# Patient Record
Sex: FEMALE | Race: WHITE | NOT HISPANIC OR LATINO | Employment: PART TIME | URBAN - METROPOLITAN AREA
[De-identification: names, ages, dates, MRNs, and addresses within clinical notes are randomized per-mention and may not be internally consistent; named-entity substitution may affect disease eponyms.]

---

## 2016-12-30 ASSESSMENT — ANXIETY QUESTIONNAIRES
7. FEELING AFRAID AS IF SOMETHING AWFUL MIGHT HAPPEN: 0 = NOT AT ALL
GAD7 TOTAL SCORE: 1
GAD7 TOTAL SCORE: 1

## 2016-12-30 ASSESSMENT — ENCOUNTER SYMPTOMS
BACK PAIN: 1
HEADACHES: 1
NAUSEA: 1
ABDOMINAL PAIN: 1
SORE THROAT: 1
DECREASED LIBIDO: 1
BLOATING: 1
SINUS CONGESTION: 1
ARTHRALGIAS: 1
VOMITING: 1
SINUS PAIN: 1
INCREASED ENERGY: 1
NIGHT SWEATS: 1
FATIGUE: 1
DIARRHEA: 1
STIFFNESS: 1
CONSTIPATION: 1

## 2016-12-31 ASSESSMENT — ANXIETY QUESTIONNAIRES: GAD7 TOTAL SCORE: 1

## 2017-01-04 DIAGNOSIS — D64.9 ANEMIA, UNSPECIFIED TYPE: Primary | ICD-10-CM

## 2017-01-06 DIAGNOSIS — D64.9 ANEMIA, UNSPECIFIED TYPE: ICD-10-CM

## 2017-01-06 LAB
HCT VFR BLD AUTO: 37.2 % (ref 35–47)
HGB BLD-MCNC: 11.4 G/DL (ref 11.7–15.7)

## 2017-01-06 PROCEDURE — 85018 HEMOGLOBIN: CPT | Performed by: INTERNAL MEDICINE

## 2017-01-06 PROCEDURE — 36415 COLL VENOUS BLD VENIPUNCTURE: CPT | Performed by: INTERNAL MEDICINE

## 2017-01-06 PROCEDURE — 85014 HEMATOCRIT: CPT | Performed by: INTERNAL MEDICINE

## 2017-01-11 NOTE — PROGRESS NOTES
"Quick Note:    M[facility] message sent:  \"Ms. Foy,    The repeat labs show your hemoglobin to be back at its baseline.    Hunter Monroy MD  1/10/2017 7:40 PM\"  ______  "

## 2017-01-13 ENCOUNTER — OFFICE VISIT (OUTPATIENT)
Dept: OBGYN | Facility: CLINIC | Age: 37
End: 2017-01-13
Attending: OBSTETRICS & GYNECOLOGY
Payer: COMMERCIAL

## 2017-01-13 VITALS
BODY MASS INDEX: 18.99 KG/M2 | HEIGHT: 65 IN | WEIGHT: 114 LBS | HEART RATE: 72 BPM | DIASTOLIC BLOOD PRESSURE: 79 MMHG | SYSTOLIC BLOOD PRESSURE: 118 MMHG

## 2017-01-13 DIAGNOSIS — N93.9 ABNORMAL UTERINE BLEEDING (AUB): ICD-10-CM

## 2017-01-13 DIAGNOSIS — N64.52 NIPPLE DISCHARGE: ICD-10-CM

## 2017-01-13 DIAGNOSIS — Z01.419 ENCOUNTER FOR GYNECOLOGICAL EXAMINATION WITHOUT ABNORMAL FINDING: Primary | ICD-10-CM

## 2017-01-13 DIAGNOSIS — Z12.4 SCREENING FOR MALIGNANT NEOPLASM OF CERVIX: ICD-10-CM

## 2017-01-13 LAB
PROLACTIN SERPL-MCNC: 4 UG/L (ref 3–27)
TSH SERPL DL<=0.005 MIU/L-ACNC: 3.1 MU/L (ref 0.4–4)

## 2017-01-13 PROCEDURE — 84443 ASSAY THYROID STIM HORMONE: CPT | Performed by: OBSTETRICS & GYNECOLOGY

## 2017-01-13 PROCEDURE — 84146 ASSAY OF PROLACTIN: CPT | Performed by: OBSTETRICS & GYNECOLOGY

## 2017-01-13 PROCEDURE — 82306 VITAMIN D 25 HYDROXY: CPT | Performed by: OBSTETRICS & GYNECOLOGY

## 2017-01-13 PROCEDURE — 87624 HPV HI-RISK TYP POOLED RSLT: CPT | Performed by: OBSTETRICS & GYNECOLOGY

## 2017-01-13 PROCEDURE — G0145 SCR C/V CYTO,THINLAYER,RESCR: HCPCS | Performed by: OBSTETRICS & GYNECOLOGY

## 2017-01-13 PROCEDURE — 99212 OFFICE O/P EST SF 10 MIN: CPT | Mod: ZF

## 2017-01-13 PROCEDURE — 36415 COLL VENOUS BLD VENIPUNCTURE: CPT | Performed by: OBSTETRICS & GYNECOLOGY

## 2017-01-13 NOTE — Clinical Note
2017       RE: Abby Foy  [address]  [place] [address]     Dear Colleague,    Thank you for referring your patient, Abby Foy, to the [facility] at [facility]. Please see a copy of my visit note below.      Progress Note    SUBJECTIVE:  Abby Foy is an 36 year old  , who requests a breast and pelvic exam.    Patient is followed by Stacie for primary care.    Concerns today include: Has been on OCPs since her last delivery, combination pills for most of this time.  Since October she has has irregular bleeding, generally during her withdrawal week , but sometimes heavier and sometimes lighter.  She has had increased breast tenderness and noticed some nipple discharge.  This improved when she stopped massaging her breasts.  She has taken multiple pregnancy tests which have all been negative.  She has been constipated but this is not new.    Menstrual History:  Menstrual History 2013 2013 2016 10/5/2016 2017   LAST MENSTRUAL PERIOD - 2013 2016 9/10/2016 2016   Menarche age - - - - -   Period Cycle (Days) - - - - -   Period Duration (Days) - - - - -   Method of Contraception Combined oral contraceptive - - - -   Period Pattern Regular - - - -   Menstrual Flow - - - - -   Menstrual Control - - - - -   Menstrual Control Change Freq (Hours) - - - - -   Dysmenorrhea - - - - -   Reviewed Today Yes - - - -       Last  PAP      NIL   2014  History of abnormal Pap smear: NO - age 30- 65 PAP every 3 years recommended    Last No results found for this basename: HPV16  Last No results found for this basename: hpv18  Last No results found for this basename: hrhpv    Mammogram current: not applicable    HISTORY:  Prescription Medications as of 2017             methotrexate sodium 2.5 MG TABS Take 10 mg by mouth once a week . Take all 4 tablets on the same day of each week.     sulfaSALAzine ER (AZULFIDINE EN) 500 MG EC tablet Take 2 tablets (1,000 mg) by mouth 2 times daily    folic acid (FOLVITE) 1 MG tablet Take 1 tablet (1 mg) by mouth daily    adalimumab (HUMIRA PEN) 40 MG/0.8ML pen kit Inject 0.8 mLs (40 mg) Subcutaneous every 14 days    levonorgestrel-ethinyl estradiol (AVIANE,ALESSE,LESSINA) 0.1-20 MG-MCG per tablet Take 1 tablet by mouth daily    Fexofenadine HCl (ALLEGRA PO) Take 180 mg by mouth daily    ibuprofen (ADVIL,MOTRIN) 400-800 mg tablet Take 1-2 tablets (400-800 mg) by mouth every 6 hours as needed for other (cramping)    multivitamin peds with iron (FLINTSTONES COMPLETE) 60 MG chewable tablet Take 1 chew tab by mouth daily.        Allergies   Allergen Reactions     No Known Drug Allergy      Shrimp Swelling     Lips and tongue     Walnuts [Nuts] Swelling     Lips and tongue       Immunization History   Administered Date(s) Administered     Influenza (IIV3) 10/12/2011, 10/03/2013     Influenza Vaccine, 3 YRS +, IM (QUADRIVALENT W/PRESERVATIVES) 10/01/2015     Pneumococcal (PCV 13) 2016     Pneumococcal 23 valent 2016     TD (ADULT, 7+) 08/15/2001     TDAP (ADACEL AGES 11-64) 2011     TDAP (BOOSTRIX AGES 10-64) 2014       Obstetric History       T1      TAB0   SAB0   E0   M0   L1      Past Medical History   Diagnosis Date     Contraception 2009     Diagnostic skin and sensitization tests 3/27/13 skin tests all NEGATIVE for environmental and food allergens including shellfish and nuts.      Nonallergic rhinitis      3/27/13 skin tests all NEGATIVE for environmental and food allergens including shellfish and nuts. 3/27/13 followup IgE tests NEG to Peanut, SNF, shrimp, macadamia nut, pistachio and walnut.     Angioedema of lips episode in 3/13--idiopathic     Rheumatoid arthritis of multiple sites with negative rheumatoid factor (H) 2015     No past surgical history on file.  Family History   Problem Relation Age of Onset      "Hypertension Maternal Grandmother      Autoimmune Disease Maternal Grandmother      Autoimmune hepatitis     CANCER Maternal Grandfather      Hypertension Paternal Grandmother      Cancer - colorectal Paternal Grandfather      Cardiovascular Paternal Grandfather      DIABETES No family hx of      CEREBROVASCULAR DISEASE No family hx of      Thyroid Disease No family hx of      Glaucoma No family hx of      Macular Degeneration No family hx of      Hypertension Mother      Autoimmune Disease Mother      Autoimmune hepatitis     Multiple Sclerosis Maternal Aunt      Other Cancer Paternal Grandfather      Hypertension Father      Hyperlipidemia Mother      DIABETES Father      Type 2     Asthma Mother      Social History     Social History     Marital Status:      Spouse Name: N/A     Number of Children: 1     Years of Education: N/A     Occupational History           Social History Main Topics     Smoking status: Never Smoker      Smokeless tobacco: Never Used     Alcohol Use: Yes      Comment: 1-3 drinks/ week     Drug Use: No     Sexual Activity:     Partners: Male     Birth Control/ Protection: Pill, Male Surgical     Other Topics Concern     Parent/Sibling W/ Cabg, Mi Or Angioplasty Before 65f 55m? No     Social History Narrative    How much exercise per week? 4 times    How much calcium per day? 2 servings and multivits       How much caffeine per day? 1 cup    How much vitamin D per day? In foods and sunlight    Do you/your family wear seatbelts?  Yes    Do you/your family use safety helmets? Yes    Do you/your family use sunscreen? Yes    Do you/your family keep firearms in the home? No    Do you/your family have a smoke detector(s)? Yes        Do you feel safe in your home? Yes    Has anyone ever touched you in an unwanted manner? No     Explain                ROS    EXAM:  Blood pressure 118/79, pulse 72, height 1.638 m (5' 4.5\"), weight 51.71 kg (114 lb), last menstrual period 12/24/2016, not " currently breastfeeding. Body mass index is 19.27 kg/(m^2).  General appearance: Pleasant female in no acute distress.     BREAST EXAM:  Breast: Without visible skin changes. No dimpling or lesions seen.   Breasts supple, non-tender with palpation, no dominant mass, nodularity, or nipple discharge noted bilaterally. Axillary nodes negative.      PELVIC EXAM:  EG/BUS: Normal genital architecture without lesions, erythema or abnormal secretions Bartholin's, Urethra, [place]'s normal   Urethral meatus: normal    Urethra: no masses, tenderness, or scarring    Bladder: no masses or tenderness    Vagina: moist, pink, rugae with creamy, white and odorless secretions  Cervix: Multiparous,, no lesions and pink, moist, closed, without lesion or CMT  Uterus: small, smooth, firm, mobile w/o pain  Adnexa: Within normal limits and No masses, nodularity, tenderness  Rectum:anus normal       ASSESSMENT:  Encounter Diagnoses   Name Primary?     Encounter for gynecological examination without abnormal finding [Z01.419] Yes     Screening for malignant neoplasm of cervix      Abnormal uterine bleeding (AUB)      Nipple discharge       36 year old Female Pelvic and Breast Exam    PLAN:   Orders Placed This Encounter   Procedures     Pelvic and Breast Exam Procedure []     Pap Smear Exam []     US GYN Complete Transvaginal - [address]50 (In Clinic)     Pap imaged thin layer screen with HPV - recommended age 30 - 65 years (select HPV order below)     HPV High Risk Types DNA Cervical     25- OH-Vitamin D     TSH with free T4 reflex     Prolactin     Will follow-up after ultrasound and labs via phone call or mychart.    Return in one year/PRN for preventive care or problems/concerns.     Verbalized understanding and agreement with visit plan.      Apryl West MD  Answers for HPI/ROS submitted by the patient on 12/30/2016   ANA 7 TOTAL SCORE: 1  PHQ-2 Depressed: Not at all, Not at all  PHQ-2 Score: 0  General Symptoms: Yes  Skin  Symptoms: Yes  HENT Symptoms: Yes  EYE SYMPTOMS: No  HEART SYMPTOMS: No  LUNG SYMPTOMS: No  INTESTINAL SYMPTOMS: Yes  URINARY SYMPTOMS: No  GYNECOLOGIC SYMPTOMS: Yes  BREAST SYMPTOMS: No  SKELETAL SYMPTOMS: Yes  BLOOD SYMPTOMS: No  NERVOUS SYSTEM SYMPTOMS: Yes  MENTAL HEALTH SYMPTOMS: No  Fatigue: Yes  Night sweats: Yes  Increased stress: Yes  Change in or Loss of Energy: Yes  Itching: Yes  Rashes: Yes  Tinnitus: Yes  Nosebleeds: Yes  Congestion: Yes  Sinus pain: Yes  Mouth sores: Yes  Sore throat: Yes  Nausea: Yes  Vomiting: Yes  Abdominal pain: Yes  Bloating: Yes  Constipation: Yes  Diarrhea: Yes  Back pain: Yes  Joint pain: Yes  Joint stiffness: Yes  Headache: Yes  Bleeding or spotting between periods: Yes  Heavy or painful periods: Yes  Irregular periods: Yes  Vaginal discharge: Yes  Reduced libido: Yes          Again, thank you for allowing me to participate in the care of your patient.      Sincerely,    Apryl West MD

## 2017-01-13 NOTE — PROGRESS NOTES
Progress Note    SUBJECTIVE:  Abby Foy is an 36 year old  , who requests a breast and pelvic exam.    Patient is followed by Stacie for primary care.    Concerns today include: Has been on OCPs since her last delivery, combination pills for most of this time.  Since October she has has irregular bleeding, generally during her withdrawal week , but sometimes heavier and sometimes lighter.  She has had increased breast tenderness and noticed some nipple discharge.  This improved when she stopped massaging her breasts.  She has taken multiple pregnancy tests which have all been negative.  She has been constipated but this is not new.    Menstrual History:  Menstrual History 2013 2013 2016 10/5/2016 2017   LAST MENSTRUAL PERIOD - 2013 2016 9/10/2016 2016   Menarche age - - - - -   Period Cycle (Days) - - - - -   Period Duration (Days) - - - - -   Method of Contraception Combined oral contraceptive - - - -   Period Pattern Regular - - - -   Menstrual Flow - - - - -   Menstrual Control - - - - -   Menstrual Control Change Freq (Hours) - - - - -   Dysmenorrhea - - - - -   Reviewed Today Yes - - - -       Last  PAP      NIL   2014  History of abnormal Pap smear: NO - age 30- 65 PAP every 3 years recommended    Last No results found for this basename: HPV16  Last No results found for this basename: hpv18  Last No results found for this basename: hrhpv    Mammogram current: not applicable    HISTORY:  Prescription Medications as of 2017             methotrexate sodium 2.5 MG TABS Take 10 mg by mouth once a week . Take all 4 tablets on the same day of each week.    sulfaSALAzine ER (AZULFIDINE EN) 500 MG EC tablet Take 2 tablets (1,000 mg) by mouth 2 times daily    folic acid (FOLVITE) 1 MG tablet Take 1 tablet (1 mg) by mouth daily    adalimumab (HUMIRA PEN) 40 MG/0.8ML pen kit Inject 0.8 mLs (40 mg) Subcutaneous every 14 days    levonorgestrel-ethinyl estradiol  (AVIANE,ALESSE,LESSINA) 0.1-20 MG-MCG per tablet Take 1 tablet by mouth daily    Fexofenadine HCl (ALLEGRA PO) Take 180 mg by mouth daily    ibuprofen (ADVIL,MOTRIN) 400-800 mg tablet Take 1-2 tablets (400-800 mg) by mouth every 6 hours as needed for other (cramping)    multivitamin peds with iron (FLINTSTONES COMPLETE) 60 MG chewable tablet Take 1 chew tab by mouth daily.        Allergies   Allergen Reactions     No Known Drug Allergy      Shrimp Swelling     Lips and tongue     Walnuts [Nuts] Swelling     Lips and tongue       Immunization History   Administered Date(s) Administered     Influenza (IIV3) 10/12/2011, 10/03/2013     Influenza Vaccine, 3 YRS +, IM (QUADRIVALENT W/PRESERVATIVES) 10/01/2015     Pneumococcal (PCV 13) 2016     Pneumococcal 23 valent 2016     TD (ADULT, 7+) 08/15/2001     TDAP (ADACEL AGES 11-64) 2011     TDAP (BOOSTRIX AGES 10-64) 2014       Obstetric History       T1      TAB0   SAB0   E0   M0   L1      Past Medical History   Diagnosis Date     Contraception 2009     Diagnostic skin and sensitization tests 3/27/13 skin tests all NEGATIVE for environmental and food allergens including shellfish and nuts.      Nonallergic rhinitis      3/27/13 skin tests all NEGATIVE for environmental and food allergens including shellfish and nuts. 3/27/13 followup IgE tests NEG to Peanut, SNF, shrimp, macadamia nut, pistachio and walnut.     Angioedema of lips episode in 3/13--idiopathic     Rheumatoid arthritis of multiple sites with negative rheumatoid factor (H) 2015     No past surgical history on file.  Family History   Problem Relation Age of Onset     Hypertension Maternal Grandmother      Autoimmune Disease Maternal Grandmother      Autoimmune hepatitis     CANCER Maternal Grandfather      Hypertension Paternal Grandmother      Cancer - colorectal Paternal Grandfather      Cardiovascular Paternal Grandfather      DIABETES No family hx of       "CEREBROVASCULAR DISEASE No family hx of      Thyroid Disease No family hx of      Glaucoma No family hx of      Macular Degeneration No family hx of      Hypertension Mother      Autoimmune Disease Mother      Autoimmune hepatitis     Multiple Sclerosis Maternal Aunt      Other Cancer Paternal Grandfather      Hypertension Father      Hyperlipidemia Mother      DIABETES Father      Type 2     Asthma Mother      Social History     Social History     Marital Status:      Spouse Name: N/A     Number of Children: 1     Years of Education: N/A     Occupational History           Social History Main Topics     Smoking status: Never Smoker      Smokeless tobacco: Never Used     Alcohol Use: Yes      Comment: 1-3 drinks/ week     Drug Use: No     Sexual Activity:     Partners: Male     Birth Control/ Protection: Pill, Male Surgical     Other Topics Concern     Parent/Sibling W/ Cabg, Mi Or Angioplasty Before 65f 55m? No     Social History Narrative    How much exercise per week? 4 times    How much calcium per day? 2 servings and multivits       How much caffeine per day? 1 cup    How much vitamin D per day? In foods and sunlight    Do you/your family wear seatbelts?  Yes    Do you/your family use safety helmets? Yes    Do you/your family use sunscreen? Yes    Do you/your family keep firearms in the home? No    Do you/your family have a smoke detector(s)? Yes        Do you feel safe in your home? Yes    Has anyone ever touched you in an unwanted manner? No     Explain                ROS    EXAM:  Blood pressure 118/79, pulse 72, height 1.638 m (5' 4.5\"), weight 51.71 kg (114 lb), last menstrual period 12/24/2016, not currently breastfeeding. Body mass index is 19.27 kg/(m^2).  General appearance: Pleasant female in no acute distress.     BREAST EXAM:  Breast: Without visible skin changes. No dimpling or lesions seen.   Breasts supple, non-tender with palpation, no dominant mass, nodularity, or nipple discharge noted " bilaterally. Axillary nodes negative.      PELVIC EXAM:  EG/BUS: Normal genital architecture without lesions, erythema or abnormal secretions Bartholin's, Urethra, [place]'s normal   Urethral meatus: normal    Urethra: no masses, tenderness, or scarring    Bladder: no masses or tenderness    Vagina: moist, pink, rugae with creamy, white and odorless secretions  Cervix: Multiparous,, no lesions and pink, moist, closed, without lesion or CMT  Uterus: small, smooth, firm, mobile w/o pain  Adnexa: Within normal limits and No masses, nodularity, tenderness  Rectum:anus normal       ASSESSMENT:  Encounter Diagnoses   Name Primary?     Encounter for gynecological examination without abnormal finding [Z01.419] Yes     Screening for malignant neoplasm of cervix      Abnormal uterine bleeding (AUB)      Nipple discharge       36 year old Female Pelvic and Breast Exam    PLAN:   Orders Placed This Encounter   Procedures     Pelvic and Breast Exam Procedure []     Pap Smear Exam []     US GYN Complete Transvaginal - 69717 (In Clinic)     Pap imaged thin layer screen with HPV - recommended age 30 - 65 years (select HPV order below)     HPV High Risk Types DNA Cervical     25- OH-Vitamin D     TSH with free T4 reflex     Prolactin     Will follow-up after ultrasound and labs via phone call or mychart.    Return in one year/PRN for preventive care or problems/concerns.     Verbalized understanding and agreement with visit plan.      Apryl West MD  Answers for HPI/ROS submitted by the patient on 12/30/2016   ANA 7 TOTAL SCORE: 1  PHQ-2 Depressed: Not at all, Not at all  PHQ-2 Score: 0  General Symptoms: Yes  Skin Symptoms: Yes  HENT Symptoms: Yes  EYE SYMPTOMS: No  HEART SYMPTOMS: No  LUNG SYMPTOMS: No  INTESTINAL SYMPTOMS: Yes  URINARY SYMPTOMS: No  GYNECOLOGIC SYMPTOMS: Yes  BREAST SYMPTOMS: No  SKELETAL SYMPTOMS: Yes  BLOOD SYMPTOMS: No  NERVOUS SYSTEM SYMPTOMS: Yes  MENTAL HEALTH SYMPTOMS: No  Fatigue: Yes  Night  sweats: Yes  Increased stress: Yes  Change in or Loss of Energy: Yes  Itching: Yes  Rashes: Yes  Tinnitus: Yes  Nosebleeds: Yes  Congestion: Yes  Sinus pain: Yes  Mouth sores: Yes  Sore throat: Yes  Nausea: Yes  Vomiting: Yes  Abdominal pain: Yes  Bloating: Yes  Constipation: Yes  Diarrhea: Yes  Back pain: Yes  Joint pain: Yes  Joint stiffness: Yes  Headache: Yes  Bleeding or spotting between periods: Yes  Heavy or painful periods: Yes  Irregular periods: Yes  Vaginal discharge: Yes  Reduced libido: Yes

## 2017-01-16 ENCOUNTER — OFFICE VISIT (OUTPATIENT)
Dept: OBGYN | Facility: CLINIC | Age: 37
End: 2017-01-16
Attending: OBSTETRICS & GYNECOLOGY
Payer: COMMERCIAL

## 2017-01-16 DIAGNOSIS — N93.9 ABNORMAL UTERINE BLEEDING (AUB): ICD-10-CM

## 2017-01-16 LAB — DEPRECATED CALCIDIOL+CALCIFEROL SERPL-MC: 45 UG/L (ref 20–75)

## 2017-01-16 PROCEDURE — 76830 TRANSVAGINAL US NON-OB: CPT | Mod: ZF

## 2017-01-16 NOTE — Clinical Note
1/16/2017       RE: Abby Foy  [address] [facility] [place] [address]     Dear Colleague,    Thank you for referring your patient, Abby Foy, to the [facility] at [facility]. Please see a copy of my visit note below.    36 year old female with LMP 12/24/2016 presents for gynecologic ultrasound indicated by abnormal uterine bleeding on OCP's.  This study was done transvaginally.    Uterine findings:   Presence: Visible Size: Normal 4.7x4.1x2.9 cm.  Endometrium = 3.8 mm.   Cx length = 31.8 mm.      Flexion:  Anteverted Position: Midline Margins: Smooth Shape: Normal   Contour: Regular Texture: Homogeneous Cavity: Normal Masses: Normal    Pelvic findings:    Right Adnexa: Normal   Left Adnexa : dilated left fallopian tube, possible left pyosalpinx   Bladder:  Normal         Cul - de - sac fluid: None    Ovarian follicles:   Right ovary:  2.3x2.4x1.6cm.     Small follicles     Size(s):largest -  [id number]     Left ovary:  2.0x1.76x1.5cm.     0 follicles    Comments:  Thin endometrial stripe, otherwise normal Pelvic US.    KIMMY Flores MD

## 2017-01-16 NOTE — PROGRESS NOTES
36 year old female with LMP 12/24/2016 presents for gynecologic ultrasound indicated by abnormal uterine bleeding on OCP's.  This study was done transvaginally.    Uterine findings:   Presence: Visible Size: Normal 4.7x4.1x2.9 cm.  Endometrium = 3.8 mm.   Cx length = 31.8 mm.      Flexion:  Anteverted Position: Midline Margins: Smooth Shape: Normal   Contour: Regular Texture: Homogeneous Cavity: Normal Masses: Normal    Pelvic findings:    Right Adnexa: Normal   Left Adnexa : dilated left fallopian tube, possible left pyosalpinx   Bladder:  Normal         Cul - de - sac fluid: None    Ovarian follicles:   Right ovary:  2.3x2.4x1.6cm.     Small follicles     Size(s):largest -  [id number]     Left ovary:  2.0x1.76x1.5cm.     0 follicles    Comments:  Thin endometrial stripe, otherwise normal Pelvic US.    KIMMY Flores MD

## 2017-01-16 NOTE — Clinical Note
1/16/2017      RE: Abby Foy  [address] [address] [facility]  [place] [address]       36 year old female with LMP 12/24/2016 presents for gynecologic ultrasound indicated by abnormal uterine bleeding on OCP's.  This study was done transvaginally.    Uterine findings:   Presence: Visible Size: Normal 4.7x4.1x2.9 cm.  Endometrium = 3.8 mm.   Cx length = 31.8 mm.      Flexion:  Anteverted Position: Midline Margins: Smooth Shape: Normal   Contour: Regular Texture: Homogeneous Cavity: Normal Masses: Normal    Pelvic findings:    Right Adnexa: Normal   Left Adnexa : dilated left fallopian tube, possible left pyosalpinx   Bladder:  Normal         Cul - de - sac fluid: None    Ovarian follicles:   Right ovary:  2.3x2.4x1.6cm.     Small follicles     Size(s):largest -  [id number]     Left ovary:  2.0x1.76x1.5cm.     0 follicles    Comments:  Thin endometrial stripe, otherwise normal Pelvic US.    KIMMY Flores MD    S Ultrasound

## 2017-01-17 LAB
COPATH REPORT: NORMAL
PAP: NORMAL

## 2017-01-18 DIAGNOSIS — N70.11 HYDROSALPINX: Primary | ICD-10-CM

## 2017-01-20 LAB
FINAL DIAGNOSIS: NORMAL
HPV HR 12 DNA CVX QL NAA+PROBE: NEGATIVE
HPV16 DNA SPEC QL NAA+PROBE: NEGATIVE
HPV18 DNA SPEC QL NAA+PROBE: NEGATIVE
SPECIMEN DESCRIPTION: NORMAL

## 2017-01-24 ENCOUNTER — TELEPHONE (OUTPATIENT)
Dept: OBGYN | Facility: CLINIC | Age: 37
End: 2017-01-24

## 2017-01-24 NOTE — TELEPHONE ENCOUNTER
Received message from call center. Patient needs an ULS in 2 months per Dr. West. Patient last seen on 1/16. Need to schedule ULS on 3/16.    Attempted to reach patient, N/A LVM.

## 2017-01-27 ENCOUNTER — TELEPHONE (OUTPATIENT)
Dept: OBGYN | Facility: CLINIC | Age: 37
End: 2017-01-27

## 2017-01-27 NOTE — TELEPHONE ENCOUNTER
Returning patient's call to schedule ultrasound in 2 months, begin 3/16. Attempted to reach patient, N/A LVM.

## 2017-01-30 ENCOUNTER — TELEPHONE (OUTPATIENT)
Dept: OBGYN | Facility: CLINIC | Age: 37
End: 2017-01-30

## 2017-02-27 RX ORDER — LEVONORGESTREL/ETHIN.ESTRADIOL 0.1-0.02MG
1 TABLET ORAL DAILY
Qty: 90 TABLET | Refills: 3 | Status: SHIPPED | OUTPATIENT
Start: 2017-02-27 | End: 2017-06-09

## 2017-02-27 NOTE — TELEPHONE ENCOUNTER
Refill request received for OCP . Her last clinic appointment was 1/13/17. Refill completed per [facility] RN protocol.

## 2017-03-16 DIAGNOSIS — Z79.899 HIGH RISK MEDICATION USE: ICD-10-CM

## 2017-03-16 DIAGNOSIS — M47.819 SERONEGATIVE SPONDYLOARTHROPATHY: ICD-10-CM

## 2017-03-16 LAB
ALBUMIN SERPL-MCNC: 3.8 G/DL (ref 3.4–5)
ALP SERPL-CCNC: 44 U/L (ref 40–150)
ALT SERPL W P-5'-P-CCNC: 27 U/L (ref 0–50)
AST SERPL W P-5'-P-CCNC: 20 U/L (ref 0–45)
BASOPHILS # BLD AUTO: 0 10E9/L (ref 0–0.2)
BASOPHILS NFR BLD AUTO: 0.5 %
BILIRUB DIRECT SERPL-MCNC: 0.1 MG/DL (ref 0–0.2)
BILIRUB SERPL-MCNC: 0.3 MG/DL (ref 0.2–1.3)
CREAT SERPL-MCNC: 0.86 MG/DL (ref 0.52–1.04)
CRP SERPL-MCNC: <2.9 MG/L (ref 0–8)
DIFFERENTIAL METHOD BLD: ABNORMAL
EOSINOPHIL # BLD AUTO: 0.2 10E9/L (ref 0–0.7)
EOSINOPHIL NFR BLD AUTO: 3.9 %
ERYTHROCYTE [DISTWIDTH] IN BLOOD BY AUTOMATED COUNT: 11.9 % (ref 10–15)
ERYTHROCYTE [SEDIMENTATION RATE] IN BLOOD BY WESTERGREN METHOD: 7 MM/H (ref 0–20)
GFR SERPL CREATININE-BSD FRML MDRD: 74 ML/MIN/1.7M2
HCT VFR BLD AUTO: 32.4 % (ref 35–47)
HGB BLD-MCNC: 10.1 G/DL (ref 11.7–15.7)
LYMPHOCYTES # BLD AUTO: 2.7 10E9/L (ref 0.8–5.3)
LYMPHOCYTES NFR BLD AUTO: 48 %
MCH RBC QN AUTO: 29.1 PG (ref 26.5–33)
MCHC RBC AUTO-ENTMCNC: 31.2 G/DL (ref 31.5–36.5)
MCV RBC AUTO: 93 FL (ref 78–100)
MONOCYTES # BLD AUTO: 0.4 10E9/L (ref 0–1.3)
MONOCYTES NFR BLD AUTO: 7.3 %
NEUTROPHILS # BLD AUTO: 2.3 10E9/L (ref 1.6–8.3)
NEUTROPHILS NFR BLD AUTO: 40.3 %
PLATELET # BLD AUTO: 173 10E9/L (ref 150–450)
PROT SERPL-MCNC: 7 G/DL (ref 6.8–8.8)
RBC # BLD AUTO: 3.47 10E12/L (ref 3.8–5.2)
WBC # BLD AUTO: 5.6 10E9/L (ref 4–11)

## 2017-03-16 PROCEDURE — 36415 COLL VENOUS BLD VENIPUNCTURE: CPT | Performed by: INTERNAL MEDICINE

## 2017-03-16 PROCEDURE — 82565 ASSAY OF CREATININE: CPT | Performed by: INTERNAL MEDICINE

## 2017-03-16 PROCEDURE — 86140 C-REACTIVE PROTEIN: CPT | Performed by: INTERNAL MEDICINE

## 2017-03-16 PROCEDURE — 85652 RBC SED RATE AUTOMATED: CPT | Performed by: INTERNAL MEDICINE

## 2017-03-16 PROCEDURE — 85025 COMPLETE CBC W/AUTO DIFF WBC: CPT | Performed by: INTERNAL MEDICINE

## 2017-03-16 PROCEDURE — 80076 HEPATIC FUNCTION PANEL: CPT | Performed by: INTERNAL MEDICINE

## 2017-03-20 ENCOUNTER — OFFICE VISIT (OUTPATIENT)
Dept: RHEUMATOLOGY | Facility: CLINIC | Age: 37
End: 2017-03-20
Payer: COMMERCIAL

## 2017-03-20 VITALS
SYSTOLIC BLOOD PRESSURE: 127 MMHG | BODY MASS INDEX: 19.13 KG/M2 | WEIGHT: 114.8 LBS | DIASTOLIC BLOOD PRESSURE: 75 MMHG | HEART RATE: 99 BPM | OXYGEN SATURATION: 99 % | HEIGHT: 65 IN

## 2017-03-20 DIAGNOSIS — D64.9 ANEMIA, UNSPECIFIED TYPE: ICD-10-CM

## 2017-03-20 DIAGNOSIS — R61 NIGHT SWEATS: ICD-10-CM

## 2017-03-20 DIAGNOSIS — M47.819 SERONEGATIVE SPONDYLOARTHROPATHY: Primary | ICD-10-CM

## 2017-03-20 DIAGNOSIS — R51.9 NONINTRACTABLE HEADACHE, UNSPECIFIED CHRONICITY PATTERN, UNSPECIFIED HEADACHE TYPE: ICD-10-CM

## 2017-03-20 DIAGNOSIS — Z79.899 HIGH RISK MEDICATION USE: ICD-10-CM

## 2017-03-20 LAB
DIFFERENTIAL METHOD BLD: ABNORMAL
EOSINOPHIL # BLD AUTO: 0.2 10E9/L (ref 0–0.7)
EOSINOPHIL NFR BLD AUTO: 3 %
ERYTHROCYTE [DISTWIDTH] IN BLOOD BY AUTOMATED COUNT: 12.2 % (ref 10–15)
FERRITIN SERPL-MCNC: 90 NG/ML (ref 12–150)
FOLATE SERPL-MCNC: 49.8 NG/ML
HCT VFR BLD AUTO: 32.8 % (ref 35–47)
HGB BLD-MCNC: 10.3 G/DL (ref 11.7–15.7)
IRON SATN MFR SERPL: 33 % (ref 15–46)
IRON SERPL-MCNC: 119 UG/DL (ref 35–180)
LYMPHOCYTES # BLD AUTO: 2.1 10E9/L (ref 0.8–5.3)
LYMPHOCYTES NFR BLD AUTO: 41 %
MCH RBC QN AUTO: 29.4 PG (ref 26.5–33)
MCHC RBC AUTO-ENTMCNC: 31.4 G/DL (ref 31.5–36.5)
MCV RBC AUTO: 94 FL (ref 78–100)
MONOCYTES # BLD AUTO: 0.3 10E9/L (ref 0–1.3)
MONOCYTES NFR BLD AUTO: 6 %
NEUTROPHILS # BLD AUTO: 2.6 10E9/L (ref 1.6–8.3)
NEUTROPHILS NFR BLD AUTO: 50 %
PLATELET # BLD AUTO: 187 10E9/L (ref 150–450)
RBC # BLD AUTO: 3.5 10E12/L (ref 3.8–5.2)
RETICS # AUTO: 109.6 10E9/L (ref 25–95)
RETICS/RBC NFR AUTO: 3.1 % (ref 0.5–2)
TIBC SERPL-MCNC: 357 UG/DL (ref 240–430)
TRANSFERRIN SERPL-MCNC: 282 MG/DL (ref 210–360)
VIT B12 SERPL-MCNC: 627 PG/ML (ref 193–986)
WBC # BLD AUTO: 5.2 10E9/L (ref 4–11)

## 2017-03-20 PROCEDURE — 82607 VITAMIN B-12: CPT | Performed by: INTERNAL MEDICINE

## 2017-03-20 PROCEDURE — 99214 OFFICE O/P EST MOD 30 MIN: CPT | Performed by: INTERNAL MEDICINE

## 2017-03-20 PROCEDURE — 83540 ASSAY OF IRON: CPT | Performed by: INTERNAL MEDICINE

## 2017-03-20 PROCEDURE — 86704 HEP B CORE ANTIBODY TOTAL: CPT | Performed by: INTERNAL MEDICINE

## 2017-03-20 PROCEDURE — 83550 IRON BINDING TEST: CPT | Performed by: INTERNAL MEDICINE

## 2017-03-20 PROCEDURE — 84165 PROTEIN E-PHORESIS SERUM: CPT | Performed by: INTERNAL MEDICINE

## 2017-03-20 PROCEDURE — 84466 ASSAY OF TRANSFERRIN: CPT | Performed by: INTERNAL MEDICINE

## 2017-03-20 PROCEDURE — 86334 IMMUNOFIX E-PHORESIS SERUM: CPT | Performed by: INTERNAL MEDICINE

## 2017-03-20 PROCEDURE — 85025 COMPLETE CBC W/AUTO DIFF WBC: CPT | Performed by: INTERNAL MEDICINE

## 2017-03-20 PROCEDURE — 36415 COLL VENOUS BLD VENIPUNCTURE: CPT | Performed by: INTERNAL MEDICINE

## 2017-03-20 PROCEDURE — 82784 ASSAY IGA/IGD/IGG/IGM EACH: CPT | Performed by: INTERNAL MEDICINE

## 2017-03-20 PROCEDURE — 82746 ASSAY OF FOLIC ACID SERUM: CPT | Performed by: INTERNAL MEDICINE

## 2017-03-20 PROCEDURE — 86480 TB TEST CELL IMMUN MEASURE: CPT | Performed by: INTERNAL MEDICINE

## 2017-03-20 PROCEDURE — 85045 AUTOMATED RETICULOCYTE COUNT: CPT | Performed by: INTERNAL MEDICINE

## 2017-03-20 PROCEDURE — 00000402 ZZHCL STATISTIC TOTAL PROTEIN: Performed by: INTERNAL MEDICINE

## 2017-03-20 PROCEDURE — 82728 ASSAY OF FERRITIN: CPT | Performed by: INTERNAL MEDICINE

## 2017-03-20 NOTE — PROGRESS NOTES
Rheumatology Clinic Visit      Abby Foy MRN# [id number]   YOB: 1980 Age: 36 year old      Date of visit: 3/20/17   PCP: Levar Slaughter  Dermatologist: Amy Patel  Ophthalmologist: Dr. Mathis     Chief Complaint   Patient presents with:  RECHECK: patient states she is still getting headaches, to the point she vomits and had to go to the ER for fluids. Getting them once a week. Also has spots on her fingers    Assessment and Plan   1. 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 However, given her continued back pain that improved with activity but no radiographic evidence for inflammatory arthritis or osteoarthritis, there was concern that she had inflammatory back pain that would be more consistent with a spondylarthritis. Humira was started and resulted in improvement of her back pain, arguing that she does have axial disease. Sulfasalazine was previously added and has been beneficial. Headaches more frequently though, and therefore we will discontinue methotrexate and sulfasalazine. See #3.   - Discontinue methotrexate 10 mg once weekly   - Continue Humira 40 mg every 14 days  - Discontinue sulfasalazine 1000mg BID    # Pregnancy Planning:  had a vasectomy    2. Iritis History, then diagnosed with Giant Papillary Conjunctivitis: Was evaluated by ophthalmology.    3. Headaches: Unclear etiology and has been worsening over time. Possibly related to methotrexate and sulfasalazine and therefore they are being discontinued. If she does not have improvement of her headaches within 2 weeks, then I have asked that she be evaluated by neurology.  - Discontinue methotrexate  - Discontinue sulfasalazine  - Neurology referral    4. Anemia: Unclear etiology. Not  menstruating for the past several months.    - Labs: Iron, iron binding capacity, reticulocyte count, transferrin, ferritin, CBC, B12, folate    5. No menstruation for the past several months: following with GYN    6. Night sweats: check labs  - Labs: SPEP, Immunofixation, quantiferon TB, HBV core ab    7. Bone Health: 1/2017 vitamin D normal.     8. Vaccinations:   - Influenza: encouraged yearly vaccination  - [id number]: up to date  - [id number]: up to date     Ms. Foy verbalized agreement with and understanding of the rational for the diagnosis and treatment plan.  All questions were answered to best of my ability and the patient's satisfaction. Ms. Foy was advised to contact the clinic with any questions that may arise after the clinic visit.      Thank you for involving me in the care of the patient    Return to clinic: 3 months      HPI   Abby Foy is a 36 year old female with medical history significant for urticaria, H. pylori infection, and iritis? who returns for f/u of seronegative spondyloarthropathy.    Today, Ms. Foy reports that her arthritis is doing well. Morning stiffness for approximately 10 minutes, but may be worse on random mornings. No joint swelling. Back stiffness may occur in the middle of the day it resolves quickly; + gelling phenomenon. She has noticed that her headaches have gotten worse and she has had to go to the [facility] twice for IV fluids and medications. She has also been to her gynecologist because she is no longer menstruating and for these issues.  No pregnant, and her  has now had a vasectomy.    Denies fevers, chills, nausea, vomiting, constipation, diarrhea. No abdominal pain. No chest pain/pressure, palpitations, or shortness of breath. No neck pain. Occasional cold sores; no sores since starting methotrexate.     Tobacco: None  EtOH: 1-2 drinks on the weekends  Drugs: None  Occupation: Dietitian at the [employer]    ROS    GEN: No fevers/chills  SKIN: See HPI  HEENT: Chronic headaches.  See HPI.  CV: No chest pain, pressure, palpitations, or dyspnea on exertion.  PULM: No SOB. No cough or wheezing  GI: No nausea, vomiting, constipation, diarrhea. No blood in stool. +Abdominal bloating.  : No blood in urine.  MSK: See HPI.  NEURO: negative  PSYCH: negative  EXT: No LE edema.     Active Problem List     Patient Active Problem List   Diagnosis     CARDIOVASCULAR SCREENING; LDL GOAL LESS THAN 160     Urticaria     Diagnostic skin and sensitization tests     Nonallergic rhinitis     Angioedema of lips     H. pylori infection     GPC (giant papillary conjunctivitis)     Seronegative spondyloarthropathy     Midline low back pain without sciatica     Abnormal uterine bleeding (AUB)- on OCPs     Past Medical History     Past Medical History   Diagnosis Date     Angioedema of lips episode in 3/13--idiopathic     Contraception 1/28/2009     Diagnostic skin and sensitization tests 3/27/13 skin tests all NEGATIVE for environmental and food allergens including shellfish and nuts.      Nonallergic rhinitis      3/27/13 skin tests all NEGATIVE for environmental and food allergens including shellfish and nuts. 3/27/13 followup IgE tests NEG to Peanut, SNF, shrimp, macadamia nut, pistachio and walnut.     Rheumatoid arthritis of multiple sites with negative rheumatoid factor (H) 12/31/2015     Past Surgical History   History reviewed. No pertinent past surgical history.     Allergy     Allergies   Allergen Reactions     No Known Drug Allergy      Shrimp Swelling     Lips and tongue     Walnuts [Nuts] Swelling     Lips and tongue       Current Medication List     Current Outpatient Prescriptions   Medication Sig     levonorgestrel-ethinyl estradiol (AVIANE,ALESSE,LESSINA) 0.1-20 MG-MCG per tablet Take 1 tablet by mouth daily     methotrexate sodium 2.5 MG TABS Take 10 mg by mouth once a week . Take all 4 tablets on the same day of each week.      sulfaSALAzine ER (AZULFIDINE EN) 500 MG EC tablet Take 2 tablets (1,000 mg) by mouth 2 times daily     folic acid (FOLVITE) 1 MG tablet Take 1 tablet (1 mg) by mouth daily     adalimumab (HUMIRA PEN) 40 MG/0.8ML pen kit Inject 0.8 mLs (40 mg) Subcutaneous every 14 days     Fexofenadine HCl (ALLEGRA PO) Take 180 mg by mouth daily     ibuprofen (ADVIL,MOTRIN) 400-800 mg tablet Take 1-2 tablets (400-800 mg) by mouth every 6 hours as needed for other (cramping)     multivitamin peds with iron (FLINTSTONES COMPLETE) 60 MG chewable tablet Take 1 chew tab by mouth daily.     No current facility-administered medications for this visit.      Social History   See HPI    Family History     Family History   Problem Relation Age of Onset     Hypertension Maternal Grandmother      Autoimmune Disease Maternal Grandmother      Autoimmune hepatitis     CANCER Maternal Grandfather      Hypertension Paternal Grandmother      Cancer - colorectal Paternal Grandfather      Cardiovascular Paternal Grandfather      Other Cancer Paternal Grandfather      Hypertension Mother      Autoimmune Disease Mother      Autoimmune hepatitis     Hyperlipidemia Mother      Asthma Mother      Hypertension Father      DIABETES Father      Type 2     Multiple Sclerosis Maternal Aunt      CEREBROVASCULAR DISEASE No family hx of      Thyroid Disease No family hx of      Glaucoma No family hx of      Macular Degeneration No family hx of      Breast Cancer No family hx of      Colon Cancer No family hx of      Physical Exam     Temp Readings from Last 3 Encounters:   10/05/16 98  F (36.7  C) (Oral)   09/26/16 98.5  F (36.9  C) (Oral)   08/02/16 98.4  F (36.9  C) (Oral)     BP Readings from Last 5 Encounters:   03/20/17 127/75   01/13/17 118/79   12/22/16 109/70   10/05/16 117/77   09/26/16 107/74     Pulse Readings from Last 1 Encounters:   03/20/17 99     Resp Readings from Last 1 Encounters:   03/28/16 16     Estimated body mass index is 19.4 kg/(m^2) as  "calculated from the following:    Height as of this encounter: 1.638 m (5' 4.5\").    Weight as of this encounter: 52.1 kg (114 lb 12.8 oz).    GEN: NAD  HEENT: MMM. Anicteric, noninjected sclera; eyes appear to have good moisture by gross examination  CV: S1, S2. RRR. No m/r/g.  PULM: CTA bilaterally. No w/c.  MSK: Tenderness palpation without synovial swelling of the right third and fourth PIPs. Wrists, elbows, and shoulders without swelling or tenderness palpation. Hips nontender to direct palpation. Knees, ankles, and feet without swelling or tenderness to palpation. Spine tender to palpation only in the lumbar area.  NEURO: normal gait  SKIN: pearly lesions on several fingers; nontender to palpation  EXT: No LE edema  PSYCH: Alert. Appropriate.    Labs / Imaging (select studies)   RF/CCP  Recent Labs   Lab Test  12/21/15   1447   CCPABY  <20  Interpretation:  Negative     RHF  <20     MELY/RNP/Sm/SSA/SSB/dsDNA  Recent Labs   Lab Test  08/05/15   1621  06/30/15   1636  08/06/13   1500   MARCIE   --   <1.0  Interpretation:  Negative     --    SSAIGG  <0.2  Negative   Antibody index (AI) values reflect qualitative changes in antibody   concentration that cannot be directly associated with clinical condition or   disease state.     --    --    SSBIGG  <0.2  Negative   Antibody index (AI) values reflect qualitative changes in antibody   concentration that cannot be directly associated with clinical condition or   disease state.     --    --    TREPAB   --    --   Negative   [id number]   --   116   --    [id number]   --   21   --      Recent Labs   Lab Test  06/30/15   1636   DNA  <15  Interpretation:  Negative       Send-out Labs  Recent Labs   Lab Test  03/31/10   1135   STSTNM  AMH ANTIMULLERIEN HORMONE   SSPTYP  Serum     Antiphospholipid Antibodies  Recent Labs   Lab Test  06/30/15   1636   CARG  <15.0  Interpretation:  Negative     FRED  <12.5  Interpretation:  Negative       HLA-B27  Recent Labs   Lab Test  12/21/15   " 1447   [id number]  SSOP   B1  B27 Neg     CBC  Recent Labs   Lab Test  03/16/17   1431  01/06/17   1007  12/22/16   0945  09/26/16   1444   WBC  5.6   --   5.5  7.7   RBC  3.47*   --   3.55*  3.71*   HGB  10.1*  11.4*  10.0*  10.9*   HCT  32.4*  37.2  32.9*  34.3*   MCV  93   --   93  93   RDW  11.9   --   12.3  12.5   PLT  173   --   172  222   MCH  29.1   --   28.2  29.4   MCHC  31.2*   --   30.4*  31.8   NEUTROPHIL  40.3   --   52.0  59.1   LYMPH  48.0   --   36.7  28.9   MONOCYTE  7.3   --   6.9  6.5   EOSINOPHIL  3.9   --   4.0  5.2   BASOPHIL  0.5   --   0.4  0.3   ANEU  2.3   --   2.9  4.5   ALYM  2.7   --   2.0  2.2   ANA MARÍA  0.4   --   0.4  0.5   AEOS  0.2   --   0.2  0.4   ABAS  0.0   --   0.0  0.0     CMP  Recent Labs   Lab Test  03/16/17   1431  12/22/16   0945  09/26/16   1444   06/30/15   1636  11/05/09   1058   02/02/09   0940   NA   --    --    --    --   140   --    --   141   POTASSIUM   --    --    --    --   4.1   --    --   4.1   CHLORIDE   --    --    --    --   105   --    --   103   CO2   --    --    --    --   30   --    --   25   ANIONGAP   --    --    --    --   5   --    --   13   GLC   --    --    --    --   73  79   --   79   BUN   --    --    --    --   9   --    --   8   CR  0.86  0.88  0.77   < >  0.80   --    --   0.88   GFRESTIMATED  74  73  85   < >  83   --    --   77   GFRESTBLACK  90  88  >90   GFR Calc     < >  >90   GFR Calc     --    --   >90   DAISY   --    --    --    --   9.4   --    --   9.0   BILITOTAL  0.3  0.3  0.2   < >  0.5   --    < >   --    ALBUMIN  3.8  4.0  3.7   < >  4.1   --    < >   --    PROTTOTAL  7.0  7.0  7.4   < >  8.1   --    < >   --    ALKPHOS  44  44  66   < >  65   --    < >   --    AST  20  21  23   < >  19   --    < >   --    ALT  27  27  29   < >  26   --    < >   --     < > = values in this interval not displayed.     Calcium/VitaminD  Recent Labs   Lab Test  01/13/17   1355  12/21/15   1447  06/30/15   7001   "01/09/14   0906   11/10/10   1335  11/05/09   1058  02/02/09   0940   DAISY   --    --   9.4   --    --    --    --   9.0   D3VIT   --    --    --    --    --   45  51   --    VITDT  45  74   --   35   < >   --    --    --     < > = values in this interval not displayed.     ESR/CRP  Recent Labs   Lab Test  03/16/17   1431  12/22/16   0945  09/26/16   1444   SED  7  6  20   CRP  <2.9  <2.9  23.3*     TSH/T4  Recent Labs   Lab Test  01/13/17   1355  08/05/15   1621  06/28/13   0853   TSH  3.10  1.11  1.47     Lipid Panel  Recent Labs   Lab Test  11/05/09   1058   CHOL  211*   TRIG  67   HDL  82   LDL  115   VLDL  13   CHOLHDLRATIO  3.0     Hepatitis B  Recent Labs   Lab Test  03/28/16   1442  08/06/13   1500  11/10/10   1335   AUSAB  264.69*   --    --    HEPBANG  Nonreactive  Negative  Negative     Hepatitis C  Recent Labs   Lab Test  12/21/15   1447   HCVAB  Nonreactive   Assay performance characteristics have not been established for newborns,   infants, and children       Lyme confirmation testing by Western Blot  Recent Labs   Lab Test  08/05/15   1621   LYWG  Negative  Reference range: Negative  (Note)  Band(s) present: NONE  (Insufficient number of bands for positive result)  INTERPRETIVE INFORMATION: Borrelia Burgdorferi Ab, IgG  Western Blot  For this assay, a positive result is reported when any 5 or  more of the following 10 bands are present: 18, 23, 28, 30,  39, 41, 45, 58, 66, or 93 kDa.  All other banding patterns  are reported as negative.  Performed by [employer],  [address] [place] [id number] [phone]  [email or web address], Pete Saha MD, Lab. Director       Tuberculosis Screening  Recent Labs   Lab Test  03/28/16   1443   TBRSLT  Negative   TBAGN  0.04     HIV Screening  Recent Labs   Lab Test  12/21/15   1447   HIAGAB  Nonreactive   HIV-1 p24 Ag & HIV-1/HIV-2 Ab Not Detected       \"XR SACROILIAC JOINT 1/2 VW 12/21/2015 3:23 PM  HISTORY: Pain.  COMPARISON: None.  FINDINGS: Sacroiliac " "joints are patent and symmetric. No acute osseous  abnormality.  IMPRESSION  IMPRESSION: Normal sacroiliac joints.  YUE JARRELL MD\"    \"XR LUMBAR SPINE 2-3 VIEWS 12/21/2015 3:23 PM  HISTORY: Pain.  COMPARISON: None.  FINDINGS: Lumbar alignment is anatomic. Vertebral body heights and  intervertebral disc spaces are preserved.  IMPRESSION  IMPRESSION: Normal lumbar spine.  YUE JARRELL MD\"    \"XR HAND BILATERAL G/E 3 VW 12/21/2015 3:50 PM  HISTORY: Pain in unspecified joint   IMPRESSION  IMPRESSION: Negative.  LINNEA ZELAYA MD\"    \"XR CHEST 2 VW 6/30/2015 4:54 PM  HISTORY: Pain.  COMPARISON: None.  IMPRESSION  IMPRESSION: The lungs are clear. No focal pulmonary opacities. Heart  and mediastinum are unremarkable. No acute cardiopulmonary  abnormalities.  SO PHAN MD\"      Immunization History     Immunization History   Administered Date(s) Administered     Influenza (IIV3) 10/12/2011, 10/03/2013     Influenza Vaccine, 3 YRS +, IM (QUADRIVALENT W/PRESERVATIVES) 10/01/2015     Pneumococcal (PCV 13) 09/26/2016     Pneumococcal 23 valent 12/22/2016     TD (ADULT, 7+) 08/15/2001     TDAP (ADACEL AGES 11-64) 06/20/2011     TDAP (BOOSTRIX AGES 10-64) 03/06/2014          Chart documentation done in part with Dragon Voice recognition Software. Although reviewed after completion, some word and grammatical error may remain.    Hunter Monroy MD  "

## 2017-03-20 NOTE — NURSING NOTE
"Chief Complaint   Patient presents with     RECHECK     patient states she is still getting headaches, to the point she vomits and had to go to the ER for fluids. Getting them once a week. Also has spots on her fingers       Initial /75 (BP Location: Left arm, Patient Position: Chair, Cuff Size: Adult Regular)  Pulse 99  Ht 1.638 m (5' 4.5\")  Wt 52.1 kg (114 lb 12.8 oz)  SpO2 99%  BMI 19.4 kg/m2 Estimated body mass index is 19.4 kg/(m^2) as calculated from the following:    Height as of this encounter: 1.638 m (5' 4.5\").    Weight as of this encounter: 52.1 kg (114 lb 12.8 oz).  BP completed using cuff size: regular         RAPID3 (0-30) Cumulative Score  1.5          RAPID3 Weighted Score (divide #4 by 3 and that is the weighted score)  0.5         "

## 2017-03-20 NOTE — PATIENT INSTRUCTIONS
Dr. Monroy s Rheumatology Clinics  Locations Clinic Hours Telephone Number   [place]  [address]  [place] [address]     Wednesday: 7:20AM - 4:00PM  Thursday:     7:20AM - 4:00PM  Friday:          7:20AM - 11:00AM       To schedule an appointment with  Dr. Monroy,  please contact  Specialty [phone]       [place]  [address]  [place] [address]       Monday:       7:20AM - 4:00PM      [place]  [address]  [place] [address]       Tuesday:      7:20AM - 4:00PM          Thank you!    Maddie Hennessy CMA

## 2017-03-20 NOTE — MR AVS SNAPSHOT
After Visit Summary   3/20/2017    Abby Foy    MRN: [id number]           Patient Information     Date Of Birth          1980        Visit Information        Provider Department      3/20/2017 2:20 PM Hunter Monroy MD [facility]        Today's Diagnoses     Seronegative spondyloarthropathy    -  1    Nonintractable headache, unspecified chronicity pattern, unspecified headache type        Night sweats        Anemia, unspecified type        High risk medication use          Care Instructions      Dr. Monroy s Rheumatology Clinics  Locations Clinic Hours Telephone Number   [place]  [address] [place] [address]     Wednesday: 7:20AM - 4:00PM  Thursday:     7:20AM - 4:00PM  Friday:          7:20AM - 11:00AM       To schedule an appointment with  Dr. Monroy,  please contact  Specialty [phone]       [facility]  [address]  [place] [address]       Monday:       7:20AM - 4:00PM      [facility]  [address]  [place] [address]       Tuesday:      7:20AM - 4:00PM          Thank you!    Maddie Hennessy CMA            Follow-ups after your visit        Additional Services     NEUROLOGY ADULT REFERRAL       Your provider has referred you to: FMG: [facility] [phone]   [email or web address]  UMP: Neurology [facility] [phone]   [email or web address]  General Neurology  N: [facility] [phone]   [email or web address]  [facility]: [facility] [phone]   [email or web address]    Reason for Referral: Consult    Please be aware that coverage of these services is subject to the terms and limitations of your health insurance plan.  Call member services at your health plan with any benefit or coverage questions.       Please bring the following with you to your appointment:    (1) Any X-Rays, CTs or MRIs which have been performed.  Contact the facility where they were done to arrange for  prior to your scheduled appointment.    (2) List of current medications  (3) This referral request   (4) Any documents/labs given to you for this referral                  Follow-up notes from your care team     Return in about 2 months (around 5/20/2017).      Your next 10 appointments already scheduled     Apr 14, 2017 10:00 AM CDT   ULTRASOUND with [facility] ULTRASOUND   [facility] ([facility])    [facility] [address]  [address]  [facility] [address]   [phone]            Jun 19, 2017  3:00 PM CDT   Return Visit with Hunter Monroy MD   [facility] [place] ([facility])    [address] [address] [phone]              Who to contact     If you have questions or need follow up information about today's clinic visit or your schedule please contact [facility] directly at [phone].  Normal or non-critical lab and imaging results will be communicated to you by MyChart, letter or phone within 4 business days after the clinic has received the results. If you do not hear from us within 7 days, please contact the clinic through [employer] or phone. If you have a critical or abnormal lab result, we will notify you by phone as soon as possible.  Submit refill requests through [employer] or call your pharmacy and they will forward the refill request to us. Please allow 3 business days for your refill to be completed.          Additional Information About Your Visit        [employer] Information     [employer] gives you secure access to your electronic health record. If you see a primary care provider, you can also send messages to your care team and make appointments. If you have questions, please call your primary care  "clinic.  If you do not have a primary care provider, please call [phone] and they will assist you.        Care EveryWhere ID     This is your Care EveryWhere ID. This could be used by other organizations to access your [facility] medical records  [id number]        Your Vitals Were     Pulse Height Pulse Oximetry BMI (Body Mass Index)          99 1.638 m (5' 4.5\") 99% 19.4 kg/m2         Blood Pressure from Last 3 Encounters:   03/20/17 127/75   01/13/17 118/79   12/22/16 109/70    Weight from Last 3 Encounters:   03/20/17 52.1 kg (114 lb 12.8 oz)   01/13/17 51.7 kg (114 lb)   12/22/16 51.2 kg (112 lb 12.8 oz)              We Performed the Following     CBC with platelets differential     Ferritin     Folate     Hepatitis B core antibody     Iron and iron binding capacity     M Tuberculosis by Quantiferon     NEUROLOGY ADULT REFERRAL     Protein electrophoresis     Protein Immunofixation Serum     Reticulocyte count     Transferrin     Vitamin B12          Today's Medication Changes          These changes are accurate as of: 3/20/17  3:04 PM.  If you have any questions, ask your nurse or doctor.               Stop taking these medicines if you haven't already. Please contact your care team if you have questions.     methotrexate sodium 2.5 MG Tabs   Stopped by:  Hunter Monroy MD           sulfaSALAzine  MG EC tablet   Commonly known as:  AZULFIDINE EN   Stopped by:  Hunter Monroy MD                Where to get your medicines      These medications were sent to [facility] - [place], [place] - [address] [place] [address]  [address] [place] [address], [facility] [address]    Hours:  Mon-Fri 8:30am-5:00pm Toll Free [phone] Phone:  [phone]     adalimumab 40 MG/0.8ML pen kit                Primary Care Provider Office Phone #    [facility] [phone]       No address on file        Thank you!     Thank you for choosing [facility]  for your care. Our goal is " always to provide you with excellent care. Hearing back from our patients is one way we can continue to improve our services. Please take a few minutes to complete the written survey that you may receive in the mail after your visit with us. Thank you!             Your Updated Medication List - Protect others around you: Learn how to safely use, store and throw away your medicines at www.disposemymeds.org.          This list is accurate as of: 3/20/17  3:04 PM.  Always use your most recent med list.                   Brand Name Dispense Instructions for use    adalimumab 40 MG/0.8ML pen kit    HUMIRA PEN    2 each    Inject 0.8 mLs (40 mg) Subcutaneous every 14 days       ALLEGRA PO      Take 180 mg by mouth daily       folic acid 1 MG tablet    FOLVITE    100 tablet    Take 1 tablet (1 mg) by mouth daily       ibuprofen 400-800 mg tablet    ADVIL,MOTRIN    90 tablet    Take 1-2 tablets (400-800 mg) by mouth every 6 hours as needed for other (cramping)       levonorgestrel-ethinyl estradiol 0.1-20 MG-MCG per tablet    THIEN JARAMILLO LESSINA    90 tablet    Take 1 tablet by mouth daily       multivitamin  peds with iron 60 MG chewable tablet      Take 1 chew tab by mouth daily.

## 2017-03-21 LAB
ALBUMIN SERPL ELPH-MCNC: 4.4 G/DL (ref 3.7–5.1)
ALPHA1 GLOB SERPL ELPH-MCNC: 0.3 G/DL (ref 0.2–0.4)
ALPHA2 GLOB SERPL ELPH-MCNC: 0.6 G/DL (ref 0.5–0.9)
B-GLOBULIN SERPL ELPH-MCNC: 0.6 G/DL (ref 0.6–1)
GAMMA GLOB SERPL ELPH-MCNC: 1 G/DL (ref 0.7–1.6)
HBV CORE AB SERPL QL IA: NONREACTIVE
IGA SERPL-MCNC: 99 MG/DL (ref 70–380)
IGG SERPL-MCNC: 986 MG/DL (ref 695–1620)
IGM SERPL-MCNC: 166 MG/DL (ref 60–265)
M PROTEIN SERPL ELPH-MCNC: 0 G/DL
PROT PATTERN SERPL ELPH-IMP: NORMAL
PROT PATTERN SERPL IFE-IMP: NORMAL

## 2017-03-22 DIAGNOSIS — D64.9 ANEMIA, UNSPECIFIED TYPE: Primary | ICD-10-CM

## 2017-03-22 LAB
M TB TUBERC IFN-G BLD QL: NEGATIVE
M TB TUBERC IFN-G/MITOGEN IGNF BLD: 0 IU/ML

## 2017-03-22 NOTE — PROGRESS NOTES
"MyChart message sent:  \"Ms. Foy,    Labs are normal except for the persistent anemia; you also have an elevated reticulocyte count that means your bone marrow is working harder to increase the hemoglobin.  This indicates that you are still losing blood somehow.  I put in additional blood test orders to look for hemolysis (breaking down of the red blood cells).  Please have a blood draw for these labs.      Tuberculosis test is still pending.      Sincerely,  Hunter Monroy MD  3/22/2017 6:18 AM\""

## 2017-03-24 DIAGNOSIS — D64.9 ANEMIA, UNSPECIFIED TYPE: ICD-10-CM

## 2017-03-24 LAB
BASOPHILS # BLD AUTO: 0 10E9/L (ref 0–0.2)
BASOPHILS NFR BLD AUTO: 0.3 %
DAT POLY-SP REAG RBC QL: NORMAL
DIFFERENTIAL METHOD BLD: ABNORMAL
EOSINOPHIL # BLD AUTO: 0.2 10E9/L (ref 0–0.7)
EOSINOPHIL NFR BLD AUTO: 3 %
ERYTHROCYTE [DISTWIDTH] IN BLOOD BY AUTOMATED COUNT: 12.1 % (ref 10–15)
HCT VFR BLD AUTO: 34.8 % (ref 35–47)
HGB BLD-MCNC: 10.9 G/DL (ref 11.7–15.7)
LDH SERPL L TO P-CCNC: 159 U/L (ref 81–234)
LYMPHOCYTES # BLD AUTO: 2 10E9/L (ref 0.8–5.3)
LYMPHOCYTES NFR BLD AUTO: 28.7 %
MCH RBC QN AUTO: 29.1 PG (ref 26.5–33)
MCHC RBC AUTO-ENTMCNC: 31.3 G/DL (ref 31.5–36.5)
MCV RBC AUTO: 93 FL (ref 78–100)
MONOCYTES # BLD AUTO: 0.4 10E9/L (ref 0–1.3)
MONOCYTES NFR BLD AUTO: 5.3 %
NEUTROPHILS # BLD AUTO: 4.3 10E9/L (ref 1.6–8.3)
NEUTROPHILS NFR BLD AUTO: 62.7 %
PLATELET # BLD AUTO: 206 10E9/L (ref 150–450)
RBC # BLD AUTO: 3.74 10E12/L (ref 3.8–5.2)
RETICS # AUTO: 73.7 10E9/L (ref 25–95)
RETICS/RBC NFR AUTO: 1.9 % (ref 0.5–2)
WBC # BLD AUTO: 6.9 10E9/L (ref 4–11)

## 2017-03-24 PROCEDURE — 85025 COMPLETE CBC W/AUTO DIFF WBC: CPT | Performed by: INTERNAL MEDICINE

## 2017-03-24 PROCEDURE — 85045 AUTOMATED RETICULOCYTE COUNT: CPT | Performed by: INTERNAL MEDICINE

## 2017-03-24 PROCEDURE — 85060 BLOOD SMEAR INTERPRETATION: CPT | Performed by: INTERNAL MEDICINE

## 2017-03-24 PROCEDURE — 83615 LACTATE (LD) (LDH) ENZYME: CPT | Performed by: INTERNAL MEDICINE

## 2017-03-24 PROCEDURE — 36415 COLL VENOUS BLD VENIPUNCTURE: CPT | Performed by: INTERNAL MEDICINE

## 2017-03-24 PROCEDURE — 83010 ASSAY OF HAPTOGLOBIN QUANT: CPT | Performed by: INTERNAL MEDICINE

## 2017-03-24 PROCEDURE — 86880 COOMBS TEST DIRECT: CPT | Performed by: INTERNAL MEDICINE

## 2017-03-24 NOTE — PROGRESS NOTES
"[employer] message sent:  \"Ms. Foy,    Tuberculosis test was negative.    Sincerely,  Hunter Monroy MD  3/24/2017 7:21 AM\""

## 2017-03-27 LAB
COPATH REPORT: NORMAL
HAPTOGLOB SERPL-MCNC: 25 MG/DL (ref 15–200)

## 2017-04-02 NOTE — PROGRESS NOTES
"[employer] message sent:  \"Ms. Foy,    No clear cause for the anemia.  The labs were normal except for the anemia; but there was improvement of the hemoglobin on the most recent labs.    Let's monitor for now and if it persists then can consider having hematology evaluate.    Sincerely,  Hunter Monroy MD  4/1/2017 11:09 PM\""

## 2017-04-11 ENCOUNTER — MYC MEDICAL ADVICE (OUTPATIENT)
Dept: OBGYN | Facility: CLINIC | Age: 37
End: 2017-04-11

## 2017-04-11 DIAGNOSIS — N70.11 HYDROSALPINX: Primary | ICD-10-CM

## 2017-04-14 ENCOUNTER — OFFICE VISIT (OUTPATIENT)
Dept: OBGYN | Facility: CLINIC | Age: 37
End: 2017-04-14
Attending: OBSTETRICS & GYNECOLOGY
Payer: COMMERCIAL

## 2017-04-14 DIAGNOSIS — N93.9 ABNORMAL UTERINE BLEEDING (AUB): Primary | ICD-10-CM

## 2017-04-14 DIAGNOSIS — N70.11 HYDROSALPINX: ICD-10-CM

## 2017-04-14 PROCEDURE — 76857 US EXAM PELVIC LIMITED: CPT | Mod: ZF

## 2017-04-14 RX ORDER — DOXYCYCLINE 100 MG/1
100 CAPSULE ORAL 2 TIMES DAILY
Qty: 20 CAPSULE | Refills: 0 | Status: SHIPPED | OUTPATIENT
Start: 2017-04-14 | End: 2017-04-24

## 2017-04-14 NOTE — MR AVS SNAPSHOT
After Visit Summary   4/14/2017    Abby Foy    MRN: [id number]           Patient Information     Date Of Birth          1980        Visit Information        Provider Department      4/14/2017 10:00 AM [facility] ULTRASOUND [facility]        Today's Diagnoses     Abnormal uterine bleeding (AUB)- on OCPs    -  1    Hydrosalpinx           Follow-ups after your visit        Your next 10 appointments already scheduled     May 22, 2017  3:00 PM CDT   Return Visit with Hunter Monroy MD   [facility] [place] ([facility])    [address]  [place] [address] [phone]              Who to contact     Please call your clinic at [phone] to:    Ask questions about your health    Make or cancel appointments    Discuss your medicines    Learn about your test results    Speak to your doctor   If you have compliments or concerns about an experience at your clinic, or if you wish to file a complaint, please contact [facility] Patient Relations at [phone] or email us at [email or web address]         Additional Information About Your Visit        MyChart Information     [employer] gives you secure access to your electronic health record. If you see a primary care provider, you can also send messages to your care team and make appointments. If you have questions, please call your primary care clinic.  If you do not have a primary care provider, please call [phone] and they will assist you.      [employer] is an electronic gateway that provides easy, online access to your medical records. With [employer], you can request a clinic appointment, read your test results, renew a prescription or communicate with your care team.     To access your existing account, please contact your [facility] or call [phone] for assistance.        Care EveryWhere ID     This is your Care  EveryWhere ID. This could be used by other organizations to access your [facility] medical records  [id number]         Blood Pressure from Last 3 Encounters:   03/20/17 127/75   01/13/17 118/79   12/22/16 109/70    Weight from Last 3 Encounters:   03/20/17 52.1 kg (114 lb 12.8 oz)   01/13/17 51.7 kg (114 lb)   12/22/16 51.2 kg (112 lb 12.8 oz)              We Performed the Following     Gyn,Limited (Follow up US) (In Clinic) [id number]        Primary Care Provider Office Phone #    Ritu Trevino [facility] [phone]       No address on file        Thank you!     Thank you for choosing [facility]  for your care. Our goal is always to provide you with excellent care. Hearing back from our patients is one way we can continue to improve our services. Please take a few minutes to complete the written survey that you may receive in the mail after your visit with us. Thank you!             Your Updated Medication List - Protect others around you: Learn how to safely use, store and throw away your medicines at www.disposemymeds.org.          This list is accurate as of: 4/14/17  1:35 PM.  Always use your most recent med list.                   Brand Name Dispense Instructions for use    adalimumab 40 MG/0.8ML pen kit    HUMIRA PEN    2 each    Inject 0.8 mLs (40 mg) Subcutaneous every 14 days       ALLEGRA PO      Take 180 mg by mouth daily       folic acid 1 MG tablet    FOLVITE    100 tablet    Take 1 tablet (1 mg) by mouth daily       ibuprofen 400-800 mg tablet    ADVIL,MOTRIN    90 tablet    Take 1-2 tablets (400-800 mg) by mouth every 6 hours as needed for other (cramping)       levonorgestrel-ethinyl estradiol 0.1-20 MG-MCG per tablet    THIEN JARAMILLOLESSINA    90 tablet    Take 1 tablet by mouth daily       multivitamin  peds with iron 60 MG chewable tablet      Take 1 chew tab by mouth daily.

## 2017-04-14 NOTE — LETTER
4/14/2017       RE: Abby Foy  [address] [address] [place] [address]     Dear Colleague,    Thank you for referring your patient, Abby Foy, to the [facility] at [facility]. Please see a copy of my visit note below.    36 year old female with LMP 03/29/2017 presents for gynecologic ultrasound indicated by left hydrosalpinx, AUB.  This study was done transvaginally.    Uterine findings:   Presence: Visible Size: Normal  Endometrium = 4.2 mm          Pelvic findings:    Right Adnexa: Normal   Left Adnexa: Abnormal - dilated left fallopian tube as seen on previous.   Bladder:  Normal         Cul - de - sac fluid: None    Ovarian follicles:   Right ovary:  1.5x2.0x1.7cm.     Small follicles     Size(s):  Less than 1cm     Left ovary:  1.9x1.8x1.2cm.     Small follicles     Size(s):  Less than 1cm      Comments:  Stable left adnexal mass, consistent with dilated fallopian tube.  Thin endometrium.        KMIMY Flores MD

## 2017-04-14 NOTE — TELEPHONE ENCOUNTER
I called Abby to discuss her concerns and her ultrasound results from today.  The endometrium is still very thin so this explains the scant bleeding during the withdrawal week of her pill.  The left swollen fallopian tube is unchanged and given she is having more symptoms with pain and dysmenorrhea this may be related.  We discussed possible empiric treatment with antibiotic to see if this improves the pain.  If it does we could consider repeat ultrasound to assess if this improves the appearance of the left fallopian tube.  If it does not improve would then consider laparoscopy and salpingectomy.  Patient no longer desires childbearing and her  has had a vasectomy. She may elect bilateral salpingectomy at that time for possible risk reduction of ovarian cancer.  Patient agreeable to plan and prescription for doxycycline sent to her pharmacy.  She will send a [employer] message after completion of antibiotics to assess response and make further plan from there.    Apryl West MD

## 2017-04-14 NOTE — PROGRESS NOTES
36 year old female with LMP 03/29/2017 presents for gynecologic ultrasound indicated by left hydrosalpinx, AUB.  This study was done transvaginally.    Uterine findings:   Presence: Visible Size: Normal  Endometrium = 4.2 mm          Pelvic findings:    Right Adnexa: Normal   Left Adnexa: Abnormal - dilated left fallopian tube as seen on previous.   Bladder:  Normal         Cul - de - sac fluid: None    Ovarian follicles:   Right ovary:  1.5x2.0x1.7cm.     Small follicles     Size(s):  Less than 1cm     Left ovary:  1.9x1.8x1.2cm.     Small follicles     Size(s):  Less than 1cm      Comments:  Stable left adnexal mass, consistent with dilated fallopian tube.  Thin endometrium.        KIMMY Flores MD

## 2017-04-26 ENCOUNTER — MYC MEDICAL ADVICE (OUTPATIENT)
Dept: OBGYN | Facility: CLINIC | Age: 37
End: 2017-04-26

## 2017-04-27 ENCOUNTER — MYC MEDICAL ADVICE (OUTPATIENT)
Dept: OBGYN | Facility: CLINIC | Age: 37
End: 2017-04-27

## 2017-04-28 ENCOUNTER — TELEPHONE (OUTPATIENT)
Dept: RHEUMATOLOGY | Facility: CLINIC | Age: 37
End: 2017-04-28

## 2017-04-28 NOTE — TELEPHONE ENCOUNTER
[facility] Prior Authorization Team   Phone: [phone]  Fax: [phone]      Prior Authorization Approval    Authorization Effective Date: 4/28/2017  Authorization Expiration Date: 4/28/2019  Medication: Humira  Approved Dose/Quantity: 2/28DS  Reference #: [id number]   Insurance Company: [employer] - Phone [phone] Fax [phone]  Expected CoPay:       CoPay Card Available:  N/A    Foundation Assistance Needed:    Which Pharmacy is filling the prescription (Not needed for infusion/clinic administered): [facility] - [place], [place] - [facility] [facility] [address]  Pharmacy Notified:  YES  Patient Notified:  YES

## 2017-04-28 NOTE — TELEPHONE ENCOUNTER
[facility] Prior Authorization Team   Phone: [phone]  Fax: [phone]      PA Initiation    Medication: Humira  Insurance Company: [employer] - Phone [phone] Fax [phone]  Pharmacy Filling the Rx: [facility] - [place], [place] - [address]  Filling Pharmacy Phone: [phone]  Filling Pharmacy Fax: [phone]  Start Date: 4/28/2017

## 2017-05-02 ENCOUNTER — MYC MEDICAL ADVICE (OUTPATIENT)
Dept: OBGYN | Facility: CLINIC | Age: 37
End: 2017-05-02

## 2017-05-04 DIAGNOSIS — N70.11 HYDROSALPINX: Primary | ICD-10-CM

## 2017-05-04 RX ORDER — ACETAMINOPHEN 325 MG/1
975 TABLET ORAL ONCE
Status: CANCELLED | OUTPATIENT
Start: 2017-05-04 | End: 2017-05-04

## 2017-05-08 ENCOUNTER — TELEPHONE (OUTPATIENT)
Dept: OBGYN | Facility: CLINIC | Age: 37
End: 2017-05-08

## 2017-05-10 ENCOUNTER — TELEPHONE (OUTPATIENT)
Dept: OBGYN | Facility: CLINIC | Age: 37
End: 2017-05-10

## 2017-05-10 NOTE — TELEPHONE ENCOUNTER
Confirmed surgery date 6/23/17 with arrival time at 10:00a.m with nothing to eat eight hours before scheduled surgery time and clear liquids up to two hours before scheduled surgery time, informed patient that she will need to sched a pre op physical within thirty days of surgery and that a map and letter will be mailed out.     to complete the following fields:            CHECKLIST     Google Calendar : Yes     Resident notified: Not Applicable     Clinic schedule blocked:  Not Applicable    Patient notified:Yes      Pre op information sent: Yes     Given to patient over the phone.Yes    Comments:

## 2017-05-22 ENCOUNTER — OFFICE VISIT (OUTPATIENT)
Dept: RHEUMATOLOGY | Facility: CLINIC | Age: 37
End: 2017-05-22
Payer: COMMERCIAL

## 2017-05-22 VITALS
TEMPERATURE: 98.5 F | WEIGHT: 110.8 LBS | SYSTOLIC BLOOD PRESSURE: 113 MMHG | BODY MASS INDEX: 18.46 KG/M2 | DIASTOLIC BLOOD PRESSURE: 65 MMHG | OXYGEN SATURATION: 99 % | HEIGHT: 65 IN | HEART RATE: 84 BPM

## 2017-05-22 DIAGNOSIS — M47.819 SERONEGATIVE SPONDYLOARTHROPATHY: Primary | ICD-10-CM

## 2017-05-22 DIAGNOSIS — Z79.899 HIGH RISK MEDICATION USE: ICD-10-CM

## 2017-05-22 PROCEDURE — 99214 OFFICE O/P EST MOD 30 MIN: CPT | Performed by: INTERNAL MEDICINE

## 2017-05-22 RX ORDER — LEFLUNOMIDE 20 MG/1
20 TABLET ORAL DAILY
Qty: 30 TABLET | Refills: 3 | Status: SHIPPED | OUTPATIENT
Start: 2017-05-22 | End: 2017-06-22

## 2017-05-22 NOTE — NURSING NOTE
"Chief Complaint   Patient presents with     RECHECK     patient states she has not had any headaches, hands and feet are more stiff and back more tight. Both wrists hurt and feel weak. Had a couple days that her knuckles felt like they were freezing.        Initial /65 (BP Location: Left arm, Patient Position: Chair, Cuff Size: Adult Regular)  Pulse 84  Temp 98.5  F (36.9  C) (Oral)  Ht 1.638 m (5' 4.5\")  Wt 50.3 kg (110 lb 12.8 oz)  SpO2 99%  BMI 18.73 kg/m2 Estimated body mass index is 18.73 kg/(m^2) as calculated from the following:    Height as of this encounter: 1.638 m (5' 4.5\").    Weight as of this encounter: 50.3 kg (110 lb 12.8 oz).  BP completed using cuff size: regular         RAPID3 (0-30) Cumulative Score  3.5          RAPID3 Weighted Score (divide #4 by 3 and that is the weighted score)  1.167         "

## 2017-05-22 NOTE — PROGRESS NOTES
Rheumatology Clinic Visit      Abby Foy MRN# [id number]   YOB: 1980 Age: 37 year old      Date of visit: 5/22/17   PCP: Levar Slaughter  Dermatologist: Amy Patel  Ophthalmologist: Dr. Mathis     Chief Complaint   Patient presents with:  RECHECK: patient states she has not had any headaches, hands and feet are more stiff and back more tight. Both wrists hurt and feel weak. Had a couple days that her knuckles felt like they were freezing.     Assessment and Plan   1. 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 However, given her continued back pain that improved with activity but no radiographic evidence for inflammatory arthritis or osteoarthritis, there was concern that she had inflammatory back pain that would be more consistent with a spondylarthritis. Humira was started and resulted in improvement of her back pain, arguing that she may have axial disease. Arthritis improved with methotrexate and sulfasalazine, but she was having headaches and therefore the most likely culprit methotrexate was reduced until her headaches worsened significantly and therefore sulfasalazine and methotrexate were both discontinued. She had resolution of her headaches after discontinuing both sulfasalazine and methotrexate. After reviewing the medication history and her symptoms, I believe that the sulfasalazine was the cause of her headaches. Therefore, cautious retrial of methotrexate in the future could be done. For her peripheral joint symptoms now, will use leflunomide.   - Continue Humira 40 mg every 14 days  - Start leflunomide 20 mg daily  - Labs monthly ×2 months: CBC, creatinine, hepatic panel  - Labs 2-3 days prior to the next rheumatology clinic visit: CBC, Creatinine, Hepatic Panel, ESR,  CRP    # Pregnancy Planning:  had a vasectomy    # Leflunomide (Arava) Risks and Benefits: The risks and benefits of leflunomide were discussed in detail and the patient verbalized understanding.  The risks discussed include, but are not limited to, the risk for hypersensitivity, anaphylaxis, anaphylactoid reactions, hepatotoxicity, infections, interstitial lung disease, alopecia, rash, nausea, and diarrhea.  The impact on malignancies is not fully defined.   Alcohol should be avoided while taking leflunomide.  Pregnancy prevention and planning was discussed; it is recommended that women of childbearing potential use reliable contraception before, during, and for a period of 2 years after treatment with leflunomide; if pregnancy is planned within 2 years of discontinuing medication then women should undergo drug elimination procedures (i.e. cholestyramine). Routine laboratory monitoring is required during leflunomide therapy. I encouraged reviewing the package insert and asking any questions about the medication.      2. Iritis History, then diagnosed with Giant Papillary Conjunctivitis: Was evaluated by ophthalmology previously    3. Headaches: baseline, but worsened with either MTX or SSZ and resolved with discontinuation of these.  I believe that the SSZ was the cause of the headaches because MTX dose reduction did not affect the headaches; and she did not have headaches until SSZ was added.      4. Anemia: Unclear etiology. Improving slowly on most recent labs.  If she does not continue to improve then could consider hematology evaluation in the future.     5. Upcoming salpingectomy: I advise holding Humira for 2 weeks prior to surgery and at least 2 weeks after the surgery, restarting only if there are no signs of infection and after her surgeon tells her that it is okay to restart.     6. Bone Health: 1/2017 vitamin D normal.     Ms. Foy verbalized agreement with and understanding of the rational  for the diagnosis and treatment plan.  All questions were answered to best of my ability and the patient's satisfaction. Ms. Foy was advised to contact the clinic with any questions that may arise after the clinic visit.      Thank you for involving me in the care of the patient    Return to clinic: 3 months      HPI   Abby Foy is a 37 year old female with medical history significant for urticaria, H. pylori infection, and iritis? who returns for f/u of seronegative spondyloarthropathy.    Today, Ms. Foy reports that the headaches stopped shortly after discontinuing methotrexate and sulfasalazine. Also since discontinuing methotrexate and sulfasalazine, her joint symptoms have worsened. Worsening warning stiffness. Joint pain primarily in her hands, wrists, and ankles. Feet hurt when she changes between Flats and high heels. She is planning on having a laparoscopic salpingectomy on June 23. At one point she felt like her MCPs had ice cold fluid injected into them; this resolved after about 3 days. She also reports having warm muscle tenseness bilaterally at the lumbar spine that is worse when she bends over; no radiation of pain from her lower back.    Denies fevers, chills, nausea, vomiting, constipation, diarrhea. No abdominal pain. No chest pain/pressure, palpitations, or shortness of breath. No neck pain. Occasional cold sores..     Tobacco: None  EtOH: 1-2 drinks on the weekends  Drugs: None  Occupation: Dietitian at the [employer]    ROS   GEN: No fevers/chills  SKIN: See HPI  HEENT: Chronic headaches; see HPI.  CV: No chest pain, pressure, palpitations, or dyspnea on exertion.  PULM: No SOB. No cough or wheezing  GI: No nausea, vomiting, constipation, diarrhea. No blood in stool. +Abdominal bloating.  : No blood in urine.  MSK: See HPI.  NEURO: negative  PSYCH: negative    Active Problem List     Patient Active Problem List   Diagnosis     CARDIOVASCULAR SCREENING; LDL  GOAL LESS THAN 160     Urticaria     Diagnostic skin and sensitization tests     Nonallergic rhinitis     Angioedema of lips     H. pylori infection     GPC (giant papillary conjunctivitis)     Seronegative spondyloarthropathy     Midline low back pain without sciatica     Abnormal uterine bleeding (AUB)- on OCPs     Past Medical History     Past Medical History:   Diagnosis Date     Angioedema of lips episode in 3/13--idiopathic     Contraception 1/28/2009     Diagnostic skin and sensitization tests 3/27/13 skin tests all NEGATIVE for environmental and food allergens including shellfish and nuts.      Nonallergic rhinitis     3/27/13 skin tests all NEGATIVE for environmental and food allergens including shellfish and nuts. 3/27/13 followup IgE tests NEG to Peanut, SN[facility], shrimp, macadamia nut, pistachio and walnut.     Rheumatoid arthritis of multiple sites with negative rheumatoid factor (H) 12/31/2015     Past Surgical History   No past surgical history on file.     Allergy     Allergies   Allergen Reactions     No Known Drug Allergy      Shrimp Swelling     Lips and tongue     Walnuts [Nuts] Swelling     Lips and tongue       Current Medication List     Current Outpatient Prescriptions   Medication Sig     adalimumab (HUMIRA PEN) 40 MG/0.8ML pen kit Inject 0.8 mLs (40 mg) Subcutaneous every 14 days     Fexofenadine HCl (ALLEGRA PO) Take 180 mg by mouth daily     ibuprofen (ADVIL,MOTRIN) 400-800 mg tablet Take 1-2 tablets (400-800 mg) by mouth every 6 hours as needed for other (cramping)     multivitamin peds with iron (FLINTSTONES COMPLETE) 60 MG chewable tablet Take 1 chew tab by mouth daily.     levonorgestrel-ethinyl estradiol (AVIANE,ALESSE,LESSINA) 0.1-20 MG-MCG per tablet Take 1 tablet by mouth daily (Patient not taking: Reported on 5/22/2017)     folic acid (FOLVITE) 1 MG tablet Take 1 tablet (1 mg) by mouth daily (Patient not taking: Reported on 5/22/2017)     No current facility-administered medications  "for this visit.      Social History   See HPI    Family History     Family History   Problem Relation Age of Onset     Hypertension Maternal Grandmother      Autoimmune Disease Maternal Grandmother      Autoimmune hepatitis     CANCER Maternal Grandfather      Hypertension Paternal Grandmother      Cancer - colorectal Paternal Grandfather      Cardiovascular Paternal Grandfather      Other Cancer Paternal Grandfather      Hypertension Mother      Autoimmune Disease Mother      Autoimmune hepatitis     Hyperlipidemia Mother      Asthma Mother      Hypertension Father      DIABETES Father      Type 2     Multiple Sclerosis Maternal Aunt      CEREBROVASCULAR DISEASE No family hx of      Thyroid Disease No family hx of      Glaucoma No family hx of      Macular Degeneration No family hx of      Breast Cancer No family hx of      Colon Cancer No family hx of      Physical Exam     Temp Readings from Last 3 Encounters:   10/05/16 98  F (36.7  C) (Oral)   09/26/16 98.5  F (36.9  C) (Oral)   08/02/16 98.4  F (36.9  C) (Oral)     BP Readings from Last 5 Encounters:   03/20/17 127/75   01/13/17 118/79   12/22/16 109/70   10/05/16 117/77   09/26/16 107/74     Pulse Readings from Last 1 Encounters:   03/20/17 99     Resp Readings from Last 1 Encounters:   03/28/16 16     Estimated body mass index is 19.4 kg/(m^2) as calculated from the following:    Height as of 3/20/17: 1.638 m (5' 4.5\").    Weight as of 3/20/17: 52.1 kg (114 lb 12.8 oz).    GEN: NAD  HEENT: MMM. Anicteric, noninjected sclera; eyes appear to have good moisture by gross examination  CV: S1, S2. RRR. No m/r/g.  PULM: CTA bilaterally. No w/c.  MSK: Tenderness to palpation without synovial swelling of the left second-fourth MCPs, the left second PIP, and the second-fourth PIPs. Wrists mildly tender to palpation but without swelling. Elbows and shoulders without swelling or tenderness to palpation. Hips nontender to direct palpation. Knees without swelling or " tenderness to palpation. Bilateral ankles tender to palpation but without swelling.   SKIN: pearly lesions on several fingers; nontender to palpation  EXT: No LE edema  PSYCH: Alert. Appropriate.    Labs / Imaging (select studies)   RF/CCP  Recent Labs   Lab Test  12/21/15   1447   CCPABY  <20  Interpretation:  Negative     RHF  <20     MELY/RNP/Sm/SSA/SSB  Recent Labs   Lab Test  08/05/15   1621  06/30/15   1636  08/06/13   1500   MARCIE   --   <1.0  Interpretation:  Negative     --    SSAIGG  <0.2  Negative   Antibody index (AI) values reflect qualitative changes in antibody   concentration that cannot be directly associated with clinical condition or   disease state.     --    --    SSBIGG  <0.2  Negative   Antibody index (AI) values reflect qualitative changes in antibody   concentration that cannot be directly associated with clinical condition or   disease state.     --    --    TREPAB   --    --   Negative     dsDNA  Recent Labs   Lab Test  06/30/15   1636   DNA  <15  Interpretation:  Negative       C3/C4  Recent Labs   Lab Test  06/30/15   1636   U3THJOS  116   [id number]  21     Send-out Labs  Recent Labs   Lab Test  03/31/10   1135   STSTNM  [facility] ANTIMULLERIEN HORMONE   SSPTYP  Serum     Antiphospholipid Antibodies  Recent Labs   Lab Test  06/30/15   1636   CARG  <15.0  Interpretation:  Negative     FRED  <12.5  Interpretation:  Negative       HLA-B27  Recent Labs   Lab Test  12/21/15   1447   [id number]  SSOP   B1  B27 Neg     CBC  Recent Labs   Lab Test  03/24/17   1027  03/20/17   1506  03/16/17   1431   12/22/16   0945   WBC  6.9  5.2  5.6   --   5.5   RBC  3.74*  3.50*  3.47*   --   3.55*   HGB  10.9*  10.3*  10.1*   < >  10.0*   HCT  34.8*  32.8*  32.4*   < >  32.9*   MCV  93  94  93   --   93   RDW  12.1  12.2  11.9   --   12.3   PLT  206  187  173   --   172   MCH  29.1  29.4  29.1   --   28.2   MCHC  31.3*  31.4*  31.2*   --   30.4*   NEUTROPHIL  62.7  50.0  40.3   --   52.0   LYMPH  28.7  41.0  48.0    --   36.7   MONOCYTE  5.3  6.0  7.3   --   6.9   EOSINOPHIL  3.0  3.0  3.9   --   4.0   BASOPHIL  0.3   --   0.5   --   0.4   ANEU  4.3  2.6  2.3   --   2.9   ALYM  2.0  2.1  2.7   --   2.0   ANA MARÍA  0.4  0.3  0.4   --   0.4   AEOS  0.2  0.2  0.2   --   0.2   ABAS  0.0   --   0.0   --   0.0    < > = values in this interval not displayed.     CMP  Recent Labs   Lab Test  03/16/17   1431  12/22/16   0945  09/26/16   1444   06/30/15   1636  11/05/09   1058   NA   --    --    --    --   140   --    POTASSIUM   --    --    --    --   4.1   --    CHLORIDE   --    --    --    --   105   --    CO2   --    --    --    --   30   --    ANIONGAP   --    --    --    --   5   --    GLC   --    --    --    --   73  79   BUN   --    --    --    --   9   --    CR  0.86  0.88  0.77   < >  0.80   --    GFRESTIMATED  74  73  85   < >  83   --    GFRESTBLACK  90  88  >90   GFR Calc     < >  >90   GFR Calc     --    DAISY   --    --    --    --   9.4   --    BILITOTAL  0.3  0.3  0.2   < >  0.5   --    ALBUMIN  3.8  4.0  3.7   < >  4.1   --    PROTTOTAL  7.0  7.0  7.4   < >  8.1   --    ALKPHOS  44  44  66   < >  65   --    AST  20  21  23   < >  19   --    ALT  27  27  29   < >  26   --     < > = values in this interval not displayed.     Iron Studies  Recent Labs   Lab Test  03/20/17   1506   SHANITA  90   IRON  119   FEB  357   IRONSAT  33     Calcium/VitaminD  Recent Labs   Lab Test  01/13/17   1355  12/21/15   1447  06/30/15   1636  01/09/14   0906   11/10/10   1335  11/05/09   1058   DAISY   --    --   9.4   --    --    --    --    D3VIT   --    --    --    --    --   45  51   VITDT  45  74   --   35   < >   --    --     < > = values in this interval not displayed.     ESR/CRP  Recent Labs   Lab Test  03/16/17   1431  12/22/16   0945  09/26/16   1444   SED  7  6  20   CRP  <2.9  <2.9  23.3*     TSH/T4  Recent Labs   Lab Test  01/13/17   1355  08/05/15   1621  06/28/13   0853   TSH  3.10  1.11  1.47     Lipid  "Panel  Recent Labs   Lab Test  11/05/09   1058   CHOL  211*   TRIG  67   HDL  82   LDL  115   VLDL  13   CHOLHDLRATIO  3.0     Hepatitis B  Recent Labs   Lab Test  03/20/17   1506  03/28/16   1442  08/06/13   1500  11/10/10   1335   AUSAB   --   264.69*   --    --    HBCAB  Nonreactive   --    --    --    HEPBANG   --   Nonreactive  Negative  Negative     Hepatitis C  Recent Labs   Lab Test  12/21/15   1447   HCVAB  Nonreactive   Assay performance characteristics have not been established for newborns,   infants, and children       Lyme confirmation testing by Western Blot  Recent Labs   Lab Test  08/05/15   1621   LYWG  Negative  Reference range: Negative  (Note)  Band(s) present: NONE  (Insufficient number of bands for positive result)  INTERPRETIVE INFORMATION: Borrelia Burgdorferi Ab, IgG  Western Blot  For this assay, a positive result is reported when any 5 or  more of the following 10 bands are present: 18, 23, 28, 30,  39, 41, 45, 58, 66, or 93 kDa.  All other banding patterns  are reported as negative.  Performed by [facility],  [address] [place] [id number] [phone]  [email or web address] Pete Saha MD, Lab. Director       Tuberculosis Screening  Recent Labs   Lab Test  03/20/17   1508  03/28/16   1443   TBRSLT  Negative  Negative   TBAGN  0.00  0.04     HIV Screening  Recent Labs   Lab Test  12/21/15   1447   HIAGAB  Nonreactive   HIV-1 p24 Ag & HIV-1/HIV-2 Ab Not Detected       \"XR SACROILIAC JOINT 1/2 VW 12/21/2015 3:23 PM  HISTORY: Pain.  COMPARISON: None.  FINDINGS: Sacroiliac joints are patent and symmetric. No acute osseous  abnormality.  IMPRESSION  IMPRESSION: Normal sacroiliac joints.  YUE JARRELL MD\"    \"XR LUMBAR SPINE 2-3 VIEWS 12/21/2015 3:23 PM  HISTORY: Pain.  COMPARISON: None.  FINDINGS: Lumbar alignment is anatomic. Vertebral body heights and  intervertebral disc spaces are preserved.  IMPRESSION  IMPRESSION: Normal lumbar spine.  YUE JARRELL MD\"    \"XR HAND BILATERAL G/E " "3 VW 12/21/2015 3:50 PM  HISTORY: Pain in unspecified joint   IMPRESSION  IMPRESSION: Negative.  LINNEA ZELAYA MD\"    \"XR CHEST 2 VW 6/30/2015 4:54 PM  HISTORY: Pain.  COMPARISON: None.  IMPRESSION  IMPRESSION: The lungs are clear. No focal pulmonary opacities. Heart  and mediastinum are unremarkable. No acute cardiopulmonary  abnormalities.  SO PHAN MD\"    Immunization History     Immunization History   Administered Date(s) Administered     Influenza (IIV3) 10/12/2011, 10/03/2013     Influenza Vaccine, 3 YRS +, IM (QUADRIVALENT W/PRESERVATIVES) 10/01/2015     Pneumococcal (PCV 13) 09/26/2016     Pneumococcal 23 valent 12/22/2016     TD (ADULT, 7+) 08/15/2001     TDAP Vaccine (Adacel) 06/20/2011     TDAP Vaccine (Boostrix) 03/06/2014          Chart documentation done in part with Dragon Voice recognition Software. Although reviewed after completion, some word and grammatical error may remain.    Hunter Monroy MD  "

## 2017-05-22 NOTE — MR AVS SNAPSHOT
After Visit Summary   5/22/2017    Abby Foy    MRN: [id number]           Patient Information     Date Of Birth          1980        Visit Information        Provider Department      5/22/2017 3:00 PM Hunter Monroy MD [facility] [place]        Today's Diagnoses     Seronegative spondyloarthropathy    -  1    High risk medication use           Follow-ups after your visit        Your next 10 appointments already scheduled     Jun 09, 2017  9:40 AM CDT   Pre-Op physical with Sandi Duffy PA-C   [facility] [place] ([facility] [place]    [address]  [place] [address]   [phone]            Jun 22, 2017 10:45 AM CDT   LAB with BE LAB   [facility] [place] ([facility] [place])    [address]  [place] [address] [phone]           Patient must bring picture ID.  Patient should be prepared to give a urine specimen  Please do not eat 10-12 hours before your appointment if you are coming in fasting for labs on lipids, cholesterol, or glucose (sugar).  Pregnant women should follow their Care Team instructions. Water with medications is okay. Do not drink coffee or other fluids.   If you have concerns about taking  your medications, please ask at office or if scheduling via [employer], send a message by clicking on Secure Messaging, Message Your Care Team.            Jun 23, 2017   Procedure with Apryl West MD   [facility], Same Day Surgery (--)    [address] [address]   [phone]            Jul 06, 2017  7:45 AM CDT   Return Visit with Apryl West MD   [facility] ([facility])    [facility] [address]  [address]  [facility] [address] [phone]            Jul 21, 2017 10:00 AM CDT   LAB with BE LAB   [facility] [place] ([facility] [place])    [address]  [place] [address]   [phone]            Patient must bring picture ID.  Patient should be prepared to give a urine specimen  Please do not eat 10-12 hours before your appointment if you are coming in fasting for labs on lipids, cholesterol, or glucose (sugar).  Pregnant women should follow their Care Team instructions. Water with medications is okay. Do not drink coffee or other fluids.   If you have concerns about taking  your medications, please ask at office or if scheduling via [employer], send a message by clicking on Secure Messaging, Message Your Care Team.            Aug 22, 2017  6:00 PM CDT   LAB with BE LAB   [facility] [place] ([facility] [place])    [address]  [place] [address] [phone]           Patient must bring picture ID.  Patient should be prepared to give a urine specimen  Please do not eat 10-12 hours before your appointment if you are coming in fasting for labs on lipids, cholesterol, or glucose (sugar).  Pregnant women should follow their Care Team instructions. Water with medications is okay. Do not drink coffee or other fluids.   If you have concerns about taking  your medications, please ask at office or if scheduling via [employer], send a message by clicking on Secure Messaging, Message Your Care Team.            Aug 25, 2017  9:40 AM CDT   Return Visit with Hunter Monroy MD   [facility] [place] ([facility] [place])    [address]  [place] [address]   [phone]              Future tests that were ordered for you today     Open Standing Orders        Priority Remaining Interval Expires Ordered    Hepatic panel Routine 2/2 Every 4 Weeks 11/18/2017 5/22/2017    CBC with platelets differential Routine 2/2 Every 4 Weeks 11/18/2017 5/22/2017    Creatinine Routine 2/2 Every 4 Weeks 11/18/2017 5/22/2017          Open Future Orders        Priority Expected Expires Ordered    CBC with platelets differential Routine 8/16/2017 9/4/2017 5/22/2017    Creatinine Routine 8/16/2017  "9/4/2017 5/22/2017    CRP inflammation Routine 8/16/2017 9/4/2017 5/22/2017    Erythrocyte sedimentation rate auto Routine 8/16/2017 9/4/2017 5/22/2017    Hepatic panel Routine 8/16/2017 9/4/2017 5/22/2017            Who to contact     If you have questions or need follow up information about today's clinic visit or your schedule please contact [facility] directly at [phone].  Normal or non-critical lab and imaging results will be communicated to you by [employer], letter or phone within 4 business days after the clinic has received the results. If you do not hear from us within 7 days, please contact the clinic through [employer] or phone. If you have a critical or abnormal lab result, we will notify you by phone as soon as possible.  Submit refill requests through [employer] or call your pharmacy and they will forward the refill request to us. Please allow 3 business days for your refill to be completed.          Additional Information About Your Visit        Seebright[employer]StartDate Labs Information     [employer] gives you secure access to your electronic health record. If you see a primary care provider, you can also send messages to your care team and make appointments. If you have questions, please call your primary care clinic.  If you do not have a primary care provider, please call [phone] and they will assist you.        Care EveryWhere ID     This is your Care EveryWhere ID. This could be used by other organizations to access your [facility] medical records  [id number]        Your Vitals Were     Pulse Temperature Height Pulse Oximetry BMI (Body Mass Index)       84 98.5  F (36.9  C) (Oral) 1.638 m (5' 4.5\") 99% 18.73 kg/m2        Blood Pressure from Last 3 Encounters:   05/22/17 113/65   03/20/17 127/75   01/13/17 118/79    Weight from Last 3 Encounters:   05/22/17 50.3 kg (110 lb 12.8 oz)   03/20/17 52.1 kg (114 lb 12.8 oz)   01/13/17 51.7 kg (114 lb)                 Today's Medication Changes          These " changes are accurate as of: 5/22/17  3:19 PM.  If you have any questions, ask your nurse or doctor.               Start taking these medicines.        Dose/Directions    leflunomide 20 MG tablet   Commonly known as:  ARAVA   Used for:  Seronegative spondyloarthropathy, High risk medication use   Started by:  Hunter Monroy MD        Dose:  20 mg   Take 1 tablet (20 mg) by mouth daily   Quantity:  30 tablet   Refills:  3            Where to get your medicines      These medications were sent to [facility] [place]  [facility] [place] [facility] [address]  [address] [place] [address]     Phone:  [phone]     leflunomide 20 MG tablet                Primary Care Provider Office Phone #    [facility] [phone]       No address on file        Thank you!     Thank you for choosing [facility]  for your care. Our goal is always to provide you with excellent care. Hearing back from our patients is one way we can continue to improve our services. Please take a few minutes to complete the written survey that you may receive in the mail after your visit with us. Thank you!             Your Updated Medication List - Protect others around you: Learn how to safely use, store and throw away your medicines at www.disposemymeds.org.          This list is accurate as of: 5/22/17  3:19 PM.  Always use your most recent med list.                   Brand Name Dispense Instructions for use    adalimumab 40 MG/0.8ML pen kit    HUMIRA PEN    2 each    Inject 0.8 mLs (40 mg) Subcutaneous every 14 days       ALLEGRA PO      Take 180 mg by mouth daily       folic acid 1 MG tablet    FOLVITE    100 tablet    Take 1 tablet (1 mg) by mouth daily       ibuprofen 400-800 mg tablet    ADVIL,MOTRIN    90 tablet    Take 1-2 tablets (400-800 mg) by mouth every 6 hours as needed for other (cramping)       leflunomide 20 MG tablet    ARAVA    30 tablet    Take 1 tablet (20 mg) by mouth daily        levonorgestrel-ethinyl estradiol 0.1-20 MG-MCG per tablet    THIEN JARAMILLO LESSINA    90 tablet    Take 1 tablet by mouth daily       multivitamin  peds with iron 60 MG chewable tablet      Take 1 chew tab by mouth daily.

## 2017-06-09 ENCOUNTER — OFFICE VISIT (OUTPATIENT)
Dept: FAMILY MEDICINE | Facility: CLINIC | Age: 37
End: 2017-06-09
Payer: COMMERCIAL

## 2017-06-09 VITALS
TEMPERATURE: 98.5 F | SYSTOLIC BLOOD PRESSURE: 122 MMHG | HEIGHT: 65 IN | WEIGHT: 110 LBS | BODY MASS INDEX: 18.33 KG/M2 | DIASTOLIC BLOOD PRESSURE: 71 MMHG | OXYGEN SATURATION: 99 % | HEART RATE: 79 BPM

## 2017-06-09 DIAGNOSIS — Z01.818 PREOP GENERAL PHYSICAL EXAM: Primary | ICD-10-CM

## 2017-06-09 DIAGNOSIS — N70.11 HYDROSALPINX: ICD-10-CM

## 2017-06-09 DIAGNOSIS — Z79.899 HIGH RISK MEDICATION USE: ICD-10-CM

## 2017-06-09 DIAGNOSIS — M47.819 SERONEGATIVE SPONDYLOARTHROPATHY: ICD-10-CM

## 2017-06-09 LAB
ALBUMIN SERPL-MCNC: 3.8 G/DL (ref 3.4–5)
ALP SERPL-CCNC: 61 U/L (ref 40–150)
ALT SERPL W P-5'-P-CCNC: 51 U/L (ref 0–50)
AST SERPL W P-5'-P-CCNC: 39 U/L (ref 0–45)
BASOPHILS # BLD AUTO: 0 10E9/L (ref 0–0.2)
BASOPHILS NFR BLD AUTO: 0.5 %
BILIRUB DIRECT SERPL-MCNC: <0.1 MG/DL (ref 0–0.2)
BILIRUB SERPL-MCNC: 0.5 MG/DL (ref 0.2–1.3)
CREAT SERPL-MCNC: 0.78 MG/DL (ref 0.52–1.04)
DIFFERENTIAL METHOD BLD: NORMAL
EOSINOPHIL # BLD AUTO: 0.4 10E9/L (ref 0–0.7)
EOSINOPHIL NFR BLD AUTO: 8 %
ERYTHROCYTE [DISTWIDTH] IN BLOOD BY AUTOMATED COUNT: 11.7 % (ref 10–15)
GFR SERPL CREATININE-BSD FRML MDRD: 84 ML/MIN/1.7M2
HCT VFR BLD AUTO: 38 % (ref 35–47)
HGB BLD-MCNC: 12 G/DL (ref 11.7–15.7)
LYMPHOCYTES # BLD AUTO: 2 10E9/L (ref 0.8–5.3)
LYMPHOCYTES NFR BLD AUTO: 46.6 %
MCH RBC QN AUTO: 27.5 PG (ref 26.5–33)
MCHC RBC AUTO-ENTMCNC: 31.6 G/DL (ref 31.5–36.5)
MCV RBC AUTO: 87 FL (ref 78–100)
MONOCYTES # BLD AUTO: 0.3 10E9/L (ref 0–1.3)
MONOCYTES NFR BLD AUTO: 7.3 %
NEUTROPHILS # BLD AUTO: 1.6 10E9/L (ref 1.6–8.3)
NEUTROPHILS NFR BLD AUTO: 37.6 %
PLATELET # BLD AUTO: 178 10E9/L (ref 150–450)
PROT SERPL-MCNC: 7.4 G/DL (ref 6.8–8.8)
RBC # BLD AUTO: 4.36 10E12/L (ref 3.8–5.2)
WBC # BLD AUTO: 4.4 10E9/L (ref 4–11)

## 2017-06-09 PROCEDURE — 99214 OFFICE O/P EST MOD 30 MIN: CPT | Performed by: PHYSICIAN ASSISTANT

## 2017-06-09 PROCEDURE — 80076 HEPATIC FUNCTION PANEL: CPT | Performed by: INTERNAL MEDICINE

## 2017-06-09 PROCEDURE — 82565 ASSAY OF CREATININE: CPT | Performed by: INTERNAL MEDICINE

## 2017-06-09 PROCEDURE — 85025 COMPLETE CBC W/AUTO DIFF WBC: CPT | Performed by: INTERNAL MEDICINE

## 2017-06-09 PROCEDURE — 36415 COLL VENOUS BLD VENIPUNCTURE: CPT | Performed by: INTERNAL MEDICINE

## 2017-06-09 NOTE — PROGRESS NOTES
[facility]  [address]  [place] [address]  [phone]  Dept: [phone]    PRE-OP EVALUATION:  Today's date: 2017    Abby Foy (: 1980) presents for pre-operative evaluation assessment as requested by Dr. West.  She requires evaluation and anesthesia risk assessment prior to undergoing surgery/procedure for treatment of fluid in fallopian tube .  Proposed procedure: LAPAROSCOPIC SALPINGECTOMY, BILATERAL     Date of Surgery/ Procedure: 17  Time of Surgery/ Procedure: 12:00pm  Hospital/Surgical Facility: [facility] day surgery   Fax number for surgical facility:   Primary Physician: [facility] [place]  Type of Anesthesia Anticipated: General    Patient has a Health Care Directive or Living Will:  YES     1. NO - Do you have a history of heart attack, stroke, stent, bypass or surgery on an artery in the head, neck, heart or legs?  2. NO - Do you ever have any pain or discomfort in your chest?  3. NO - Do you have a history of  Heart Failure?  4. NO - Are you troubled by shortness of breath when: walking on the level, up a slight hill or at night?  5. NO - Do you currently have a cold, bronchitis or other respiratory infection?  6. NO - Do you have a cough, shortness of breath or wheezing?  7. NO - Do you sometimes get pains in the calves of your legs when you walk?  8. NO - Do you or anyone in your family have previous history of blood clots?  9. NO - Do you or does anyone in your family have a serious bleeding problem such as prolonged bleeding following surgeries or cuts?  10. NO - Have you ever had problems with anemia or been told to take iron pills?  11. YES - HAVE YOU HAD ANY ABNORMAL BLOOD LOSS SUCH AS BLACK, TARRY OR BLOODY STOOLS, OR ABNORMAL VAGINAL BLEEDING? Irregular periods with heavy bleeding   12. NO - Have you ever had a blood transfusion?  13. NO - Have you or any of your relatives ever had problems with anesthesia?  14. NO - Do you have  sleep apnea, excessive snoring or daytime drowsiness?  15. NO - Do you have any prosthetic heart valves?  16. NO - Do you have prosthetic joints?  17. NO - Is there any chance that you may be pregnant?      HPI:                                                      Brief HPI related to upcoming procedure: Adnexal pain with inflammation of the fallopian tube      See problem list for active medical problems.  Problems all longstanding and stable, except as noted/documented.  See ROS for pertinent symptoms related to these conditions.                                                                                                  .    MEDICAL HISTORY:                                                      Patient Active Problem List    Diagnosis Date Noted     Abnormal uterine bleeding (AUB)- on OCPs 01/13/2017     Priority: Medium     GPC (giant papillary conjunctivitis) 12/31/2015     Priority: Medium     Seronegative spondyloarthropathy 12/31/2015     Priority: Medium     Midline low back pain without sciatica 12/31/2015     Priority: Medium     H. pylori infection 08/25/2015     Priority: Medium     Angioedema of lips      Priority: Medium     Diagnostic skin and sensitization tests      Priority: Medium     Nonallergic rhinitis      Priority: Medium     3/27/13 skin tests all NEGATIVE for environmental and food allergens including shellfish and nuts.        Urticaria 03/04/2013     Priority: Medium     CARDIOVASCULAR SCREENING; LDL GOAL LESS THAN 160 05/09/2010     Priority: Medium      Past Medical History:   Diagnosis Date     Angioedema of lips episode in 3/13--idiopathic     Contraception 1/28/2009     Diagnostic skin and sensitization tests 3/27/13 skin tests all NEGATIVE for environmental and food allergens including shellfish and nuts.      Nonallergic rhinitis     3/27/13 skin tests all NEGATIVE for environmental and food allergens including shellfish and nuts. 3/27/13 followup IgE tests NEG to [facility], [facility],  "shrimp, macadamia nut, pistachio and walnut.     Rheumatoid arthritis of multiple sites with negative rheumatoid factor (H) 12/31/2015     History reviewed. No pertinent surgical history.  Current Outpatient Prescriptions   Medication Sig Dispense Refill     leflunomide (ARAVA) 20 MG tablet Take 1 tablet (20 mg) by mouth daily 30 tablet 3     adalimumab (HUMIRA PEN) 40 MG/0.8ML pen kit Inject 0.8 mLs (40 mg) Subcutaneous every 14 days 2 each 12     Fexofenadine HCl (ALLEGRA PO) Take 180 mg by mouth daily       ibuprofen (ADVIL,MOTRIN) 400-800 mg tablet Take 1-2 tablets (400-800 mg) by mouth every 6 hours as needed for other (cramping) 90 tablet 0     multivitamin peds with iron (FLINTSTONES COMPLETE) 60 MG chewable tablet Take 1 chew tab by mouth daily.       folic acid (FOLVITE) 1 MG tablet Take 1 tablet (1 mg) by mouth daily (Patient not taking: Reported on 5/22/2017) 100 tablet 3     OTC products: NSAIDS - will stop 1 week prior    Allergies   Allergen Reactions     No Known Drug Allergy      Shrimp Swelling     Lips and tongue     Walnuts [Nuts] Swelling     Lips and tongue        Latex Allergy: NO    Social History   Substance Use Topics     Smoking status: Never Smoker     Smokeless tobacco: Never Used     Alcohol use Yes      Comment: 1-3 drinks/ week     History   Drug Use No       REVIEW OF SYSTEMS:                                                    Constitutional, neuro, ENT, endocrine, pulmonary, cardiac, gastrointestinal, genitourinary, musculoskeletal, integument and psychiatric systems are negative, except as otherwise noted.    EXAM:                                                    /71  Pulse 79  Temp 98.5  F (36.9  C) (Oral)  Ht 5' 4.5\" (1.638 m)  Wt 110 lb (49.9 kg)  LMP 05/24/2017  SpO2 99%  BMI 18.59 kg/m2    GENERAL APPEARANCE: healthy, alert and no distress     EYES: EOMI, PERRL     HENT: ear canals and TM's normal and nose and mouth without ulcers or lesions     NECK: no " adenopathy, no asymmetry, masses, or scars and thyroid normal to palpation     RESP: lungs clear to auscultation - no rales, rhonchi or wheezes     CV: regular rates and rhythm, normal S1 S2, no S3 or S4 and no murmur, click or rub     ABDOMEN:  soft, nontender, no HSM or masses and bowel sounds normal     MS: extremities normal- no gross deformities noted, no evidence of inflammation in joints, FROM in all extremities.     SKIN: no suspicious lesions or rashes     NEURO: Normal strength and tone, sensory exam grossly normal, mentation intact and speech normal     PSYCH: mentation appears normal. and affect normal/bright     LYMPHATICS: No axillary, cervical, or supraclavicular nodes    DIAGNOSTICS:                                                      Labs Resulted Today:   Results for orders placed or performed in visit on 06/09/17   CBC with platelets differential   Result Value Ref Range    WBC 4.4 4.0 - 11.0 10e9/L    RBC Count 4.36 3.8 - 5.2 10e12/L    Hemoglobin 12.0 11.7 - 15.7 g/dL    Hematocrit 38.0 35.0 - 47.0 %    MCV 87 78 - 100 fl    MCH 27.5 26.5 - 33.0 pg    MCHC 31.6 31.5 - 36.5 g/dL    RDW 11.7 10.0 - 15.0 %    Platelet Count 178 150 - 450 10e9/L    Diff Method Automated Method     % Neutrophils 37.6 %    % Lymphocytes 46.6 %    % Monocytes 7.3 %    % Eosinophils 8.0 %    % Basophils 0.5 %    Absolute Neutrophil 1.6 1.6 - 8.3 10e9/L    Absolute Lymphocytes 2.0 0.8 - 5.3 10e9/L    Absolute Monocytes 0.3 0.0 - 1.3 10e9/L    Absolute Eosinophils 0.4 0.0 - 0.7 10e9/L    Absolute Basophils 0.0 0.0 - 0.2 10e9/L   Creatinine   Result Value Ref Range    Creatinine 0.78 0.52 - 1.04 mg/dL    GFR Estimate 84 >60 mL/min/1.7m2    GFR Estimate If Black >90   GFR Calc   >60 mL/min/1.7m2   Hepatic panel   Result Value Ref Range    Bilirubin Direct <0.1 0.0 - 0.2 mg/dL    Bilirubin Total 0.5 0.2 - 1.3 mg/dL    Albumin 3.8 3.4 - 5.0 g/dL    Protein Total 7.4 6.8 - 8.8 g/dL    Alkaline Phosphatase 61  40 - 150 U/L    ALT 51 (H) 0 - 50 U/L    AST 39 0 - 45 U/L       Recent Labs   Lab Test  03/24/17   1027  03/20/17   1506  03/16/17   1431   12/22/16   0945   06/30/15   1636   HGB  10.9*  10.3*  10.1*   < >  10.0*   < >  12.0   PLT  206  187  173   --   172   < >  183   NA   --    --    --    --    --    --   140   POTASSIUM   --    --    --    --    --    --   4.1   CR   --    --   0.86   --   0.88   < >  0.80    < > = values in this interval not displayed.        IMPRESSION:                                                    Reason for surgery/procedure: Inflammation of the fallopian tube  Diagnosis/reason for consult: Pre op consult    The proposed surgical procedure is considered INTERMEDIATE risk.    REVISED CARDIAC RISK INDEX  The patient has the following serious cardiovascular risks for perioperative complications such as (MI, PE, VFib and 3  AV Block):  No serious cardiac risks  INTERPRETATION: 0 risks: Class I (very low risk - 0.4% complication rate)    The patient has the following additional risks for perioperative complications:  No identified additional risks      ICD-10-CM    1. Preop general physical exam Z01.818    2. Hydrosalpinx N70.11    3. Seronegative spondyloarthropathy M47.819 CBC with platelets differential     Creatinine     Hepatic panel   4. High risk medication use Z79.899 CBC with platelets differential     Creatinine     Hepatic panel       RECOMMENDATIONS:                                                      APPROVAL GIVEN to proceed with proposed procedure, without further diagnostic evaluation       Signed Electronically by: Sandi Duffy PA-C    Copy of this evaluation report is provided to requesting physician.    [facility] Preop Guidelines

## 2017-06-09 NOTE — NURSING NOTE
"Chief Complaint   Patient presents with     Pre-Op Exam       Initial /71  Pulse 79  Temp 98.5  F (36.9  C) (Oral)  Ht 5' 4.5\" (1.638 m)  Wt 110 lb (49.9 kg)  LMP 05/24/2017  SpO2 99%  BMI 18.59 kg/m2 Estimated body mass index is 18.59 kg/(m^2) as calculated from the following:    Height as of this encounter: 5' 4.5\" (1.638 m).    Weight as of this encounter: 110 lb (49.9 kg).  Medication Reconciliation: complete   Kari Feng MA      "

## 2017-06-09 NOTE — MR AVS SNAPSHOT
After Visit Summary   6/9/2017    Abby Foy    MRN: [id number]           Patient Information     Date Of Birth          1980        Visit Information        Provider Department      6/9/2017 9:40 AM Sandi Duffy PA-C [facility] [place]        Today's Diagnoses     Preop general physical exam    -  1    Hydrosalpinx        Seronegative spondyloarthropathy        High risk medication use          Care Instructions    FYI: SANDI WILL BE OUT OF THE CLINIC FROM GAURANG 15 THROUGH JULY 4.  Any calls/Mychart messages, refill requests, and urgent lab results will be forwarded to her colleagues in her absence.     Before Your Surgery      Call your surgeon if there is any change in your health. This includes signs of a cold or flu (such as a sore throat, runny nose, cough, rash or fever).    Do not smoke, drink alcohol or take over the counter medicine (unless your surgeon or primary care doctor tells you to) for the 24 hours before and after surgery.    If you take prescribed drugs: Follow your doctor s orders about which medicines to take and which to stop until after surgery.    Eating and drinking prior to surgery: follow the instructions from your surgeon    Take a shower or bath the night before surgery. Use the soap your surgeon gave you to gently clean your skin. If you do not have soap from your surgeon, use your regular soap. Do not shave or scrub the surgery site.  Wear clean pajamas and have clean sheets on your bed.           Follow-ups after your visit        Your next 10 appointments already scheduled     Jun 23, 2017   Procedure with Apryl West MD   [facility], Same Day Surgery (--)    [address] [address]   [phone]            Jul 06, 2017  7:45 AM CDT   Return Visit with Apryl West MD   [facility] ([facility])    [facility]  [address]  [address]   [phone]            Jul 21, 2017 10:00 AM CDT   LAB with BE LAB   [facility] ([facility])    [address]  [place] [id number] [phone]           Patient must bring picture ID.  Patient should be prepared to give a urine specimen  Please do not eat 10-12 hours before your appointment if you are coming in fasting for labs on lipids, cholesterol, or glucose (sugar).  Pregnant women should follow their Care Team instructions. Water with medications is okay. Do not drink coffee or other fluids.   If you have concerns about taking  your medications, please ask at office or if scheduling via [employer], send a message by clicking on Secure Messaging, Message Your Care Team.            Aug 22, 2017  6:00 PM CDT   LAB with BE LAB   [facility] ([facility])    [address]  [place] [id number] [phone]           Patient must bring picture ID.  Patient should be prepared to give a urine specimen  Please do not eat 10-12 hours before your appointment if you are coming in fasting for labs on lipids, cholesterol, or glucose (sugar).  Pregnant women should follow their Care Team instructions. Water with medications is okay. Do not drink coffee or other fluids.   If you have concerns about taking  your medications, please ask at office or if scheduling via [employer], send a message by clicking on Secure Messaging, Message Your Care Team.            Aug 25, 2017  9:40 AM CDT   Return Visit with Hunter Monroy MD   [facility] [place] ([facility] [place])    [address]  [place] [address]   [phone]              Who to contact     Normal or non-critical lab and imaging results will be communicated to you by MyChart, letter or phone within 4 business days after the clinic has received the results. If you do not hear from us within 7 days, please contact the clinic through MyChart or phone. If you have  "a critical or abnormal lab result, we will notify you by phone as soon as possible.  Submit refill requests through [employer] or call your pharmacy and they will forward the refill request to us. Please allow 3 business days for your refill to be completed.          If you need to speak with a  for additional information , please call: [phone]             Additional Information About Your Visit        [employer] Information     [employer] gives you secure access to your electronic health record. If you see a primary care provider, you can also send messages to your care team and make appointments. If you have questions, please call your primary care clinic.  If you do not have a primary care provider, please call [phone] and they will assist you.        Care EveryWhere ID     This is your Care EveryWhere ID. This could be used by other organizations to access your [facility] medical records  [id number]        Your Vitals Were     Pulse Temperature Height Last Period Pulse Oximetry BMI (Body Mass Index)    79 98.5  F (36.9  C) (Oral) 5' 4.5\" (1.638 m) 05/24/2017 99% 18.59 kg/m2       Blood Pressure from Last 3 Encounters:   06/09/17 122/71   05/22/17 113/65   03/20/17 127/75    Weight from Last 3 Encounters:   06/09/17 110 lb (49.9 kg)   05/22/17 110 lb 12.8 oz (50.3 kg)   03/20/17 114 lb 12.8 oz (52.1 kg)              We Performed the Following     CBC with platelets differential     Creatinine     Hepatic panel        Primary Care Provider Office Phone #    [facility] [place] [facility] [phone]       No address on file        Thank you!     Thank you for choosing [facility]  for your care. Our goal is always to provide you with excellent care. Hearing back from our patients is one way we can continue to improve our services. Please take a few minutes to complete the written survey that you may receive in the mail after your visit with us. Thank you!             Your Updated Medication " List - Protect others around you: Learn how to safely use, store and throw away your medicines at www.disposemymeds.org.          This list is accurate as of: 6/9/17 10:03 AM.  Always use your most recent med list.                   Brand Name Dispense Instructions for use    adalimumab 40 MG/0.8ML pen kit    HUMIRA PEN    2 each    Inject 0.8 mLs (40 mg) Subcutaneous every 14 days       ALLEGRA PO      Take 180 mg by mouth daily       folic acid 1 MG tablet    FOLVITE    100 tablet    Take 1 tablet (1 mg) by mouth daily       ibuprofen 400-800 mg tablet    ADVIL,MOTRIN    90 tablet    Take 1-2 tablets (400-800 mg) by mouth every 6 hours as needed for other (cramping)       leflunomide 20 MG tablet    ARAVA    30 tablet    Take 1 tablet (20 mg) by mouth daily       multivitamin  peds with iron 60 MG chewable tablet      Take 1 chew tab by mouth daily.

## 2017-06-09 NOTE — PATIENT INSTRUCTIONS
FYI: PAPI WILL BE OUT OF THE CLINIC FROM GAURANG 15 THROUGH JULY 4.  Any calls/Mychart messages, refill requests, and urgent lab results will be forwarded to her colleagues in her absence.     Before Your Surgery      Call your surgeon if there is any change in your health. This includes signs of a cold or flu (such as a sore throat, runny nose, cough, rash or fever).    Do not smoke, drink alcohol or take over the counter medicine (unless your surgeon or primary care doctor tells you to) for the 24 hours before and after surgery.    If you take prescribed drugs: Follow your doctor s orders about which medicines to take and which to stop until after surgery.    Eating and drinking prior to surgery: follow the instructions from your surgeon    Take a shower or bath the night before surgery. Use the soap your surgeon gave you to gently clean your skin. If you do not have soap from your surgeon, use your regular soap. Do not shave or scrub the surgery site.  Wear clean pajamas and have clean sheets on your bed.

## 2017-06-14 ENCOUNTER — TELEPHONE (OUTPATIENT)
Dept: OBGYN | Facility: CLINIC | Age: 37
End: 2017-06-14

## 2017-06-16 ENCOUNTER — MYC MEDICAL ADVICE (OUTPATIENT)
Dept: RHEUMATOLOGY | Facility: CLINIC | Age: 37
End: 2017-06-16

## 2017-06-16 DIAGNOSIS — R74.01 ELEVATED ALT MEASUREMENT: Primary | ICD-10-CM

## 2017-06-16 NOTE — PROGRESS NOTES
"Ludiahart message sent:  \"Ms. Foy,    ALT is slightly elevated and this could be due to leflunomide.  Please have repeat blood draw in 1 week to repeat the hepatic panel.    Sincerely,  Hunter Monroy MD  6/16/2017 12:16 AM\""

## 2017-06-20 DIAGNOSIS — R74.01 ELEVATED ALT MEASUREMENT: ICD-10-CM

## 2017-06-20 PROCEDURE — 80076 HEPATIC FUNCTION PANEL: CPT | Performed by: INTERNAL MEDICINE

## 2017-06-20 PROCEDURE — 36415 COLL VENOUS BLD VENIPUNCTURE: CPT | Performed by: INTERNAL MEDICINE

## 2017-06-21 LAB
ALBUMIN SERPL-MCNC: 3.9 G/DL (ref 3.4–5)
ALP SERPL-CCNC: 60 U/L (ref 40–150)
ALT SERPL W P-5'-P-CCNC: 52 U/L (ref 0–50)
AST SERPL W P-5'-P-CCNC: 36 U/L (ref 0–45)
BILIRUB DIRECT SERPL-MCNC: <0.1 MG/DL (ref 0–0.2)
BILIRUB SERPL-MCNC: 0.5 MG/DL (ref 0.2–1.3)
PROT SERPL-MCNC: 7.2 G/DL (ref 6.8–8.8)

## 2017-06-22 ENCOUNTER — ANESTHESIA EVENT (OUTPATIENT)
Dept: SURGERY | Facility: CLINIC | Age: 37
End: 2017-06-22
Payer: COMMERCIAL

## 2017-06-22 DIAGNOSIS — Z79.899 HIGH RISK MEDICATION USE: ICD-10-CM

## 2017-06-22 DIAGNOSIS — M47.819 SERONEGATIVE SPONDYLOARTHROPATHY: ICD-10-CM

## 2017-06-22 RX ORDER — LEFLUNOMIDE 10 MG/1
10 TABLET ORAL DAILY
Qty: 30 TABLET | Refills: 2 | Status: SHIPPED | OUTPATIENT
Start: 2017-06-22 | End: 2017-08-25

## 2017-06-22 NOTE — PROGRESS NOTES
"[employer] / hellofoodt message sent:  \"Ms. Foy,    Liver enzyme ALT remains elevated, just above the normal range, but about twice your baseline.  Therefore, please reduce leflunomide to 10mg daily (I sent a new prescription).    Sincerely,  Hunter Monroy MD  6/22/2017 11:55 AM\""

## 2017-06-23 ENCOUNTER — HOSPITAL ENCOUNTER (OUTPATIENT)
Facility: CLINIC | Age: 37
Discharge: HOME | End: 2017-06-23
Attending: OBSTETRICS & GYNECOLOGY | Admitting: OBSTETRICS & GYNECOLOGY
Payer: COMMERCIAL

## 2017-06-23 ENCOUNTER — ANESTHESIA (OUTPATIENT)
Dept: SURGERY | Facility: CLINIC | Age: 37
End: 2017-06-23
Payer: COMMERCIAL

## 2017-06-23 VITALS
OXYGEN SATURATION: 98 % | SYSTOLIC BLOOD PRESSURE: 112 MMHG | RESPIRATION RATE: 20 BRPM | DIASTOLIC BLOOD PRESSURE: 73 MMHG | TEMPERATURE: 98.7 F | HEART RATE: 82 BPM | WEIGHT: 107.36 LBS | HEIGHT: 64 IN | BODY MASS INDEX: 18.33 KG/M2

## 2017-06-23 DIAGNOSIS — Z90.79 STATUS POST BILATERAL SALPINGECTOMY: Primary | ICD-10-CM

## 2017-06-23 LAB
ABO + RH BLD: NORMAL
ABO + RH BLD: NORMAL
BLD GP AB SCN SERPL QL: NORMAL
BLOOD BANK CMNT PATIENT-IMP: NORMAL
HCG UR QL: NEGATIVE
SPECIMEN EXP DATE BLD: NORMAL

## 2017-06-23 PROCEDURE — 86900 BLOOD TYPING SEROLOGIC ABO: CPT | Performed by: OBSTETRICS & GYNECOLOGY

## 2017-06-23 PROCEDURE — 86850 RBC ANTIBODY SCREEN: CPT | Performed by: OBSTETRICS & GYNECOLOGY

## 2017-06-23 PROCEDURE — 71000014 ZZH RECOVERY PHASE 1 LEVEL 2 FIRST HR: Performed by: OBSTETRICS & GYNECOLOGY

## 2017-06-23 PROCEDURE — 25000128 H RX IP 250 OP 636: Performed by: ANESTHESIOLOGY

## 2017-06-23 PROCEDURE — 40000170 ZZH STATISTIC PRE-PROCEDURE ASSESSMENT II: Performed by: OBSTETRICS & GYNECOLOGY

## 2017-06-23 PROCEDURE — 36000059 ZZH SURGERY LEVEL 3 EA 15 ADDTL MIN UMMC: Performed by: OBSTETRICS & GYNECOLOGY

## 2017-06-23 PROCEDURE — 71000027 ZZH RECOVERY PHASE 2 EACH 15 MINS: Performed by: OBSTETRICS & GYNECOLOGY

## 2017-06-23 PROCEDURE — 25000128 H RX IP 250 OP 636: Performed by: NURSE ANESTHETIST, CERTIFIED REGISTERED

## 2017-06-23 PROCEDURE — 25000132 ZZH RX MED GY IP 250 OP 250 PS 637: Performed by: OBSTETRICS & GYNECOLOGY

## 2017-06-23 PROCEDURE — 37000008 ZZH ANESTHESIA TECHNICAL FEE, 1ST 30 MIN: Performed by: OBSTETRICS & GYNECOLOGY

## 2017-06-23 PROCEDURE — 88302 TISSUE EXAM BY PATHOLOGIST: CPT | Performed by: OBSTETRICS & GYNECOLOGY

## 2017-06-23 PROCEDURE — 37000009 ZZH ANESTHESIA TECHNICAL FEE, EACH ADDTL 15 MIN: Performed by: OBSTETRICS & GYNECOLOGY

## 2017-06-23 PROCEDURE — 88302 TISSUE EXAM BY PATHOLOGIST: CPT | Mod: 26 | Performed by: OBSTETRICS & GYNECOLOGY

## 2017-06-23 PROCEDURE — 25000125 ZZHC RX 250: Performed by: ANESTHESIOLOGY

## 2017-06-23 PROCEDURE — 25000132 ZZH RX MED GY IP 250 OP 250 PS 637: Performed by: ANESTHESIOLOGY

## 2017-06-23 PROCEDURE — C9290 INJ, BUPIVACAINE LIPOSOME: HCPCS | Performed by: ANESTHESIOLOGY

## 2017-06-23 PROCEDURE — 25000125 ZZHC RX 250: Performed by: NURSE ANESTHETIST, CERTIFIED REGISTERED

## 2017-06-23 PROCEDURE — 27210794 ZZH OR GENERAL SUPPLY STERILE: Performed by: OBSTETRICS & GYNECOLOGY

## 2017-06-23 PROCEDURE — 36000057 ZZH SURGERY LEVEL 3 1ST 30 MIN - UMMC: Performed by: OBSTETRICS & GYNECOLOGY

## 2017-06-23 PROCEDURE — 36415 COLL VENOUS BLD VENIPUNCTURE: CPT | Performed by: OBSTETRICS & GYNECOLOGY

## 2017-06-23 PROCEDURE — 81025 URINE PREGNANCY TEST: CPT | Performed by: OBSTETRICS & GYNECOLOGY

## 2017-06-23 PROCEDURE — 86901 BLOOD TYPING SEROLOGIC RH(D): CPT | Performed by: OBSTETRICS & GYNECOLOGY

## 2017-06-23 RX ORDER — ONDANSETRON 2 MG/ML
INJECTION INTRAMUSCULAR; INTRAVENOUS PRN
Status: DISCONTINUED | OUTPATIENT
Start: 2017-06-23 | End: 2017-06-23

## 2017-06-23 RX ORDER — BUPIVACAINE HYDROCHLORIDE AND EPINEPHRINE 2.5; 5 MG/ML; UG/ML
INJECTION, SOLUTION INFILTRATION; PERINEURAL PRN
Status: DISCONTINUED | OUTPATIENT
Start: 2017-06-23 | End: 2017-06-23

## 2017-06-23 RX ORDER — FENTANYL CITRATE 50 UG/ML
25-50 INJECTION, SOLUTION INTRAMUSCULAR; INTRAVENOUS
Status: DISCONTINUED | OUTPATIENT
Start: 2017-06-23 | End: 2017-06-23 | Stop reason: HOSPADM

## 2017-06-23 RX ORDER — DEXAMETHASONE SODIUM PHOSPHATE 4 MG/ML
INJECTION, SOLUTION INTRA-ARTICULAR; INTRALESIONAL; INTRAMUSCULAR; INTRAVENOUS; SOFT TISSUE PRN
Status: DISCONTINUED | OUTPATIENT
Start: 2017-06-23 | End: 2017-06-23

## 2017-06-23 RX ORDER — LIDOCAINE 40 MG/G
CREAM TOPICAL
Status: DISCONTINUED | OUTPATIENT
Start: 2017-06-23 | End: 2017-06-23 | Stop reason: HOSPADM

## 2017-06-23 RX ORDER — ONDANSETRON 4 MG/1
4 TABLET, ORALLY DISINTEGRATING ORAL
Status: DISCONTINUED | OUTPATIENT
Start: 2017-06-23 | End: 2017-06-23 | Stop reason: HOSPADM

## 2017-06-23 RX ORDER — HYDROXYZINE HYDROCHLORIDE 25 MG/1
25 TABLET, FILM COATED ORAL
Status: COMPLETED | OUTPATIENT
Start: 2017-06-23 | End: 2017-06-23

## 2017-06-23 RX ORDER — IBUPROFEN 600 MG/1
600 TABLET, FILM COATED ORAL EVERY 6 HOURS PRN
Qty: 30 TABLET | Refills: 0 | Status: SHIPPED | OUTPATIENT
Start: 2017-06-23 | End: 2019-08-05

## 2017-06-23 RX ORDER — SODIUM CHLORIDE, SODIUM LACTATE, POTASSIUM CHLORIDE, CALCIUM CHLORIDE 600; 310; 30; 20 MG/100ML; MG/100ML; MG/100ML; MG/100ML
INJECTION, SOLUTION INTRAVENOUS CONTINUOUS
Status: DISCONTINUED | OUTPATIENT
Start: 2017-06-23 | End: 2017-06-23 | Stop reason: HOSPADM

## 2017-06-23 RX ORDER — SCOLOPAMINE TRANSDERMAL SYSTEM 1 MG/1
1 PATCH, EXTENDED RELEASE TRANSDERMAL ONCE
Status: COMPLETED | OUTPATIENT
Start: 2017-06-23 | End: 2017-06-23

## 2017-06-23 RX ORDER — NALOXONE HYDROCHLORIDE 0.4 MG/ML
.1-.4 INJECTION, SOLUTION INTRAMUSCULAR; INTRAVENOUS; SUBCUTANEOUS
Status: DISCONTINUED | OUTPATIENT
Start: 2017-06-23 | End: 2017-06-23 | Stop reason: HOSPADM

## 2017-06-23 RX ORDER — FENTANYL CITRATE 50 UG/ML
INJECTION, SOLUTION INTRAMUSCULAR; INTRAVENOUS PRN
Status: DISCONTINUED | OUTPATIENT
Start: 2017-06-23 | End: 2017-06-23

## 2017-06-23 RX ORDER — GLYCOPYRROLATE 0.2 MG/ML
INJECTION, SOLUTION INTRAMUSCULAR; INTRAVENOUS PRN
Status: DISCONTINUED | OUTPATIENT
Start: 2017-06-23 | End: 2017-06-23

## 2017-06-23 RX ORDER — FLUMAZENIL 0.1 MG/ML
0.2 INJECTION, SOLUTION INTRAVENOUS
Status: DISCONTINUED | OUTPATIENT
Start: 2017-06-23 | End: 2017-06-23 | Stop reason: HOSPADM

## 2017-06-23 RX ORDER — OXYCODONE HYDROCHLORIDE 5 MG/1
5-10 TABLET ORAL EVERY 4 HOURS PRN
Qty: 5 TABLET | Refills: 0 | Status: SHIPPED | OUTPATIENT
Start: 2017-06-23 | End: 2017-07-06

## 2017-06-23 RX ORDER — ONDANSETRON 4 MG/1
4 TABLET, ORALLY DISINTEGRATING ORAL EVERY 30 MIN PRN
Status: DISCONTINUED | OUTPATIENT
Start: 2017-06-23 | End: 2017-06-23 | Stop reason: HOSPADM

## 2017-06-23 RX ORDER — KETOROLAC TROMETHAMINE 30 MG/ML
INJECTION, SOLUTION INTRAMUSCULAR; INTRAVENOUS PRN
Status: DISCONTINUED | OUTPATIENT
Start: 2017-06-23 | End: 2017-06-23

## 2017-06-23 RX ORDER — OXYCODONE HYDROCHLORIDE 5 MG/1
5-10 TABLET ORAL
Status: DISCONTINUED | OUTPATIENT
Start: 2017-06-23 | End: 2017-06-23 | Stop reason: HOSPADM

## 2017-06-23 RX ORDER — LIDOCAINE HYDROCHLORIDE 20 MG/ML
INJECTION, SOLUTION INFILTRATION; PERINEURAL PRN
Status: DISCONTINUED | OUTPATIENT
Start: 2017-06-23 | End: 2017-06-23

## 2017-06-23 RX ORDER — ACETAMINOPHEN 325 MG/1
975 TABLET ORAL ONCE
Status: COMPLETED | OUTPATIENT
Start: 2017-06-23 | End: 2017-06-23

## 2017-06-23 RX ORDER — ONDANSETRON 2 MG/ML
4 INJECTION INTRAMUSCULAR; INTRAVENOUS EVERY 30 MIN PRN
Status: DISCONTINUED | OUTPATIENT
Start: 2017-06-23 | End: 2017-06-23 | Stop reason: HOSPADM

## 2017-06-23 RX ORDER — PROPOFOL 10 MG/ML
INJECTION, EMULSION INTRAVENOUS CONTINUOUS PRN
Status: DISCONTINUED | OUTPATIENT
Start: 2017-06-23 | End: 2017-06-23

## 2017-06-23 RX ORDER — IBUPROFEN 600 MG/1
600 TABLET, FILM COATED ORAL
Status: DISCONTINUED | OUTPATIENT
Start: 2017-06-23 | End: 2017-06-23 | Stop reason: HOSPADM

## 2017-06-23 RX ORDER — MEPERIDINE HYDROCHLORIDE 25 MG/ML
12.5 INJECTION INTRAMUSCULAR; INTRAVENOUS; SUBCUTANEOUS
Status: DISCONTINUED | OUTPATIENT
Start: 2017-06-23 | End: 2017-06-23 | Stop reason: HOSPADM

## 2017-06-23 RX ORDER — PROPOFOL 10 MG/ML
INJECTION, EMULSION INTRAVENOUS PRN
Status: DISCONTINUED | OUTPATIENT
Start: 2017-06-23 | End: 2017-06-23

## 2017-06-23 RX ORDER — NEOSTIGMINE METHYLSULFATE 1 MG/ML
VIAL (ML) INJECTION PRN
Status: DISCONTINUED | OUTPATIENT
Start: 2017-06-23 | End: 2017-06-23

## 2017-06-23 RX ORDER — GABAPENTIN 100 MG/1
100 CAPSULE ORAL ONCE
Status: COMPLETED | OUTPATIENT
Start: 2017-06-23 | End: 2017-06-23

## 2017-06-23 RX ADMIN — Medication: at 17:37

## 2017-06-23 RX ADMIN — LIDOCAINE HYDROCHLORIDE 60 MG: 20 INJECTION, SOLUTION INFILTRATION; PERINEURAL at 13:45

## 2017-06-23 RX ADMIN — FENTANYL CITRATE 50 MCG: 50 INJECTION, SOLUTION INTRAMUSCULAR; INTRAVENOUS at 11:27

## 2017-06-23 RX ADMIN — DEXAMETHASONE SODIUM PHOSPHATE 10 MG: 4 INJECTION, SOLUTION INTRAMUSCULAR; INTRAVENOUS at 13:45

## 2017-06-23 RX ADMIN — FENTANYL CITRATE 50 MCG: 50 INJECTION, SOLUTION INTRAMUSCULAR; INTRAVENOUS at 14:13

## 2017-06-23 RX ADMIN — KETOROLAC TROMETHAMINE 30 MG: 30 INJECTION, SOLUTION INTRAMUSCULAR at 14:35

## 2017-06-23 RX ADMIN — FENTANYL CITRATE 50 MCG: 50 INJECTION, SOLUTION INTRAMUSCULAR; INTRAVENOUS at 15:19

## 2017-06-23 RX ADMIN — NEOSTIGMINE METHYLSULFATE 2.5 MG: 1 INJECTION INTRAMUSCULAR; INTRAVENOUS; SUBCUTANEOUS at 14:39

## 2017-06-23 RX ADMIN — GABAPENTIN 100 MG: 100 CAPSULE ORAL at 12:14

## 2017-06-23 RX ADMIN — PROPOFOL 30 MG: 10 INJECTION, EMULSION INTRAVENOUS at 14:46

## 2017-06-23 RX ADMIN — HYDROXYZINE HYDROCHLORIDE 25 MG: 25 TABLET ORAL at 17:36

## 2017-06-23 RX ADMIN — ACETAMINOPHEN 975 MG: 325 TABLET, FILM COATED ORAL at 10:52

## 2017-06-23 RX ADMIN — FENTANYL CITRATE 100 MCG: 50 INJECTION, SOLUTION INTRAMUSCULAR; INTRAVENOUS at 13:45

## 2017-06-23 RX ADMIN — FENTANYL CITRATE 50 MCG: 50 INJECTION, SOLUTION INTRAMUSCULAR; INTRAVENOUS at 14:18

## 2017-06-23 RX ADMIN — ONDANSETRON 4 MG: 2 INJECTION INTRAMUSCULAR; INTRAVENOUS at 13:45

## 2017-06-23 RX ADMIN — MIDAZOLAM HYDROCHLORIDE 2 MG: 1 INJECTION, SOLUTION INTRAMUSCULAR; INTRAVENOUS at 13:34

## 2017-06-23 RX ADMIN — BUPIVACAINE 20 ML: 13.3 INJECTION, SUSPENSION, LIPOSOMAL INFILTRATION at 11:11

## 2017-06-23 RX ADMIN — Medication 40 MG: at 13:45

## 2017-06-23 RX ADMIN — MIDAZOLAM HYDROCHLORIDE 1 MG: 1 INJECTION, SOLUTION INTRAMUSCULAR; INTRAVENOUS at 11:20

## 2017-06-23 RX ADMIN — SODIUM CHLORIDE, POTASSIUM CHLORIDE, SODIUM LACTATE AND CALCIUM CHLORIDE: 600; 310; 30; 20 INJECTION, SOLUTION INTRAVENOUS at 14:33

## 2017-06-23 RX ADMIN — SCOPALAMINE 1 PATCH: 1 PATCH, EXTENDED RELEASE TRANSDERMAL at 12:14

## 2017-06-23 RX ADMIN — PROPOFOL 100 MG: 10 INJECTION, EMULSION INTRAVENOUS at 13:45

## 2017-06-23 RX ADMIN — PROPOFOL 150 MCG/KG/MIN: 10 INJECTION, EMULSION INTRAVENOUS at 13:45

## 2017-06-23 RX ADMIN — BUPIVACAINE HYDROCHLORIDE AND EPINEPHRINE BITARTRATE 20 ML: 2.5; .005 INJECTION, SOLUTION INFILTRATION; PERINEURAL at 11:11

## 2017-06-23 RX ADMIN — SODIUM CHLORIDE, POTASSIUM CHLORIDE, SODIUM LACTATE AND CALCIUM CHLORIDE: 600; 310; 30; 20 INJECTION, SOLUTION INTRAVENOUS at 13:34

## 2017-06-23 RX ADMIN — PROCHLORPERAZINE EDISYLATE 5 MG: 5 INJECTION INTRAMUSCULAR; INTRAVENOUS at 16:42

## 2017-06-23 RX ADMIN — GLYCOPYRROLATE 0.5 MG: 0.2 INJECTION, SOLUTION INTRAMUSCULAR; INTRAVENOUS at 14:39

## 2017-06-23 RX ADMIN — ONDANSETRON 4 MG: 2 INJECTION INTRAMUSCULAR; INTRAVENOUS at 15:27

## 2017-06-23 NOTE — ANESTHESIA CARE TRANSFER NOTE
Patient: Abby Foy    Procedure(s):  Operative Laparoscopy, Bilateral Salpingectomy  - Wound Class: II-Clean Contaminated    Diagnosis: Dilated Fallopian Tube   Diagnosis Additional Information: No value filed.    Anesthesia Type:   General, ETT     Note:  Airway :Face Mask  Patient transferred to:PACU        Vitals: (Last set prior to Anesthesia Care Transfer)    CRNA VITALS  6/23/2017 1426 - 6/23/2017 1503      6/23/2017             Resp Rate (observed): 8                Electronically Signed By: Ree Hennessy CRNA, APRN CRNA  June 23, 2017  3:03 PM

## 2017-06-23 NOTE — DISCHARGE INSTRUCTIONS
Same-Day Surgery   Adult Discharge Orders & Instructions     For 24 hours after surgery:  1. Get plenty of rest.  A responsible adult must stay with you for at least 24 hours after you leave the hospital.   2. Pain medication can slow your reflexes. Do not drive or use heavy equipment.  If you have weakness or tingling, don't drive or use heavy equipment until this feeling goes away.  3. Mixing alcohol and pain medication can cause dizziness and slow your breathing. It can even be fatal. Do not drink alcohol while taking pain medication.  4. Avoid strenuous or risky activities.  Ask for help when climbing stairs.   5. You may feel lightheaded.  If so, sit for a few minutes before standing.  Have someone help you get up.   6. If you have nausea (feel sick to your stomach), drink only clear liquids such as apple juice, ginger ale, broth or 7-Up.  Rest may also help.  Be sure to drink enough fluids.  Move to a regular diet as you feel able. Take pain medications with a small amount of solid food, such as toast or crackers, to avoid nausea.   7. A slight fever is normal. Call the doctor if your fever is over 100 F (37.7 C) (taken under the tongue) or lasts longer than 24 hours.  8. You may have a dry mouth, muscle aches, trouble sleeping or a sore throat.  These symptoms should go away after 24 hours.  9. Do not make important or legal decisions.   Pain Management:      1. Take pain medication (if prescribed) for pain as directed by your physician.        2. WARNING: If the pain medication you have been prescribed contains Tylenol  (acetaminophen), DO NOT take additional doses of Tylenol (acetaminophen).     Call your doctor for any of the followin.  Signs of infection (fever, growing tenderness at the surgery site, severe pain, a large amount of drainage or bleeding, foul-smelling drainage, redness, swelling).    2.  It has been over 8 to 10 hours since surgery and you are still not able to urinate (pee).    3.   "Headache for over 24 hours.    4.  Numbness, tingling or weakness the day after surgery (if you had spinal anesthesia).  To contact a doctor, call _____________________________________ or:      [phone] and ask for the Resident On Call for:          _______ob/gyn___________________________________ (answered 24 hours a day)      Emergency Department:  [place] [facility]: [phone]  [facility]: [phone]    Discharge Instructions:   Following a Laparoscopy    Comfort:    The amount of discomfort you can expect is very unpredictable.     If you have pain that cannot be controlled with non aspirin medication or with the prescription medication you may have received, you should notify your physician.     You May Experience:    Abdominal tenderness; abdominal cramps (like menstrual cramps).    Low back ache or discomfort radiating to your shoulders, chest, back or neck. This is a result of the gas used to inflate your abdomen during surgery. This gas is absorbed in 24 to 36 hours. The \"knee chest\" position will help relieve this discomfort.    Sore throat for a day or two resulting from the anesthesia tube used during surgery. You may use throat lozenges to help relieve this discomfort.    Black and blue marks on your abdomen.    Drainage:    You may expect a small amount of drainage from the incision on your abdomen and you may change the bandage when necessary.    You may also have a small amount of vaginal drainage for 3 to 4 days; this is normal and no cause for concern. If excessive bleeding occurs, notify your physician.    Do not douche, and use a pad rather than tampons. Do not resume intercourse for at least one week or until bleeding has ceased.    Home Activity:    The day of surgery spend a quiet day at home.    Increase activity as tolerated.    You may bathe or shower, do not soak in bath tub or scrub incisions.    You have no restrictions on your diet. Following " surgery, drink plenty of fluids and eat a light meal.    The anesthesia may produce some nausea. If you feel nauseated, stay in bed, keep your head down and try drinking fluids such as Seven-Up, tea or soup.    Notify Physician at Once IF:    You have a fever over 100 degrees. A low grade fever (under 100 degrees) is usual after surgery.    You have severe pain.    You have a large amount of bleeding or drainage.    Rev. 4/2014    Scopolamine Patch  (Absorbed through the skin)    The Scopolamine Patch prevents nausea and vomiting caused by motion sickness or anesthesia and surgery in adults.    Brand Name(s): Transderm Scop, Transderm-Scope  There may be other brand names for this medicine.    When This Medicine Should Not Be Used:  You should not use this medicine if you have had an allergic reaction to scopolamine, or if you have narrow angle glaucoma.    How to Use This Medicine:    Your doctor will tell you how many patches to use, where to apply them, and how often to apply them.     Do not use more patches or apply them more often than your doctor tells you to.    This medicine comes with patient instructions. Read and follow these instructions carefully. Ask your doctor or pharmacist if you have any questions.    To prevent motion sickness, apply the patch at least 4 hours before you need it.    Wash and dry your hands thoroughly before applying the patch.    Leave the patch in its sealed wrapper until you are ready to put it on. Tear the wrapper open carefully. NEVER CUT the wrapper or the patch with scissors. Do not use any patch that has been cut by accident.    Take the liner off the sticky side before applying.    Apply the patch to dry, hairless skin behind the ear.    If the patch is loose or falls off, apply a new patch at a different place behind the ear.    After you take off the patch, wash the place where the patch was and your hands thoroughly.    Only one patch should be used at any time.    If  a dose is missed:  If you forget to wear or change a patch, put one on as soon as you can. If it is almost time to put on your next patch, wait until then to apply a new patch and skip the one you missed. Do not apply extra patches to make up for a missed dose.    How to Store and Dispose of This Medicine:    Store the patches at room temperature in a closed container, away from heat, moisture and direct light.    Fold the used patch in half with the sticky sides together. Throw any used patch away so that children or pets cannot get to it. You will also need to throw away old patches after the expiration date has passed.    Keep all medicine away from children and never share your medicine with anyone.    Ask your doctor or pharmacist before using any other medicine, including over-the-counter medicines, vitamins, and herbal products.  Tell your doctor if you are using any medicines that make you sleepy. These include sleeping pills, cold and allergy medicine, narcotic pain relievers and sedatives.     Tell your doctor if you are using any medicine that make you sleepy. These include sleeping pills, elvira and allergy medicine, narcotic pain relievers and sedatives.    Do not drink alcohol while you are using this medicine.     Warnings While Using This Medicine:    Make sure your doctor knows if you are pregnant or breastfeeding or if you have glaucoma, prostate problems, trouble urinating, blocked bowels, liver disease, kidney disease or a history of seizures or mental illness.    This medicine can cause blurring of vision and other vision problems if it comes in contact with the eyes. This medicine may also cause problems with urination. If any of these reactions occur, remove the patch and call your doctor right away.    This medicine may make you dizzy or drowsy. Avoid driving, using machines, or doing anything else that could be dangerous if you are not alert. If you plan to participate in underwater sports,  "this medicine may cause disorienting effects. If this is a concern for you, talk with your doctor.    This medicine may make you sweat less and cause your body to get too hot. Be careful in hot weather, when you are exercising or if using sauna or whirlpool.    Make sure any doctor or dentist who treats you knows that you are using this medicine. This medicine may affect the results of certain medical tests.    Skin burns have been reported at the patch site in several patients wearing an aluminized transdermal system during a magnetic resonance imaging scan (MRI). Because Transderm Scop contains aluminum, it is recommended to remove the system before undergoing an MRI.    Call your doctor right away if you notice any of these side effects:    Allergic reaction: Itching or hives, swelling in your face or hands, swelling or tingling in your mouth or throat, chest tightness, trouble breathing    Blurred vision    Confusion or memory loss    Fast, slow or uneven heartbeat    Lightheadedness, dizziness, drowsiness or fainting    Seeing, hearing or feeling things that are not there    Severe eye pain    Trouble urinating    If you notice these less serious side effects, talk with your doctor:    Dry mouth    Dry, itchy or red eyes    Restlessness    Skin rash or redness    If you notice other side effects that you think are caused by this medicine, tell your doctor immediately.    Rev. 4/2014    Information about Liposomal bupivacaine (Exparel)    What is Liposomal bupivacaine?    Exparel is a numbing medication that can help you manage your pain after surgery.  This medication is similar to \"novacaine,\" which is often used by the dentist.  Exparel is released slowly and can help control pain for up to 72 hours.    What is the purpose of Liposomal bupivacaine ?    To manage your pain after surgery    To help you sleep better, take deep breaths, walk more comfortable, and feel up to visiting with others    How is the " procedure done?    Liposomal bupivacaine medication by an injection.    It is usually given while you are under sedation right before your surgery.  If this is the case, you will be awake, but you should not experience any pain during the procedure.    For some people, the injection may be given at the very end of you surgery.  It all depends on the type of surgery and your situation.    The procedure usually takes about 15 minutes.  An ultrasound machine will help the anesthesiologist insert it in the right place.    A needle is used to place the numbing medication under your skin.  It provides pain relief by numbing the tissue in the area where your surgeon will make the incision.    What can I expect?    You may experience numbness, tingling, or a feeling of heaviness around the area that was injected.    The anesthesia team will check on you every day, for 3 days while you are in the hospital.    Let your nurse or anesthesia team know if you experience:       Numbness or tingling occurs in areas other than around the tubing     Blurry vision    Ringing in your ears    A metallic taste in your mouth    If you go home before the end of the 3 days, and experience any of the above symptoms, IMMEDIATELY CALL YOUR ANESTHESIOLOGIST:      Call: Dial [phone].or call [phone]  Let the  know why you are calling and ask them to contact the anesthesiologist right away for you.    You should not receive any other type of numbing medication within 4 days after receiving Liposomal bupivacaine unless your anesthesiologist approves.                Exp 9/2017

## 2017-06-23 NOTE — ANESTHESIA PREPROCEDURE EVALUATION
Anesthesia Evaluation     . Pt has had prior anesthetic. Type: General    History of anesthetic complications   - PONV        ROS/MED HX    ENT/Pulmonary:  - neg pulmonary ROS     Neurologic:  - neg neurologic ROS     Cardiovascular:  - neg cardiovascular ROS       METS/Exercise Tolerance:  >4 METS   Hematologic:     (+) Other Hematologic Disorder-Immunosupression      Musculoskeletal:   (+) arthritis, , , other musculoskeletal- RA affected TMJ, improved with wisdom teeth sx. b/l hand arthritis from RA      GI/Hepatic:  - neg GI/hepatic ROS       Renal/Genitourinary:     (+) Other Renal/ Genitourinary, Hydrosalpinx      Endo:  - neg endo ROS       Psychiatric:  - neg psychiatric ROS       Infectious Disease:  - neg infectious disease ROS       Malignancy:      - no malignancy   Other:    - neg other ROS                 Physical Exam  Normal systems: cardiovascular, pulmonary and dental    Airway   Mallampati: I  TM distance: >3 FB  Neck ROM: full    Dental     Cardiovascular   Rhythm and rate: regular and normal      Pulmonary    breath sounds clear to auscultation                    Anesthesia Plan      History & Physical Review  History and physical reviewed and following examination; no interval change.    ASA Status:  2 .    NPO Status:  > 8 hours    Plan for General and ETT with Intravenous and Propofol induction. Maintenance will be TIVA.    PONV prophylaxis:  Ondansetron (or other 5HT-3), Dexamethasone or Solumedrol and Scopolamine patch  Additional equipment: Videolaryngoscope (To minimize neck and jaw mobilization)      Postoperative Care  Postoperative pain management:  IV analgesics, Oral pain medications and Multi-modal analgesia.      Consents  Anesthetic plan, risks, benefits and alternatives discussed with:  Patient..                          .

## 2017-06-23 NOTE — ANESTHESIA PROCEDURE NOTES
Peripheral Nerve Block Procedure Note    Staff:     Anesthesiologist:  NEERU SMITH  Location: Pre-op  Procedure Start/Stop TImes:      6/23/2017 11:05 AM     6/23/2017 11:15 AM    patient identified, IV checked, site marked, risks and benefits discussed, informed consent, monitors and equipment checked, pre-op evaluation, at physician/surgeon's request and post-op pain management      Correct Patient: Yes      Correct Position: Yes      Correct Site: Yes      Correct Procedure: Yes      Correct Laterality:  Yes    Site Marked:  Yes  Procedure details:     Procedure:  TAP    ASA:  2    Laterality:  Bilateral    Position:  Supine    Sterile Prep: chloraprep, mask and sterile gloves      Local skin infiltration:  None    Needle:  Short bevel and insulated    Needle gauge:  21    Needle length (inches):  3.13    Ultrasound: Yes      Ultrasound used to identify targeted nerve, plexus, or vascular structure and placed a needle adjacent to it      Permanent Image entered into patiient's record      Abnormal pain on injection: No      Blood Aspirated: No      Paresthesias:  No    Bleeding at site: No      Bolus via:  Needle    Infusion Method:  Single Shot    Complications:  None  Assessment/Narrative:     Injection made incrementally with aspirations every (mL):  5

## 2017-06-23 NOTE — IP AVS SNAPSHOT
MRN:[id number]                      After Visit Summary   6/23/2017    Abby Foy    MRN: [id number]           Thank you!     Thank you for choosing [facility] for your care. Our goal is always to provide you with excellent care. Hearing back from our patients is one way we can continue to improve our services. Please take a few minutes to complete the written survey that you may receive in the mail after you visit with us. Thank you!        Patient Information     Date Of Birth          1980        About your hospital stay     You were admitted on:  June 23, 2017 You last received care in the:  [facility] PACU    You were discharged on:  June 23, 2017       Who to Call     For medical emergencies, please call 911.  For non-urgent questions about your medical care, please call your primary care provider or clinic, [phone]  For questions related to your surgery, please call your surgery clinic        Attending Provider     Provider Apryl Obando MD OB/Gyn       Primary Care Provider Office Phone #    Ritu Trevino [facility] [phone]      After Care Instructions     Discharge Instructions       Resume pre procedure diet            Discharge Instructions       Pelvic Rest. No tampons, douching or intercourse for  2-4  weeks.            Discharge Instructions       Patient to arrange follow up appointment in 1-2  weeks            Discharge Instructions       You may take 400-600mg ibuprofen every 6 hours for pain control. You may also take tylenol 650mg every 4 hours as needed. You may take narcotic pain medication (percocet, vicodin or norco) 1-2 tabs every 4 hours for severe pain. As a caution, narcotic pain may cause constipation and you should take either Miralax and/or Senna to help prevent constipation. If you have loose stools, discontinue Miralax and Senna. If your pain worsens or your nausea/vomiting is not resolved with pain medications return to ED for  evaluation or go to urgent care if able. You should make follow up appointment with your primary doctor or OB/GYN in 2 weeks if you do not already have an appointment scheduled. Call clinic sooner if you are not feeling well, have questions or other concerns.    Call the clinic or return to the ED if you have any of the following:   - Temperature greater than 100.4F  - Pain not controlled by pain medications  - Uncontrolled nausea/vomiting  - Foul-smelling vaginal discharge  - Vaginal bleeding soaking 1 pad per hour for 2 hours in a row    Call for temperature > 100.4, foul smelling vaginal discharge, bleeding > 1 pad per hour x 2 hrs, pain not controlled by oral pain meds, severe constipation or severe nausea or vomiting.            Dressing       Keep dressing clean and dry, change as instructed by Provider or RN            Ice to affected area       PRN as tolerated            No alcohol       NO ALCOHOL for 24 hours post procedure            No driving or operating machinery       No driving or operating machinery until day after procedure or while taking narcotic pain medications            No lifting       No lifting over 10-15 pounds and no strenuous physical activity.  For 6 weeks            Shower        Shower on Post-op day  1.   DO NOT take a bath                  Your next 10 appointments already scheduled     Jul 06, 2017  7:45 AM CDT   Return Visit with Apryl West MD   [facility] ([facility])    [facility] [address]  [address]  [address] [facility] [address]   [phone]            Jul 21, 2017 10:00 AM CDT   LAB with BE LAB   [facility] [place] ([facility] [place])    [address] [id number] [phone]           Patient must bring picture ID.  Patient should be prepared to give a urine specimen  Please do not eat 10-12 hours before your appointment if you are coming in fasting for labs on lipids,  cholesterol, or glucose (sugar).  Pregnant women should follow their Care Team instructions. Water with medications is okay. Do not drink coffee or other fluids.   If you have concerns about taking  your medications, please ask at office or if scheduling via reQall, send a message by clicking on Secure Messaging, Message Your Care Team.            Aug 22, 2017  6:00 PM CDT   LAB with BE LAB   [facility] [place] ([facility] [place])    [address]  [place] [id number] [phone]           Patient must bring picture ID.  Patient should be prepared to give a urine specimen  Please do not eat 10-12 hours before your appointment if you are coming in fasting for labs on lipids, cholesterol, or glucose (sugar).  Pregnant women should follow their Care Team instructions. Water with medications is okay. Do not drink coffee or other fluids.   If you have concerns about taking  your medications, please ask at office or if scheduling via reQall, send a message by clicking on Secure Messaging, Message Your Care Team.            Aug 25, 2017  9:40 AM CDT   Return Visit with Hunter Monroy MD   [facility] [place] ([facility] [place])    [address] [facility]  [place] [address] [phone]              Further instructions from your care team       Same-Day Surgery   Adult Discharge Orders & Instructions     For 24 hours after surgery:  1. Get plenty of rest.  A responsible adult must stay with you for at least 24 hours after you leave the hospital.   2. Pain medication can slow your reflexes. Do not drive or use heavy equipment.  If you have weakness or tingling, don't drive or use heavy equipment until this feeling goes away.  3. Mixing alcohol and pain medication can cause dizziness and slow your breathing. It can even be fatal. Do not drink alcohol while taking pain medication.  4. Avoid strenuous or risky activities.  Ask for help when climbing stairs.   5. You may feel  lightheaded.  If so, sit for a few minutes before standing.  Have someone help you get up.   6. If you have nausea (feel sick to your stomach), drink only clear liquids such as apple juice, ginger ale, broth or 7-Up.  Rest may also help.  Be sure to drink enough fluids.  Move to a regular diet as you feel able. Take pain medications with a small amount of solid food, such as toast or crackers, to avoid nausea.   7. A slight fever is normal. Call the doctor if your fever is over 100 F (37.7 C) (taken under the tongue) or lasts longer than 24 hours.  8. You may have a dry mouth, muscle aches, trouble sleeping or a sore throat.  These symptoms should go away after 24 hours.  9. Do not make important or legal decisions.   Pain Management:      1. Take pain medication (if prescribed) for pain as directed by your physician.        2. WARNING: If the pain medication you have been prescribed contains Tylenol  (acetaminophen), DO NOT take additional doses of Tylenol (acetaminophen).     Call your doctor for any of the followin.  Signs of infection (fever, growing tenderness at the surgery site, severe pain, a large amount of drainage or bleeding, foul-smelling drainage, redness, swelling).    2.  It has been over 8 to 10 hours since surgery and you are still not able to urinate (pee).    3.  Headache for over 24 hours.    4.  Numbness, tingling or weakness the day after surgery (if you had spinal anesthesia).  To contact a doctor, call _____________________________________ or:      [phone] and ask for the Resident On Call for:          _______ob/gyn___________________________________ (answered 24 hours a day)      Emergency Department:  [facility]: [phone]  [place] [facility]: [phone]    Discharge Instructions:   Following a Laparoscopy    Comfort:    The amount of discomfort you can expect is very unpredictable.     If you have pain that cannot be controlled with non  "aspirin medication or with the prescription medication you may have received, you should notify your physician.     You May Experience:    Abdominal tenderness; abdominal cramps (like menstrual cramps).    Low back ache or discomfort radiating to your shoulders, chest, back or neck. This is a result of the gas used to inflate your abdomen during surgery. This gas is absorbed in 24 to 36 hours. The \"knee chest\" position will help relieve this discomfort.    Sore throat for a day or two resulting from the anesthesia tube used during surgery. You may use throat lozenges to help relieve this discomfort.    Black and blue marks on your abdomen.    Drainage:    You may expect a small amount of drainage from the incision on your abdomen and you may change the bandage when necessary.    You may also have a small amount of vaginal drainage for 3 to 4 days; this is normal and no cause for concern. If excessive bleeding occurs, notify your physician.    Do not douche, and use a pad rather than tampons. Do not resume intercourse for at least one week or until bleeding has ceased.    Home Activity:    The day of surgery spend a quiet day at home.    Increase activity as tolerated.    You may bathe or shower, do not soak in bath tub or scrub incisions.    You have no restrictions on your diet. Following surgery, drink plenty of fluids and eat a light meal.    The anesthesia may produce some nausea. If you feel nauseated, stay in bed, keep your head down and try drinking fluids such as Seven-Up, tea or soup.    Notify Physician at Once IF:    You have a fever over 100 degrees. A low grade fever (under 100 degrees) is usual after surgery.    You have severe pain.    You have a large amount of bleeding or drainage.    Rev. 4/2014    Scopolamine Patch  (Absorbed through the skin)    The Scopolamine Patch prevents nausea and vomiting caused by motion sickness or anesthesia and surgery in adults.    Brand Name(s): Transderm Scop, " Transderm-Scope  There may be other brand names for this medicine.    When This Medicine Should Not Be Used:  You should not use this medicine if you have had an allergic reaction to scopolamine, or if you have narrow angle glaucoma.    How to Use This Medicine:    Your doctor will tell you how many patches to use, where to apply them, and how often to apply them.     Do not use more patches or apply them more often than your doctor tells you to.    This medicine comes with patient instructions. Read and follow these instructions carefully. Ask your doctor or pharmacist if you have any questions.    To prevent motion sickness, apply the patch at least 4 hours before you need it.    Wash and dry your hands thoroughly before applying the patch.    Leave the patch in its sealed wrapper until you are ready to put it on. Tear the wrapper open carefully. NEVER CUT the wrapper or the patch with scissors. Do not use any patch that has been cut by accident.    Take the liner off the sticky side before applying.    Apply the patch to dry, hairless skin behind the ear.    If the patch is loose or falls off, apply a new patch at a different place behind the ear.    After you take off the patch, wash the place where the patch was and your hands thoroughly.    Only one patch should be used at any time.    If a dose is missed:  If you forget to wear or change a patch, put one on as soon as you can. If it is almost time to put on your next patch, wait until then to apply a new patch and skip the one you missed. Do not apply extra patches to make up for a missed dose.    How to Store and Dispose of This Medicine:    Store the patches at room temperature in a closed container, away from heat, moisture and direct light.    Fold the used patch in half with the sticky sides together. Throw any used patch away so that children or pets cannot get to it. You will also need to throw away old patches after the expiration date has  passed.    Keep all medicine away from children and never share your medicine with anyone.    Ask your doctor or pharmacist before using any other medicine, including over-the-counter medicines, vitamins, and herbal products.  Tell your doctor if you are using any medicines that make you sleepy. These include sleeping pills, cold and allergy medicine, narcotic pain relievers and sedatives.     Tell your doctor if you are using any medicine that make you sleepy. These include sleeping pills, elvira and allergy medicine, narcotic pain relievers and sedatives.    Do not drink alcohol while you are using this medicine.     Warnings While Using This Medicine:    Make sure your doctor knows if you are pregnant or breastfeeding or if you have glaucoma, prostate problems, trouble urinating, blocked bowels, liver disease, kidney disease or a history of seizures or mental illness.    This medicine can cause blurring of vision and other vision problems if it comes in contact with the eyes. This medicine may also cause problems with urination. If any of these reactions occur, remove the patch and call your doctor right away.    This medicine may make you dizzy or drowsy. Avoid driving, using machines, or doing anything else that could be dangerous if you are not alert. If you plan to participate in underwater sports, this medicine may cause disorienting effects. If this is a concern for you, talk with your doctor.    This medicine may make you sweat less and cause your body to get too hot. Be careful in hot weather, when you are exercising or if using sauna or whirlpool.    Make sure any doctor or dentist who treats you knows that you are using this medicine. This medicine may affect the results of certain medical tests.    Skin burns have been reported at the patch site in several patients wearing an aluminized transdermal system during a magnetic resonance imaging scan (MRI). Because Transderm Scop contains aluminum, it is  "recommended to remove the system before undergoing an MRI.    Call your doctor right away if you notice any of these side effects:    Allergic reaction: Itching or hives, swelling in your face or hands, swelling or tingling in your mouth or throat, chest tightness, trouble breathing    Blurred vision    Confusion or memory loss    Fast, slow or uneven heartbeat    Lightheadedness, dizziness, drowsiness or fainting    Seeing, hearing or feeling things that are not there    Severe eye pain    Trouble urinating    If you notice these less serious side effects, talk with your doctor:    Dry mouth    Dry, itchy or red eyes    Restlessness    Skin rash or redness    If you notice other side effects that you think are caused by this medicine, tell your doctor immediately.    Rev. 4/2014    Information about Liposomal bupivacaine (Exparel)    What is Liposomal bupivacaine?    Exparel is a numbing medication that can help you manage your pain after surgery.  This medication is similar to \"novacaine,\" which is often used by the dentist.  Exparel is released slowly and can help control pain for up to 72 hours.    What is the purpose of Liposomal bupivacaine ?    To manage your pain after surgery    To help you sleep better, take deep breaths, walk more comfortable, and feel up to visiting with others    How is the procedure done?    Liposomal bupivacaine medication by an injection.    It is usually given while you are under sedation right before your surgery.  If this is the case, you will be awake, but you should not experience any pain during the procedure.    For some people, the injection may be given at the very end of you surgery.  It all depends on the type of surgery and your situation.    The procedure usually takes about 15 minutes.  An ultrasound machine will help the anesthesiologist insert it in the right place.    A needle is used to place the numbing medication under your skin.  It provides pain relief by " "numbing the tissue in the area where your surgeon will make the incision.    What can I expect?    You may experience numbness, tingling, or a feeling of heaviness around the area that was injected.    The anesthesia team will check on you every day, for 3 days while you are in the hospital.    Let your nurse or anesthesia team know if you experience:       Numbness or tingling occurs in areas other than around the tubing     Blurry vision    Ringing in your ears    A metallic taste in your mouth    If you go home before the end of the 3 days, and experience any of the above symptoms, IMMEDIATELY CALL YOUR ANESTHESIOLOGIST:      Call: Dial [phone].or call [phone]  Let the  know why you are calling and ask them to contact the anesthesiologist right away for you.    You should not receive any other type of numbing medication within 4 days after receiving Liposomal bupivacaine unless your anesthesiologist approves.                Exp 9/2017            Pending Results     Date and Time Order Name Status Description    6/23/2017 1421 Surgical pathology exam In process             Admission Information     Date & Time Provider Department Dept. Phone    6/23/2017 Apryl West MD [facility] PACU [phone]      Your Vitals Were     Blood Pressure Pulse Temperature Respirations Height Weight    114/78 82 98.1  F (36.7  C) (Axillary) 17 1.626 m (5' 4\") 48.7 kg (107 lb 5.8 oz)    Last Period Pulse Oximetry BMI (Body Mass Index)             05/24/2017 97% 18.43 kg/m2         [employer] Information     [employer] gives you secure access to your electronic health record. If you see a primary care provider, you can also send messages to your care team and make appointments. If you have questions, please call your primary care clinic.  If you do not have a primary care provider, please call [phone] and they will assist you.        Care EveryWhere ID     This is your Care EveryWhere ID. This could be used by other " organizations to access your [facility] medical records  [id number]        Equal Access to Services     ROMINA HORNER : Hadii jeff Salazar, tony nguyen, adam fonseca. So [facility] [phone].    ATENCIÓN: Si habla español, tiene a wheatley disposición servicios gratuitos de asistencia lingüística. Llame al [phone].    We comply with applicable federal civil rights laws and [place] laws. We do not discriminate on the basis of race, color, national origin, age, disability sex, sexual orientation or gender identity.               Review of your medicines      START taking        Dose / Directions    oxyCODONE 5 MG IR tablet   Commonly known as:  ROXICODONE   Used for:  Status post bilateral salpingectomy        Dose:  5-10 mg   Take 1-2 tablets (5-10 mg) by mouth every 4 hours as needed for pain or other (Moderate to Severe)   Quantity:  5 tablet   Refills:  0         CONTINUE these medicines which may have CHANGED, or have new prescriptions. If we are uncertain of the size of tablets/capsules you have at home, strength may be listed as something that might have changed.        Dose / Directions    ibuprofen 600 MG tablet   Commonly known as:  ADVIL/MOTRIN   This may have changed:    - medication strength  - how much to take  - reasons to take this   Used for:  Status post bilateral salpingectomy        Dose:  600 mg   Take 1 tablet (600 mg) by mouth every 6 hours as needed for pain (mild)   Quantity:  30 tablet   Refills:  0         CONTINUE these medicines which have NOT CHANGED        Dose / Directions    adalimumab 40 MG/0.8ML pen kit   Commonly known as:  HUMIRA PEN   Used for:  Seronegative spondyloarthropathy        Dose:  40 mg   Inject 0.8 mLs (40 mg) Subcutaneous every 14 days   Quantity:  2 each   Refills:  12       ALLEGRA PO        Dose:  180 mg   Take 180 mg by mouth daily   Refills:  0       folic acid 1 MG tablet   Commonly known as:   FOLVITE   Used for:  Seronegative spondyloarthropathy        Dose:  1 mg   Take 1 tablet (1 mg) by mouth daily   Quantity:  100 tablet   Refills:  3       leflunomide 10 MG tablet   Commonly known as:  ARAVA   Used for:  Seronegative spondyloarthropathy, High risk medication use        Dose:  10 mg   Take 1 tablet (10 mg) by mouth daily   Quantity:  30 tablet   Refills:  2       multivitamin  peds with iron 60 MG chewable tablet        Dose:  1 chew tab   Take 1 chew tab by mouth daily.   Refills:  0            Where to get your medicines      These medications were sent to [facility] [place], [place] - [address]  [address] [facility] [address], [facility] [address]     Phone:  [phone]     ibuprofen 600 MG tablet         Some of these will need a paper prescription and others can be bought over the counter. Ask your nurse if you have questions.     Bring a paper prescription for each of these medications     oxyCODONE 5 MG IR tablet                Protect others around you: Learn how to safely use, store and throw away your medicines at www.disposemymeds.org.             Medication List: This is a list of all your medications and when to take them. Check marks below indicate your daily home schedule. Keep this list as a reference.      Medications           Morning Afternoon Evening Bedtime As Needed    adalimumab 40 MG/0.8ML pen kit   Commonly known as:  HUMIRA PEN   Inject 0.8 mLs (40 mg) Subcutaneous every 14 days                                ALLEGRA PO   Take 180 mg by mouth daily                                folic acid 1 MG tablet   Commonly known as:  FOLVITE   Take 1 tablet (1 mg) by mouth daily                                ibuprofen 600 MG tablet   Commonly known as:  ADVIL/MOTRIN   Take 1 tablet (600 mg) by mouth every 6 hours as needed for pain (mild)   Last time this was given:  IV Toradol at 2:30 pm                                leflunomide 10 MG tablet   Commonly known as:   ARAVA   Take 1 tablet (10 mg) by mouth daily                                multivitamin  peds with iron 60 MG chewable tablet   Take 1 chew tab by mouth daily.                                oxyCODONE 5 MG IR tablet   Commonly known as:  ROXICODONE   Take 1-2 tablets (5-10 mg) by mouth every 4 hours as needed for pain or other (Moderate to Severe)

## 2017-06-23 NOTE — IP AVS SNAPSHOT
[facility]    [address] [address]    Phone:  [phone]                                       After Visit Summary   6/23/2017    Abby Foy    MRN: [id number]           After Visit Summary Signature Page     I have received my discharge instructions, and my questions have been answered. I have discussed any challenges I see with this plan with the nurse or doctor.    ..........................................................................................................................................  Patient/Patient Representative Signature      ..........................................................................................................................................  Patient Representative Print Name and Relationship to Patient    ..................................................               ................................................  Date                                            Time    ..........................................................................................................................................  Reviewed by Signature/Title    ...................................................              ..............................................  Date                                                            Time

## 2017-06-23 NOTE — OP NOTE
[facility] Operative Note    Name: Abby Foy   MRN: [id number]   : 1980     Date of Service: 2017     Preoperative Diagnosis:     AUB  Dysmenorrhea  Abdominal pain  H/o dilated left fallopian tube  Desires permanent sterilization  RA  Seronegative spondyloarthropathy    Postoperative Diagnosis:   Same s/p below stated procedure    Procedure:  Laparoscopic bilateral salpingectomy    Surgeon: Dr. West    Assistants: Francesca Szymanski MD PGY-4, Virginia Amezcua MS3    Anesthesia: General endotracheal    Complications: none apparent    EBL: 5 mL  IV Fluids: 1000 mL  crystalloid  UOP: 600 mL, clear    Findings: EUA revealed retroverted uterus, 5 cm size, mobile with no adnexal masses. Cervix appeared normal without masses or lesions. On laparoscopy, bilateral ureters visualized. Normal appearing retrocecal appendix, bowel, liver, gallbladder, omentum, stomach, uterus and bilateral ovaries. Tortuous left fallopian tube, mildly dilated. Right fallopian tube with 2 paratubal cysts, 1 cm and 2 cm in size. Hemostatic surgical excision sites. No intraabdominal adhesions noted.     Specimen: Bilateral fallopian tubes with right paratubal cyst    Indications: Abby Foy is a 37 year old  female who presented with AUB on OCPs with dysmenorrhea and abdominal pain found to have left dilated fallopian tube. Given she no longer desired fertility, bilateral salpingectomy was recommended.  Risks, benefits and alternatives to above stated procedure were discussed. Patient desired to proceed and written informed consent was obtained prior to proceeding.     Procedure:  The patient was taken to the operating room where she was placed in the dorsal lithotomy position with feet in yellow fin stirrups and arms tucked at her sides. General endotracheal anesthesia was administered after SCDs were in place. An exam under anesthesia was performed with findings as above. The patient was then prepped and  draped in the usual sterile fashion. A frances catheter was placed. Speculum was placed and cervix grasped with tenaculum at 12 o'clock. Uterine manipulator placed without difficulty. Speculum removed.    Attention was then turned to the abdomen where a 5 mm incision was made in the inferior aspect of the umbilicus was incised using an 11-blade scalpel. A rose used to expand the incision. A Veress needle with saline attached was used to access the peritoneum through the umbilical incision, and a saline drop test suggested intraabdominal placement. CO2 gas was attached and opening pressure was low, 0-2 mmHg. Pneumoperitoneum was achieved with good tympany of the abdomen. The Veress was removed and a 5 mm laparoscopic port was placed and trocar removed. Laparoscope inserted, intraabdominal entry confirmed and no evidence of injury from port placement noted. Abdomen explored with findings as noted above. The patient was placed in Trendelenburg position. Then two additional incisions were made in RLQ and LLQ approximately 2 cm superior and 2 cm medial to bilateral ASIS. Then a 5 mm port and a 10 mm port were placed under direct visualization in the RLQ and LLQ, respectively, with no evidence of injury. The two step laparoscope port was used to place the 10 mm port and fascia stretched, not requiring closure. An atraumatic grasper was used to sweep the bowel out of the pelvis. An atraumatic grasper was used to elevate the left fallopian tube and the Gyrus was then used to cauterize, cut and excise the fallopian tube by taking serial bites along mesosalpinx until the cornua was reached. The right fallopian tube was similarly identified, cauterized and ligated. Bilateral tubes were removed from abdomen without difficulty and sent to pathology. Hemostatic excision sites were noted.    The ports were tented up toward abdomen and the pneumoperitoneum was expelled with positive pressure breaths given. Ports were then removed.  The skin incisions were closed using 4-0 vicryl. Sterile bandage applied with Dermabond. Speculum replaced, uterine manipulator and cervix noted to be hemostatic. Morley catheter was removed.     Instrument, sponge, and needle counts were correct times 2. Dr. West was present and scrubbed for the entire procedure. The patient was extubated in the operating room and transferred to the PACU in stable condition. She tolerated the procedure without complications.     Francesca Szymanski MD, MPH  OB/GYN Resident G4  6/23/2017 2:34 PM     I was present and scrubbed throughout the procedure,  I agree with the note above  Apryl West MD

## 2017-06-23 NOTE — ANESTHESIA POSTPROCEDURE EVALUATION
Patient: Abby Foy    Procedure(s):  Operative Laparoscopy, Bilateral Salpingectomy  - Wound Class: II-Clean Contaminated    Diagnosis:Dilated Fallopian Tube   Diagnosis Additional Information: No value filed.    Anesthesia Type:  General, ETT    Note:  Anesthesia Post Evaluation    Patient location during evaluation: PACU  Patient participation: Able to fully participate in evaluation  Level of consciousness: awake and alert  Pain management: adequate  Airway patency: patent  Cardiovascular status: acceptable  Respiratory status: acceptable  Hydration status: acceptable  PONV: none     Anesthetic complications: None    Comments: Sleepy, but doing well.        Last vitals:  Vitals:    06/23/17 1245 06/23/17 1458 06/23/17 1500   BP:  130/86    Pulse:      Resp: 21 12 10   Temp:  36.5  C (97.7  F)    SpO2: 98% 100% 100%         Electronically Signed By: Nikhil Walsh MD  June 23, 2017  3:27 PM

## 2017-06-23 NOTE — OR NURSING
This RN transitioned pt from phase 1 to phase 2 of anesthesia recovery care by this RN at 1600. Report and transfer of care to Beatriz Loza RN

## 2017-06-27 LAB — COPATH REPORT: NORMAL

## 2017-06-29 ENCOUNTER — TELEPHONE (OUTPATIENT)
Dept: OBGYN | Facility: CLINIC | Age: 37
End: 2017-06-29

## 2017-06-29 NOTE — TELEPHONE ENCOUNTER
I spoke with Abby regarding the normal pathology report.  She is overall doing well and is back to work.  She still has some bloating but is down to using only ibuprofen for pain control.  Has follow-up scheduled next week.  Apryl West MD

## 2017-07-06 ENCOUNTER — OFFICE VISIT (OUTPATIENT)
Dept: OBGYN | Facility: CLINIC | Age: 37
End: 2017-07-06
Attending: OBSTETRICS & GYNECOLOGY
Payer: COMMERCIAL

## 2017-07-06 VITALS
DIASTOLIC BLOOD PRESSURE: 71 MMHG | WEIGHT: 108 LBS | BODY MASS INDEX: 18.54 KG/M2 | HEART RATE: 78 BPM | SYSTOLIC BLOOD PRESSURE: 107 MMHG

## 2017-07-06 DIAGNOSIS — Z90.79 STATUS POST BILATERAL SALPINGECTOMY: Primary | ICD-10-CM

## 2017-07-06 PROCEDURE — 99212 OFFICE O/P EST SF 10 MIN: CPT | Mod: ZF

## 2017-07-06 ASSESSMENT — PAIN SCALES - GENERAL: PAINLEVEL: NO PAIN (0)

## 2017-07-06 NOTE — NURSING NOTE
Chief Complaint   Patient presents with     Surgical Followup     Bilateral salpingectomy 6/23/2017   Cecilia Steward LPN

## 2017-07-06 NOTE — LETTER
7/6/2017       RE: Abby Foy  [address] [address]     Dear Colleague,    Thank you for referring your patient, Abby Foy, to the [facility] at [facility]. Please see a copy of my visit note below.    Abby Foy is 37 year old yo s/p laparoscopic bilateral salpingectomy on 6/23/17 for dilated fallopian tube on ultrasound.  She has been doing well.   She has had her menses since which was delayed one week.  She denies pain, she no longer requires any pain medication.  Bladder and bowel function have returned to normal.     ROS: 10 point ROS neg other than the symptoms noted above in the HPI.    Past Medical History:   Diagnosis Date     Angioedema of lips episode in 3/13--idiopathic     Contraception 1/28/2009     Diagnostic skin and sensitization tests 3/27/13 skin tests all NEGATIVE for environmental and food allergens including shellfish and nuts.      Nonallergic rhinitis     3/27/13 skin tests all NEGATIVE for environmental and food allergens including shellfish and nuts. 3/27/13 followup IgE tests NEG to Peanut, SNF, shrimp, macadamia nut, pistachio and walnut.     Rheumatoid arthritis of multiple sites with negative rheumatoid factor (H) 12/31/2015       Past Surgical History:   Procedure Laterality Date     LAPAROSCOPIC SALPINGECTOMY Bilateral 6/23/2017    Procedure: LAPAROSCOPIC SALPINGECTOMY;  Operative Laparoscopy, Bilateral Salpingectomy ;  Surgeon: Apryl West MD;  Location: [facility] OR       /71  Pulse 78  Wt 49 kg (108 lb)  LMP 05/24/2017  Breastfeeding? No  BMI 18.54 kg/m2   Gen'l: well appearing  Abdomen: soft, NT, ND well healed laparoscopic stab wounds, small area of echymosis below the umbilical incision.      Pathology:  A. FALLOPIAN TUBE, LEFT, PARTIAL EXCISION:   - Complete cross section of a fallopian tube with no significant   histologic abnormality     B. FALLOPIAN TUBE,  RIGHT, PARTIAL EXCISION:   - Complete cross section of a fallopian tube with no significant   histologic abnormality   - Simple, benign paratubal cyst    Assessment/Plan:  2 weeks s/p  laparoscopic bilateral salpingectomy doing well.    - Reviewed benign pathology, and variation from ultrasound appearance.  - Continue pelvic rest and lifting restrictions for 1 wk.  - Return to clinic for annual exam, sooner if any concerns.  Apryl West MD

## 2017-07-06 NOTE — PROGRESS NOTES
Abby Foy is 37 year old yo s/p laparoscopic bilateral salpingectomy on 6/23/17 for dilated fallopian tube on ultrasound.  She has been doing well.   She has had her menses since which was delayed one week.  She denies pain, she no longer requires any pain medication.  Bladder and bowel function have returned to normal.     ROS: 10 point ROS neg other than the symptoms noted above in the HPI.    Past Medical History:   Diagnosis Date     Angioedema of lips episode in 3/13--idiopathic     Contraception 1/28/2009     Diagnostic skin and sensitization tests 3/27/13 skin tests all NEGATIVE for environmental and food allergens including shellfish and nuts.      Nonallergic rhinitis     3/27/13 skin tests all NEGATIVE for environmental and food allergens including shellfish and nuts. 3/27/13 followup IgE tests NEG to Peanut, SNF, shrimp, macadamia nut, pistachio and walnut.     Rheumatoid arthritis of multiple sites with negative rheumatoid factor (H) 12/31/2015       Past Surgical History:   Procedure Laterality Date     LAPAROSCOPIC SALPINGECTOMY Bilateral 6/23/2017    Procedure: LAPAROSCOPIC SALPINGECTOMY;  Operative Laparoscopy, Bilateral Salpingectomy ;  Surgeon: Apryl West MD;  Location: [facility]       /71  Pulse 78  Wt 49 kg (108 lb)  LMP 05/24/2017  Breastfeeding? No  BMI 18.54 kg/m2   Gen'l: well appearing  Abdomen: soft, NT, ND well healed laparoscopic stab wounds, small area of echymosis below the umbilical incision.      Pathology:  A. FALLOPIAN TUBE, LEFT, PARTIAL EXCISION:   - Complete cross section of a fallopian tube with no significant   histologic abnormality     B. FALLOPIAN TUBE, RIGHT, PARTIAL EXCISION:   - Complete cross section of a fallopian tube with no significant   histologic abnormality   - Simple, benign paratubal cyst    Assessment/Plan:  2 weeks s/p  laparoscopic bilateral salpingectomy doing well.    - Reviewed benign pathology, and variation from ultrasound  appearance.  - Continue pelvic rest and lifting restrictions for 1 wk.  - Return to clinic for annual exam, sooner if any concerns.  Apryl West MD

## 2017-07-06 NOTE — MR AVS SNAPSHOT
After Visit Summary   7/6/2017    Abby Foy    MRN: [id number]           Patient Information     Date Of Birth          1980        Visit Information        Provider Department      7/6/2017 7:45 AM Apryl West MD [facility]        Today's Diagnoses     Status post bilateral salpingectomy    -  1       Follow-ups after your visit        Follow-up notes from your care team     Return if symptoms worsen or fail to improve.      Your next 10 appointments already scheduled     Jul 21, 2017 10:00 AM CDT   LAB with BE LAB   [facility]    [address] [address]   [phone]           Patient must bring picture ID.  Patient should be prepared to give a urine specimen  Please do not eat 10-12 hours before your appointment if you are coming in fasting for labs on lipids, cholesterol, or glucose (sugar).  Pregnant women should follow their Care Team instructions. Water with medications is okay. Do not drink coffee or other fluids.   If you have concerns about taking  your medications, please ask at office or if scheduling via TalkBin, send a message by clicking on Secure Messaging, Message Your Care Team.            Aug 22, 2017  6:00 PM CDT   LAB with BE LAB   [facility] ([facility])    [address] [address]   [phone]           Patient must bring picture ID.  Patient should be prepared to give a urine specimen  Please do not eat 10-12 hours before your appointment if you are coming in fasting for labs on lipids, cholesterol, or glucose (sugar).  Pregnant women should follow their Care Team instructions. Water with medications is okay. Do not drink coffee or other fluids.   If you have concerns about taking  your medications, please ask at office or if scheduling via TalkBin, send a message by clicking on Secure Messaging, Message Your Care Team.             Aug 25, 2017  9:40 AM CDT   Return Visit with Hunter Monroy MD   [facility] [place] ([facility])    [address]  [place] [address] [phone]              Who to contact     Please call your clinic at [phone] to:    Ask questions about your health    Make or cancel appointments    Discuss your medicines    Learn about your test results    Speak to your doctor   If you have compliments or concerns about an experience at your clinic, or if you wish to file a complaint, please contact [facility] Patient Relations at [phone] or email us at [email or web address]         Additional Information About Your Visit        [email or web address] Information     [employer] gives you secure access to your electronic health record. If you see a primary care provider, you can also send messages to your care team and make appointments. If you have questions, please call your primary care clinic.  If you do not have a primary care provider, please call [phone] and they will assist you.      [employer] is an electronic gateway that provides easy, online access to your medical records. With [employer], you can request a clinic appointment, read your test results, renew a prescription or communicate with your care team.     To access your existing account, please contact your [facility] Clinic or call [phone] for assistance.        Care EveryWhere ID     This is your Care EveryWhere ID. This could be used by other organizations to access your [place] medical records  [id number]        Your Vitals Were     Pulse Last Period Breastfeeding? BMI (Body Mass Index)          78 05/24/2017 No 18.54 kg/m2         Blood Pressure from Last 3 Encounters:   07/06/17 107/71   06/23/17 112/73   06/09/17 122/71    Weight from Last 3 Encounters:   07/06/17 49 kg (108 lb)   06/23/17 48.7 kg (107 lb 5.8 oz)   06/09/17 49.9 kg (110 lb)               Today, you had the following     No orders found for display         Today's Medication Changes          These changes are accurate as of: 7/6/17  8:00 AM.  If you have any questions, ask your nurse or doctor.               Stop taking these medicines if you haven't already. Please contact your care team if you have questions.     folic acid 1 MG tablet   Commonly known as:  FOLVITE   Stopped by:  Apryl West MD           oxyCODONE 5 MG IR tablet   Commonly known as:  ROXICODONE   Stopped by:  Apryl West MD                    Primary Care Provider Office Phone #    Ritu Trevino [facility] [phone]       No address on file        Equal Access to Services     [facility]: Hadii jeff wade Soly, wabryan nguyen, marla kaalmarichard moore, adam girard . So [facility] [phone].    ATENCIÓN: Si habla español, tiene a wheatley disposición servicios gratuitos de asistencia lingüística. [facility] [phone].    We comply with applicable federal civil rights laws and [place] laws. We do not discriminate on the basis of race, color, national origin, age, disability sex, sexual orientation or gender identity.            Thank you!     Thank you for choosing [facility]  for your care. Our goal is always to provide you with excellent care. Hearing back from our patients is one way we can continue to improve our services. Please take a few minutes to complete the written survey that you may receive in the mail after your visit with us. Thank you!             Your Updated Medication List - Protect others around you: Learn how to safely use, store and throw away your medicines at www.disposemymeds.org.          This list is accurate as of: 7/6/17  8:00 AM.  Always use your most recent med list.                   Brand Name Dispense Instructions for use Diagnosis    adalimumab 40 MG/0.8ML pen kit    HUMIRA PEN    2 each    Inject 0.8 mLs (40 mg) Subcutaneous  every 14 days    Seronegative spondyloarthropathy       ALLEGRA PO      Take 180 mg by mouth daily        ibuprofen 600 MG tablet    ADVIL/MOTRIN    30 tablet    Take 1 tablet (600 mg) by mouth every 6 hours as needed for pain (mild)    Status post bilateral salpingectomy       leflunomide 10 MG tablet    ARAVA    30 tablet    Take 1 tablet (10 mg) by mouth daily    Seronegative spondyloarthropathy, High risk medication use       multivitamin  peds with iron 60 MG chewable tablet      Take 1 chew tab by mouth daily.

## 2017-07-21 DIAGNOSIS — M47.819 SERONEGATIVE SPONDYLOARTHROPATHY: ICD-10-CM

## 2017-07-21 DIAGNOSIS — Z79.899 HIGH RISK MEDICATION USE: ICD-10-CM

## 2017-07-21 LAB
ALBUMIN SERPL-MCNC: 3.9 G/DL (ref 3.4–5)
ALP SERPL-CCNC: 67 U/L (ref 40–150)
ALT SERPL W P-5'-P-CCNC: 37 U/L (ref 0–50)
AST SERPL W P-5'-P-CCNC: 26 U/L (ref 0–45)
BASOPHILS # BLD AUTO: 0 10E9/L (ref 0–0.2)
BASOPHILS NFR BLD AUTO: 0.7 %
BILIRUB DIRECT SERPL-MCNC: <0.1 MG/DL (ref 0–0.2)
BILIRUB SERPL-MCNC: 0.5 MG/DL (ref 0.2–1.3)
CREAT SERPL-MCNC: 0.74 MG/DL (ref 0.52–1.04)
DIFFERENTIAL METHOD BLD: ABNORMAL
EOSINOPHIL # BLD AUTO: 0.3 10E9/L (ref 0–0.7)
EOSINOPHIL NFR BLD AUTO: 6.3 %
ERYTHROCYTE [DISTWIDTH] IN BLOOD BY AUTOMATED COUNT: 12.4 % (ref 10–15)
GFR SERPL CREATININE-BSD FRML MDRD: 89 ML/MIN/1.7M2
HCT VFR BLD AUTO: 36.1 % (ref 35–47)
HGB BLD-MCNC: 11.4 G/DL (ref 11.7–15.7)
LYMPHOCYTES # BLD AUTO: 1.5 10E9/L (ref 0.8–5.3)
LYMPHOCYTES NFR BLD AUTO: 35.8 %
MCH RBC QN AUTO: 26.8 PG (ref 26.5–33)
MCHC RBC AUTO-ENTMCNC: 31.6 G/DL (ref 31.5–36.5)
MCV RBC AUTO: 85 FL (ref 78–100)
MONOCYTES # BLD AUTO: 0.4 10E9/L (ref 0–1.3)
MONOCYTES NFR BLD AUTO: 9.8 %
NEUTROPHILS # BLD AUTO: 2 10E9/L (ref 1.6–8.3)
NEUTROPHILS NFR BLD AUTO: 47.4 %
PLATELET # BLD AUTO: 168 10E9/L (ref 150–450)
PROT SERPL-MCNC: 7.3 G/DL (ref 6.8–8.8)
RBC # BLD AUTO: 4.25 10E12/L (ref 3.8–5.2)
WBC # BLD AUTO: 4.3 10E9/L (ref 4–11)

## 2017-07-21 PROCEDURE — 85025 COMPLETE CBC W/AUTO DIFF WBC: CPT | Performed by: INTERNAL MEDICINE

## 2017-07-21 PROCEDURE — 82565 ASSAY OF CREATININE: CPT | Performed by: INTERNAL MEDICINE

## 2017-07-21 PROCEDURE — 36415 COLL VENOUS BLD VENIPUNCTURE: CPT | Performed by: INTERNAL MEDICINE

## 2017-07-21 PROCEDURE — 80076 HEPATIC FUNCTION PANEL: CPT | Performed by: INTERNAL MEDICINE

## 2017-07-25 NOTE — PROGRESS NOTES
"[employer] message sent:  \"Ms. Foy,    Liver enzyme, ALT, is normal again.  Mild anemia persists.    Sincerely,  Hunter Monroy MD  7/25/2017 4:32 PM\""

## 2017-08-22 DIAGNOSIS — Z79.899 HIGH RISK MEDICATION USE: ICD-10-CM

## 2017-08-22 DIAGNOSIS — M47.819 SERONEGATIVE SPONDYLOARTHROPATHY: ICD-10-CM

## 2017-08-22 LAB
BASOPHILS # BLD AUTO: 0 10E9/L (ref 0–0.2)
BASOPHILS NFR BLD AUTO: 0.3 %
DIFFERENTIAL METHOD BLD: NORMAL
EOSINOPHIL # BLD AUTO: 0.3 10E9/L (ref 0–0.7)
EOSINOPHIL NFR BLD AUTO: 4.1 %
ERYTHROCYTE [DISTWIDTH] IN BLOOD BY AUTOMATED COUNT: 12.4 % (ref 10–15)
ERYTHROCYTE [SEDIMENTATION RATE] IN BLOOD BY WESTERGREN METHOD: 7 MM/H (ref 0–20)
HCT VFR BLD AUTO: 36.5 % (ref 35–47)
HGB BLD-MCNC: 11.8 G/DL (ref 11.7–15.7)
LYMPHOCYTES # BLD AUTO: 2.2 10E9/L (ref 0.8–5.3)
LYMPHOCYTES NFR BLD AUTO: 36.1 %
MCH RBC QN AUTO: 27.1 PG (ref 26.5–33)
MCHC RBC AUTO-ENTMCNC: 32.3 G/DL (ref 31.5–36.5)
MCV RBC AUTO: 84 FL (ref 78–100)
MONOCYTES # BLD AUTO: 0.5 10E9/L (ref 0–1.3)
MONOCYTES NFR BLD AUTO: 8 %
NEUTROPHILS # BLD AUTO: 3.2 10E9/L (ref 1.6–8.3)
NEUTROPHILS NFR BLD AUTO: 51.5 %
PLATELET # BLD AUTO: 196 10E9/L (ref 150–450)
RBC # BLD AUTO: 4.36 10E12/L (ref 3.8–5.2)
WBC # BLD AUTO: 6.1 10E9/L (ref 4–11)

## 2017-08-22 PROCEDURE — 85025 COMPLETE CBC W/AUTO DIFF WBC: CPT | Performed by: INTERNAL MEDICINE

## 2017-08-22 PROCEDURE — 85652 RBC SED RATE AUTOMATED: CPT | Performed by: INTERNAL MEDICINE

## 2017-08-22 PROCEDURE — 80076 HEPATIC FUNCTION PANEL: CPT | Performed by: INTERNAL MEDICINE

## 2017-08-22 PROCEDURE — 36415 COLL VENOUS BLD VENIPUNCTURE: CPT | Performed by: INTERNAL MEDICINE

## 2017-08-22 PROCEDURE — 82565 ASSAY OF CREATININE: CPT | Performed by: INTERNAL MEDICINE

## 2017-08-22 PROCEDURE — 86140 C-REACTIVE PROTEIN: CPT | Performed by: INTERNAL MEDICINE

## 2017-08-23 LAB
ALBUMIN SERPL-MCNC: 4.1 G/DL (ref 3.4–5)
ALP SERPL-CCNC: 63 U/L (ref 40–150)
ALT SERPL W P-5'-P-CCNC: 32 U/L (ref 0–50)
AST SERPL W P-5'-P-CCNC: 21 U/L (ref 0–45)
BILIRUB DIRECT SERPL-MCNC: 0.1 MG/DL (ref 0–0.2)
BILIRUB SERPL-MCNC: 0.4 MG/DL (ref 0.2–1.3)
CREAT SERPL-MCNC: 0.75 MG/DL (ref 0.52–1.04)
CRP SERPL-MCNC: <2.9 MG/L (ref 0–8)
GFR SERPL CREATININE-BSD FRML MDRD: 87 ML/MIN/1.7M2
PROT SERPL-MCNC: 7.5 G/DL (ref 6.8–8.8)

## 2017-08-25 ENCOUNTER — OFFICE VISIT (OUTPATIENT)
Dept: RHEUMATOLOGY | Facility: CLINIC | Age: 37
End: 2017-08-25
Payer: COMMERCIAL

## 2017-08-25 VITALS
TEMPERATURE: 97.5 F | HEIGHT: 64 IN | HEART RATE: 79 BPM | WEIGHT: 110 LBS | DIASTOLIC BLOOD PRESSURE: 71 MMHG | BODY MASS INDEX: 18.78 KG/M2 | SYSTOLIC BLOOD PRESSURE: 103 MMHG | OXYGEN SATURATION: 99 %

## 2017-08-25 DIAGNOSIS — M79.642 PAIN OF LEFT HAND: ICD-10-CM

## 2017-08-25 DIAGNOSIS — M47.819 SERONEGATIVE SPONDYLOARTHROPATHY: Primary | ICD-10-CM

## 2017-08-25 DIAGNOSIS — Z79.899 HIGH RISK MEDICATION USE: ICD-10-CM

## 2017-08-25 PROCEDURE — 99213 OFFICE O/P EST LOW 20 MIN: CPT | Performed by: INTERNAL MEDICINE

## 2017-08-25 RX ORDER — LEFLUNOMIDE 10 MG/1
10 TABLET ORAL DAILY
Qty: 90 TABLET | Refills: 1 | Status: SHIPPED | OUTPATIENT
Start: 2017-08-25 | End: 2018-02-16

## 2017-08-25 NOTE — NURSING NOTE
"Chief Complaint   Patient presents with     Arthritis     patient states she is feeling really good, has a handful of time when her hands hurt. Has not had any headaches since switching medication       Initial /71 (BP Location: Left arm, Patient Position: Chair, Cuff Size: Adult Regular)  Pulse 79  Temp 97.5  F (36.4  C) (Oral)  Ht 1.626 m (5' 4\")  Wt 49.9 kg (110 lb)  SpO2 99%  BMI 18.88 kg/m2 Estimated body mass index is 18.88 kg/(m^2) as calculated from the following:    Height as of this encounter: 1.626 m (5' 4\").    Weight as of this encounter: 49.9 kg (110 lb).  BP completed using cuff size: regular         RAPID3 (0-30) Cumulative Score  0          RAPID3 Weighted Score (divide #4 by 3 and that is the weighted score)  0         "

## 2017-08-25 NOTE — PROGRESS NOTES
Rheumatology Clinic Visit      Abby Foy MRN# [id number]   YOB: 1980 Age: 37 year old      Date of visit: 8/25/17   PCP: Levar Slaughter  Dermatologist: Amy Patel  Ophthalmologist: Dr. Mathis     Chief Complaint   Patient presents with:  Arthritis: patient states she is feeling really good, has a handful of time when her hands hurt. Has not had any headaches since switching medication    Assessment and Plan   1. 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 However, given her continued back pain that improved with activity but no radiographic evidence for inflammatory arthritis or osteoarthritis, there was concern that she had inflammatory back pain that would be more consistent with a spondylarthritis. Humira was started and resulted in improvement of her back pain, suggesting axial disease.  Arthritis improved with methotrexate and sulfasalazine, but she was having headaches and therefore the most likely culprit methotrexate was reduced until her headaches worsened significantly and therefore sulfasalazine and methotrexate were both discontinued. She had resolution of her headaches after discontinuing both sulfasalazine and methotrexate. I believe that the sulfasalazine was the cause of her headaches. Therefore, cautious retrial of methotrexate in the future could be done. At this time, she is doing very well and tolerating her medications well.  Leflunomide dose is limited because of the transaminitis with higher doses.   - Continue Humira 40 mg every 14 days  - Continue leflunomide 20 mg daily  - Labs monthly ×2 months: CBC, creatinine, hepatic panel  - Labs 2-3 days prior to the next rheumatology clinic visit: CBC, Creatinine, Hepatic Panel, ESR, CRP    # Pregnancy Planning: s/p  salpingectomy;  had a vasectomy    2. Iritis History, then diagnosed with Giant Papillary Conjunctivitis: Was evaluated by ophthalmology previously    3. Headaches: Likely due to SSZ. Headaches stopped with discontinuation of SSZ.    4. Left thumb pain: sprained it 1 month ago per patient.  Mild tenderness to palpation of the left 1st MCP, flexion and extension with mild pain.  No swelling.  Refer to hand therapy.  - hand therapy referral    Ms. Foy verbalized agreement with and understanding of the rational for the diagnosis and treatment plan.  All questions were answered to best of my ability and the patient's satisfaction. Ms. Foy was advised to contact the clinic with any questions that may arise after the clinic visit.      Thank you for involving me in the care of the patient    Return to clinic: 3 months      HPI   Abby Foy is a 37 year old female with medical history significant for urticaria, H. pylori infection, and iritis? who returns for f/u of seronegative spondyloarthropathy.    Since last seen, ALT was elevated twice her baseline, so leflunomide was reduced on 6/22/2017 with resolution of the abnormality.     Today, Ms. Foy reports that she is doing great.  Occasional morning stiffness that resolves with activity.  Occasional pain in her bilateral 2nd-4th PIPs that resolves with activity.  No gelling phenomenon.  Headaches have resolved.  Doing well and tolerating her medications well.  She does not spraining her left thumb 1 month ago and her  (physical therapist) thought that possibly hand therapy could help.     Denies fevers, chills, nausea, vomiting, constipation, diarrhea. No abdominal pain. No chest pain/pressure, palpitations, or shortness of breath. No neck pain. Occasional cold sores..     Tobacco: None  EtOH: 1-2 drinks on the weekends  Drugs: None  Occupation: Dietitian at the [employer]    ROS   GEN: No fevers/chills  SKIN: See  HPI  HEENT: see HPI.  CV: No chest pain, pressure, palpitations, or dyspnea on exertion.  PULM: No SOB. No cough or wheezing  GI: No nausea, vomiting, constipation, diarrhea. No blood in stool. +Abdominal bloating.  : No blood in urine.  MSK: See HPI.  NEURO: negative  PSYCH: negative    Active Problem List     Patient Active Problem List   Diagnosis     CARDIOVASCULAR SCREENING; LDL GOAL LESS THAN 160     Urticaria     Diagnostic skin and sensitization tests     Nonallergic rhinitis     Angioedema of lips     H. pylori infection     GPC (giant papillary conjunctivitis)     Seronegative spondyloarthropathy     Midline low back pain without sciatica     Abnormal uterine bleeding (AUB)- on OCPs     Past Medical History     Past Medical History:   Diagnosis Date     Angioedema of lips episode in 3/13--idiopathic     Contraception 1/28/2009     Diagnostic skin and sensitization tests 3/27/13 skin tests all NEGATIVE for environmental and food allergens including shellfish and nuts.      Nonallergic rhinitis     3/27/13 skin tests all NEGATIVE for environmental and food allergens including shellfish and nuts. 3/27/13 followup IgE tests NEG to Peanut, SNF, shrimp, macadamia nut, pistachio and walnut.     Rheumatoid arthritis of multiple sites with negative rheumatoid factor (H) 12/31/2015     Past Surgical History     Past Surgical History:   Procedure Laterality Date     LAPAROSCOPIC SALPINGECTOMY Bilateral 6/23/2017    Procedure: LAPAROSCOPIC SALPINGECTOMY;  Operative Laparoscopy, Bilateral Salpingectomy ;  Surgeon: Apryl West MD;  Location: [facility] OR        Allergy     Allergies   Allergen Reactions     No Known Drug Allergy      Shrimp Swelling     Lips and tongue     Walnuts [Nuts] Swelling     Lips and tongue       Current Medication List     Current Outpatient Prescriptions   Medication Sig     ibuprofen (ADVIL/MOTRIN) 600 MG tablet Take 1 tablet (600 mg) by mouth every 6 hours as needed for pain (mild)      "leflunomide (ARAVA) 10 MG tablet Take 1 tablet (10 mg) by mouth daily     adalimumab (HUMIRA PEN) 40 MG/0.8ML pen kit Inject 0.8 mLs (40 mg) Subcutaneous every 14 days     Fexofenadine HCl (ALLEGRA PO) Take 180 mg by mouth daily     multivitamin peds with iron (FLINTSTONES COMPLETE) 60 MG chewable tablet Take 1 chew tab by mouth daily.     No current facility-administered medications for this visit.      Social History   See HPI    Family History     Family History   Problem Relation Age of Onset     Hypertension Maternal Grandmother      Autoimmune Disease Maternal Grandmother      Autoimmune hepatitis     CANCER Maternal Grandfather      Hypertension Paternal Grandmother      Cancer - colorectal Paternal Grandfather      Cardiovascular Paternal Grandfather      Other Cancer Paternal Grandfather      Hypertension Mother      Autoimmune Disease Mother      Autoimmune hepatitis     Hyperlipidemia Mother      Asthma Mother      Hypertension Father      DIABETES Father      Type 2     Multiple Sclerosis Maternal Aunt      CEREBROVASCULAR DISEASE No family hx of      Thyroid Disease No family hx of      Glaucoma No family hx of      Macular Degeneration No family hx of      Breast Cancer No family hx of      Colon Cancer No family hx of      Physical Exam     Temp Readings from Last 3 Encounters:   08/25/17 97.5  F (36.4  C) (Oral)   06/23/17 98.7  F (37.1  C) (Oral)   06/09/17 98.5  F (36.9  C) (Oral)     BP Readings from Last 5 Encounters:   08/25/17 103/71   07/06/17 107/71   06/23/17 112/73   06/09/17 122/71   05/22/17 113/65     Pulse Readings from Last 1 Encounters:   08/25/17 79     Resp Readings from Last 1 Encounters:   06/23/17 20     Estimated body mass index is 18.88 kg/(m^2) as calculated from the following:    Height as of this encounter: 1.626 m (5' 4\").    Weight as of this encounter: 49.9 kg (110 lb).    GEN: NAD  HEENT: MMM. Anicteric, noninjected sclera; eyes appear to have good moisture by gross " examination  CV: S1, S2. RRR. No m/r/g.  PULM: CTA bilaterally. No w/c.  MSK: Left 1st MCP with mild tenderness to palpation but no swelling or increased warmth; flexion and extension with some pain; strength preserved.  Other MCPs, PIPs, DIPs, wrists, elbows, shoulders, knees, ankles, MTPs, and back without swelling or tenderness to palpation.    SKIN: No rash  EXT: No LE edema  PSYCH: Alert. Appropriate.    Labs / Imaging (select studies)   RF/CCP  Recent Labs   Lab Test  12/21/15   1447   CCPABY  <20  Interpretation:  Negative     RHF  <20     MELY/RNP/Sm/SSA/SSB  Recent Labs   Lab Test  08/05/15   1621  06/30/15   1636  08/06/13   1500   MARCIE   --   <1.0  Interpretation:  Negative     --    SSAIGG  <0.2  Negative   Antibody index (AI) values reflect qualitative changes in antibody   concentration that cannot be directly associated with clinical condition or   disease state.     --    --    SSBIGG  <0.2  Negative   Antibody index (AI) values reflect qualitative changes in antibody   concentration that cannot be directly associated with clinical condition or   disease state.     --    --    TREPAB   --    --   Negative     dsDNA  Recent Labs   Lab Test  06/30/15   1636   DNA  <15  Interpretation:  Negative       C3/C4  Recent Labs   Lab Test  06/30/15   1636   [id number]  116   R4ZOKML  21     Antiphospholipid Antibodies  Recent Labs   Lab Test  06/30/15   1636   CARG  <15.0  Interpretation:  Negative     FRED  <12.5  Interpretation:  Negative       HLA-B27  Recent Labs   Lab Test  12/21/15   1447   [id number]  SSOP   B1  B27 Neg     CBC  Recent Labs   Lab Test  08/22/17   1819  07/21/17   0926  06/09/17   0944   WBC  6.1  4.3  4.4   RBC  4.36  4.25  4.36   HGB  11.8  11.4*  12.0   HCT  36.5  36.1  38.0   MCV  84  85  87   RDW  12.4  12.4  11.7   PLT  196  168  178   MCH  27.1  26.8  27.5   MCHC  32.3  31.6  31.6   NEUTROPHIL  51.5  47.4  37.6   LYMPH  36.1  35.8  46.6   MONOCYTE  8.0  9.8  7.3   EOSINOPHIL  4.1  6.3   8.0   BASOPHIL  0.3  0.7  0.5   ANEU  3.2  2.0  1.6   ALYM  2.2  1.5  2.0   ANA MARÍA  0.5  0.4  0.3   AEOS  0.3  0.3  0.4   ABAS  0.0  0.0  0.0     CMP  Recent Labs   Lab Test  08/22/17   1819  07/21/17   0926  06/20/17   1746  06/09/17   0944  03/16/17   1431   06/30/15   1636  11/05/09   1058   NA   --    --    --    --    --    --   140   --    POTASSIUM   --    --    --    --    --    --   4.1   --    CHLORIDE   --    --    --    --    --    --   105   --    CO2   --    --    --    --    --    --   30   --    ANIONGAP   --    --    --    --    --    --   5   --    GLC   --    --    --    --    --    --   73  79   BUN   --    --    --    --    --    --   9   --    CR  0.75  0.74   --   0.78  0.86   < >  0.80   --    GFRESTIMATED  87  89   --   84  74   < >  83   --    GFRESTBLACK  >90  >90  African American GFR Calc     --   >90   GFR Calc    90   < >  >90   GFR Calc     --    DAISY   --    --    --    --    --    --   9.4   --    BILITOTAL  0.4  0.5  0.5  0.5  0.3   < >  0.5   --    ALBUMIN  4.1  3.9  3.9  3.8  3.8   < >  4.1   --    PROTTOTAL  7.5  7.3  7.2  7.4  7.0   < >  8.1   --    ALKPHOS  63  67  60  61  44   < >  65   --    AST  21  26  36  39  20   < >  19   --    ALT  32  37  52*  51*  27   < >  26   --     < > = values in this interval not displayed.     Iron Studies  Recent Labs   Lab Test  03/20/17   1506   SHANITA  90   IRON  119   FEB  357   IRONSAT  33     Calcium/VitaminD  Recent Labs   Lab Test  01/13/17   1355  12/21/15   1447  06/30/15   1636  01/09/14   0906   11/10/10   1335  11/05/09   1058   DAISY   --    --   9.4   --    --    --    --    D3VIT   --    --    --    --    --   45  51   VITDT  45  74   --   35   < >   --    --     < > = values in this interval not displayed.     ESR/CRP  Recent Labs   Lab Test  08/22/17   1819  03/16/17   1431  12/22/16   0945   SED  7  7  6   CRP  <2.9  <2.9  <2.9     TSH/T4  Recent Labs   Lab Test  01/13/17   1355  08/05/15   1621   "06/28/13   0853   TSH  3.10  1.11  1.47     Lipid Panel  Recent Labs   Lab Test  11/05/09   1058   CHOL  211*   TRIG  67   HDL  82   LDL  115   VLDL  13   CHOLHDLRATIO  3.0     Hepatitis B  Recent Labs   Lab Test  03/20/17   1506  03/28/16   1442  08/06/13   1500  11/10/10   1335   AUSAB   --   264.69*   --    --    HBCAB  Nonreactive   --    --    --    HEPBANG   --   Nonreactive  Negative  Negative     Hepatitis C  Recent Labs   Lab Test  12/21/15   1447   HCVAB  Nonreactive   Assay performance characteristics have not been established for newborns,   infants, and children       Lyme confirmation testing by Western Blot  Recent Labs   Lab Test  08/05/15   1621   LYWG  Negative  Reference range: Negative  (Note)  Band(s) present: NONE  (Insufficient number of bands for positive result)  INTERPRETIVE INFORMATION: Borrelia Burgdorferi Ab, IgG  Western Blot  For this assay, a positive result is reported when any 5 or  more of the following 10 bands are present: 18, 23, 28, 30,  39, 41, 45, 58, 66, or 93 kDa.  All other banding patterns  are reported as negative.  Performed by [facility],  [address] [place] [address] [phone]  [email or web address], Pete Saha MD, Lab. Director       Tuberculosis Screening  Recent Labs   Lab Test  03/20/17   1508  03/28/16   1443   TBRSLT  Negative  Negative   TBAGN  0.00  0.04     HIV Screening  Recent Labs   Lab Test  12/21/15   1447   HIAGAB  Nonreactive   HIV-1 p24 Ag & HIV-1/HIV-2 Ab Not Detected       \"XR SACROILIAC JOINT 1/2 VW 12/21/2015 3:23 PM  HISTORY: Pain.  COMPARISON: None.  FINDINGS: Sacroiliac joints are patent and symmetric. No acute osseous  abnormality.  IMPRESSION  IMPRESSION: Normal sacroiliac joints.  YUE JARRELL MD\"    \"XR LUMBAR SPINE 2-3 VIEWS 12/21/2015 3:23 PM  HISTORY: Pain.  COMPARISON: None.  FINDINGS: Lumbar alignment is anatomic. Vertebral body heights and  intervertebral disc spaces are preserved.  IMPRESSION  IMPRESSION: Normal lumbar " "spine.  YUE JARRELL MD\"    \"XR HAND BILATERAL G/E 3 VW 12/21/2015 3:50 PM  HISTORY: Pain in unspecified joint   IMPRESSION  IMPRESSION: Negative.  LINNEA ZELAYA MD\"    \"XR CHEST 2 VW 6/30/2015 4:54 PM  HISTORY: Pain.  COMPARISON: None.  IMPRESSION  IMPRESSION: The lungs are clear. No focal pulmonary opacities. Heart  and mediastinum are unremarkable. No acute cardiopulmonary  abnormalities.  SO PHAN MD\"    Immunization History     Immunization History   Administered Date(s) Administered     Influenza (IIV3) 10/12/2011, 10/03/2013     Influenza Vaccine, 3 YRS +, IM (QUADRIVALENT W/PRESERVATIVES) 10/01/2015     Pneumococcal (PCV 13) 09/26/2016     Pneumococcal 23 valent 12/22/2016     TD (ADULT, 7+) 08/15/2001     TDAP Vaccine (Adacel) 06/20/2011     TDAP Vaccine (Boostrix) 03/06/2014          Chart documentation done in part with Dragon Voice recognition Software. Although reviewed after completion, some word and grammatical error may remain.    Hunter Monroy MD  "

## 2017-08-25 NOTE — MR AVS SNAPSHOT
After Visit Summary   8/25/2017    Abby Foy    MRN: [id number]           Patient Information     Date Of Birth          1980        Visit Information        Provider Department      8/25/2017 9:40 AM Hunter Monroy MD [facility] [place]        Today's Diagnoses     Seronegative spondyloarthropathy    -  1    High risk medication use        Pain of left hand           Follow-ups after your visit        Additional Services     ERMA PT, HAND, AND CHIROPRACTIC REFERRAL       **This order will print in the ERMA Scheduling Office**    Hand Therapy is available through:    *[facility]  *[facility]  *[facility]    Call one number to schedule at any of the above locations: [phone].    Your provider has referred you to: Hand Therapy    Indication/Reason for Referral: left thumb/hand pain  Onset of Illness: 1 month ago  Therapy Orders: Evaluate and Treat  Special Programs: None  Special Request: None    Gricelda Madison      Additional Comments for the Therapist or Chiropractor: none    Please be aware that coverage of these services is subject to the terms and limitations of your health insurance plan.  Call member services at your health plan with any benefit or coverage questions.      Please bring the following to your appointment:    *Your personal calendar for scheduling future appointments  *Comfortable clothing                  Your next 10 appointments already scheduled     Sep 22, 2017  9:30 AM CDT   LAB with BE LAB   [facility] [place] ([facility] [place])    [address]  [place] [id number] [phone]           Patient must bring picture ID. Patient should be prepared to give a urine specimen  Please do not eat 10-12 hours before your appointment if you are coming in fasting for labs on lipids, cholesterol, or glucose (sugar). Pregnant women should follow their Care Team instructions. Water  with medications is okay. Do not drink coffee or other fluids. If you have concerns about taking  your medications, please ask at office or if scheduling via [employer], send a message by clicking on Secure Messaging, Message Your Care Team.            Oct 27, 2017  9:30 AM CDT   LAB with BE LAB   [facility] ([facility]    [address]  [place] [address]   [phone]           Patient must bring picture ID. Patient should be prepared to give a urine specimen  Please do not eat 10-12 hours before your appointment if you are coming in fasting for labs on lipids, cholesterol, or glucose (sugar). Pregnant women should follow their Care Team instructions. Water with medications is okay. Do not drink coffee or other fluids. If you have concerns about taking  your medications, please ask at office or if scheduling via [employer], send a message by clicking on Secure Messaging, Message Your Care Team.            Nov 14, 2017  6:00 PM CST   LAB with BE LAB   [facility]    [address]  [place] [address]   [phone]           Patient must bring picture ID. Patient should be prepared to give a urine specimen  Please do not eat 10-12 hours before your appointment if you are coming in fasting for labs on lipids, cholesterol, or glucose (sugar). Pregnant women should follow their Care Team instructions. Water with medications is okay. Do not drink coffee or other fluids. If you have concerns about taking  your medications, please ask at office or if scheduling via [employer], send a message by clicking on Secure Messaging, Message Your Care Team.            Nov 17, 2017 10:20 AM CST   Return Visit with Hutner Monroy MD   [facility]    [address]  [place] [address] [phone]              Future tests that were ordered for you today     Open Standing Orders        Priority  Remaining Interval Expires Ordered    CBC with platelets differential Routine 2/2 Every 4 Weeks 2/21/2018 8/25/2017    Creatinine Routine 2/2 Every 4 Weeks 2/21/2018 8/25/2017    Hepatic panel Routine 2/2 Every 4 Weeks 2/21/2018 8/25/2017          Open Future Orders        Priority Expected Expires Ordered    CBC with platelets differential Routine 11/19/2017 12/8/2017 8/25/2017    Creatinine Routine 11/19/2017 12/8/2017 8/25/2017    CRP inflammation Routine 11/19/2017 12/8/2017 8/25/2017    Erythrocyte sedimentation rate auto Routine 11/19/2017 12/8/2017 8/25/2017    Hepatic panel Routine 11/19/2017 12/8/2017 8/25/2017            Who to contact     If you have questions or need follow up information about today's clinic visit or your schedule please contact [facility] directly at [phone].  Normal or non-critical lab and imaging results will be communicated to you by [email or web address], letter or phone within 4 business days after the clinic has received the results. If you do not hear from us within 7 days, please contact the clinic through [employer] or phone. If you have a critical or abnormal lab result, we will notify you by phone as soon as possible.  Submit refill requests through [employer] or call your pharmacy and they will forward the refill request to us. Please allow 3 business days for your refill to be completed.          Additional Information About Your Visit        [email or web address] Information     [employer] gives you secure access to your electronic health record. If you see a primary care provider, you can also send messages to your care team and make appointments. If you have questions, please call your primary care clinic.  If you do not have a primary care provider, please call [phone] and they will assist you.        Care EveryWhere ID     This is your Care EveryWhere ID. This could be used by other organizations to access your [place] medical records  [id number]        Your Vitals Were      "Pulse Temperature Height Pulse Oximetry BMI (Body Mass Index)       79 97.5  F (36.4  C) (Oral) 1.626 m (5' 4\") 99% 18.88 kg/m2        Blood Pressure from Last 3 Encounters:   08/25/17 103/71   07/06/17 107/71   06/23/17 112/73    Weight from Last 3 Encounters:   08/25/17 49.9 kg (110 lb)   07/06/17 49 kg (108 lb)   06/23/17 48.7 kg (107 lb 5.8 oz)              We Performed the Following     ERMA PT, HAND, AND CHIROPRACTIC REFERRAL          Where to get your medicines      These medications were sent to [facility] - [place], [place] - [address]  [address] [facility] [address]    Hours:  Mon-Fri 8:30am-5:00pm Toll Free [phone] Phone:  [phone]     leflunomide 10 MG tablet          Primary Care Provider Office Phone #    [facility] [phone]       No address on file        Equal Access to Services     DANIKA [facility] HospitalSTEFANY : Hadii jeff wilkso Soly, waaxda luqadaha, qaybta kaalmada ademelinda, adam girard . So [facility] [phone].    ATENCIÓN: Si habla español, tiene a wheatley disposición servicios gratuitos de asistencia lingüística. Marilynn al [phone].    We comply with applicable federal civil rights laws and [place] laws. We do not discriminate on the basis of race, color, national origin, age, disability sex, sexual orientation or gender identity.            Thank you!     Thank you for choosing [facility]  for your care. Our goal is always to provide you with excellent care. Hearing back from our patients is one way we can continue to improve our services. Please take a few minutes to complete the written survey that you may receive in the mail after your visit with us. Thank you!             Your Updated Medication List - Protect others around you: Learn how to safely use, store and throw away your medicines at www.disposemymeds.org.          This list is accurate as of: 8/25/17 10:13 AM.  Always use " your most recent med list.                   Brand Name Dispense Instructions for use Diagnosis    adalimumab 40 MG/0.8ML pen kit    HUMIRA PEN    2 each    Inject 0.8 mLs (40 mg) Subcutaneous every 14 days    Seronegative spondyloarthropathy       ALLEGRA PO      Take 180 mg by mouth daily        ibuprofen 600 MG tablet    ADVIL/MOTRIN    30 tablet    Take 1 tablet (600 mg) by mouth every 6 hours as needed for pain (mild)    Status post bilateral salpingectomy       leflunomide 10 MG tablet    ARAVA    90 tablet    Take 1 tablet (10 mg) by mouth daily    Seronegative spondyloarthropathy, High risk medication use       multivitamin  peds with iron 60 MG chewable tablet      Take 1 chew tab by mouth daily.

## 2017-09-22 DIAGNOSIS — Z79.899 HIGH RISK MEDICATION USE: ICD-10-CM

## 2017-09-22 DIAGNOSIS — M47.819 SERONEGATIVE SPONDYLOARTHROPATHY: ICD-10-CM

## 2017-09-22 LAB
ALBUMIN SERPL-MCNC: 3.9 G/DL (ref 3.4–5)
ALP SERPL-CCNC: 62 U/L (ref 40–150)
ALT SERPL W P-5'-P-CCNC: 34 U/L (ref 0–50)
AST SERPL W P-5'-P-CCNC: 25 U/L (ref 0–45)
BASOPHILS # BLD AUTO: 0 10E9/L (ref 0–0.2)
BASOPHILS NFR BLD AUTO: 0.3 %
BILIRUB DIRECT SERPL-MCNC: 0.1 MG/DL (ref 0–0.2)
BILIRUB SERPL-MCNC: 0.5 MG/DL (ref 0.2–1.3)
CREAT SERPL-MCNC: 0.85 MG/DL (ref 0.52–1.04)
DIFFERENTIAL METHOD BLD: ABNORMAL
EOSINOPHIL # BLD AUTO: 0.2 10E9/L (ref 0–0.7)
EOSINOPHIL NFR BLD AUTO: 3.3 %
ERYTHROCYTE [DISTWIDTH] IN BLOOD BY AUTOMATED COUNT: 12.3 % (ref 10–15)
GFR SERPL CREATININE-BSD FRML MDRD: 76 ML/MIN/1.7M2
HCT VFR BLD AUTO: 34.7 % (ref 35–47)
HGB BLD-MCNC: 10.9 G/DL (ref 11.7–15.7)
LYMPHOCYTES # BLD AUTO: 1.4 10E9/L (ref 0.8–5.3)
LYMPHOCYTES NFR BLD AUTO: 22.3 %
MCH RBC QN AUTO: 27 PG (ref 26.5–33)
MCHC RBC AUTO-ENTMCNC: 31.4 G/DL (ref 31.5–36.5)
MCV RBC AUTO: 86 FL (ref 78–100)
MONOCYTES # BLD AUTO: 0.5 10E9/L (ref 0–1.3)
MONOCYTES NFR BLD AUTO: 8.3 %
NEUTROPHILS # BLD AUTO: 4.2 10E9/L (ref 1.6–8.3)
NEUTROPHILS NFR BLD AUTO: 65.8 %
PLATELET # BLD AUTO: 160 10E9/L (ref 150–450)
PROT SERPL-MCNC: 7.1 G/DL (ref 6.8–8.8)
RBC # BLD AUTO: 4.03 10E12/L (ref 3.8–5.2)
WBC # BLD AUTO: 6.4 10E9/L (ref 4–11)

## 2017-09-22 PROCEDURE — 85025 COMPLETE CBC W/AUTO DIFF WBC: CPT | Performed by: INTERNAL MEDICINE

## 2017-09-22 PROCEDURE — 36415 COLL VENOUS BLD VENIPUNCTURE: CPT | Performed by: INTERNAL MEDICINE

## 2017-09-22 PROCEDURE — 80076 HEPATIC FUNCTION PANEL: CPT | Performed by: INTERNAL MEDICINE

## 2017-09-22 PROCEDURE — 82565 ASSAY OF CREATININE: CPT | Performed by: INTERNAL MEDICINE

## 2017-09-25 NOTE — PROGRESS NOTES
"[employer] message sent:  \"Ms. Foy,    Hemoglobin is reduced at 10.9; this is lower than your previous lab but similar to labs 6 months ago.  No change to the treatment plan at this time based on this finding.    Sincerely,  Hunter Monroy MD  9/25/2017 6:15 AM\""

## 2017-10-12 ENCOUNTER — THERAPY VISIT (OUTPATIENT)
Dept: OCCUPATIONAL THERAPY | Facility: CLINIC | Age: 37
End: 2017-10-12
Payer: COMMERCIAL

## 2017-10-12 DIAGNOSIS — M79.645 THUMB PAIN, LEFT: Primary | ICD-10-CM

## 2017-10-12 DIAGNOSIS — S63.642D SPRAIN OF METACARPOPHALANGEAL (MCP) JOINT OF LEFT THUMB, SUBSEQUENT ENCOUNTER: ICD-10-CM

## 2017-10-12 PROCEDURE — 29130 APPL FINGER SPLINT STATIC: CPT | Mod: GO | Performed by: OCCUPATIONAL THERAPIST

## 2017-10-12 PROCEDURE — 97165 OT EVAL LOW COMPLEX 30 MIN: CPT | Mod: GO | Performed by: OCCUPATIONAL THERAPIST

## 2017-10-12 NOTE — MR AVS SNAPSHOT
After Visit Summary   10/12/2017    Abby Foy    MRN: [id number]           Patient Information     Date Of Birth          1980        Visit Information        Provider Department      10/12/2017 2:00 PM [facility] [place]        Today's Diagnoses     Thumb pain, left    -  1    Sprain of metacarpophalangeal (MCP) joint of left thumb, subsequent encounter           Follow-ups after your visit        Your next 10 appointments already scheduled     Oct 27, 2017  9:30 AM CDT   LAB with BE LAB   [facility]    [address] [address]   [phone]           Patient must bring picture ID. Patient should be prepared to give a urine specimen  Please do not eat 10-12 hours before your appointment if you are coming in fasting for labs on lipids, cholesterol, or glucose (sugar). Pregnant women should follow their Care Team instructions. Water with medications is okay. Do not drink coffee or other fluids. If you have concerns about taking  your medications, please ask at office or if scheduling via happn, send a message by clicking on Secure Messaging, Message Your Care Team.            Nov 14, 2017  6:00 PM CST   LAB with BE LAB   [facility] [place] ([facility])    [address] [address]   [phone]           Patient must bring picture ID. Patient should be prepared to give a urine specimen  Please do not eat 10-12 hours before your appointment if you are coming in fasting for labs on lipids, cholesterol, or glucose (sugar). Pregnant women should follow their Care Team instructions. Water with medications is okay. Do not drink coffee or other fluids. If you have concerns about taking  your medications, please ask at office or if scheduling via happn, send a message by clicking on Secure Messaging, Message Your Care Team.            Nov  17, 2017 10:20 AM CST   Return Visit with Hunter Monroy MD   [facility] ([facility])    [address]  [place] [address] [phone]              Who to contact     If you have questions or need follow up information about today's clinic visit or your schedule please contact [facility] [place] directly at [phone].  Normal or non-critical lab and imaging results will be communicated to you by [employer], letter or phone within 4 business days after the clinic has received the results. If you do not hear from us within 7 days, please contact the clinic through [employer] or phone. If you have a critical or abnormal lab result, we will notify you by phone as soon as possible.  Submit refill requests through SHAPE or call your pharmacy and they will forward the refill request to us. Please allow 3 business days for your refill to be completed.          Additional Information About Your Visit        SHAPE Information     SHAPE gives you secure access to your electronic health record. If you see a primary care provider, you can also send messages to your care team and make appointments. If you have questions, please call your primary care clinic.  If you do not have a primary care provider, please call [phone] and they will assist you.        Care EveryWhere ID     This is your Care EveryWhere ID. This could be used by other organizations to access your [facility] medical records  [id number]         Blood Pressure from Last 3 Encounters:   08/25/17 103/71   07/06/17 107/71   06/23/17 112/73    Weight from Last 3 Encounters:   08/25/17 49.9 kg (110 lb)   07/06/17 49 kg (108 lb)   06/23/17 48.7 kg (107 lb 5.8 oz)              We Performed the Following     APPLY FINGER SPLINT STATIC     HC OT EVAL, LOW COMPLEXITY     ERMA INITIAL EVAL REPORT        Primary Care Provider Office Phone # Fax #    Sandi Duffy PA-C [phone]  [phone]       [address] [place] [address]        Equal Access to Services     ROMINA HORNER : Hadii aad ku hadbolivaro Soly, waaxda luqadaha, qaybta kaalmada teresa, adam bestin hayaakaren azarvikki belle lasrikaren lo. So [facility] [phone].    ATENCIÓN: Si habla español, tiene a wheatley disposición servicios gratuitos de asistencia lingüística. Llame al [phone].    We comply with applicable federal civil rights laws and [place] laws. We do not discriminate on the basis of race, color, national origin, age, disability, sex, sexual orientation, or gender identity.            Thank you!     Thank you for choosing [facility]  for your care. Our goal is always to provide you with excellent care. Hearing back from our patients is one way we can continue to improve our services. Please take a few minutes to complete the written survey that you may receive in the mail after your visit with us. Thank you!             Your Updated Medication List - Protect others around you: Learn how to safely use, store and throw away your medicines at www.disposemymeds.org.          This list is accurate as of: 10/12/17  2:48 PM.  Always use your most recent med list.                   Brand Name Dispense Instructions for use Diagnosis    adalimumab 40 MG/0.8ML pen kit    HUMIRA PEN    2 each    Inject 0.8 mLs (40 mg) Subcutaneous every 14 days    Seronegative spondyloarthropathy       ALLEGRA PO      Take 180 mg by mouth daily        ibuprofen 600 MG tablet    ADVIL/MOTRIN    30 tablet    Take 1 tablet (600 mg) by mouth every 6 hours as needed for pain (mild)    Status post bilateral salpingectomy       leflunomide 10 MG tablet    ARAVA    90 tablet    Take 1 tablet (10 mg) by mouth daily    Seronegative spondyloarthropathy, High risk medication use       multivitamin  peds with iron 60 MG chewable tablet      Take 1 chew tab by mouth daily.

## 2017-10-12 NOTE — PROGRESS NOTES
Hand Therapy Initial Evaluation    Current Date:  10/12/2017    Subjective:  Abby Foy is a 37 year old right hand dominant female.    Diagnosis:   Left thumb pain/sprain  DOI:  July 2017    Patient reports symptoms of pain, stiffness/loss of motion, weakness/loss of strength and edema of the left thumb which occurred due to falling on thumb while diving for ball. Since onset symptoms are unchanged.  Special tests:  None.  Previous treatment: None.  General health as reported by patient is excellent.  Pertinent medical history includes:rheumatoid arthritis, migraines/headaches  Medical allergies:none.  Surgical history: other: tube removal.  Medication history: anti-inflammatory.    Occupational Profile Information:  Current occupation is Dietician   Currently working in normal job without restrictions  Job Tasks: prolonged sitting, computer work, walking  Prior functional level:  no limitations  Barriers include:none  Mobility: No difficulty  Transportation: drives  Leisure activities/hobbies: Cares for 2 young children    Functional Outcome Measure:  Upper Extremity Functional Index  SCORE:   Column Totals: 69/80  (A lower score indicates greater disability.)    ROM:  Thumb  10/12/17   AROM(PROM) Right Left   MP 0/50 0/50   IP +/55 +/55   PAbd 45 45   RAbd 45 40     Strength:   (Measured in pounds)  1 trial due to pain    10/12/17   Trials Right Left   1  2  3 55  45  44 12++   Average: 48      Lat Pinch  10/12/17   Trials Right Left   1  2  3 13  12  10 5++   Average: 12      3 Pt Pinch  10/12/17   Trials Right Left   1  2  3 13  12  13 8+   Average: 13      Edema: reports swelling for a few days after injury 2 months ago but resolve    Sensation:  WNL throughout all nerve distributions; per patient report    Palpation:   10/12/17   Thenars ++   Thumb MP volar plate ++   Thumb UCL  +   Thumb RCL +     Special Tests:  Thumb MP 10/12/17   RCL ++ with laxity   UCL -       Pain Report:  VAS(0-10)  10/12/17   At Rest: 1-2/10   With Use: 7/10   Location:  thumb  Pain Quality:  Sharp  Frequency: intermittent    Pain is worst:  daytime  Exacerbated by:  Gripping, lifting  Relieved by:  rest  Progression:  Unchanged    Assessment:  Patient presents with symptoms consistent with diagnosis of thumb RCL sprain, with conservative intervention.     Patient's limitations or Problem List includes:  Pain, Weakness, Decreased stability, Hypermobility, Decreased  and Decreased pinch of the left thumb which interferes with the patient's ability to perform Work Tasks, Recreational Activities and Household Chores as compared to previous level of function.    Rehab Potential:  Good - Return to full activity, some limitations    Patient will benefit from skilled Occupational Therapy to increase  strength, pinch strength and stability of thumb and decrease pain to return to previous activity level and resume normal daily tasks and to reach their rehab potential.    Barriers to Learning:  No barrier    Communication Issues:  Patient appears to be able to clearly communicate and understand verbal and written communication and follow directions correctly.    Chart Review: Chart Review and Simple history review with patient    Identified Performance Deficits: child rearing, home establishment and management, meal preparation and cleanup and work    Assessment of Occupational Performance:  5 or more Performance Deficits    Clinical Decision Making (Complexity): Low complexity    Treatment Explanation:  The following has been discussed with the patient:  RX ordered/plan of care  Anticipated outcomes  Possible risks and side effects    Plan:  Frequency:  1 X week, once daily  Duration:  for 1 week (NA, one time visit)    Treatment Plan:    Orthotic Fabrication:  Hand based orthosis  Self Care:  Ergonomic Considerations to avoid stress to RCL of thumb    Discharge Plan:  Achieve all LTG.  Independent in home treatment  program.  Reach maximal therapeutic benefit.    Home Exercise Program:  Avoid stress to RCL of thumb  Wear hand based thumb spica splint full time x 3-6 weeks    Next Visit:  May return for strengthening when thumb is less painful  Hold chart open for two months, if no contact is received from patient, discharge will occur at that time with this note serving as the discharge summary.

## 2017-10-27 DIAGNOSIS — Z79.899 HIGH RISK MEDICATION USE: ICD-10-CM

## 2017-10-27 DIAGNOSIS — M47.819 SERONEGATIVE SPONDYLOARTHROPATHY: ICD-10-CM

## 2017-10-27 LAB
ALBUMIN SERPL-MCNC: 4.2 G/DL (ref 3.4–5)
ALP SERPL-CCNC: 66 U/L (ref 40–150)
ALT SERPL W P-5'-P-CCNC: 31 U/L (ref 0–50)
AST SERPL W P-5'-P-CCNC: 25 U/L (ref 0–45)
BASOPHILS # BLD AUTO: 0 10E9/L (ref 0–0.2)
BASOPHILS NFR BLD AUTO: 0.7 %
BILIRUB DIRECT SERPL-MCNC: 0.1 MG/DL (ref 0–0.2)
BILIRUB SERPL-MCNC: 0.4 MG/DL (ref 0.2–1.3)
CREAT SERPL-MCNC: 0.85 MG/DL (ref 0.52–1.04)
DIFFERENTIAL METHOD BLD: ABNORMAL
EOSINOPHIL # BLD AUTO: 0.2 10E9/L (ref 0–0.7)
EOSINOPHIL NFR BLD AUTO: 4.5 %
ERYTHROCYTE [DISTWIDTH] IN BLOOD BY AUTOMATED COUNT: 12.3 % (ref 10–15)
GFR SERPL CREATININE-BSD FRML MDRD: 75 ML/MIN/1.7M2
HCT VFR BLD AUTO: 37.8 % (ref 35–47)
HGB BLD-MCNC: 11.8 G/DL (ref 11.7–15.7)
LYMPHOCYTES # BLD AUTO: 1.7 10E9/L (ref 0.8–5.3)
LYMPHOCYTES NFR BLD AUTO: 40.4 %
MCH RBC QN AUTO: 26.5 PG (ref 26.5–33)
MCHC RBC AUTO-ENTMCNC: 31.2 G/DL (ref 31.5–36.5)
MCV RBC AUTO: 85 FL (ref 78–100)
MONOCYTES # BLD AUTO: 0.4 10E9/L (ref 0–1.3)
MONOCYTES NFR BLD AUTO: 9.9 %
NEUTROPHILS # BLD AUTO: 1.9 10E9/L (ref 1.6–8.3)
NEUTROPHILS NFR BLD AUTO: 44.5 %
PLATELET # BLD AUTO: 200 10E9/L (ref 150–450)
PROT SERPL-MCNC: 7.8 G/DL (ref 6.8–8.8)
RBC # BLD AUTO: 4.45 10E12/L (ref 3.8–5.2)
WBC # BLD AUTO: 4.2 10E9/L (ref 4–11)

## 2017-10-27 PROCEDURE — 82565 ASSAY OF CREATININE: CPT | Performed by: INTERNAL MEDICINE

## 2017-10-27 PROCEDURE — 36415 COLL VENOUS BLD VENIPUNCTURE: CPT | Performed by: INTERNAL MEDICINE

## 2017-10-27 PROCEDURE — 85025 COMPLETE CBC W/AUTO DIFF WBC: CPT | Performed by: INTERNAL MEDICINE

## 2017-10-27 PROCEDURE — 80076 HEPATIC FUNCTION PANEL: CPT | Performed by: INTERNAL MEDICINE

## 2017-11-17 ENCOUNTER — OFFICE VISIT (OUTPATIENT)
Dept: RHEUMATOLOGY | Facility: CLINIC | Age: 37
End: 2017-11-17
Payer: COMMERCIAL

## 2017-11-17 VITALS
HEART RATE: 76 BPM | HEIGHT: 64 IN | WEIGHT: 109.2 LBS | BODY MASS INDEX: 18.64 KG/M2 | DIASTOLIC BLOOD PRESSURE: 73 MMHG | OXYGEN SATURATION: 100 % | SYSTOLIC BLOOD PRESSURE: 108 MMHG

## 2017-11-17 DIAGNOSIS — Z79.899 HIGH RISK MEDICATION USE: ICD-10-CM

## 2017-11-17 DIAGNOSIS — M47.819 SERONEGATIVE SPONDYLOARTHROPATHY: Primary | ICD-10-CM

## 2017-11-17 PROCEDURE — 99213 OFFICE O/P EST LOW 20 MIN: CPT | Performed by: INTERNAL MEDICINE

## 2017-11-17 RX ORDER — LEFLUNOMIDE 10 MG/1
10 TABLET ORAL DAILY
Qty: 90 TABLET | Refills: 1 | Status: CANCELLED | OUTPATIENT
Start: 2017-11-17

## 2017-11-17 NOTE — NURSING NOTE
"Chief Complaint   Patient presents with     Arthritis     patient states in the mornings she has some hand pain but goes away within a half hour. Best she has felt in a long time       Initial /73 (BP Location: Left arm, Patient Position: Chair, Cuff Size: Adult Regular)  Pulse 76  Ht 1.626 m (5' 4\")  Wt 49.5 kg (109 lb 3.2 oz)  SpO2 100%  BMI 18.74 kg/m2 Estimated body mass index is 18.74 kg/(m^2) as calculated from the following:    Height as of this encounter: 1.626 m (5' 4\").    Weight as of this encounter: 49.5 kg (109 lb 3.2 oz).  BP completed using cuff size: regular         RAPID3 (0-30) Cumulative Score  0.5          RAPID3 Weighted Score (divide #4 by 3 and that is the weighted score)  0.17         "

## 2017-11-17 NOTE — MR AVS SNAPSHOT
After Visit Summary   11/17/2017    Abby Foy    MRN: [id number]           Patient Information     Date Of Birth          1980        Visit Information        Provider Department      11/17/2017 10:20 AM Hunter Monroy MD [facility] [place]        Today's Diagnoses     Seronegative spondyloarthropathy    -  1    High risk medication use           Follow-ups after your visit        Your next 10 appointments already scheduled     Feb 13, 2018  6:00 PM CST   LAB with BE LAB   [facility] [place] [facility] [place]    [address]  [place] [address]   [phone]           Please do not eat 10-12 hours before your appointment if you are coming in fasting for labs on lipids, cholesterol, or glucose (sugar). This does not apply to pregnant women. Water, hot tea and black coffee (with nothing added) are okay. Do not drink other fluids, diet soda or chew gum.            Feb 16, 2018 10:00 AM CST   Return Visit with Hunter Monroy MD   [facility] ([facility] [place])    [address]  [place] [address]   [phone]              Future tests that were ordered for you today     Open Future Orders        Priority Expected Expires Ordered    Creatinine Routine 2/11/2018 3/2/2018 11/17/2017    Hepatic panel Routine 2/11/2018 3/2/2018 11/17/2017    CRP inflammation Routine 2/11/2018 3/2/2018 11/17/2017    Erythrocyte sedimentation rate auto Routine 2/11/2018 3/2/2018 11/17/2017    CBC with platelets differential Routine 2/11/2018 3/2/2018 11/17/2017            Who to contact     If you have questions or need follow up information about today's clinic visit or your schedule please contact [facility] [place] directly at [phone].  Normal or non-critical lab and imaging results will be communicated to you by MyChart, letter or phone within 4 business days after the clinic has received the results. If you do not hear from us  "within 7 days, please contact the clinic through [employer] or phone. If you have a critical or abnormal lab result, we will notify you by phone as soon as possible.  Submit refill requests through [employer] or call your pharmacy and they will forward the refill request to us. Please allow 3 business days for your refill to be completed.          Additional Information About Your Visit        [facility] Information     [employer] gives you secure access to your electronic health record. If you see a primary care provider, you can also send messages to your care team and make appointments. If you have questions, please call your primary care clinic.  If you do not have a primary care provider, please call [phone] and they will assist you.        Care EveryWhere ID     This is your Care EveryWhere ID. This could be used by other organizations to access your [facility] medical records  [id number]        Your Vitals Were     Pulse Height Pulse Oximetry BMI (Body Mass Index)          76 1.626 m (5' 4\") 100% 18.74 kg/m2         Blood Pressure from Last 3 Encounters:   11/17/17 108/73   08/25/17 103/71   07/06/17 107/71    Weight from Last 3 Encounters:   11/17/17 49.5 kg (109 lb 3.2 oz)   08/25/17 49.9 kg (110 lb)   07/06/17 49 kg (108 lb)               Primary Care Provider Office Phone # Fax #    Sandi Duffy PA-C [phone]       [address] [facility] [address]        Equal Access to Services     [facility] AH: Hadii aad ku hadasho Soomaali, waaxda luqadaha, qaybta kaalmada adeegyada, waxay idiin hayaan ashanti berriosararosanna la'zuleyman . So [facility] [phone].    ATENCIÓN: Si habla español, tiene a wheatley disposición servicios gratuitos de asistencia lingüística. Llame al [phone].    We comply with applicable federal civil rights laws and [place] laws. We do not discriminate on the basis of race, color, national origin, age, disability, sex, sexual orientation, or gender identity.            Thank you!  "    Thank you for choosing [facility] [place]  for your care. Our goal is always to provide you with excellent care. Hearing back from our patients is one way we can continue to improve our services. Please take a few minutes to complete the written survey that you may receive in the mail after your visit with us. Thank you!             Your Updated Medication List - Protect others around you: Learn how to safely use, store and throw away your medicines at www.disposemymeds.org.          This list is accurate as of: 11/17/17 10:33 AM.  Always use your most recent med list.                   Brand Name Dispense Instructions for use Diagnosis    adalimumab 40 MG/0.8ML pen kit    HUMIRA PEN    2 each    Inject 0.8 mLs (40 mg) Subcutaneous every 14 days    Seronegative spondyloarthropathy       ALLEGRA PO      Take 180 mg by mouth daily        ibuprofen 600 MG tablet    ADVIL/MOTRIN    30 tablet    Take 1 tablet (600 mg) by mouth every 6 hours as needed for pain (mild)    Status post bilateral salpingectomy       leflunomide 10 MG tablet    ARAVA    90 tablet    Take 1 tablet (10 mg) by mouth daily    Seronegative spondyloarthropathy, High risk medication use       multivitamin  peds with iron 60 MG chewable tablet      Take 1 chew tab by mouth daily.

## 2017-11-17 NOTE — PROGRESS NOTES
Rheumatology Clinic Visit      Abby Foy MRN# [id number]   YOB: 1980 Age: 37 year old      Date of visit: 11/17/17   PCP: Levar Slaughter  Dermatologist: Amy Patel  Ophthalmologist: Dr. Mathis     Chief Complaint   Patient presents with:  Arthritis: patient states in the mornings she has some hand pain but goes away within a half hour. Best she has felt in a long time    Assessment and Plan   1. 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 However, given her continued back pain that improved with activity but no radiographic evidence for inflammatory arthritis or osteoarthritis, there was concern that she had inflammatory back pain that would be more consistent with a spondylarthritis. Humira was started and resulted in improvement of her back pain, suggesting axial disease.  Arthritis improved with methotrexate and sulfasalazine, but she was having headaches and therefore the most likely culprit methotrexate was reduced until her headaches worsened significantly and therefore sulfasalazine and methotrexate were both discontinued. She had resolution of her headaches after discontinuing both sulfasalazine and methotrexate. I believe that the sulfasalazine was the cause of her headaches. Therefore, cautious retrial of methotrexate in the future could be done. At this time, she is doing very well and tolerating her medications well.  Leflunomide dose is limited because of the transaminitis with higher doses.   - Continue Humira 40 mg every 14 days  - Continue leflunomide 10 mg daily  - Labs 2-3 days prior to the next rheumatology clinic visit: CBC, Creatinine, Hepatic Panel, ESR, CRP    # Pregnancy Planning: s/p salpingectomy;  had a vasectomy    2. Iritis History, then diagnosed with  Giant Papillary Conjunctivitis: Was evaluated by ophthalmology previously    3. Headaches: Likely due to SSZ. Headaches stopped with discontinuation of SSZ. One headache since last seen.     4. Left thumb pain: improved with a splint given by hand therapy, but cannot wear it during the day because of needing to write notes.  I recommended that she consider using a dictation program such as Dragon, during this time to help her hand heal.     5. Vaccinations: Vaccinations reviewed with Ms. Foy.  Risks and benefits of vaccinations were discussed.  - Influenza: reportedly receieved at her place of employment this season  - [id number]: up to date  - [id number]: up to date    Ms. Foy verbalized agreement with and understanding of the rational for the diagnosis and treatment plan.  All questions were answered to best of my ability and the patient's satisfaction. Ms. Foy was advised to contact the clinic with any questions that may arise after the clinic visit.      Thank you for involving me in the care of the patient    Return to clinic: 3 months      HPI   Abby Foy is a 37 year old female with medical history significant for urticaria, H. pylori infection, and iritis? who returns for f/u of seronegative spondyloarthropathy.    Today, Ms. Foy reports that she is doing great.  She says that this is the best she has felt.  Went to hand therapy and was given a splint; wearing it is hindered by the need to type her notes at work.  One migraine since last seen.  Morning stiffness for no more than 30 minutes, if it is present at all.  No gelling phenomenon.       Denies fevers, chills, nausea, vomiting, constipation, diarrhea. No abdominal pain. No chest pain/pressure, palpitations, or shortness of breath. No neck pain. Occasional cold sores..     Tobacco: None  EtOH: 1-2 drinks on the weekends  Drugs: None  Occupation: Dietitian at the [employer]    ROS   GEN: No  fevers/chills  SKIN: See HPI  HEENT: see HPI.  CV: No chest pain, pressure, palpitations, or dyspnea on exertion.  PULM: No SOB. No cough or wheezing  GI: No nausea, vomiting, constipation, diarrhea. No blood in stool. +Abdominal bloating.  : No blood in urine.  MSK: See HPI.  NEURO: negative  PSYCH: negative    Active Problem List     Patient Active Problem List   Diagnosis     CARDIOVASCULAR SCREENING; LDL GOAL LESS THAN 160     Urticaria     Diagnostic skin and sensitization tests     Nonallergic rhinitis     Angioedema of lips     H. pylori infection     GPC (giant papillary conjunctivitis)     Seronegative spondyloarthropathy     Midline low back pain without sciatica     Abnormal uterine bleeding (AUB)- on OCPs     Thumb pain, left     Sprain of metacarpophalangeal (MCP) joint of left thumb, subsequent encounter     Past Medical History     Past Medical History:   Diagnosis Date     Angioedema of lips episode in 3/13--idiopathic     Contraception 1/28/2009     Diagnostic skin and sensitization tests 3/27/13 skin tests all NEGATIVE for environmental and food allergens including shellfish and nuts.      Nonallergic rhinitis     3/27/13 skin tests all NEGATIVE for environmental and food allergens including shellfish and nuts. 3/27/13 followup IgE tests NEG to Peanut, SNF, shrimp, macadamia nut, pistachio and walnut.     Rheumatoid arthritis of multiple sites with negative rheumatoid factor (H) 12/31/2015     Past Surgical History     Past Surgical History:   Procedure Laterality Date     LAPAROSCOPIC SALPINGECTOMY Bilateral 6/23/2017    Procedure: LAPAROSCOPIC SALPINGECTOMY;  Operative Laparoscopy, Bilateral Salpingectomy ;  Surgeon: Apryl West MD;  Location: [facility] OR        Allergy     Allergies   Allergen Reactions     No Known Drug Allergy      Shrimp Swelling     Lips and tongue     Walnuts [Nuts] Swelling     Lips and tongue       Current Medication List     Current Outpatient Prescriptions  "  Medication Sig     leflunomide (ARAVA) 10 MG tablet Take 1 tablet (10 mg) by mouth daily     ibuprofen (ADVIL/MOTRIN) 600 MG tablet Take 1 tablet (600 mg) by mouth every 6 hours as needed for pain (mild)     adalimumab (HUMIRA PEN) 40 MG/0.8ML pen kit Inject 0.8 mLs (40 mg) Subcutaneous every 14 days     Fexofenadine HCl (ALLEGRA PO) Take 180 mg by mouth daily     multivitamin peds with iron (FLINTSTONES COMPLETE) 60 MG chewable tablet Take 1 chew tab by mouth daily.     No current facility-administered medications for this visit.      Social History   See HPI    Family History     Family History   Problem Relation Age of Onset     Hypertension Maternal Grandmother      Autoimmune Disease Maternal Grandmother      Autoimmune hepatitis     CANCER Maternal Grandfather      Hypertension Paternal Grandmother      Cancer - colorectal Paternal Grandfather      Cardiovascular Paternal Grandfather      Other Cancer Paternal Grandfather      Hypertension Mother      Autoimmune Disease Mother      Autoimmune hepatitis     Hyperlipidemia Mother      Asthma Mother      Hypertension Father      DIABETES Father      Type 2     Multiple Sclerosis Maternal Aunt      CEREBROVASCULAR DISEASE No family hx of      Thyroid Disease No family hx of      Glaucoma No family hx of      Macular Degeneration No family hx of      Breast Cancer No family hx of      Colon Cancer No family hx of      Physical Exam     Temp Readings from Last 3 Encounters:   08/25/17 97.5  F (36.4  C) (Oral)   06/23/17 98.7  F (37.1  C) (Oral)   06/09/17 98.5  F (36.9  C) (Oral)     BP Readings from Last 5 Encounters:   08/25/17 103/71   07/06/17 107/71   06/23/17 112/73   06/09/17 122/71   05/22/17 113/65     Pulse Readings from Last 1 Encounters:   08/25/17 79     Resp Readings from Last 1 Encounters:   06/23/17 20     Estimated body mass index is 18.88 kg/(m^2) as calculated from the following:    Height as of 8/25/17: 1.626 m (5' 4\").    Weight as of 8/25/17: " 49.9 kg (110 lb).    GEN: NAD  HEENT: MMM. Anicteric, noninjected sclera; eyes appear to have good moisture by gross examination  CV: S1, S2. RRR. No m/r/g.  PULM: CTA bilaterally. No w/c.  MSK: MCPs, PIPs, DIPs, wrists, elbows, shoulders, knees, ankles, and MTPs without swelling or tenderness to palpation.  Hips nontender to direct palpation. Achilles tendon nontender to palpation.   SKIN: No rash  EXT: No LE edema  PSYCH: Alert. Appropriate.    Labs / Imaging (select studies)   RF/CCP  Recent Labs   Lab Test  12/21/15   1447   CCPABY  <20  Interpretation:  Negative     RHF  <20     MELY  Recent Labs   Lab Test  06/30/15   1636   MARCIE  <1.0  Interpretation:  Negative       RNP/Sm/SSA/SSB  Recent Labs   Lab Test  08/05/15   1621  08/06/13   1500   SSAIGG  <0.2  Negative   Antibody index (AI) values reflect qualitative changes in antibody   concentration that cannot be directly associated with clinical condition or   disease state.     --    SSBIGG  <0.2  Negative   Antibody index (AI) values reflect qualitative changes in antibody   concentration that cannot be directly associated with clinical condition or   disease state.     --    TREPAB   --   Negative     dsDNA  Recent Labs   Lab Test  06/30/15   1636   DNA  <15  Interpretation:  Negative       C3/C4  Recent Labs   Lab Test  06/30/15   1636   H1ARXGN  116   M8EHUNK  21     Antiphospholipid Antibodies  Recent Labs   Lab Test  06/30/15   1636   CARG  <15.0  Interpretation:  Negative     FRED  <12.5  Interpretation:  Negative       HLA-B27  Recent Labs   Lab Test  12/21/15   1447   [id number]  SSOP   B1  B27 Neg     IgG  Recent Labs   Lab Test  03/20/17   1506   IGG  986   IGA  99   IGM  166     CBC  Recent Labs   Lab Test  10/27/17   0912  09/22/17   0925  08/22/17   1819   WBC  4.2  6.4  6.1   RBC  4.45  4.03  4.36   HGB  11.8  10.9*  11.8   HCT  37.8  34.7*  36.5   MCV  85  86  84   RDW  12.3  12.3  12.4   PLT  200  160  196   MCH  26.5  27.0  27.1   MCHC  31.2*   31.4*  32.3   NEUTROPHIL  44.5  65.8  51.5   LYMPH  40.4  22.3  36.1   MONOCYTE  9.9  8.3  8.0   EOSINOPHIL  4.5  3.3  4.1   BASOPHIL  0.7  0.3  0.3   ANEU  1.9  4.2  3.2   ALYM  1.7  1.4  2.2   ANA MARÍA  0.4  0.5  0.5   AEOS  0.2  0.2  0.3   ABAS  0.0  0.0  0.0     CMP  Recent Labs   Lab Test  10/27/17   0912  09/22/17   0925  08/22/17   1819  07/21/17   0926   06/30/15   1636  11/05/09   1058   NA   --    --    --    --    --   140   --    POTASSIUM   --    --    --    --    --   4.1   --    CHLORIDE   --    --    --    --    --   105   --    CO2   --    --    --    --    --   30   --    ANIONGAP   --    --    --    --    --   5   --    GLC   --    --    --    --    --   73  79   BUN   --    --    --    --    --   9   --    CR  0.85  0.85  0.75  0.74   < >  0.80   --    GFRESTIMATED  75  76  87  89   < >  83   --    GFRESTBLACK  >90  >90  >90  >90  African American GFR Calc     < >  >90   GFR Calc     --    DAISY   --    --    --    --    --   9.4   --    BILITOTAL  0.4  0.5  0.4  0.5   < >  0.5   --    ALBUMIN  4.2  3.9  4.1  3.9   < >  4.1   --    PROTTOTAL  7.8  7.1  7.5  7.3   < >  8.1   --    ALKPHOS  66  62  63  67   < >  65   --    AST  25  25  21  26   < >  19   --    ALT  31  34  32  37   < >  26   --     < > = values in this interval not displayed.     Iron Studies  Recent Labs   Lab Test  03/20/17   1506   SHANITA  90   IRON  119   FEB  357   IRONSAT  33     Calcium/VitaminD  Recent Labs   Lab Test  01/13/17   1355  12/21/15   1447  06/30/15   1636  01/09/14   0906   11/10/10   1335  11/05/09   1058   DAISY   --    --   9.4   --    --    --    --    D3VIT   --    --    --    --    --   45  51   VITDT  45  74   --   35   < >   --    --     < > = values in this interval not displayed.     ESR/CRP  Recent Labs   Lab Test  08/22/17   1819  03/16/17   1431  12/22/16   0945   SED  7  7  6   CRP  <2.9  <2.9  <2.9     Lipid Panel  Recent Labs   Lab Test  11/05/09   1058   CHOL  211*   TRIG  67   HDL  82  "  LDL  115   VLDL  13   CHOLHDLRATIO  3.0     Hepatitis B  Recent Labs   Lab Test  03/20/17   1506  03/28/16   1442  08/06/13   1500  11/10/10   1335   AUSAB   --   264.69*   --    --    HBCAB  Nonreactive   --    --    --    HEPBANG   --   Nonreactive  Negative  Negative     Hepatitis C  Recent Labs   Lab Test  12/21/15   1447   HCVAB  Nonreactive   Assay performance characteristics have not been established for newborns,   infants, and children       Lyme confirmation testing by Western Blot  Recent Labs   Lab Test  08/05/15   1621   LYWG  Negative  Reference range: Negative  (Note)  Band(s) present: NONE  (Insufficient number of bands for positive result)  INTERPRETIVE INFORMATION: Borrelia Burgdorferi Ab, IgG  Western Blot  For this assay, a positive result is reported when any 5 or  more of the following 10 bands are present: 18, 23, 28, 30,  39, 41, 45, 58, 66, or 93 kDa.  All other banding patterns  are reported as negative.  Performed by [email or web address],  [address] [place] [id number] [phone]  [email or web address] Pete Saha MD, Lab. Director       Tuberculosis Screening  Recent Labs   Lab Test  03/20/17   1508  03/28/16   1443   TBRSLT  Negative  Negative   TBAGN  0.00  0.04     HIV Screening  Recent Labs   Lab Test  12/21/15   1447   HIAGAB  Nonreactive   HIV-1 p24 Ag & HIV-1/HIV-2 Ab Not Detected       \"XR SACROILIAC JOINT 1/2 VW 12/21/2015 3:23 PM  HISTORY: Pain.  COMPARISON: None.  FINDINGS: Sacroiliac joints are patent and symmetric. No acute osseous  abnormality.  IMPRESSION  IMPRESSION: Normal sacroiliac joints.  YUE JARRELL MD\"    \"XR LUMBAR SPINE 2-3 VIEWS 12/21/2015 3:23 PM  HISTORY: Pain.  COMPARISON: None.  FINDINGS: Lumbar alignment is anatomic. Vertebral body heights and  intervertebral disc spaces are preserved.  IMPRESSION  IMPRESSION: Normal lumbar spine.  YUE JARRELL MD\"    \"XR HAND BILATERAL G/E 3 VW 12/21/2015 3:50 PM  HISTORY: Pain in unspecified joint " "  IMPRESSION  IMPRESSION: Negative.  LINNEA ZELAYA MD\"    \"XR CHEST 2 VW 6/30/2015 4:54 PM  HISTORY: Pain.  COMPARISON: None.  IMPRESSION  IMPRESSION: The lungs are clear. No focal pulmonary opacities. Heart  and mediastinum are unremarkable. No acute cardiopulmonary  abnormalities.  SO PHAN MD\"    Immunization History     Immunization History   Administered Date(s) Administered     Influenza (IIV3) 10/12/2011, 10/03/2013     Influenza Vaccine, 3 YRS +, IM (QUADRIVALENT W/PRESERVATIVES) 10/01/2015     Pneumococcal (PCV 13) 09/26/2016     Pneumococcal 23 valent 12/22/2016     TD (ADULT, 7+) 08/15/2001     TDAP Vaccine (Adacel) 06/20/2011     TDAP Vaccine (Boostrix) 03/06/2014          Chart documentation done in part with Dragon Voice recognition Software. Although reviewed after completion, some word and grammatical error may remain.    Hunter Monroy MD  "

## 2017-11-30 ENCOUNTER — OFFICE VISIT (OUTPATIENT)
Dept: FAMILY MEDICINE | Facility: CLINIC | Age: 37
End: 2017-11-30
Payer: COMMERCIAL

## 2017-11-30 VITALS
DIASTOLIC BLOOD PRESSURE: 73 MMHG | OXYGEN SATURATION: 98 % | TEMPERATURE: 98.4 F | HEART RATE: 84 BPM | WEIGHT: 106 LBS | BODY MASS INDEX: 18.19 KG/M2 | SYSTOLIC BLOOD PRESSURE: 113 MMHG

## 2017-11-30 DIAGNOSIS — H10.9 BACTERIAL CONJUNCTIVITIS OF RIGHT EYE: ICD-10-CM

## 2017-11-30 DIAGNOSIS — Z23 NEED FOR PROPHYLACTIC VACCINATION AND INOCULATION AGAINST INFLUENZA: ICD-10-CM

## 2017-11-30 DIAGNOSIS — J06.9 VIRAL UPPER RESPIRATORY TRACT INFECTION: Primary | ICD-10-CM

## 2017-11-30 PROCEDURE — 99213 OFFICE O/P EST LOW 20 MIN: CPT | Performed by: PHYSICIAN ASSISTANT

## 2017-11-30 RX ORDER — OFLOXACIN 3 MG/ML
1 SOLUTION/ DROPS OPHTHALMIC 4 TIMES DAILY
Qty: 2 ML | Refills: 0 | Status: SHIPPED | OUTPATIENT
Start: 2017-11-30 | End: 2017-12-07

## 2017-11-30 NOTE — MR AVS SNAPSHOT
After Visit Summary   11/30/2017    Abby Foy    MRN: [id number]           Patient Information     Date Of Birth          1980        Visit Information        Provider Department      11/30/2017 9:40 AM Sandi Duffy PA-C [facility]        Today's Diagnoses     Need for prophylactic vaccination and inoculation against influenza    -  1    Bacterial conjunctivitis of right eye          Care Instructions    Sucrets lozenges and Cepacol throat spray.  Ibuprofen, Aleve, and Tylenol - pain relievers.    Increase your water intake in order to keep the secretions/mucous in your upper respiratory tract thin. Get plenty of rest and wash your hands well. Follow up if symptoms fail to improve or worsen.            Follow-ups after your visit        Your next 10 appointments already scheduled     Feb 13, 2018  6:00 PM CST   LAB with BE LAB   [facility] ([facility])    [address] [facility] [address]   [phone]           Please do not eat 10-12 hours before your appointment if you are coming in fasting for labs on lipids, cholesterol, or glucose (sugar). This does not apply to pregnant women. Water, hot tea and black coffee (with nothing added) are okay. Do not drink other fluids, diet soda or chew gum.            Feb 16, 2018 10:00 AM CST   Return Visit with Hunter Monroy MD   [facility] ([facility])    [address] [place] [address]  [place] [address]   [phone]              Who to contact     Normal or non-critical lab and imaging results will be communicated to you by MyChart, letter or phone within 4 business days after the clinic has received the results. If you do not hear from us within 7 days, please contact the clinic through MyChart or phone. If you have a critical or abnormal lab result, we will notify you by phone as soon as possible.  Submit refill requests through [employer] or call your  pharmacy and they will forward the refill request to us. Please allow 3 business days for your refill to be completed.          If you need to speak with a  for additional information , please call: [phone]             Additional Information About Your Visit        "[employer]hart Information     [employer] gives you secure access to your electronic health record. If you see a primary care provider, you can also send messages to your care team and make appointments. If you have questions, please call your primary care clinic.  If you do not have a primary care provider, please call [phone] and they will assist you.        Care EveryWhere ID     This is your Care EveryWhere ID. This could be used by other organizations to access your [place] medical records  [id number]        Your Vitals Were     Pulse Temperature Pulse Oximetry Breastfeeding? BMI (Body Mass Index)       84 98.4  F (36.9  C) (Oral) 98% No 18.19 kg/m2        Blood Pressure from Last 3 Encounters:   11/30/17 113/73   11/17/17 108/73   08/25/17 103/71    Weight from Last 3 Encounters:   11/30/17 106 lb (48.1 kg)   11/17/17 109 lb 3.2 oz (49.5 kg)   08/25/17 110 lb (49.9 kg)              Today, you had the following     No orders found for display         Today's Medication Changes          These changes are accurate as of: 11/30/17 10:10 AM.  If you have any questions, ask your nurse or doctor.               Start taking these medicines.        Dose/Directions    ofloxacin 0.3 % ophthalmic solution   Commonly known as:  OCUFLOX   Used for:  Bacterial conjunctivitis of right eye   Started by:  Sandi Duffy PA-C        Dose:  1 drop   Place 1 drop into the right eye 4 times daily for 7 days   Quantity:  2 mL   Refills:  0            Where to get your medicines      Some of these will need a paper prescription and others can be bought over the counter.  Ask your nurse if you have questions.     Bring a paper prescription for  each of these medications     ofloxacin 0.3 % ophthalmic solution                Primary Care Provider Office Phone # Fax #    Sandi Duffy PA-C [phone]       [address] [place] [address]        Equal Access to Services     [facility]: Hadii aad ku hadasho Soomaali, waaxda luqadaha, qaybta kaalmada adeegyada, waxay idiin hayaan adeeg yoshi lasrikaren . So [facility] [phone].    ATENCIÓN: Si habla español, tiene a wheatley disposición servicios gratuitos de asistencia lingüística. [facility] [phone].    We comply with applicable federal civil rights laws and [place] laws. We do not discriminate on the basis of race, color, national origin, age, disability, sex, sexual orientation, or gender identity.            Thank you!     Thank you for choosing [facility]  for your care. Our goal is always to provide you with excellent care. Hearing back from our patients is one way we can continue to improve our services. Please take a few minutes to complete the written survey that you may receive in the mail after your visit with us. Thank you!             Your Updated Medication List - Protect others around you: Learn how to safely use, store and throw away your medicines at www.disposemymeds.org.          This list is accurate as of: 11/30/17 10:10 AM.  Always use your most recent med list.                   Brand Name Dispense Instructions for use Diagnosis    adalimumab 40 MG/0.8ML pen kit    HUMIRA PEN    2 each    Inject 0.8 mLs (40 mg) Subcutaneous every 14 days    Seronegative spondyloarthropathy       ALLEGRA PO      Take 180 mg by mouth daily        ibuprofen 600 MG tablet    ADVIL/MOTRIN    30 tablet    Take 1 tablet (600 mg) by mouth every 6 hours as needed for pain (mild)    Status post bilateral salpingectomy       leflunomide 10 MG tablet    ARAVA    90 tablet    Take 1 tablet (10 mg) by mouth daily    Seronegative spondyloarthropathy, High risk medication use        multivitamin  peds with iron 60 MG chewable tablet      Take 1 chew tab by mouth daily.        ofloxacin 0.3 % ophthalmic solution    OCUFLOX    2 mL    Place 1 drop into the right eye 4 times daily for 7 days    Bacterial conjunctivitis of right eye

## 2017-11-30 NOTE — PROGRESS NOTES
SUBJECTIVE:   Abby Foy is a 37 year old female who presents to clinic today for the following health issues:      Acute Illness   Acute illness concerns: Cold symptoms, follow by sore throat  Onset: Last Tuesday    Fever: YES- 100.9F oral    Chills/Sweats: YES    Headache (location?): no    Sinus Pressure:YES    Conjunctivitis:  YES: both    Ear Pain: YES: both    Rhinorrhea: YES    Congestion: YES    Sore Throat: YES     Cough: no    Wheeze: no    Decreased Appetite: no    Nausea: no    Vomiting: no    Diarrhea:  YES    Dysuria/Freq.: no    Fatigue/Achiness: YES    Sick/Strep Exposure: no     Therapies Tried and outcome: na        Problem list and histories reviewed & adjusted, as indicated.  Additional history: as documented    Patient Active Problem List   Diagnosis     CARDIOVASCULAR SCREENING; LDL GOAL LESS THAN 160     Urticaria     Diagnostic skin and sensitization tests     Nonallergic rhinitis     Angioedema of lips     H. pylori infection     GPC (giant papillary conjunctivitis)     Seronegative spondyloarthropathy     Midline low back pain without sciatica     Abnormal uterine bleeding (AUB)- on OCPs     Thumb pain, left     Sprain of metacarpophalangeal (MCP) joint of left thumb, subsequent encounter     Past Surgical History:   Procedure Laterality Date     LAPAROSCOPIC SALPINGECTOMY Bilateral 6/23/2017    Procedure: LAPAROSCOPIC SALPINGECTOMY;  Operative Laparoscopy, Bilateral Salpingectomy ;  Surgeon: Apryl West MD;  Location:  OR       Social History   Substance Use Topics     Smoking status: Never Smoker     Smokeless tobacco: Never Used     Alcohol use Yes      Comment: 1-3 drinks/ week     Family History   Problem Relation Age of Onset     Hypertension Maternal Grandmother      Autoimmune Disease Maternal Grandmother      Autoimmune hepatitis     CANCER Maternal Grandfather      Hypertension Paternal Grandmother      Cancer - colorectal Paternal Grandfather      Cardiovascular  Paternal Grandfather      Other Cancer Paternal Grandfather      Hypertension Mother      Autoimmune Disease Mother      Autoimmune hepatitis     Hyperlipidemia Mother      Asthma Mother      Hypertension Father      DIABETES Father      Type 2     Multiple Sclerosis Maternal Aunt      CEREBROVASCULAR DISEASE No family hx of      Thyroid Disease No family hx of      Glaucoma No family hx of      Macular Degeneration No family hx of      Breast Cancer No family hx of      Colon Cancer No family hx of              Reviewed and updated as needed this visit by clinical staffTobacco  Allergies  Meds  Med Hx  Surg Hx  Fam Hx  Soc Hx      Reviewed and updated as needed this visit by Provider         ROS:  Constitutional, HEENT, cardiovascular, pulmonary, gi and gu systems are negative, except as otherwise noted.      OBJECTIVE:                                                    /73 (BP Location: Right arm, Patient Position: Chair, Cuff Size: Adult Regular)  Pulse 84  Temp 98.4  F (36.9  C) (Oral)  Wt 106 lb (48.1 kg)  SpO2 98%  Breastfeeding? No  BMI 18.19 kg/m2  Body mass index is 18.19 kg/(m^2).  GENERAL APPEARANCE: healthy, alert and no distress  EYES: Eyes grossly normal to inspection and conjunctiva/corneas- conjunctival injection right  HENT: ear canals and TM's normal and pharyngeal erythema  RESP: lungs clear to auscultation - no rales, rhonchi or wheezes  CV: regular rates and rhythm, normal S1 S2, no S3 or S4 and no murmur, click or rub  LYMPHATICS: normal ant/post cervical and supraclavicular nodes       ASSESSMENT:                                                      1. Viral upper respiratory tract infection    2. Need for prophylactic vaccination and inoculation against influenza    3. Bacterial conjunctivitis of right eye         PLAN:                                                    Likely a viral URI. Patient leaving the state tomorrow, prescription for Ofloxacin given incase eye  symptoms worsen.    Patient Instructions   Sucrets lozenges and Cepacol throat spray.  Ibuprofen, Aleve, and Tylenol - pain relievers.    Increase your water intake in order to keep the secretions/mucous in your upper respiratory tract thin. Get plenty of rest and wash your hands well. Follow up if symptoms fail to improve or worsen.         The patient was in agreement with the plan today and had no questions or concerns prior to leaving the clinic.     Sandi Duffy PA-C  [facility]

## 2017-11-30 NOTE — NURSING NOTE
"Chief Complaint   Patient presents with     URI       Initial /73 (BP Location: Right arm, Patient Position: Chair, Cuff Size: Adult Regular)  Pulse 84  Temp 98.4  F (36.9  C) (Oral)  Wt 106 lb (48.1 kg)  SpO2 98%  Breastfeeding? No  BMI 18.19 kg/m2 Estimated body mass index is 18.19 kg/(m^2) as calculated from the following:    Height as of 11/17/17: 5' 4\" (1.626 m).    Weight as of this encounter: 106 lb (48.1 kg).  Medication Reconciliation: complete   An,CMA (AMAA)      "

## 2017-12-26 PROBLEM — S63.642D SPRAIN OF METACARPOPHALANGEAL (MCP) JOINT OF LEFT THUMB, SUBSEQUENT ENCOUNTER: Status: RESOLVED | Noted: 2017-10-12 | Resolved: 2017-12-26

## 2017-12-26 PROBLEM — M79.645 THUMB PAIN, LEFT: Status: RESOLVED | Noted: 2017-10-12 | Resolved: 2017-12-26

## 2018-02-13 DIAGNOSIS — Z79.899 HIGH RISK MEDICATION USE: ICD-10-CM

## 2018-02-13 DIAGNOSIS — M47.819 SERONEGATIVE SPONDYLOARTHROPATHY: ICD-10-CM

## 2018-02-13 LAB
BASOPHILS # BLD AUTO: 0 10E9/L (ref 0–0.2)
BASOPHILS NFR BLD AUTO: 0.2 %
DIFFERENTIAL METHOD BLD: ABNORMAL
EOSINOPHIL # BLD AUTO: 0.2 10E9/L (ref 0–0.7)
EOSINOPHIL NFR BLD AUTO: 2.5 %
ERYTHROCYTE [DISTWIDTH] IN BLOOD BY AUTOMATED COUNT: 12.9 % (ref 10–15)
ERYTHROCYTE [SEDIMENTATION RATE] IN BLOOD BY WESTERGREN METHOD: 7 MM/H (ref 0–20)
HCT VFR BLD AUTO: 36.7 % (ref 35–47)
HGB BLD-MCNC: 11.6 G/DL (ref 11.7–15.7)
LYMPHOCYTES # BLD AUTO: 2.3 10E9/L (ref 0.8–5.3)
LYMPHOCYTES NFR BLD AUTO: 39.5 %
MCH RBC QN AUTO: 26.5 PG (ref 26.5–33)
MCHC RBC AUTO-ENTMCNC: 31.6 G/DL (ref 31.5–36.5)
MCV RBC AUTO: 84 FL (ref 78–100)
MONOCYTES # BLD AUTO: 0.5 10E9/L (ref 0–1.3)
MONOCYTES NFR BLD AUTO: 7.6 %
NEUTROPHILS # BLD AUTO: 3 10E9/L (ref 1.6–8.3)
NEUTROPHILS NFR BLD AUTO: 50.2 %
PLATELET # BLD AUTO: 218 10E9/L (ref 150–450)
RBC # BLD AUTO: 4.37 10E12/L (ref 3.8–5.2)
WBC # BLD AUTO: 5.9 10E9/L (ref 4–11)

## 2018-02-13 PROCEDURE — 80076 HEPATIC FUNCTION PANEL: CPT | Performed by: INTERNAL MEDICINE

## 2018-02-13 PROCEDURE — 85652 RBC SED RATE AUTOMATED: CPT | Performed by: INTERNAL MEDICINE

## 2018-02-13 PROCEDURE — 36415 COLL VENOUS BLD VENIPUNCTURE: CPT | Performed by: INTERNAL MEDICINE

## 2018-02-13 PROCEDURE — 85025 COMPLETE CBC W/AUTO DIFF WBC: CPT | Performed by: INTERNAL MEDICINE

## 2018-02-13 PROCEDURE — 82565 ASSAY OF CREATININE: CPT | Performed by: INTERNAL MEDICINE

## 2018-02-13 PROCEDURE — 86140 C-REACTIVE PROTEIN: CPT | Performed by: INTERNAL MEDICINE

## 2018-02-14 LAB
ALBUMIN SERPL-MCNC: 4 G/DL (ref 3.4–5)
ALP SERPL-CCNC: 59 U/L (ref 40–150)
ALT SERPL W P-5'-P-CCNC: 26 U/L (ref 0–50)
AST SERPL W P-5'-P-CCNC: 22 U/L (ref 0–45)
BILIRUB DIRECT SERPL-MCNC: 0.1 MG/DL (ref 0–0.2)
BILIRUB SERPL-MCNC: 0.5 MG/DL (ref 0.2–1.3)
CREAT SERPL-MCNC: 0.74 MG/DL (ref 0.52–1.04)
CRP SERPL-MCNC: <2.9 MG/L (ref 0–8)
GFR SERPL CREATININE-BSD FRML MDRD: 88 ML/MIN/1.7M2
PROT SERPL-MCNC: 7.3 G/DL (ref 6.8–8.8)

## 2018-02-16 ENCOUNTER — OFFICE VISIT (OUTPATIENT)
Dept: RHEUMATOLOGY | Facility: CLINIC | Age: 38
End: 2018-02-16
Payer: COMMERCIAL

## 2018-02-16 VITALS
OXYGEN SATURATION: 100 % | HEIGHT: 64 IN | DIASTOLIC BLOOD PRESSURE: 68 MMHG | WEIGHT: 111 LBS | SYSTOLIC BLOOD PRESSURE: 106 MMHG | BODY MASS INDEX: 18.95 KG/M2 | HEART RATE: 76 BPM

## 2018-02-16 DIAGNOSIS — M47.819 SERONEGATIVE SPONDYLOARTHROPATHY: Primary | ICD-10-CM

## 2018-02-16 DIAGNOSIS — Z79.899 HIGH RISK MEDICATION USE: ICD-10-CM

## 2018-02-16 PROCEDURE — 99213 OFFICE O/P EST LOW 20 MIN: CPT | Performed by: INTERNAL MEDICINE

## 2018-02-16 RX ORDER — LEFLUNOMIDE 10 MG/1
10 TABLET ORAL DAILY
Qty: 90 TABLET | Refills: 0 | Status: SHIPPED | OUTPATIENT
Start: 2018-02-16 | End: 2018-06-15

## 2018-02-16 NOTE — NURSING NOTE
"Chief Complaint   Patient presents with     Arthritis     Patient states when the weather is coold her hands hurt more       Initial /68 (BP Location: Left arm, Patient Position: Chair, Cuff Size: Adult Regular)  Pulse 76  Ht 1.626 m (5' 4\")  Wt 50.3 kg (111 lb)  SpO2 100%  BMI 19.05 kg/m2 Estimated body mass index is 19.05 kg/(m^2) as calculated from the following:    Height as of this encounter: 1.626 m (5' 4\").    Weight as of this encounter: 50.3 kg (111 lb).  BP completed using cuff size: regular         RAPID3 (0-30) Cumulative Score  1.0          RAPID3 Weighted Score (divide #4 by 3 and that is the weighted score)  0.33         "

## 2018-02-16 NOTE — Clinical Note
Arthritis doing well.  Seeing if she'll do equally well with just Humira.  So she's holding the leflunomide for now.

## 2018-02-16 NOTE — PROGRESS NOTES
Rheumatology Clinic Visit      Abby Foy MRN# [id number]   YOB: 1980 Age: 37 year old      Date of visit: 2/16/18   PCP: Levar Slaughter  Dermatologist: Amy Patel  Ophthalmologist: Dr. Mathis     Chief Complaint   Patient presents with:  Arthritis: Patient states when the weather is coold her hands hurt more    Assessment and Plan   1. 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 However, given her continued back pain that improved with activity but no radiographic evidence for inflammatory arthritis or osteoarthritis, there was concern that she had inflammatory back pain that would be more consistent with a spondylarthritis. Humira was started and resulted in improvement of her back pain, suggesting axial disease.  Arthritis improved with methotrexate and sulfasalazine, but she was having headaches and therefore the most likely culprit methotrexate was reduced until her headaches worsened significantly and therefore sulfasalazine and methotrexate were both discontinued. She had resolution of her headaches after discontinuing both sulfasalazine and methotrexate. I believe that the sulfasalazine was the cause of her headaches. Therefore, cautious retrial of methotrexate in the future could be done. At this time, she is doing very well and tolerating her medications well.  Currently on leflunomide 10 mg daily (transaminitis with higher doses) and Humira 40 mg SQ every 14 days.  Trial stopping leflunomide, to see if she will do as well with Humira monotherapy.  Leflunomide will be left on her medication list in case she finds that she needs to restart it.  - Continue Humira 40 mg every 14 days  - Hold leflunomide 10 mg daily; restart leflunomide if symptoms return    # Pregnancy Planning:  s/p salpingectomy;  had a vasectomy    2. Iritis History, then diagnosed with Giant Papillary Conjunctivitis: Was evaluated by ophthalmology previously    3. Headaches: Likely due to SSZ. Headaches stopped with discontinuation of SSZ. One headache since last seen.     Ms. Foy verbalized agreement with and understanding of the rational for the diagnosis and treatment plan.  All questions were answered to best of my ability and the patient's satisfaction. Ms. Foy was advised to contact the clinic with any questions that may arise after the clinic visit.      Thank you for involving me in the care of the patient    Return to clinic: 4 months      HPI   Abby Foy is a 37 year old female with medical history significant for urticaria, H. pylori infection, and iritis? who returns for f/u of seronegative spondyloarthropathy.    Today, Ms. Foy reports that she is doing well.  No joint pain.  Morning stiffness for no more than 20-30 minutes.  No gelling phenomenon.  Questions if she is able to get off of her medications or if she will need to be on them forever.      Denies fevers, chills, nausea, vomiting, constipation, diarrhea. No abdominal pain. No chest pain/pressure, palpitations, or shortness of breath. No neck pain. Occasional cold sores..     Tobacco: None  EtOH: 1-2 drinks on the weekends  Drugs: None  Occupation: Dietitian at the [employer]    ROS   GEN: No fevers/chills  SKIN: See HPI  HEENT: see HPI.  CV: No chest pain, pressure, palpitations, or dyspnea on exertion.  PULM: No SOB. No cough or wheezing  GI: No nausea, vomiting, constipation, diarrhea. No blood in stool. +Abdominal bloating.  : No blood in urine.  MSK: See HPI.  NEURO: negative  PSYCH: negative    Active Problem List     Patient Active Problem List   Diagnosis     CARDIOVASCULAR SCREENING; LDL GOAL LESS THAN 160     Urticaria     Diagnostic skin and sensitization tests     Nonallergic rhinitis      Angioedema of lips     H. pylori infection     GPC (giant papillary conjunctivitis)     Seronegative spondyloarthropathy     Midline low back pain without sciatica     Abnormal uterine bleeding (AUB)- on OCPs     Past Medical History     Past Medical History:   Diagnosis Date     Angioedema of lips episode in 3/13--idiopathic     Contraception 1/28/2009     Diagnostic skin and sensitization tests 3/27/13 skin tests all NEGATIVE for environmental and food allergens including shellfish and nuts.      Nonallergic rhinitis     3/27/13 skin tests all NEGATIVE for environmental and food allergens including shellfish and nuts. 3/27/13 followup IgE tests NEG to Peanut, SN[facility], shrimp, macadamia nut, pistachio and walnut.     Rheumatoid arthritis of multiple sites with negative rheumatoid factor (H) 12/31/2015     Past Surgical History     Past Surgical History:   Procedure Laterality Date     LAPAROSCOPIC SALPINGECTOMY Bilateral 6/23/2017    Procedure: LAPAROSCOPIC SALPINGECTOMY;  Operative Laparoscopy, Bilateral Salpingectomy ;  Surgeon: Apryl West MD;  Location: [facility] OR        Allergy     Allergies   Allergen Reactions     No Known Drug Allergy      Shrimp Swelling     Lips and tongue     Walnuts [Nuts] Swelling     Lips and tongue       Current Medication List     Current Outpatient Prescriptions   Medication Sig     leflunomide (ARAVA) 10 MG tablet Take 1 tablet (10 mg) by mouth daily     ibuprofen (ADVIL/MOTRIN) 600 MG tablet Take 1 tablet (600 mg) by mouth every 6 hours as needed for pain (mild)     adalimumab (HUMIRA PEN) 40 MG/0.8ML pen kit Inject 0.8 mLs (40 mg) Subcutaneous every 14 days     Fexofenadine HCl (ALLEGRA PO) Take 180 mg by mouth daily     multivitamin peds with iron (FLINTSTONES COMPLETE) 60 MG chewable tablet Take 1 chew tab by mouth daily.     No current facility-administered medications for this visit.      Social History   See HPI    Family History     Family History   Problem Relation Age of  "Onset     Hypertension Maternal Grandmother      Autoimmune Disease Maternal Grandmother      Autoimmune hepatitis     CANCER Maternal Grandfather      Hypertension Paternal Grandmother      Cancer - colorectal Paternal Grandfather      Cardiovascular Paternal Grandfather      Other Cancer Paternal Grandfather      Hypertension Mother      Autoimmune Disease Mother      Autoimmune hepatitis     Hyperlipidemia Mother      Asthma Mother      Hypertension Father      DIABETES Father      Type 2     Multiple Sclerosis Maternal Aunt      CEREBROVASCULAR DISEASE No family hx of      Thyroid Disease No family hx of      Glaucoma No family hx of      Macular Degeneration No family hx of      Breast Cancer No family hx of      Colon Cancer No family hx of      Physical Exam     Temp Readings from Last 3 Encounters:   11/30/17 98.4  F (36.9  C) (Oral)   08/25/17 97.5  F (36.4  C) (Oral)   06/23/17 98.7  F (37.1  C) (Oral)     BP Readings from Last 5 Encounters:   02/16/18 106/68   11/30/17 113/73   11/17/17 108/73   08/25/17 103/71   07/06/17 107/71     Pulse Readings from Last 1 Encounters:   02/16/18 76     Resp Readings from Last 1 Encounters:   06/23/17 20     Estimated body mass index is 19.05 kg/(m^2) as calculated from the following:    Height as of this encounter: 1.626 m (5' 4\").    Weight as of this encounter: 50.3 kg (111 lb).    GEN: NAD  HEENT: MMM. Anicteric, noninjected sclera; eyes appear to have good moisture by gross examination  CV: S1, S2. RRR. No m/r/g.  PULM: CTA bilaterally. No w/c.  MSK: MCPs, PIPs, DIPs, wrists, elbows, shoulders, knees, ankles, and MTPs without swelling or tenderness to palpation.  Hips nontender to direct palpation. Achilles tendon nontender to palpation.   SKIN: No rash  EXT: No LE edema  PSYCH: Alert. Appropriate.    Labs / Imaging (select studies)   RF/CCP  Recent Labs   Lab Test  12/21/15   1447   CCPABY  <20  Interpretation:  Negative     RHF  <20     CBC  Recent Labs   Lab " Test  02/13/18   1754  10/27/17   0912  09/22/17   0925   WBC  5.9  4.2  6.4   RBC  4.37  4.45  4.03   HGB  11.6*  11.8  10.9*   HCT  36.7  37.8  34.7*   MCV  84  85  86   RDW  12.9  12.3  12.3   PLT  218  200  160   MCH  26.5  26.5  27.0   MCHC  31.6  31.2*  31.4*   NEUTROPHIL  50.2  44.5  65.8   LYMPH  39.5  40.4  22.3   MONOCYTE  7.6  9.9  8.3   EOSINOPHIL  2.5  4.5  3.3   BASOPHIL  0.2  0.7  0.3   ANEU  3.0  1.9  4.2   ALYM  2.3  1.7  1.4   ANA MARÍA  0.5  0.4  0.5   AEOS  0.2  0.2  0.2   ABAS  0.0  0.0  0.0     CMP  Recent Labs   Lab Test  02/13/18   1754  10/27/17   0912  09/22/17   0925   06/30/15   1636   NA   --    --    --    --   140   POTASSIUM   --    --    --    --   4.1   CHLORIDE   --    --    --    --   105   CO2   --    --    --    --   30   ANIONGAP   --    --    --    --   5   GLC   --    --    --    --   73   BUN   --    --    --    --   9   CR  0.74  0.85  0.85   < >  0.80   GFRESTIMATED  88  75  76   < >  83   GFRESTBLACK  >90  >90  >90   < >  >90   GFR Calc     DAISY   --    --    --    --   9.4   BILITOTAL  0.5  0.4  0.5   < >  0.5   ALBUMIN  4.0  4.2  3.9   < >  4.1   PROTTOTAL  7.3  7.8  7.1   < >  8.1   ALKPHOS  59  66  62   < >  65   AST  22  25  25   < >  19   ALT  26  31  34   < >  26    < > = values in this interval not displayed.     Calcium/VitaminD  Recent Labs   Lab Test  01/13/17   1355  12/21/15   1447  06/30/15   1636  01/09/14   0906   11/10/10   1335   DAISY   --    --   9.4   --    --    --    D3VIT   --    --    --    --    --   45   VITDT  45  74   --   35   < >   --     < > = values in this interval not displayed.     ESR/CRP  Recent Labs   Lab Test  02/13/18   1754  08/22/17   1819  03/16/17   1431   SED  7  7  7   CRP  <2.9  <2.9  <2.9     Hepatitis B  Recent Labs   Lab Test  03/20/17   1506  03/28/16   1442  08/06/13   1500  11/10/10   1335   AUSAB   --   264.69*   --    --    HBCAB  Nonreactive   --    --    --    HEPBANG   --   Nonreactive  Negative  Negative  "    Hepatitis C  Recent Labs   Lab Test  12/21/15   1447   HCVAB  Nonreactive   Assay performance characteristics have not been established for newborns,   infants, and children       Lyme confirmation testing by Western Blot  Recent Labs   Lab Test  08/05/15   1621   LYWG  Negative  Reference range: Negative  (Note)  Band(s) present: NONE  (Insufficient number of bands for positive result)  INTERPRETIVE INFORMATION: Borrelia Burgdorferi Ab, IgG  Western Blot  For this assay, a positive result is reported when any 5 or  more of the following 10 bands are present: 18, 23, 28, 30,  39, 41, 45, 58, 66, or 93 kDa.  All other banding patterns  are reported as negative.  Performed by [facility],  [address] [place] [id number] [phone]  [email or web address], Pete Saha MD, Lab. Director       Tuberculosis Screening  Recent Labs   Lab Test  03/20/17   1508  03/28/16   1443   TBRSLT  Negative  Negative   TBAGN  0.00  0.04     HIV Screening  Recent Labs   Lab Test  12/21/15   1447   HIAGAB  Nonreactive   HIV-1 p24 Ag & HIV-1/HIV-2 Ab Not Detected         \"XR SACROILIAC JOINT 1/2 VW 12/21/2015 3:23 PM  HISTORY: Pain.  COMPARISON: None.  FINDINGS: Sacroiliac joints are patent and symmetric. No acute osseous  abnormality.  IMPRESSION  IMPRESSION: Normal sacroiliac joints.  YUE JARRELL MD\"    \"XR LUMBAR SPINE 2-3 VIEWS 12/21/2015 3:23 PM  HISTORY: Pain.  COMPARISON: None.  FINDINGS: Lumbar alignment is anatomic. Vertebral body heights and  intervertebral disc spaces are preserved.  IMPRESSION  IMPRESSION: Normal lumbar spine.  YUE JARRELL MD\"    \"XR HAND BILATERAL G/E 3 VW 12/21/2015 3:50 PM  HISTORY: Pain in unspecified joint   IMPRESSION  IMPRESSION: Negative.  LINNEA ZELAYA MD\"    \"XR CHEST 2 VW 6/30/2015 4:54 PM  HISTORY: Pain.  COMPARISON: None.  IMPRESSION  IMPRESSION: The lungs are clear. No focal pulmonary opacities. Heart  and mediastinum are unremarkable. No acute " "cardiopulmonary  abnormalities.  SO PHAN MD\"    Immunization History     Immunization History   Administered Date(s) Administered     Influenza (IIV3) PF 10/12/2011, 10/03/2013     Influenza Vaccine, 3 YRS +, IM (QUADRIVALENT W/PRESERVATIVES) 10/01/2015     Pneumo Conj 13-V (2010&after) 09/26/2016     Pneumococcal 23 valent 12/22/2016     TD (ADULT, 7+) 08/15/2001     TDAP Vaccine (Adacel) 06/20/2011     TDAP Vaccine (Boostrix) 03/06/2014          Chart documentation done in part with Dragon Voice recognition Software. Although reviewed after completion, some word and grammatical error may remain.    Hunter Monroy MD     "

## 2018-02-16 NOTE — MR AVS SNAPSHOT
After Visit Summary   2/16/2018    Abby Foy    MRN: [id number]           Patient Information     Date Of Birth          1980        Visit Information        Provider Department      2/16/2018 10:00 AM Hunter Monroy MD [facility]        Today's Diagnoses     Seronegative spondyloarthropathy        High risk medication use           Follow-ups after your visit        Your next 10 appointments already scheduled     Juvenal 15, 2018 10:00 AM CDT   Return Visit with Hunter Monroy MD   [facility] ([facility])    [address]  [place] [id number] [phone]              Who to contact     If you have questions or need follow up information about today's clinic visit or your schedule please contact [facility] directly at [phone].  Normal or non-critical lab and imaging results will be communicated to you by MyChart, letter or phone within 4 business days after the clinic has received the results. If you do not hear from us within 7 days, please contact the clinic through MyChart or phone. If you have a critical or abnormal lab result, we will notify you by phone as soon as possible.  Submit refill requests through Meetmeals or call your pharmacy and they will forward the refill request to us. Please allow 3 business days for your refill to be completed.          Additional Information About Your Visit        MyChart Information     [employer] gives you secure access to your electronic health record. If you see a primary care provider, you can also send messages to your care team and make appointments. If you have questions, please call your primary care clinic.  If you do not have a primary care provider, please call [phone] and they will assist you.        Care EveryWhere ID     This is your Care EveryWhere ID. This could be used by other organizations to access your [facility] medical records  [id number]        Your  "Vitals Were     Pulse Height Pulse Oximetry BMI (Body Mass Index)          76 1.626 m (5' 4\") 100% 19.05 kg/m2         Blood Pressure from Last 3 Encounters:   02/16/18 106/68   11/30/17 113/73   11/17/17 108/73    Weight from Last 3 Encounters:   02/16/18 50.3 kg (111 lb)   11/30/17 48.1 kg (106 lb)   11/17/17 49.5 kg (109 lb 3.2 oz)              Today, you had the following     No orders found for display         Where to get your medicines      These medications were sent to [facility] - [place] [address]   [address] , [facility] [address]    Hours:  Mon-Fri 8:30am-5:00pm Toll Free [phone] Phone:  [phone]     adalimumab 40 MG/0.8ML pen kit         These medications were sent to [facility] [place]  [address]  [address] [facility] [address]     Phone:  [phone]     leflunomide 10 MG tablet          Primary Care Provider Office Phone # Fax #    Sandi Duffy PA-C [phone] [id number] [facility] [address] [facility]  [place] [address]        Equal Access to Services     DANIKA [facility] AH: Hadii aad ku hadasho Soomaali, waaxda luqadaha, qaybta kaalmada adeegyada, adam bran hayadebayo girard . So [facility] [phone].    ATENCIÓN: Si habla español, tiene a wheatley disposición servicios gratuitos de asistencia lingüística. Marilynn al [phone].    We comply with applicable federal civil rights laws and [place] laws. We do not discriminate on the basis of race, color, national origin, age, disability, sex, sexual orientation, or gender identity.            Thank you!     Thank you for choosing [facility]  for your care. Our goal is always to provide you with excellent care. Hearing back from our patients is one way we can continue to improve our services. Please take a few minutes to complete the written survey that you may receive in the mail after your visit with us. Thank you!      "        Your Updated Medication List - Protect others around you: Learn how to safely use, store and throw away your medicines at www.disposemymeds.org.          This list is accurate as of 2/16/18 10:29 AM.  Always use your most recent med list.                   Brand Name Dispense Instructions for use Diagnosis    adalimumab 40 MG/0.8ML pen kit    HUMIRA PEN    2 each    Inject 0.8 mLs (40 mg) Subcutaneous every 14 days    Seronegative spondyloarthropathy       ALLEGRA PO      Take 180 mg by mouth daily        ibuprofen 600 MG tablet    ADVIL/MOTRIN    30 tablet    Take 1 tablet (600 mg) by mouth every 6 hours as needed for pain (mild)    Status post bilateral salpingectomy       leflunomide 10 MG tablet    ARAVA    90 tablet    Take 1 tablet (10 mg) by mouth daily    Seronegative spondyloarthropathy, High risk medication use       multivitamin  peds with iron 60 MG chewable tablet      Take 1 chew tab by mouth daily.

## 2018-06-15 ENCOUNTER — OFFICE VISIT (OUTPATIENT)
Dept: RHEUMATOLOGY | Facility: CLINIC | Age: 38
End: 2018-06-15
Payer: COMMERCIAL

## 2018-06-15 VITALS
RESPIRATION RATE: 14 BRPM | WEIGHT: 109.6 LBS | DIASTOLIC BLOOD PRESSURE: 70 MMHG | HEART RATE: 82 BPM | SYSTOLIC BLOOD PRESSURE: 112 MMHG | BODY MASS INDEX: 18.71 KG/M2 | OXYGEN SATURATION: 100 % | HEIGHT: 64 IN

## 2018-06-15 DIAGNOSIS — Z79.899 HIGH RISK MEDICATION USE: ICD-10-CM

## 2018-06-15 DIAGNOSIS — M47.819 SERONEGATIVE SPONDYLOARTHROPATHY: Primary | ICD-10-CM

## 2018-06-15 PROCEDURE — 99213 OFFICE O/P EST LOW 20 MIN: CPT | Performed by: INTERNAL MEDICINE

## 2018-06-15 RX ORDER — LEFLUNOMIDE 10 MG/1
10 TABLET ORAL DAILY
Qty: 90 TABLET | Refills: 1 | Status: SHIPPED | OUTPATIENT
Start: 2018-06-15 | End: 2018-10-19

## 2018-06-15 NOTE — PROGRESS NOTES
Rheumatology Clinic Visit      Abby Foy MRN# [id number]   YOB: 1980 Age: 38 year old      Date of visit: 6/15/18   PCP: Sandi Duffy  Dermatologist: Amy Patel  Ophthalmologist: Dr. Mathis     Chief Complaint   Patient presents with:  Arthritis: patient states she is doing ok, having more issues with eyes, they get more red. More stiff in the morning, trouble getting rings on fingers in the morning as her fingers feel more stiff and swollen    Assessment and Plan   1. 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 However, given her continued back pain that improved with activity but no radiographic evidence for inflammatory arthritis or osteoarthritis, there was concern that she had inflammatory back pain that would be more consistent with a spondylarthritis. Humira was started and resulted in improvement of her back pain, suggesting axial disease.  Arthritis improved with methotrexate and sulfasalazine, but she was having headaches and therefore the most likely culprit methotrexate was reduced until her headaches worsened significantly and therefore sulfasalazine and methotrexate were both discontinued. She had resolution of her headaches after discontinuing both sulfasalazine and methotrexate. I believe that the sulfasalazine was the cause of her headaches. Therefore, cautious retrial of methotrexate in the future could be done. At this time, she is doing very well and tolerating her medications well.  Currently on Humira 40 mg SQ every 14 days.  Leflunomide was stopped to see if Humira monotherapy was sufficient but symptoms returned (more stiffness and could not put rings on in the AM) so will restart leflunomide 10 mg daily (transaminitis with higher doses).   - Continue Humira  40 mg every 14 days  - Restar leflunomide 10 mg daily  - Labs in 3 months: CBC, Creatinine, Hepatic Panel, ESR, CRP    # Pregnancy Planning: s/p salpingectomy;  had a vasectomy    2. 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 Interestingly when leflunomide was stopped between 3/2018 and today, 6/15/2018, she experienced increased L>R eye irritation that when bothering her only affected one eye at a time.  Leflunomide is being restarted for arthritis symptoms so will see if it improves the eye issue.     3. Headaches: Likely due to SSZ. Headaches stopped with discontinuation of SSZ. One headache since last seen.     Ms. Foy verbalized agreement with and understanding of the rational for the diagnosis and treatment plan.  All questions were answered to best of my ability and the patient's satisfaction. Ms. Foy was advised to contact the clinic with any questions that may arise after the clinic visit.      Thank you for involving me in the care of the patient    Return to clinic: 3-4 months    HPI   Abby Foy is a 38 year old female with medical history significant for urticaria, H. pylori infection, and iritis? who returns for f/u of seronegative spondyloarthropathy.    Today, Ms. Foy reports that since stopping the leflunomide she has had more joint stiffness that lasts all day, worsens with inactivity and improves with activity.  Can't put rings on a couple fingers that she normally wears rings on; but able to put them on in the late morning/afternoon.  Also with some irritation to her L>R eyes, unilateral each times she has more eye irritation and the irritation resolves with topical treatments she has.      Denies fevers, chills, nausea, vomiting, constipation, diarrhea. No abdominal pain. No chest pain/pressure, palpitations, or shortness of breath. No neck pain. Occasional cold sores..     Tobacco: None  EtOH:  1-2 drinks on the weekends  Drugs: None  Occupation: Dietitian at the [employer]    ROS   GEN: No fevers/chills  SKIN: See HPI  HEENT: see HPI.  CV: No chest pain, pressure, palpitations, or dyspnea on exertion.  PULM: No SOB. No cough or wheezing  GI: No nausea, vomiting, constipation, diarrhea. No blood in stool. +Abdominal bloating.  : No blood in urine.  MSK: See HPI.  NEURO: negative  PSYCH: negative    Active Problem List     Patient Active Problem List   Diagnosis     CARDIOVASCULAR SCREENING; LDL GOAL LESS THAN 160     Urticaria     Diagnostic skin and sensitization tests     Nonallergic rhinitis     Angioedema of lips     H. pylori infection     GPC (giant papillary conjunctivitis)     Seronegative spondyloarthropathy     Midline low back pain without sciatica     Abnormal uterine bleeding (AUB)- on OCPs     Past Medical History     Past Medical History:   Diagnosis Date     Angioedema of lips episode in 3/13--idiopathic     Contraception 1/28/2009     Diagnostic skin and sensitization tests 3/27/13 skin tests all NEGATIVE for environmental and food allergens including shellfish and nuts.      Nonallergic rhinitis     3/27/13 skin tests all NEGATIVE for environmental and food allergens including shellfish and nuts. 3/27/13 followup IgE tests NEG to Peanut, SNF, shrimp, macadamia nut, pistachio and walnut.     Rheumatoid arthritis of multiple sites with negative rheumatoid factor (H) 12/31/2015     Past Surgical History     Past Surgical History:   Procedure Laterality Date     LAPAROSCOPIC SALPINGECTOMY Bilateral 6/23/2017    Procedure: LAPAROSCOPIC SALPINGECTOMY;  Operative Laparoscopy, Bilateral Salpingectomy ;  Surgeon: Apryl West MD;  Location: [facility] OR        Allergy     Allergies   Allergen Reactions     No Known Drug Allergy      Shrimp Swelling     Lips and tongue     Walnuts [Nuts] Swelling     Lips and tongue       Current Medication List     Current Outpatient Prescriptions    Medication Sig     adalimumab (HUMIRA PEN) 40 MG/0.8ML pen kit Inject 0.8 mLs (40 mg) Subcutaneous every 14 days     Fexofenadine HCl (ALLEGRA PO) Take 180 mg by mouth daily     ibuprofen (ADVIL/MOTRIN) 600 MG tablet Take 1 tablet (600 mg) by mouth every 6 hours as needed for pain (mild)     multivitamin peds with iron (FLINTSTONES COMPLETE) 60 MG chewable tablet Take 1 chew tab by mouth daily.     leflunomide (ARAVA) 10 MG tablet Take 1 tablet (10 mg) by mouth daily (Patient not taking: Reported on 6/15/2018)     No current facility-administered medications for this visit.      Social History   See HPI    Family History     Family History   Problem Relation Age of Onset     Hypertension Maternal Grandmother      Autoimmune Disease Maternal Grandmother      Autoimmune hepatitis     CANCER Maternal Grandfather      Hypertension Paternal Grandmother      Cancer - colorectal Paternal Grandfather      Cardiovascular Paternal Grandfather      Other Cancer Paternal Grandfather      Hypertension Mother      Autoimmune Disease Mother      Autoimmune hepatitis     Hyperlipidemia Mother      Asthma Mother      Hypertension Father      DIABETES Father      Type 2     Multiple Sclerosis Maternal Aunt      CEREBROVASCULAR DISEASE No family hx of      Thyroid Disease No family hx of      Glaucoma No family hx of      Macular Degeneration No family hx of      Breast Cancer No family hx of      Colon Cancer No family hx of      Physical Exam     Temp Readings from Last 3 Encounters:   11/30/17 98.4  F (36.9  C) (Oral)   08/25/17 97.5  F (36.4  C) (Oral)   06/23/17 98.7  F (37.1  C) (Oral)     BP Readings from Last 5 Encounters:   06/15/18 112/70   02/16/18 106/68   11/30/17 113/73   11/17/17 108/73   08/25/17 103/71     Pulse Readings from Last 1 Encounters:   06/15/18 82     Resp Readings from Last 1 Encounters:   06/15/18 14     Estimated body mass index is 18.81 kg/(m^2) as calculated from the following:    Height as of this  "encounter: 1.626 m (5' 4\").    Weight as of this encounter: 49.7 kg (109 lb 9.6 oz).    GEN: NAD  HEENT: MMM. Anicteric, noninjected sclera; eyes appear to have good moisture by gross examination  CV: S1, S2. RRR. No m/r/g.  PULM: CTA bilaterally. No w/c.  MSK: MCPs, PIPs, DIPs, wrists, elbows, shoulders, knees, ankles, and left MTPs without swelling or tenderness to palpation. She reports dropping an object on her right forefoot with some pain and swelling that has nearly resolved; so I did not palpate the right MTPs to assess for pain. Hips nontender to direct palpation. Achilles tendon nontender to palpation.   SKIN: No rash  EXT: No LE edema  PSYCH: Alert. Appropriate.    Labs / Imaging (select studies)   RF/CCP  Recent Labs   Lab Test  12/21/15   1447   CCPABY  <20  Interpretation:  Negative     RHF  <20     CBC  Recent Labs   Lab Test  02/13/18   1754  10/27/17   0912  09/22/17   0925   WBC  5.9  4.2  6.4   RBC  4.37  4.45  4.03   HGB  11.6*  11.8  10.9*   HCT  36.7  37.8  34.7*   MCV  84  85  86   RDW  12.9  12.3  12.3   PLT  218  200  160   MCH  26.5  26.5  27.0   MCHC  31.6  31.2*  31.4*   NEUTROPHIL  50.2  44.5  65.8   LYMPH  39.5  40.4  22.3   MONOCYTE  7.6  9.9  8.3   EOSINOPHIL  2.5  4.5  3.3   BASOPHIL  0.2  0.7  0.3   ANEU  3.0  1.9  4.2   ALYM  2.3  1.7  1.4   ANA MARÍA  0.5  0.4  0.5   AEOS  0.2  0.2  0.2   ABAS  0.0  0.0  0.0     CMP  Recent Labs   Lab Test  02/13/18   1754  10/27/17   0912  09/22/17   0925   06/30/15   1636   NA   --    --    --    --   140   POTASSIUM   --    --    --    --   4.1   CHLORIDE   --    --    --    --   105   CO2   --    --    --    --   30   ANIONGAP   --    --    --    --   5   GLC   --    --    --    --   73   BUN   --    --    --    --   9   CR  0.74  0.85  0.85   < >  0.80   GFRESTIMATED  88  75  76   < >  83   GFRESTBLACK  >90  >90  >90   < >  >90   GFR Calc     DAISY   --    --    --    --   9.4   BILITOTAL  0.5  0.4  0.5   < >  0.5   ALBUMIN  4.0  4.2 " " 3.9   < >  4.1   PROTTOTAL  7.3  7.8  7.1   < >  8.1   ALKPHOS  59  66  62   < >  65   AST  22  25  25   < >  19   ALT  26  31  34   < >  26    < > = values in this interval not displayed.     Calcium/VitaminD  Recent Labs   Lab Test  01/13/17   1355  12/21/15   1447  06/30/15   1636  01/09/14   0906   11/10/10   1335   DAISY   --    --   9.4   --    --    --    D3VIT   --    --    --    --    --   45   VITDT  45  74   --   35   < >   --     < > = values in this interval not displayed.     ESR/CRP  Recent Labs   Lab Test  02/13/18   1754  08/22/17   1819  03/16/17   1431   SED  7  7  7   CRP  <2.9  <2.9  <2.9     Hepatitis B  Recent Labs   Lab Test  03/20/17   1506  03/28/16   1442  08/06/13   1500  11/10/10   1335   AUSAB   --   264.69*   --    --    HBCAB  Nonreactive   --    --    --    HEPBANG   --   Nonreactive  Negative  Negative     Hepatitis C  Recent Labs   Lab Test  12/21/15   1447   HCVAB  Nonreactive   Assay performance characteristics have not been established for newborns,   infants, and children       Lyme confirmation testing by Western Blot  Recent Labs   Lab Test  08/05/15   1621   LYWG  Negative  Reference range: Negative  (Note)  Band(s) present: NONE  (Insufficient number of bands for positive result)  INTERPRETIVE INFORMATION: Borrelia Burgdorferi Ab, IgG  Western Blot  For this assay, a positive result is reported when any 5 or  more of the following 10 bands are present: 18, 23, 28, 30,  39, 41, 45, 58, 66, or 93 kDa.  All other banding patterns  are reported as negative.  Performed by [facility],  [address] [place] [id number] [phone]  [email or web address], Pete Saha MD, Lab. Director       Tuberculosis Screening  Recent Labs   Lab Test  03/20/17   1508  03/28/16   1443   TBRSLT  Negative  Negative   TBAGN  0.00  0.04     HIV Screening  Recent Labs   Lab Test  12/21/15   1447   HIAGAB  Nonreactive   HIV-1 p24 Ag & HIV-1/HIV-2 Ab Not Detected       \"XR SACROILIAC JOINT 1/2 VW " "12/21/2015 3:23 PM  HISTORY: Pain.  COMPARISON: None.  FINDINGS: Sacroiliac joints are patent and symmetric. No acute osseous  abnormality.  IMPRESSION  IMPRESSION: Normal sacroiliac joints.  YUE JARRELL MD\"    \"XR LUMBAR SPINE 2-3 VIEWS 12/21/2015 3:23 PM  HISTORY: Pain.  COMPARISON: None.  FINDINGS: Lumbar alignment is anatomic. Vertebral body heights and  intervertebral disc spaces are preserved.  IMPRESSION  IMPRESSION: Normal lumbar spine.  YUE JARRELL MD\"    \"XR HAND BILATERAL G/E 3 VW 12/21/2015 3:50 PM  HISTORY: Pain in unspecified joint   IMPRESSION  IMPRESSION: Negative.  LINNEA ZELAYA MD\"    \"XR CHEST 2 VW 6/30/2015 4:54 PM  HISTORY: Pain.  COMPARISON: None.  IMPRESSION  IMPRESSION: The lungs are clear. No focal pulmonary opacities. Heart  and mediastinum are unremarkable. No acute cardiopulmonary  abnormalities.  SO PHAN MD\"    Immunization History     Immunization History   Administered Date(s) Administered     Influenza (IIV3) PF 10/12/2011, 10/03/2013     Influenza Vaccine, 3 YRS +, IM (QUADRIVALENT W/PRESERVATIVES) 10/01/2015     Pneumo Conj 13-V (2010&after) 09/26/2016     Pneumococcal 23 valent 12/22/2016     TD (ADULT, 7+) 08/15/2001     TDAP Vaccine (Adacel) 06/20/2011     TDAP Vaccine (Boostrix) 03/06/2014          Chart documentation done in part with Dragon Voice recognition Software. Although reviewed after completion, some word and grammatical error may remain.    Hunter Monroy MD     "

## 2018-06-15 NOTE — PATIENT INSTRUCTIONS
Rheumatology    Dr. Hunter Monroy         [facility]   (Monday)  [address]  [place] [address]       [facility]   (Tuesday)  [address]  [place] [address]    [facility]   (Wed., Thurs., and Friday)  [address] [place] [address]    Phone number: [phone]  Thank you for choosing [facility].  Maddie Hennessy CMA

## 2018-06-15 NOTE — MR AVS SNAPSHOT
After Visit Summary   6/15/2018    Abby Foy    MRN: [id number]           Patient Information     Date Of Birth          1980        Visit Information        Provider Department      6/15/2018 10:00 AM Hunter Monroy MD [facility]        Today's Diagnoses     Seronegative spondyloarthropathy    -  1    High risk medication use          Care Instructions    Rheumatology    Dr. Hunter [place] [facility]   (Monday)  [address]  [place] [address]       [facility]   (Tuesday)  [address] [place] [address]    [facility]   (Wed., Thurs., and Friday)  [address]  [place] [address]    Phone number: [phone]  Thank you for choosing [facility].  Maddie Hennessy CMA            Follow-ups after your visit        Your next 10 appointments already scheduled     Sep 14, 2018 10:00 AM CDT   LAB with BE LAB   [facility] ([facility]    [address] [facility] [id number] [phone]           Please do not eat 10-12 hours before your appointment if you are coming in fasting for labs on lipids, cholesterol, or glucose (sugar). This does not apply to pregnant women. Water, hot tea and black coffee (with nothing added) are okay. Do not drink other fluids, diet soda or chew gum.            Oct 19, 2018  8:20 AM CDT   Return Visit with Hunter Monroy MD   [facility] ([facility])    [address] [facility] [id number] [phone]              Future tests that were ordered for you today     Open Future Orders        Priority Expected Expires Ordered    Creatinine Routine 9/9/2018 10/13/2018 6/15/2018    CRP inflammation Routine 9/9/2018 10/13/2018 6/15/2018    Erythrocyte sedimentation rate auto Routine 9/9/2018 10/13/2018 6/15/2018    Hepatic panel Routine 9/9/2018 10/13/2018 6/15/2018    CBC with platelets differential Routine 9/9/2018 10/13/2018  "6/15/2018            Who to contact     If you have questions or need follow up information about today's clinic visit or your schedule please contact [facility] directly at [phone].  Normal or non-critical lab and imaging results will be communicated to you by MyChart, letter or phone within 4 business days after the clinic has received the results. If you do not hear from us within 7 days, please contact the clinic through MyChart or phone. If you have a critical or abnormal lab result, we will notify you by phone as soon as possible.  Submit refill requests through [employer] or call your pharmacy and they will forward the refill request to us. Please allow 3 business days for your refill to be completed.          Additional Information About Your Visit        [facility] Information     [employer] gives you secure access to your electronic health record. If you see a primary care provider, you can also send messages to your care team and make appointments. If you have questions, please call your primary care clinic.  If you do not have a primary care provider, please call [phone] and they will assist you.        Care EveryWhere ID     This is your Care EveryWhere ID. This could be used by other organizations to access your [facility] medical records  [id number]        Your Vitals Were     Pulse Respirations Height Pulse Oximetry BMI (Body Mass Index)       82 14 1.626 m (5' 4\") 100% 18.81 kg/m2        Blood Pressure from Last 3 Encounters:   06/15/18 112/70   02/16/18 106/68   11/30/17 113/73    Weight from Last 3 Encounters:   06/15/18 49.7 kg (109 lb 9.6 oz)   02/16/18 50.3 kg (111 lb)   11/30/17 48.1 kg (106 lb)                 Where to get your medicines      These medications were sent to [facility] [place] - [address] [address] [facility] [address] [place]  [address] [place] [address]     Phone:  [phone]     leflunomide 10 MG tablet          Primary Care Provider Office Phone # " Fax #    Sandi Duffy PA-C [phone]       [address] [place] [address]        Equal Access to Services     JONATHANDANIKA SIRI : Hadii aad ku hadbolivaro Soalejandroali, waaxda luqadaha, qaybta kaalmada adeegyada, adam narayankaren wally. So [facility] [phone].    ATENCIÓN: Si habla español, tiene a wheatley disposición servicios gratuitos de asistencia lingüística. Llame al [phone].    We comply with applicable federal civil rights laws and [place] laws. We do not discriminate on the basis of race, color, national origin, age, disability, sex, sexual orientation, or gender identity.            Thank you!     Thank you for choosing [facility]  [facility]. Our goal is always to provide you with excellent care. Hearing back from our patients is one way we can continue to improve our services. Please take a few minutes to complete the written survey that you may receive in the mail after your visit with us. Thank you!             Your Updated Medication List - Protect others around you: Learn how to safely use, store and throw away your medicines at www.disposemymeds.org.          This list is accurate as of 6/15/18 10:12 AM.  Always use your most recent med list.                   Brand Name Dispense Instructions for use Diagnosis    adalimumab 40 MG/0.8ML pen kit    HUMIRA PEN    2 each    Inject 0.8 mLs (40 mg) Subcutaneous every 14 days    Seronegative spondyloarthropathy       ALLEGRA PO      Take 180 mg by mouth daily        ibuprofen 600 MG tablet    ADVIL/MOTRIN    30 tablet    Take 1 tablet (600 mg) by mouth every 6 hours as needed for pain (mild)    Status post bilateral salpingectomy       leflunomide 10 MG tablet    ARAVA    90 tablet    Take 1 tablet (10 mg) by mouth daily    Seronegative spondyloarthropathy       multivitamin  peds with iron 60 MG chewable tablet      Take 1 chew tab by mouth daily.

## 2018-09-14 DIAGNOSIS — M47.819 SERONEGATIVE SPONDYLOARTHROPATHY: ICD-10-CM

## 2018-09-14 LAB
ALBUMIN SERPL-MCNC: 4 G/DL (ref 3.4–5)
ALP SERPL-CCNC: 55 U/L (ref 40–150)
ALT SERPL W P-5'-P-CCNC: 24 U/L (ref 0–50)
AST SERPL W P-5'-P-CCNC: 21 U/L (ref 0–45)
BASOPHILS # BLD AUTO: 0 10E9/L (ref 0–0.2)
BASOPHILS NFR BLD AUTO: 0.5 %
BILIRUB DIRECT SERPL-MCNC: <0.1 MG/DL (ref 0–0.2)
BILIRUB SERPL-MCNC: 0.3 MG/DL (ref 0.2–1.3)
CREAT SERPL-MCNC: 0.87 MG/DL (ref 0.52–1.04)
CRP SERPL-MCNC: <2.9 MG/L (ref 0–8)
DIFFERENTIAL METHOD BLD: NORMAL
EOSINOPHIL # BLD AUTO: 0.1 10E9/L (ref 0–0.7)
EOSINOPHIL NFR BLD AUTO: 0.8 %
ERYTHROCYTE [DISTWIDTH] IN BLOOD BY AUTOMATED COUNT: 12.4 % (ref 10–15)
ERYTHROCYTE [SEDIMENTATION RATE] IN BLOOD BY WESTERGREN METHOD: 7 MM/H (ref 0–20)
GFR SERPL CREATININE-BSD FRML MDRD: 73 ML/MIN/1.7M2
HCT VFR BLD AUTO: 37.3 % (ref 35–47)
HGB BLD-MCNC: 11.8 G/DL (ref 11.7–15.7)
LYMPHOCYTES # BLD AUTO: 1.7 10E9/L (ref 0.8–5.3)
LYMPHOCYTES NFR BLD AUTO: 28.1 %
MCH RBC QN AUTO: 26.9 PG (ref 26.5–33)
MCHC RBC AUTO-ENTMCNC: 31.6 G/DL (ref 31.5–36.5)
MCV RBC AUTO: 85 FL (ref 78–100)
MONOCYTES # BLD AUTO: 0.5 10E9/L (ref 0–1.3)
MONOCYTES NFR BLD AUTO: 7.4 %
NEUTROPHILS # BLD AUTO: 3.9 10E9/L (ref 1.6–8.3)
NEUTROPHILS NFR BLD AUTO: 63.2 %
PLATELET # BLD AUTO: 186 10E9/L (ref 150–450)
PROT SERPL-MCNC: 7.3 G/DL (ref 6.8–8.8)
RBC # BLD AUTO: 4.38 10E12/L (ref 3.8–5.2)
WBC # BLD AUTO: 6.1 10E9/L (ref 4–11)

## 2018-09-14 PROCEDURE — 86140 C-REACTIVE PROTEIN: CPT | Performed by: INTERNAL MEDICINE

## 2018-09-14 PROCEDURE — 80076 HEPATIC FUNCTION PANEL: CPT | Performed by: INTERNAL MEDICINE

## 2018-09-14 PROCEDURE — 85652 RBC SED RATE AUTOMATED: CPT | Performed by: INTERNAL MEDICINE

## 2018-09-14 PROCEDURE — 82565 ASSAY OF CREATININE: CPT | Performed by: INTERNAL MEDICINE

## 2018-09-14 PROCEDURE — 36415 COLL VENOUS BLD VENIPUNCTURE: CPT | Performed by: INTERNAL MEDICINE

## 2018-09-14 PROCEDURE — 85025 COMPLETE CBC W/AUTO DIFF WBC: CPT | Performed by: INTERNAL MEDICINE

## 2018-10-19 ENCOUNTER — TELEPHONE (OUTPATIENT)
Dept: RHEUMATOLOGY | Facility: CLINIC | Age: 38
End: 2018-10-19

## 2018-10-19 ENCOUNTER — OFFICE VISIT (OUTPATIENT)
Dept: RHEUMATOLOGY | Facility: CLINIC | Age: 38
End: 2018-10-19
Payer: COMMERCIAL

## 2018-10-19 VITALS
RESPIRATION RATE: 16 BRPM | OXYGEN SATURATION: 100 % | DIASTOLIC BLOOD PRESSURE: 79 MMHG | HEIGHT: 64 IN | BODY MASS INDEX: 19.29 KG/M2 | WEIGHT: 113 LBS | SYSTOLIC BLOOD PRESSURE: 114 MMHG | HEART RATE: 83 BPM

## 2018-10-19 DIAGNOSIS — M47.819 SERONEGATIVE SPONDYLOARTHROPATHY: Primary | ICD-10-CM

## 2018-10-19 PROCEDURE — 99214 OFFICE O/P EST MOD 30 MIN: CPT | Performed by: INTERNAL MEDICINE

## 2018-10-19 RX ORDER — LEFLUNOMIDE 10 MG/1
10 TABLET ORAL DAILY
Qty: 90 TABLET | Refills: 2 | Status: SHIPPED | OUTPATIENT
Start: 2018-10-19 | End: 2019-02-21

## 2018-10-19 NOTE — MR AVS SNAPSHOT
After Visit Summary   10/19/2018    Abby Foy    MRN: [id number]           Patient Information     Date Of Birth          1980        Visit Information        Provider Department      10/19/2018 8:20 AM Hunter Monroy MD [facility]        Today's Diagnoses     Seronegative spondyloarthropathy           Follow-ups after your visit        Your next 10 appointments already scheduled     Nov 09, 2018  9:15 AM CST   Return Visit with Apryl West MD   [facility]    [facility]  [address]  [address] [facility] [address]   [phone]            Dec 07, 2018  9:30 AM CST   LAB with [facility]   [facility]    [address] [facility] [id number] [phone]           Please do not eat 10-12 hours before your appointment if you are coming in fasting for labs on lipids, cholesterol, or glucose (sugar). This does not apply to pregnant women. Water, hot tea and black coffee (with nothing added) are okay. Do not drink other fluids, diet soda or chew gum.            Feb 21, 2019  2:00 PM CST   Return Visit with Hunter Monroy MD   [facility]    [address] [facility] [id number] [phone]              Future tests that were ordered for you today     Open Standing Orders        Priority Remaining Interval Expires Ordered    CBC with platelets differential Routine 4/4 Every 3 Months 10/14/2019 10/19/2018    Creatinine Routine 4/4 Every 3 Months 10/14/2019 10/19/2018    Hepatic panel Routine 4/4 Every 3 Months 10/14/2019 10/19/2018            Who to contact     If you have questions or need follow up information about today's clinic visit or your schedule please contact [facility] directly at [phone].  Normal or non-critical lab and imaging results will be communicated to you by  "MyChart, letter or phone within 4 business days after the clinic has received the results. If you do not hear from us within 7 days, please contact the clinic through [employer] or phone. If you have a critical or abnormal lab result, we will notify you by phone as soon as possible.  Submit refill requests through [employer] or call your pharmacy and they will forward the refill request to us. Please allow 3 business days for your refill to be completed.          Additional Information About Your Visit        [employer] Information     [employer] gives you secure access to your electronic health record. If you see a primary care provider, you can also send messages to your care team and make appointments. If you have questions, please call your primary care clinic.  If you do not have a primary care provider, please call [phone] and they will assist you.        Care EveryWhere ID     This is your Care EveryWhere ID. This could be used by other organizations to access your [place] medical records  [id number]        Your Vitals Were     Pulse Respirations Height Pulse Oximetry BMI (Body Mass Index)       83 16 1.626 m (5' 4\") 100% 19.4 kg/m2        Blood Pressure from Last 3 Encounters:   10/19/18 114/79   06/15/18 112/70   02/16/18 106/68    Weight from Last 3 Encounters:   10/19/18 51.3 kg (113 lb)   06/15/18 49.7 kg (109 lb 9.6 oz)   02/16/18 50.3 kg (111 lb)                 Today's Medication Changes          These changes are accurate as of 10/19/18  8:56 AM.  If you have any questions, ask your nurse or doctor.               Start taking these medicines.        Dose/Directions    golimumab 50 MG/0.5ML auto-injector pen   Commonly known as:  SIMPONI   Used for:  Seronegative spondyloarthropathy   Started by:  Hunter Monroy MD        Dose:  50 mg   Inject 0.5 mLs (50 mg) Subcutaneous every 28 days   Quantity:  0.5 mL   Refills:  6         Stop taking these medicines if you haven't already. Please contact your care " team if you have questions.     adalimumab 40 MG/0.8ML pen kit   Commonly known as:  HUMIRA PEN   Stopped by:  Hunetr Monroy MD                Where to get your medicines      These medications were sent to [facility] - [address] [facility]  [address] [facility] [place] [address]     Phone:  [phone]     leflunomide 10 MG tablet         Call your pharmacy to confirm that your medication is ready for pickup. It may take up to 24 hours for them to receive the prescription. If the prescription is not ready within 3 business days, please contact your clinic or your provider.     We will let you know when these medications are ready. If you don't hear back within 3 business days, please contact us.     golimumab 50 MG/0.5ML auto-injector pen                Primary Care Provider Office Phone # Fax #    Sandi Duffy PA-C [phone] [address]  [place] [address]        Equal Access to Services     [facility]: Hadii aad ku hadasho Soomaali, waaxda luqadaha, qaybta kaalmada adeegyada, waxay idiin hayaan adeeg khararosanna la'adebayo . So [facility] [phone].    ATENCIÓN: Si habla español, tiene a wheatley disposición servicios gratuitos de asistencia lingüística. [facility] [phone].    We comply with applicable federal civil rights laws and [place] laws. We do not discriminate on the basis of race, color, national origin, age, disability, sex, sexual orientation, or gender identity.            Thank you!     Thank you for choosing [facility]  for your care. Our goal is always to provide you with excellent care. Hearing back from our patients is one way we can continue to improve our services. Please take a few minutes to complete the written survey that you may receive in the mail after your visit with us. Thank you!             Your Updated Medication List - Protect others around you: Learn how to safely use, store and throw away your medicines  at www.disposemymeds.org.          This list is accurate as of 10/19/18  8:56 AM.  Always use your most recent med list.                   Brand Name Dispense Instructions for use Diagnosis    ALLEGRA PO      Take 180 mg by mouth daily        golimumab 50 MG/0.5ML auto-injector pen    SIMPONI    0.5 mL    Inject 0.5 mLs (50 mg) Subcutaneous every 28 days    Seronegative spondyloarthropathy       ibuprofen 600 MG tablet    ADVIL/MOTRIN    30 tablet    Take 1 tablet (600 mg) by mouth every 6 hours as needed for pain (mild)    Status post bilateral salpingectomy       leflunomide 10 MG tablet    ARAVA    90 tablet    Take 1 tablet (10 mg) by mouth daily    Seronegative spondyloarthropathy       multivitamin  peds with iron 60 MG chewable tablet      Take 1 chew tab by mouth daily.

## 2018-10-19 NOTE — PROGRESS NOTES
Rheumatology Clinic Visit      Abby Foy MRN# [id number]   YOB: 1980 Age: 38 year old      Date of visit: 10/19/18   PCP: Sandi Duffy  Dermatologist: Amy Patel  Ophthalmologist: Dr. Mathis     Chief Complaint   Patient presents with:  RECHECK: 4 months patient states she is doing ok    Assessment and Plan   1. 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 However, given her continued back pain that improved with activity but no radiographic evidence for inflammatory arthritis or osteoarthritis, there was concern that she had inflammatory back pain that would be more consistent with a spondylarthritis. Humira was started and resulted in improvement of her back pain, suggesting axial disease.  Arthritis improved with methotrexate and sulfasalazine, but she was having headaches and therefore the most likely culprit methotrexate was reduced until her headaches worsened significantly and therefore sulfasalazine and methotrexate were both discontinued. She had resolution of her headaches after discontinuing both sulfasalazine and methotrexate. I believe that the sulfasalazine was the cause of her headaches. Therefore, cautious retrial of methotrexate in the future could be done.  She was doing well on a combination of leflunomide 10 mg daily and Humira 40 mg SQ every 14 days but Humira has not been taken for approximately 1 months because of financial assistance issues with it that makes Humira not financially feasible.  We discussed other options for treatment.  One option is to wait and see how she does without a biologic DMARD but my concern about her inflammatory back symptoms is that they will return with more time, and her arthritis was doing well on combination therapy.  Another  option is to start a different biologic DMARD that comes with a different financial assistance program.  Enbrel is not preferred because of her iritis history.  Therefore, will change to Simponi.     - Discontinue Humira 40 mg every 14 days  - Start Simponi 50mg SQ every 28 days  - Continue leflunomide 10 mg daily  - Labs every 3 months, next in December: CBC, Creatinine, Hepatic Panel, ESR, CRP              Rapid 3, cumulative scores                      10/19/2018: 2 (leflunomide 10mg daily; Humira last taken 1 month ago [financially not reasonable now])    # Golimumab (Simponi) Risks and Benefits: The risks and benefits of golimumab were discussed in detail and the patient verbalized understanding.  The risks discussed include, but are not limited to, the risk for hypersensitivity, anaphylaxis, anaphylactoid reactions, an increased risk for serious infections leading to hospitalization or death, a possible increased risk for lymphoma and other malignancies, a possible worsening of demyelinating diseases, a possible worsening of heart failure, risk for cytopenias, risk for drug induced lupus, possible reactivation of hepatitis B, and possible reactivation of latent tuberculosis.  Subcutaneous injections may result in injection site reactions and/or pain at the site of injection.  The most common adverse reactions are infections and injection site reactions.  It was discussed that the medication would need to be discontinued if a serious infection develops.  It was discussed that live vaccinations should not be received while using golimumab or within 30 days prior to starting golimumab.  I encouraged reviewing the package insert and asking any questions about the medication.      # Pregnancy Planning: s/p salpingectomy;  had a vasectomy    2. 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 Interestingly when leflunomide was stopped between  3/2018 and today, 6/15/2018, she experienced increased L>R eye irritation that when bothering her only affected one eye at a time.  Leflunomide was restarted with improvement of the eye inflammation.     3. Headaches: Likely due to SSZ. Headaches stopped with discontinuation of SSZ. One headache since last seen.    4. Vaccinations: Vaccinations reviewed with Ms. Foy.  Risks and benefits of vaccinations were discussed. Data and lack of data for shingrix reviewed.  CDC stance on shingrix when on moderate to high immunosuppression reviewed.   - Influenza: up to date  - [id number]: up to date  - [id number]: up to date  - Shingrix: She will check on insurance coverage; may receive at a pharmacy or next clinic visit      Ms. Foy verbalized agreement with and understanding of the rational for the diagnosis and treatment plan.  All questions were answered to best of my ability and the patient's satisfaction. Ms. Foy was advised to contact the clinic with any questions that may arise after the clinic visit.      Thank you for involving me in the care of the patient    Return to clinic: 3-4 months    HPI   Abby Foy is a 38 year old female with medical history significant for urticaria, H. pylori infection, and iritis? who returns for f/u of seronegative spondyloarthropathy.    Today, Ms. Foy reports that she has not taken Humira since September because her financial assistance associated with it ran out and it is not financially feasible to continue.  She had one episode where she wore high heels and found that her ankles and MTPs hurt more so she used ibuprofen 600mg in the morning and 400mg in the evening on that day, and then 400 mg once the next day with some improvement.  She then more regular tennis shoes for 5 days and it improved but she still has some pain across her MTPs.  Overall feels like the leflunomide is doing the most for her and is not sure if she wants to restart a biologic  medication at this time versus waiting to see how she does without a biologic and starting at that time if needed.  A different biologic will be needed because of her financial assistance running out.  No recurrence of inflammatory eye issues; she says that she does not mind wearing glasses but really appreciates being able to wear contacts.  Morning stiffness for approximately 30-60 minutes.    Denies fevers, chills, nausea, vomiting, constipation, diarrhea. No abdominal pain. No chest pain/pressure, palpitations, or shortness of breath. No neck pain. Occasional cold sores..     Tobacco: None  EtOH: 1-2 drinks on the weekends  Drugs: None  Occupation: Dietitian at the [employer]    ROS   GEN: No fevers/chills  SKIN: See HPI  HEENT: see HPI.  CV: No chest pain, pressure, palpitations, or dyspnea on exertion.  PULM: No SOB. No cough or wheezing  GI: No nausea, vomiting, constipation, diarrhea. No blood in stool. +Abdominal bloating.  : No blood in urine.  MSK: See HPI.  NEURO: negative  PSYCH: negative    Active Problem List     Patient Active Problem List   Diagnosis     CARDIOVASCULAR SCREENING; LDL GOAL LESS THAN 160     Urticaria     Diagnostic skin and sensitization tests     Nonallergic rhinitis     Angioedema of lips     H. pylori infection     GPC (giant papillary conjunctivitis)     Seronegative spondyloarthropathy     Midline low back pain without sciatica     Abnormal uterine bleeding (AUB)- on OCPs     Past Medical History     Past Medical History:   Diagnosis Date     Angioedema of lips episode in 3/13--idiopathic     Contraception 1/28/2009     Diagnostic skin and sensitization tests 3/27/13 skin tests all NEGATIVE for environmental and food allergens including shellfish and nuts.      Nonallergic rhinitis     3/27/13 skin tests all NEGATIVE for environmental and food allergens including shellfish and nuts. 3/27/13 followup IgE tests NEG to Peanut, SNF, shrimp, macadamia nut, pistachio  and walnut.     Rheumatoid arthritis of multiple sites with negative rheumatoid factor (H) 12/31/2015     Past Surgical History     Past Surgical History:   Procedure Laterality Date     LAPAROSCOPIC SALPINGECTOMY Bilateral 6/23/2017    Procedure: LAPAROSCOPIC SALPINGECTOMY;  Operative Laparoscopy, Bilateral Salpingectomy ;  Surgeon: Apryl West MD;  Location: [facility] OR        Allergy     Allergies   Allergen Reactions     No Known Drug Allergy      Shrimp Swelling     Lips and tongue     Walnuts [Nuts] Swelling     Lips and tongue       Current Medication List     Current Outpatient Prescriptions   Medication Sig     adalimumab (HUMIRA PEN) 40 MG/0.8ML pen kit Inject 0.8 mLs (40 mg) Subcutaneous every 14 days     Fexofenadine HCl (ALLEGRA PO) Take 180 mg by mouth daily     ibuprofen (ADVIL/MOTRIN) 600 MG tablet Take 1 tablet (600 mg) by mouth every 6 hours as needed for pain (mild)     leflunomide (ARAVA) 10 MG tablet Take 1 tablet (10 mg) by mouth daily     multivitamin peds with iron (FLINTSTONES COMPLETE) 60 MG chewable tablet Take 1 chew tab by mouth daily.     No current facility-administered medications for this visit.      Social History   See HPI    Family History     Family History   Problem Relation Age of Onset     Hypertension Maternal Grandmother      Autoimmune Disease Maternal Grandmother      Autoimmune hepatitis     Cancer Maternal Grandfather      Hypertension Paternal Grandmother      Cancer - colorectal Paternal Grandfather      Cardiovascular Paternal Grandfather      Other Cancer Paternal Grandfather      Hypertension Mother      Autoimmune Disease Mother      Autoimmune hepatitis     Hyperlipidemia Mother      Asthma Mother      Hypertension Father      Diabetes Father      Type 2     Multiple Sclerosis Maternal Aunt      Cerebrovascular Disease No family hx of      Thyroid Disease No family hx of      Glaucoma No family hx of      Macular Degeneration No family hx of      Breast Cancer No  "family hx of      Colon Cancer No family hx of      Physical Exam     Temp Readings from Last 3 Encounters:   11/30/17 98.4  F (36.9  C) (Oral)   08/25/17 97.5  F (36.4  C) (Oral)   06/23/17 98.7  F (37.1  C) (Oral)     BP Readings from Last 5 Encounters:   10/19/18 114/79   06/15/18 112/70   02/16/18 106/68   11/30/17 113/73   11/17/17 108/73     Pulse Readings from Last 1 Encounters:   10/19/18 83     Resp Readings from Last 1 Encounters:   10/19/18 16     Estimated body mass index is 19.4 kg/(m^2) as calculated from the following:    Height as of this encounter: 1.626 m (5' 4\").    Weight as of this encounter: 51.3 kg (113 lb).    GEN: NAD  HEENT: MMM. Anicteric, noninjected sclera; eyes appear to have good moisture by gross examination  CV: S1, S2. RRR. No m/r/g.  PULM: CTA bilaterally. No w/c.  MSK: MCPs, PIPs, DIPs, wrists, elbows, shoulders, knees, and ankles without swelling or tenderness to palpation.  Diffuse MTP tenderness to palpation that was without swelling.  Achilles tendons nontender to palpation.    SKIN: No rash  EXT: No LE edema  PSYCH: Alert. Appropriate.    Labs / Imaging (select studies)   RF/CCP  Recent Labs   Lab Test  12/21/15   1447   CCPABY  <20  Interpretation:  Negative     RHF  <20     CBC  Recent Labs   Lab Test  09/14/18   0952  02/13/18   1754  10/27/17   0912   WBC  6.1  5.9  4.2   RBC  4.38  4.37  4.45   HGB  11.8  11.6*  11.8   HCT  37.3  36.7  37.8   MCV  85  84  85   RDW  12.4  12.9  12.3   PLT  186  218  200   MCH  26.9  26.5  26.5   MCHC  31.6  31.6  31.2*   NEUTROPHIL  63.2  50.2  44.5   LYMPH  28.1  39.5  40.4   MONOCYTE  7.4  7.6  9.9   EOSINOPHIL  0.8  2.5  4.5   BASOPHIL  0.5  0.2  0.7   ANEU  3.9  3.0  1.9   ALYM  1.7  2.3  1.7   ANA MARÍA  0.5  0.5  0.4   AEOS  0.1  0.2  0.2   ABAS  0.0  0.0  0.0     CMP  Recent Labs   Lab Test  09/14/18   0952  02/13/18   1754  10/27/17   0912   06/30/15   1636   NA   --    --    --    --   140   POTASSIUM   --    --    --    --   4.1 "   CHLORIDE   --    --    --    --   105   CO2   --    --    --    --   30   ANIONGAP   --    --    --    --   5   GLC   --    --    --    --   73   BUN   --    --    --    --   9   CR  0.87  0.74  0.85   < >  0.80   GFRESTIMATED  73  88  75   < >  83   GFRESTBLACK  88  >90  >90   < >  >90   GFR Calc     DAISY   --    --    --    --   9.4   BILITOTAL  0.3  0.5  0.4   < >  0.5   ALBUMIN  4.0  4.0  4.2   < >  4.1   PROTTOTAL  7.3  7.3  7.8   < >  8.1   ALKPHOS  55  59  66   < >  65   AST  21  22  25   < >  19   ALT  24  26  31   < >  26    < > = values in this interval not displayed.     Calcium/VitaminD  Recent Labs   Lab Test  01/13/17   1355  12/21/15   1447  06/30/15   1636  01/09/14   0906   11/10/10   1335   DAISY   --    --   9.4   --    --    --    D3VIT   --    --    --    --    --   45   VITDT  45  74   --   35   < >   --     < > = values in this interval not displayed.     ESR/CRP  Recent Labs   Lab Test  09/14/18   0952  02/13/18   1754  08/22/17   1819   SED  7  7  7   CRP  <2.9  <2.9  <2.9     Hepatitis B  Recent Labs   Lab Test  03/20/17   1506  03/28/16   1442  08/06/13   1500  11/10/10   1335   AUSAB   --   264.69*   --    --    HBCAB  Nonreactive   --    --    --    HEPBANG   --   Nonreactive  Negative  Negative     Hepatitis C  Recent Labs   Lab Test  12/21/15   1447   HCVAB  Nonreactive   Assay performance characteristics have not been established for newborns,   infants, and children       Lyme confirmation testing by Western Blot  Recent Labs   Lab Test  08/05/15   1621   LYWG  Negative  Reference range: Negative  (Note)  Band(s) present: NONE  (Insufficient number of bands for positive result)  INTERPRETIVE INFORMATION: Borrelia Burgdorferi Ab, IgG  Western Blot  For this assay, a positive result is reported when any 5 or  more of the following 10 bands are present: 18, 23, 28, 30,  39, 41, 45, 58, 66, or 93 kDa.  All other banding patterns  are reported as negative.  Performed by  "[facility],  [address] [place] [address] [phone]  [email or web address], Pete Saha MD, Lab. Director       Tuberculosis Screening  Recent Labs   Lab Test  03/20/17   1508  03/28/16   1443   TBRSLT  Negative  Negative   TBAGN  0.00  0.04     HIV Screening  Recent Labs   Lab Test  12/21/15   1447   HIAGAB  Nonreactive   HIV-1 p24 Ag & HIV-1/HIV-2 Ab Not Detected       \"XR SACROILIAC JOINT 1/2 VW 12/21/2015 3:23 PM  HISTORY: Pain.  COMPARISON: None.  FINDINGS: Sacroiliac joints are patent and symmetric. No acute osseous  abnormality.  IMPRESSION  IMPRESSION: Normal sacroiliac joints.  YUE JARRELL MD\"    \"XR LUMBAR SPINE 2-3 VIEWS 12/21/2015 3:23 PM  HISTORY: Pain.  COMPARISON: None.  FINDINGS: Lumbar alignment is anatomic. Vertebral body heights and  intervertebral disc spaces are preserved.  IMPRESSION  IMPRESSION: Normal lumbar spine.  YUE JARRELL MD\"    \"XR HAND BILATERAL G/E 3 VW 12/21/2015 3:50 PM  HISTORY: Pain in unspecified joint   IMPRESSION  IMPRESSION: Negative.  LINNEA ZELAYA MD\"    \"XR CHEST 2 VW 6/30/2015 4:54 PM  HISTORY: Pain.  COMPARISON: None.  IMPRESSION  IMPRESSION: The lungs are clear. No focal pulmonary opacities. Heart  and mediastinum are unremarkable. No acute cardiopulmonary  abnormalities.  SO PHAN MD\"    Immunization History     Immunization History   Administered Date(s) Administered     Influenza (IIV3) PF 10/12/2011, 10/03/2013     Influenza Vaccine, 3 YRS +, IM (QUADRIVALENT W/PRESERVATIVES) 10/01/2015     Pneumo Conj 13-V (2010&after) 09/26/2016     Pneumococcal 23 valent 12/22/2016     TD (ADULT, 7+) 08/15/2001     TDAP Vaccine (Adacel) 06/20/2011     TDAP Vaccine (Boostrix) 03/06/2014          Chart documentation done in part with Dragon Voice recognition Software. Although reviewed after completion, some word and grammatical error may remain.    Hunter Monroy MD     "

## 2018-10-19 NOTE — TELEPHONE ENCOUNTER
PA Initiation    Medication: Simponi - PA Initiated  Insurance Company: [employer] - Phone [phone] Fax [phone]  Pharmacy Filling the Rx: [facility] - [place], [place] - [address]  Filling Pharmacy Phone:    Filling Pharmacy Fax:    Start Date: 10/19/2018

## 2018-10-23 NOTE — TELEPHONE ENCOUNTER
PRIOR AUTHORIZATION DENIED    Medication: Simponi - PA Denied, Appeal Initiated    Denial Date: 10/22/2018    Denial Rational:     Appeal Information:

## 2018-10-23 NOTE — TELEPHONE ENCOUNTER
Medication Appeal Initiation    We have initiated an appeal for the requested medication:  Medication: Simponi - PA Denied, Appeal Initiated  Appeal Start Date:  10/23/2018  Insurance Company:    Comments:

## 2018-10-25 NOTE — TELEPHONE ENCOUNTER
MEDICATION APPEAL APPROVED    Medication: Simponi - PA Denied, Appeal Approved  Authorization Effective Date: 10/24/2018  Authorization Expiration Date: 10/24/2019  Approved Dose/Quantity:   Reference #: [id number]   Insurance Company: [employer] - Phone [phone] Fax [phone]  Expected CoPay: 4619.65 ($5)     CoPay Card Available: Yes   Foundation Assistance Needed:    Which Pharmacy is filling the prescription (Not needed for infusion/clinic administered): [facility] - [place], [place] - [address]

## 2018-11-09 ENCOUNTER — OFFICE VISIT (OUTPATIENT)
Dept: OBGYN | Facility: CLINIC | Age: 38
End: 2018-11-09
Attending: OBSTETRICS & GYNECOLOGY
Payer: COMMERCIAL

## 2018-11-09 VITALS
DIASTOLIC BLOOD PRESSURE: 79 MMHG | SYSTOLIC BLOOD PRESSURE: 116 MMHG | HEART RATE: 73 BPM | HEIGHT: 64 IN | BODY MASS INDEX: 18.92 KG/M2 | WEIGHT: 110.8 LBS

## 2018-11-09 DIAGNOSIS — Z12.4 SCREENING FOR CERVICAL CANCER: ICD-10-CM

## 2018-11-09 DIAGNOSIS — N93.9 ABNORMAL UTERINE BLEEDING (AUB): Primary | ICD-10-CM

## 2018-11-09 LAB
ESTRADIOL SERPL-MCNC: 42 PG/ML
FSH SERPL-ACNC: 6.2 IU/L
PROLACTIN SERPL-MCNC: 3 UG/L (ref 3–27)
TSH SERPL DL<=0.005 MIU/L-ACNC: 0.84 MU/L (ref 0.4–4)

## 2018-11-09 PROCEDURE — 84443 ASSAY THYROID STIM HORMONE: CPT | Performed by: OBSTETRICS & GYNECOLOGY

## 2018-11-09 PROCEDURE — 87624 HPV HI-RISK TYP POOLED RSLT: CPT | Performed by: OBSTETRICS & GYNECOLOGY

## 2018-11-09 PROCEDURE — G0145 SCR C/V CYTO,THINLAYER,RESCR: HCPCS | Performed by: OBSTETRICS & GYNECOLOGY

## 2018-11-09 PROCEDURE — 82670 ASSAY OF TOTAL ESTRADIOL: CPT | Performed by: OBSTETRICS & GYNECOLOGY

## 2018-11-09 PROCEDURE — G0463 HOSPITAL OUTPT CLINIC VISIT: HCPCS | Mod: 25

## 2018-11-09 PROCEDURE — 84146 ASSAY OF PROLACTIN: CPT | Performed by: OBSTETRICS & GYNECOLOGY

## 2018-11-09 PROCEDURE — 83001 ASSAY OF GONADOTROPIN (FSH): CPT | Performed by: OBSTETRICS & GYNECOLOGY

## 2018-11-09 PROCEDURE — 36415 COLL VENOUS BLD VENIPUNCTURE: CPT | Performed by: OBSTETRICS & GYNECOLOGY

## 2018-11-09 RX ORDER — MEDROXYPROGESTERONE ACETATE 10 MG
10 TABLET ORAL DAILY
Qty: 14 TABLET | Refills: 0 | Status: SHIPPED | OUTPATIENT
Start: 2018-11-09 | End: 2018-11-23

## 2018-11-09 NOTE — MR AVS SNAPSHOT
After Visit Summary   11/9/2018    Abby Foy    MRN: [id number]           Patient Information     Date Of Birth          1980        Visit Information        Provider Department      11/9/2018 9:15 AM Apryl West MD [facility]        Today's Diagnoses     Abnormal uterine bleeding (AUB)    -  1    Screening for cervical cancer           Follow-ups after your visit        Your next 10 appointments already scheduled     Nov 16, 2018 10:30 AM CST   US PELVIC COMPLETE W TRANSVAGINAL with [email or web address]   [facility] ([email or web address] Clinics)    [facility]  3rd Floor, [address]  [address] [place] [address] [phone]           How do I prepare for my exam? (Food and drink instructions) Adults: Drink four 8-ounce glasses of fluid an hour before your exam. If you need to empty your bladder before your exam, try to release only a little urine. Then, drink another glass of fluid.  Children: * Children who are potty trained up to 6 years old should drink at least 2 cups (16 oz) of water/non-carbonated beverage 30 minutes prior to the exam. * Children who are 6-10 years should drink at least 3 cups (24 oz) of water/non-carbonated beverage 45 minutes prior to the exam. * Children who are 10 years or older should drink at least 4 cups (32 oz) of water/non-carbonated beverage 45 minutes prior to the exam.  If your child is very uncomfortable or has an urgent need to pee, please notify a technologist; they will try to find out how much longer the wait may be and provide instructions to help relieve the pressure.  What should I wear: Wear comfortable clothes.  How long does the exam take: Most ultrasounds take 30 to 60 minutes.  What should I bring: Bring a list of your medicines, including vitamins, minerals and over-the-counter drugs. It is safest to leave personal items at home.  Do I need a :  No  is  needed.  What do I need to tell my doctor: Tell your doctor about any allergies you may have.  What should I do after the exam: No restrictions, You may resume normal activities.  What is this test: An ultrasound uses sound waves to make pictures of the body. Sound waves do not cause pain. The only discomfort may be the pressure of the wand against your skin or full bladder.  Who should I call with questions: If you have any questions, please call the Imaging Department where you will have your exam. Directions, parking instructions, and other information is available on our website, [email or web address].            Dec 07, 2018  9:30 AM CST   LAB with BE LAB   [facility] ([facility]    [address] [address] [phone]           Please do not eat 10-12 hours before your appointment if you are coming in fasting for labs on lipids, cholesterol, or glucose (sugar). This does not apply to pregnant women. Water, hot tea and black coffee (with nothing added) are okay. Do not drink other fluids, diet soda or chew gum.            Feb 21, 2019  2:00 PM CST   Return Visit with Hunter Monroy MD   [facility] ([facility]    [address]  [place] [address] [phone]              Who to contact     Please call your clinic at [phone] to:    Ask questions about your health    Make or cancel appointments    Discuss your medicines    Learn about your test results    Speak to your doctor            Additional Information About Your Visit        [employer] Information     [employer] gives you secure access to your electronic health record. If you see a primary care provider, you can also send messages to your care team and make appointments. If you have questions, please call your primary care clinic.  If you do not have a primary care provider, please call [phone] and they will assist you.      [employer] is an electronic gateway  "that provides easy, online access to your medical records. With [employer], you can request a clinic appointment, read your test results, renew a prescription or communicate with your care team.     To access your existing account, please contact your [facility] or call [phone] for assistance.        Care EveryWhere ID     This is your Care EveryWhere ID. This could be used by other organizations to access your [place] medical records  [id number]        Your Vitals Were     Pulse Height Last Period Breastfeeding? BMI (Body Mass Index)       73 1.626 m (5' 4\") 10/22/2018 No 19.02 kg/m2        Blood Pressure from Last 3 Encounters:   11/09/18 116/79   10/19/18 114/79   06/15/18 112/70    Weight from Last 3 Encounters:   11/09/18 50.3 kg (110 lb 12.8 oz)   10/19/18 51.3 kg (113 lb)   06/15/18 49.7 kg (109 lb 9.6 oz)              We Performed the Following     Estradiol     Follicle Stimulating Hormone     HPV High Risk Types DNA Cervical     Obtaining, preparing and conveyance of cervical or vaginal smear to laboratory.     Pap imaged thin layer screen with HPV - recommended age 30 - 65 years (select HPV order below)     Prolactin     TSH with free T4 reflex          Today's Medication Changes          These changes are accurate as of 11/9/18 11:59 PM.  If you have any questions, ask your nurse or doctor.               Start taking these medicines.        Dose/Directions    medroxyPROGESTERone 10 MG tablet   Commonly known as:  PROVERA   Used for:  Abnormal uterine bleeding (AUB)   Started by:  Apryl West MD        Dose:  10 mg   Take 1 tablet (10 mg) by mouth daily for 14 days   Quantity:  14 tablet   Refills:  0            Where to get your medicines      These medications were sent to [facility] [address]  [address] [address]     Phone:  [phone]     medroxyPROGESTERone 10 MG tablet                Primary " Care Provider Office Phone # Fax #    Sandi Duffy PA-C [phone]       [address] [place] [address]        Equal Access to Services     ROMINA HORNER : Hadii aad ku hadbolivaro Soalejandroali, waaxda luqadaha, qaybta kaalmada adeegyada, waxhector griffinn ashanti belle laKenrickadebayo lo. So [facility] [phone].    ATENCIÓN: Si habla español, tiene a wheatley disposición servicios gratuitos de asistencia lingüística. Llame al [phone].    We comply with applicable federal civil rights laws and [place] laws. We do not discriminate on the basis of race, color, national origin, age, disability, sex, sexual orientation, or gender identity.            Thank you!     Thank you for choosing [facility]  for your care. Our goal is always to provide you with excellent care. Hearing back from our patients is one way we can continue to improve our services. Please take a few minutes to complete the written survey that you may receive in the mail after your visit with us. Thank you!             Your Updated Medication List - Protect others around you: Learn how to safely use, store and throw away your medicines at www.disposemymeds.org.          This list is accurate as of 11/9/18 11:59 PM.  Always use your most recent med list.                   Brand Name Dispense Instructions for use Diagnosis    ALLEGRA PO      Take 180 mg by mouth daily        golimumab 50 MG/0.5ML auto-injector pen    SIMPONI    0.5 mL    Inject 0.5 mLs (50 mg) Subcutaneous every 28 days    Seronegative spondyloarthropathy       ibuprofen 600 MG tablet    ADVIL/MOTRIN    30 tablet    Take 1 tablet (600 mg) by mouth every 6 hours as needed for pain (mild)    Status post bilateral salpingectomy       leflunomide 10 MG tablet    ARAVA    90 tablet    Take 1 tablet (10 mg) by mouth daily    Seronegative spondyloarthropathy       medroxyPROGESTERone 10 MG tablet    PROVERA    14 tablet    Take 1 tablet (10 mg) by mouth daily for  14 days    Abnormal uterine bleeding (AUB)       multivitamin  peds with iron 60 MG chewable tablet      Take 1 chew tab by mouth daily.

## 2018-11-09 NOTE — PROGRESS NOTES
"37 yo  s/p bilateral salpingectomy in  present with abnormal bleeding beginning in August.  She has had spotting for a long time before and after her menses.  This is associated with significant pelvic pain.  She even had her  exam her, \"to make sure I didn't have a fractured pelvis\".  She feels \"swollen\" in the perineum.  No changes in voiding.  No trouble with BM and buldge or swelling is not associated with constipation.  No splinting.  She may have noted more dryness with intercourse but no pain.      Had been on COCs until bilateral salpingectomy in .    Otherwise health seems stable.  Did discontinue humera and may start symphony but currently is not on any biologic agents for her RA.  So far her RA seems stable.    Recent bleeding history is as follows:  10/15 spotted for a week  10/22 period like bleeding for 3-4 days   started spotting again     ROS: 10 point ROS neg other than the symptoms noted above in the HPI.    Past Medical History:   Diagnosis Date     Angioedema of lips episode in 3/13--idiopathic     Contraception 2009     Diagnostic skin and sensitization tests 3/27/13 skin tests all NEGATIVE for environmental and food allergens including shellfish and nuts.      Nonallergic rhinitis     3/27/13 skin tests all NEGATIVE for environmental and food allergens including shellfish and nuts. 3/27/13 followup IgE tests NEG to Peanut, SNF, shrimp, macadamia nut, pistachio and walnut.     Rheumatoid arthritis of multiple sites with negative rheumatoid factor (H) 2015     Past Surgical History:   Procedure Laterality Date     LAPAROSCOPIC SALPINGECTOMY Bilateral 2017    Procedure: LAPAROSCOPIC SALPINGECTOMY;  Operative Laparoscopy, Bilateral Salpingectomy ;  Surgeon: Apryl West MD;  Location: [facility] OR     Family History   Problem Relation Age of Onset     Hypertension Maternal Grandmother      Autoimmune Disease Maternal Grandmother      Autoimmune " "hepatitis     Cancer Maternal Grandfather      Hypertension Paternal Grandmother      Cancer - colorectal Paternal Grandfather      Cardiovascular Paternal Grandfather      Other Cancer Paternal Grandfather      Hypertension Mother      Autoimmune Disease Mother      Autoimmune hepatitis     Hyperlipidemia Mother      Asthma Mother      Hypertension Father      Diabetes Father      Type 2     Multiple Sclerosis Maternal Aunt      Cerebrovascular Disease No family hx of      Thyroid Disease No family hx of      Glaucoma No family hx of      Macular Degeneration No family hx of      Breast Cancer No family hx of      Colon Cancer No family hx of      Physical exam:  /79 (BP Location: Left arm, Patient Position: Chair)  Pulse 73  Ht 1.626 m (5' 4\")  Wt 50.3 kg (110 lb 12.8 oz)  LMP 10/22/2018  Breastfeeding? No  BMI 19.02 kg/m2  Gen'l: appears well no distress  Abd: soft, NT, ND  Vulva: normal, no lesions  Urethra: normal  Vagina: pink, normal rugae, phsyiologic discharge present  Cervix: parous, no lesion, no bleeding, patient is due for pap soon and given abnormal bleeding obtained today  Uterus: mobile, NT, anteverted, normal in size  Adnexa: no palpable masses, NT  Rectum: Anus normal, no rectovaginal exam done    Diagnoses and associated orders for this visit:  Abnormal uterine bleeding (AUB)  -     US Pelvic Complete with Transvaginal; Future  -     Follicle Stimulating Hormone  -     TSH with free T4 reflex  -     Prolactin  -     Estradiol  -     medroxyPROGESTERone (PROVERA) 10 MG tablet; Take 1 tablet (10 mg) by mouth daily for 14 days  -     Obtaining, preparing and conveyance of cervical or vaginal smear to laboratory.  -     HPV High Risk Types DNA Cervical    Screening for cervical cancer  -     Obtaining, preparing and conveyance of cervical or vaginal smear to laboratory.  -     Pap imaged thin layer screen with HPV - recommended age 30 - 65 years (select HPV order below)  -     HPV High " Risk Types DNA Cervical      Ultrasound ordered to evaluate for structural causes, may be anovulatory given vaginal dryness, hormonal labs as above.  Recommend trial of provera withdrawal bleed.  Follow-up after above.  Patient is less than 40 without additional risk factors for endometrial hyperplasia/cancer, will defer biopsy pending above.    Apryl West MD

## 2018-11-09 NOTE — NURSING NOTE
Chief Complaint   Patient presents with     Follow Up For     Abnormal menstrual cycle since July        See FERNANDO Reddy 11/9/2018

## 2018-11-09 NOTE — LETTER
"2018       RE: Abby Foy  [address] [address]     Dear Colleague,    Thank you for referring your patient, Abby Foy, to the [facility] at [facility]. Please see a copy of my visit note below.    37 yo  s/p bilateral salpingectomy in  present with abnormal bleeding beginning in August.  She has had spotting for a long time before and after her menses.  This is associated with significant pelvic pain.  She even had her  exam her, \"to make sure I didn't have a fractured pelvis\".  She feels \"swollen\" in the perineum.  No changes in voiding.  No trouble with BM and buldge or swelling is not associated with constipation.  No splinting.  She may have noted more dryness with intercourse but no pain.      Had been on COCs until bilateral salpingectomy in .    Otherwise health seems stable.  Did discontinue humera and may start symphony but currently is not on any biologic agents for her RA.  So far her RA seems stable.    Recent bleeding history is as follows:  10/15 spotted for a week  10/22 period like bleeding for 3-4 days   started spotting again     ROS: 10 point ROS neg other than the symptoms noted above in the HPI.    Past Medical History:   Diagnosis Date     Angioedema of lips episode in 3/13--idiopathic     Contraception 2009     Diagnostic skin and sensitization tests 3/27/13 skin tests all NEGATIVE for environmental and food allergens including shellfish and nuts.      Nonallergic rhinitis     3/27/13 skin tests all NEGATIVE for environmental and food allergens including shellfish and nuts. 3/27/13 followup IgE tests NEG to Peanut, SNF, shrimp, macadamia nut, pistachio and walnut.     Rheumatoid arthritis of multiple sites with negative rheumatoid factor (H) 2015     Past Surgical History:   Procedure Laterality Date     LAPAROSCOPIC SALPINGECTOMY Bilateral " "6/23/2017    Procedure: LAPAROSCOPIC SALPINGECTOMY;  Operative Laparoscopy, Bilateral Salpingectomy ;  Surgeon: Apryl West MD;  Location: [facility] OR     Family History   Problem Relation Age of Onset     Hypertension Maternal Grandmother      Autoimmune Disease Maternal Grandmother      Autoimmune hepatitis     Cancer Maternal Grandfather      Hypertension Paternal Grandmother      Cancer - colorectal Paternal Grandfather      Cardiovascular Paternal Grandfather      Other Cancer Paternal Grandfather      Hypertension Mother      Autoimmune Disease Mother      Autoimmune hepatitis     Hyperlipidemia Mother      Asthma Mother      Hypertension Father      Diabetes Father      Type 2     Multiple Sclerosis Maternal Aunt      Cerebrovascular Disease No family hx of      Thyroid Disease No family hx of      Glaucoma No family hx of      Macular Degeneration No family hx of      Breast Cancer No family hx of      Colon Cancer No family hx of      Physical exam:  /79 (BP Location: Left arm, Patient Position: Chair)  Pulse 73  Ht 1.626 m (5' 4\")  Wt 50.3 kg (110 lb 12.8 oz)  LMP 10/22/2018  Breastfeeding? No  BMI 19.02 kg/m2  Gen'l: appears well no distress  Abd: soft, NT, ND  Vulva: normal, no lesions  Urethra: normal  Vagina: pink, normal rugae, phsyiologic discharge present  Cervix: parous, no lesion, no bleeding, patient is due for pap soon and given abnormal bleeding obtained today  Uterus: mobile, NT, anteverted, normal in size  Adnexa: no palpable masses, NT  Rectum: Anus normal, no rectovaginal exam done    Diagnoses and associated orders for this visit:  Abnormal uterine bleeding (AUB)  -     US Pelvic Complete with Transvaginal; Future  -     Follicle Stimulating Hormone  -     TSH with free T4 reflex  -     Prolactin  -     Estradiol  -     medroxyPROGESTERone (PROVERA) 10 MG tablet; Take 1 tablet (10 mg) by mouth daily for 14 days  -     Obtaining, preparing and conveyance of cervical or vaginal " smear to laboratory.  -     HPV High Risk Types DNA Cervical    Screening for cervical cancer  -     Obtaining, preparing and conveyance of cervical or vaginal smear to laboratory.  -     Pap imaged thin layer screen with HPV - recommended age 30 - 65 years (select HPV order below)  -     HPV High Risk Types DNA Cervical      Ultrasound ordered to evaluate for structural causes, may be anovulatory given vaginal dryness, hormonal labs as above.  Recommend trial of provera withdrawal bleed.  Follow-up after above.  Patient is less than 40 without additional risk factors for endometrial hyperplasia/cancer, will defer biopsy pending above.    Apryl West MD

## 2018-11-13 LAB
COPATH REPORT: NORMAL
PAP: NORMAL

## 2018-11-16 ENCOUNTER — RADIANT APPOINTMENT (OUTPATIENT)
Dept: ULTRASOUND IMAGING | Facility: CLINIC | Age: 38
End: 2018-11-16
Attending: OBSTETRICS & GYNECOLOGY
Payer: COMMERCIAL

## 2018-11-16 DIAGNOSIS — N93.9 ABNORMAL UTERINE BLEEDING (AUB): ICD-10-CM

## 2018-11-16 PROCEDURE — 76856 US EXAM PELVIC COMPLETE: CPT

## 2018-11-18 LAB
FINAL DIAGNOSIS: NORMAL
HPV HR 12 DNA CVX QL NAA+PROBE: NEGATIVE
HPV16 DNA SPEC QL NAA+PROBE: NEGATIVE
HPV18 DNA SPEC QL NAA+PROBE: NEGATIVE
SPECIMEN DESCRIPTION: NORMAL
SPECIMEN SOURCE CVX/VAG CYTO: NORMAL

## 2018-11-20 DIAGNOSIS — N93.9 ABNORMAL UTERINE BLEEDING (AUB): Primary | ICD-10-CM

## 2018-11-20 DIAGNOSIS — N84.0 ENDOMETRIAL POLYP: ICD-10-CM

## 2018-11-20 RX ORDER — ACETAMINOPHEN 325 MG/1
975 TABLET ORAL ONCE
Status: CANCELLED | OUTPATIENT
Start: 2018-11-20 | End: 2018-11-20

## 2018-11-26 ENCOUNTER — TELEPHONE (OUTPATIENT)
Dept: OBGYN | Facility: CLINIC | Age: 38
End: 2018-11-26

## 2018-11-26 NOTE — TELEPHONE ENCOUNTER
Confirmed surgery date with patient 12/14/18 with arrival time at 8:50a.m with nothing to eat eight hours before scheduled surgery time and clear liquids up to two hours before scheduled surgery time, informed patient that a surgery letter and map will be mailed out.     to complete the following fields:            CHECKLIST     Google Calendar : Yes     Resident notified: Not Applicable     Clinic schedule blocked:  Not Applicable    Patient notified:Yes      Pre op information sent: Yes     Given to patient over the phone.Yes    Comments:

## 2018-12-07 DIAGNOSIS — M47.819 SERONEGATIVE SPONDYLOARTHROPATHY: ICD-10-CM

## 2018-12-07 LAB
ALBUMIN SERPL-MCNC: 4.1 G/DL (ref 3.4–5)
ALP SERPL-CCNC: 58 U/L (ref 40–150)
ALT SERPL W P-5'-P-CCNC: 23 U/L (ref 0–50)
AST SERPL W P-5'-P-CCNC: 18 U/L (ref 0–45)
BASOPHILS # BLD AUTO: 0 10E9/L (ref 0–0.2)
BASOPHILS NFR BLD AUTO: 0.6 %
BILIRUB DIRECT SERPL-MCNC: 0.1 MG/DL (ref 0–0.2)
BILIRUB SERPL-MCNC: 0.6 MG/DL (ref 0.2–1.3)
CREAT SERPL-MCNC: 0.77 MG/DL (ref 0.52–1.04)
DIFFERENTIAL METHOD BLD: NORMAL
EOSINOPHIL # BLD AUTO: 0.1 10E9/L (ref 0–0.7)
EOSINOPHIL NFR BLD AUTO: 1.7 %
ERYTHROCYTE [DISTWIDTH] IN BLOOD BY AUTOMATED COUNT: 12.3 % (ref 10–15)
GFR SERPL CREATININE-BSD FRML MDRD: 85 ML/MIN/1.7M2
HCT VFR BLD AUTO: 38 % (ref 35–47)
HGB BLD-MCNC: 12.1 G/DL (ref 11.7–15.7)
LYMPHOCYTES # BLD AUTO: 1.8 10E9/L (ref 0.8–5.3)
LYMPHOCYTES NFR BLD AUTO: 32.5 %
MCH RBC QN AUTO: 26.8 PG (ref 26.5–33)
MCHC RBC AUTO-ENTMCNC: 31.8 G/DL (ref 31.5–36.5)
MCV RBC AUTO: 84 FL (ref 78–100)
MONOCYTES # BLD AUTO: 0.4 10E9/L (ref 0–1.3)
MONOCYTES NFR BLD AUTO: 8 %
NEUTROPHILS # BLD AUTO: 3.1 10E9/L (ref 1.6–8.3)
NEUTROPHILS NFR BLD AUTO: 57.2 %
PLATELET # BLD AUTO: 167 10E9/L (ref 150–450)
PROT SERPL-MCNC: 7.6 G/DL (ref 6.8–8.8)
RBC # BLD AUTO: 4.52 10E12/L (ref 3.8–5.2)
WBC # BLD AUTO: 5.4 10E9/L (ref 4–11)

## 2018-12-07 PROCEDURE — 36415 COLL VENOUS BLD VENIPUNCTURE: CPT | Performed by: INTERNAL MEDICINE

## 2018-12-07 PROCEDURE — 82565 ASSAY OF CREATININE: CPT | Performed by: INTERNAL MEDICINE

## 2018-12-07 PROCEDURE — 80076 HEPATIC FUNCTION PANEL: CPT | Performed by: INTERNAL MEDICINE

## 2018-12-07 PROCEDURE — 85025 COMPLETE CBC W/AUTO DIFF WBC: CPT | Performed by: INTERNAL MEDICINE

## 2018-12-11 ENCOUNTER — ANESTHESIA EVENT (OUTPATIENT)
Dept: SURGERY | Facility: CLINIC | Age: 38
End: 2018-12-11
Payer: COMMERCIAL

## 2018-12-12 ENCOUNTER — HOSPITAL ENCOUNTER (OUTPATIENT)
Facility: CLINIC | Age: 38
Discharge: HOME | End: 2018-12-12
Attending: OBSTETRICS & GYNECOLOGY | Admitting: OBSTETRICS & GYNECOLOGY
Payer: COMMERCIAL

## 2018-12-12 ENCOUNTER — ANESTHESIA (OUTPATIENT)
Dept: SURGERY | Facility: CLINIC | Age: 38
End: 2018-12-12
Payer: COMMERCIAL

## 2018-12-12 VITALS
WEIGHT: 108.03 LBS | HEART RATE: 91 BPM | OXYGEN SATURATION: 100 % | RESPIRATION RATE: 20 BRPM | TEMPERATURE: 97.8 F | DIASTOLIC BLOOD PRESSURE: 77 MMHG | SYSTOLIC BLOOD PRESSURE: 114 MMHG | HEIGHT: 65 IN | BODY MASS INDEX: 18 KG/M2

## 2018-12-12 LAB
ABO + RH BLD: NORMAL
ABO + RH BLD: NORMAL
BLD GP AB SCN SERPL QL: NORMAL
BLOOD BANK CMNT PATIENT-IMP: NORMAL
GLUCOSE BLDC GLUCOMTR-MCNC: 97 MG/DL (ref 70–99)
GLUCOSE SERPL-MCNC: 88 MG/DL (ref 70–99)
HCG UR QL: NEGATIVE
HGB BLD-MCNC: 11.8 G/DL (ref 11.7–15.7)
SPECIMEN EXP DATE BLD: NORMAL

## 2018-12-12 PROCEDURE — 36000059 ZZH SURGERY LEVEL 3 EA 15 ADDTL MIN UMMC: Performed by: OBSTETRICS & GYNECOLOGY

## 2018-12-12 PROCEDURE — 88305 TISSUE EXAM BY PATHOLOGIST: CPT | Mod: 26 | Performed by: OBSTETRICS & GYNECOLOGY

## 2018-12-12 PROCEDURE — 86850 RBC ANTIBODY SCREEN: CPT | Performed by: OBSTETRICS & GYNECOLOGY

## 2018-12-12 PROCEDURE — 27210794 ZZH OR GENERAL SUPPLY STERILE: Performed by: OBSTETRICS & GYNECOLOGY

## 2018-12-12 PROCEDURE — 71000027 ZZH RECOVERY PHASE 2 EACH 15 MINS: Performed by: OBSTETRICS & GYNECOLOGY

## 2018-12-12 PROCEDURE — 86900 BLOOD TYPING SEROLOGIC ABO: CPT | Performed by: OBSTETRICS & GYNECOLOGY

## 2018-12-12 PROCEDURE — 36000057 ZZH SURGERY LEVEL 3 1ST 30 MIN - UMMC: Performed by: OBSTETRICS & GYNECOLOGY

## 2018-12-12 PROCEDURE — 85018 HEMOGLOBIN: CPT | Performed by: OBSTETRICS & GYNECOLOGY

## 2018-12-12 PROCEDURE — 36415 COLL VENOUS BLD VENIPUNCTURE: CPT | Performed by: OBSTETRICS & GYNECOLOGY

## 2018-12-12 PROCEDURE — 25000128 H RX IP 250 OP 636: Performed by: NURSE ANESTHETIST, CERTIFIED REGISTERED

## 2018-12-12 PROCEDURE — 88305 TISSUE EXAM BY PATHOLOGIST: CPT | Performed by: OBSTETRICS & GYNECOLOGY

## 2018-12-12 PROCEDURE — 81025 URINE PREGNANCY TEST: CPT | Performed by: OBSTETRICS & GYNECOLOGY

## 2018-12-12 PROCEDURE — 82962 GLUCOSE BLOOD TEST: CPT

## 2018-12-12 PROCEDURE — 82947 ASSAY GLUCOSE BLOOD QUANT: CPT | Performed by: OBSTETRICS & GYNECOLOGY

## 2018-12-12 PROCEDURE — 40000170 ZZH STATISTIC PRE-PROCEDURE ASSESSMENT II: Performed by: OBSTETRICS & GYNECOLOGY

## 2018-12-12 PROCEDURE — 25000125 ZZHC RX 250: Performed by: OBSTETRICS & GYNECOLOGY

## 2018-12-12 PROCEDURE — 37000009 ZZH ANESTHESIA TECHNICAL FEE, EACH ADDTL 15 MIN: Performed by: OBSTETRICS & GYNECOLOGY

## 2018-12-12 PROCEDURE — 86901 BLOOD TYPING SEROLOGIC RH(D): CPT | Performed by: OBSTETRICS & GYNECOLOGY

## 2018-12-12 PROCEDURE — 25000125 ZZHC RX 250: Performed by: NURSE ANESTHETIST, CERTIFIED REGISTERED

## 2018-12-12 PROCEDURE — 37000008 ZZH ANESTHESIA TECHNICAL FEE, 1ST 30 MIN: Performed by: OBSTETRICS & GYNECOLOGY

## 2018-12-12 PROCEDURE — 25000132 ZZH RX MED GY IP 250 OP 250 PS 637: Performed by: OBSTETRICS & GYNECOLOGY

## 2018-12-12 PROCEDURE — 25000566 ZZH SEVOFLURANE, EA 15 MIN: Performed by: OBSTETRICS & GYNECOLOGY

## 2018-12-12 PROCEDURE — 25000128 H RX IP 250 OP 636: Performed by: ANESTHESIOLOGY

## 2018-12-12 RX ORDER — LIDOCAINE HYDROCHLORIDE 10 MG/ML
INJECTION, SOLUTION INFILTRATION; PERINEURAL PRN
Status: DISCONTINUED | OUTPATIENT
Start: 2018-12-12 | End: 2018-12-12 | Stop reason: HOSPADM

## 2018-12-12 RX ORDER — KETOROLAC TROMETHAMINE 30 MG/ML
INJECTION, SOLUTION INTRAMUSCULAR; INTRAVENOUS PRN
Status: DISCONTINUED | OUTPATIENT
Start: 2018-12-12 | End: 2018-12-12

## 2018-12-12 RX ORDER — PROPOFOL 10 MG/ML
INJECTION, EMULSION INTRAVENOUS CONTINUOUS PRN
Status: DISCONTINUED | OUTPATIENT
Start: 2018-12-12 | End: 2018-12-12

## 2018-12-12 RX ORDER — LIDOCAINE HYDROCHLORIDE 20 MG/ML
INJECTION, SOLUTION INFILTRATION; PERINEURAL PRN
Status: DISCONTINUED | OUTPATIENT
Start: 2018-12-12 | End: 2018-12-12

## 2018-12-12 RX ORDER — FENTANYL CITRATE 50 UG/ML
INJECTION, SOLUTION INTRAMUSCULAR; INTRAVENOUS PRN
Status: DISCONTINUED | OUTPATIENT
Start: 2018-12-12 | End: 2018-12-12

## 2018-12-12 RX ORDER — DEXAMETHASONE SODIUM PHOSPHATE 4 MG/ML
INJECTION, SOLUTION INTRA-ARTICULAR; INTRALESIONAL; INTRAMUSCULAR; INTRAVENOUS; SOFT TISSUE PRN
Status: DISCONTINUED | OUTPATIENT
Start: 2018-12-12 | End: 2018-12-12

## 2018-12-12 RX ORDER — SODIUM CHLORIDE, SODIUM LACTATE, POTASSIUM CHLORIDE, CALCIUM CHLORIDE 600; 310; 30; 20 MG/100ML; MG/100ML; MG/100ML; MG/100ML
INJECTION, SOLUTION INTRAVENOUS CONTINUOUS
Status: DISCONTINUED | OUTPATIENT
Start: 2018-12-12 | End: 2018-12-12 | Stop reason: HOSPADM

## 2018-12-12 RX ORDER — ACETAMINOPHEN 325 MG/1
975 TABLET ORAL ONCE
Status: COMPLETED | OUTPATIENT
Start: 2018-12-12 | End: 2018-12-12

## 2018-12-12 RX ORDER — PROPOFOL 10 MG/ML
INJECTION, EMULSION INTRAVENOUS PRN
Status: DISCONTINUED | OUTPATIENT
Start: 2018-12-12 | End: 2018-12-12

## 2018-12-12 RX ORDER — ONDANSETRON 2 MG/ML
INJECTION INTRAMUSCULAR; INTRAVENOUS PRN
Status: DISCONTINUED | OUTPATIENT
Start: 2018-12-12 | End: 2018-12-12

## 2018-12-12 RX ORDER — ACETAMINOPHEN 325 MG/1
650 TABLET ORAL
Status: DISCONTINUED | OUTPATIENT
Start: 2018-12-12 | End: 2018-12-12 | Stop reason: HOSPADM

## 2018-12-12 RX ORDER — OXYCODONE HYDROCHLORIDE 5 MG/1
5 TABLET ORAL
Status: DISCONTINUED | OUTPATIENT
Start: 2018-12-12 | End: 2018-12-12 | Stop reason: HOSPADM

## 2018-12-12 RX ADMIN — FENTANYL CITRATE 25 MCG: 50 INJECTION, SOLUTION INTRAMUSCULAR; INTRAVENOUS at 14:34

## 2018-12-12 RX ADMIN — ONDANSETRON 4 MG: 2 INJECTION INTRAMUSCULAR; INTRAVENOUS at 14:33

## 2018-12-12 RX ADMIN — DEXAMETHASONE SODIUM PHOSPHATE 4 MG: 4 INJECTION, SOLUTION INTRAMUSCULAR; INTRAVENOUS at 14:13

## 2018-12-12 RX ADMIN — PROPOFOL 40 MG: 10 INJECTION, EMULSION INTRAVENOUS at 13:58

## 2018-12-12 RX ADMIN — LIDOCAINE HYDROCHLORIDE 60 MG: 20 INJECTION, SOLUTION INFILTRATION; PERINEURAL at 13:58

## 2018-12-12 RX ADMIN — SODIUM CHLORIDE, POTASSIUM CHLORIDE, SODIUM LACTATE AND CALCIUM CHLORIDE: 600; 310; 30; 20 INJECTION, SOLUTION INTRAVENOUS at 13:54

## 2018-12-12 RX ADMIN — MIDAZOLAM 2 MG: 1 INJECTION INTRAMUSCULAR; INTRAVENOUS at 13:53

## 2018-12-12 RX ADMIN — PROPOFOL 100 MCG/KG/MIN: 10 INJECTION, EMULSION INTRAVENOUS at 13:58

## 2018-12-12 RX ADMIN — PROPOFOL 40 MG: 10 INJECTION, EMULSION INTRAVENOUS at 14:34

## 2018-12-12 RX ADMIN — KETOROLAC TROMETHAMINE 30 MG: 30 INJECTION, SOLUTION INTRAMUSCULAR at 14:33

## 2018-12-12 RX ADMIN — ACETAMINOPHEN 975 MG: 325 TABLET, FILM COATED ORAL at 10:50

## 2018-12-12 RX ADMIN — FENTANYL CITRATE 50 MCG: 50 INJECTION, SOLUTION INTRAMUSCULAR; INTRAVENOUS at 14:01

## 2018-12-12 ASSESSMENT — MIFFLIN-ST. JEOR: SCORE: 1175.88

## 2018-12-12 NOTE — DISCHARGE INSTRUCTIONS
Discharge Instructions: Following a Dilation   and Curettage/Dilation and Evacuation    What to expect:    Expect small to moderate amount of vaginal bleeding which should taper off in 4-5 days. It should not be heavier than your regular menstrual flow.    Do not douche, and use a pad rather than tampons.     No intercourse until bleeding has ceased.    Activity:    Rest the day of surgery. You may resume normal activity the next day.    You may bathe or shower.    Avoid heavy lifting (10-15 lbs) for one week.      .                                      [facility]  Pre/Post Care Unit 3A        Abby Foy  [address] [address]      Date: 12/12/2018      TO WHOM IT MAY CONCERN:    Abby Foy was seen at our hospital for a procedure on 12/12/2018.  Patient may return to school or work 12/14.  The patient has the following activity restrictions: no Gym or vigorous exercise for 24 hours post procedure    Sincerely,      Cora Pollard RN  12/12/2018  3:14 PM       Pre/Post Care Unit      Comfort:    The amount of discomfort you can expect is very unpredictable. If you have pain that cannot be controlled with non-aspirin pain relievers or with the prescription you may have received, you should notify your doctor.    Abdominal cramping (like menstrual cramps) or low back ache are common and should not be a cause for concern. You will be drowsy and weak the day of surgery and possibly the following day.    Diet:    You have no restrictions on your diet. Following surgery, drink plenty of fluids and eat a light meal.    Nausea:    The anesthesia medications you received during your surgical procedure may produce some nausea.    If you feel nauseated, stay in bed, keep your head down and try drinking fluids such as Seven-Up, tea or soup.    Notify Physician at once if you experience:    A fever over 100 degrees (a low grade fever under 100 degrees is  usual after surgery).    Heavy flow and/or passing large clots. Saturating more than 1 pad per hour for 2 or more hours.     Severe pain or cramps.    Same-Day Surgery   Adult Discharge Orders & Instructions     For 24 hours after surgery:  1. Get plenty of rest.  A responsible adult must stay with you for at least 24 hours after you leave the hospital.   2. Pain medication can slow your reflexes. Do not drive or use heavy equipment.  If you have weakness or tingling, don't drive or use heavy equipment until this feeling goes away.  3. Mixing alcohol and pain medication can cause dizziness and slow your breathing. It can even be fatal. Do not drink alcohol while taking pain medication.  4. Avoid strenuous or risky activities.  Ask for help when climbing stairs.   5. You may feel lightheaded.  If so, sit for a few minutes before standing.  Have someone help you get up.   6. If you have nausea (feel sick to your stomach), drink only clear liquids such as apple juice, ginger ale, broth or 7-Up.  Rest may also help.  Be sure to drink enough fluids.  Move to a regular diet as you feel able. Take pain medications with a small amount of solid food, such as toast or crackers, to avoid nausea.   7. A slight fever is normal. Call the doctor if your fever is over 100 F (37.7 C) (taken under the tongue) or lasts longer than 24 hours.  8. You may have a dry mouth, muscle aches, trouble sleeping or a sore throat.  These symptoms should go away after 24 hours.  9. Do not make important or legal decisions.   Pain Management:      1. Take pain medication (if prescribed) for pain as directed by your physician.        2. WARNING: If the pain medication you have been prescribed contains Tylenol  (acetaminophen), DO NOT take additional doses of Tylenol (acetaminophen).     Call your doctor for any of the followin.  Signs of infection (fever, growing tenderness at the surgery site, severe pain, a large amount of drainage or  bleeding, foul-smelling drainage, redness, swelling).    2.  It has been over 8 to 10 hours since surgery and you are still not able to urinate (pee).    3.  Headache for over 24 hours.    4.  Numbness, tingling or weakness the day after surgery (if you had spinal anesthesia).  To contact a doctor, call _____________________________________ or:      [phone] and ask for the Resident On Call for:          __________________________________________ (answered 24 hours a day)      Emergency Department:  [facility] Department: [phone]  [place]: [phone]               Rev. 10/2014     Rev. 5/12

## 2018-12-12 NOTE — ANESTHESIA CARE TRANSFER NOTE
Patient: Abby Batestalino    Procedure(s):  dIAGNOSTIC HYSTEROSCOPY AND D AND C    Diagnosis: Abnormal Uterine Bleeding  Diagnosis Additional Information: No value filed.    Anesthesia Type:   No value filed.     Note:  Airway :Room Air  Patient transferred to:Phase II  Comments: Abby arrived in PACU responding to questions.  Report given and questions answered.Handoff Report: Identifed the Patient, Identified the Reponsible Provider, Reviewed the pertinent medical history, Discussed the surgical course, Reviewed Intra-OP anesthesia mangement and issues during anesthesia, Set expectations for post-procedure period and Allowed opportunity for questions and acknowledgement of understanding      Vitals: (Last set prior to Anesthesia Care Transfer)    CRNA VITALS  12/12/2018 1413 - 12/12/2018 1448      12/12/2018             Pulse:  82    SpO2:  100 %    EKG:  Sinus rhythm                Electronically Signed By: Kirk Savage CRNA, APRN CRNA  December 12, 2018  2:48 PM

## 2018-12-12 NOTE — BRIEF OP NOTE
[facility]  Brief Operative Note    Date of Surgery: 12/12/2018    Preop Dx:    - Abnormal uterine bleeding  - Possible intrauterine polyp identified on US    Postop Dx: same s/p procedure    Procedure: Hysteroscopy, Dilation and curettage    Surgeon: Apryl West MD    Assistants: Lucille Epperson MD, PGY-1    Anesthesia: MAC    EBL:  5 cc    IVF:  800 cc    UOP:  Not measured    Drains: None     Specimen: Endometrial curettings    Complications: None apparent    Findings:  Normal bimanual exam, uterus mid position, slightly anteverted. No adnexal masses palpable. Normal uterine cavity. No abnormal structures, polyps or fibroids identified. Normal tubal ostia bilaterally. Sharp curettage performed and endometrial curettings sent to pathology.    Disposition: Transferred in stable condition to the PACU    Lucille Epperson MD   Ob/Gyn, PGY-1  12/12/18 2:45 PM

## 2018-12-12 NOTE — ANESTHESIA POSTPROCEDURE EVALUATION
Anesthesia POST Procedure Evaluation    Patient: Abby Foy   MRN:     [id number] Gender:   female   Age:    38 year old :      1980        Preoperative Diagnosis: Abnormal Uterine Bleeding   Procedure(s):  dIAGNOSTIC HYSTEROSCOPY AND D AND C   Postop Comments: No value filed.       Anesthesia Type:  MAC    Reportable Event: NO     PAIN: Uncomplicated   Sign Out status: Comfortable, Well controlled pain     PONV: No PONV   Sign Out status:  No Nausea or Vomiting     Neuro/Psych: Uneventful perioperative course   Sign Out Status: Preoperative baseline; Age appropriate mentation     Airway/Resp.: Uneventful perioperative course   Sign Out Status: Non labored breathing, age appropriate RR; Resp. Status within EXPECTED Parameters     CV: Uneventful perioperative course   Sign Out status: Appropriate BP and perfusion indices; Appropriate HR/Rhythm     Disposition:   Sign Out in:  Phase II  Disposition:  Home  Recovery Course: Uneventful  Follow-Up: Not required           Last Anesthesia Record Vitals:  CRNA VITALS  2018 1413 - 2018 1503      2018             NIBP:  114/77    Pulse:  71    NIBP Mean:  92    Ht Rate:  71    Temp:  36.5  C (97.7  F) Oral     Oral    SpO2:  100 %    Resp Rate (observed):  14    EKG:  Sinus rhythm          Last PACU/Preop Vitals:  Vitals:    18 0900 18 1445   BP: 120/85 114/77   Pulse: 91    Resp: 18 18   Temp: 36.7  C (98.1  F) 36.5  C (97.7  F)   SpO2: 100% 99%         Electronically Signed By: Wendie Kirkland MD, 2018, 3:03 PM

## 2018-12-12 NOTE — PROGRESS NOTES
SPIRITUAL HEALTH SERVICES  [facility] ([facility]) 3C   PRE-SURGERY VISIT    Attempted pre-surgery visit but patient and  explained that they had initially denied support for a hospital  and declined any support at this time. I informed them of our continued support upon request.     Cameron Otero, [facility]  Staff   Pager [phone]

## 2018-12-12 NOTE — H&P
37 yo  with history of tubal ligation via bilateral salpingectomy now with AUB since August.  She tried a trial of Provera, but bled through the course and then stopped for only a few days before starting to spot and have light bleeding again.  Ultrasound showed possible polyp.  She is not interested in an IUD at this point.  She presents today for hysteroscopy, D&C and possible polypectomy.     ROS: 10 point ROS neg other than the symptoms noted above in the HPI.    Past Medical History:   Diagnosis Date     Angioedema of lips episode in 3/13--idiopathic     Contraception 1/28/2009     Diagnostic skin and sensitization tests 3/27/13 skin tests all NEGATIVE for environmental and food allergens including shellfish and nuts.      Nonallergic rhinitis     3/27/13 skin tests all NEGATIVE for environmental and food allergens including shellfish and nuts. 3/27/13 followup IgE tests NEG to Peanut, SNF, shrimp, macadamia nut, pistachio and walnut.     Rheumatoid arthritis of multiple sites with negative rheumatoid factor (H) 12/31/2015     Past Surgical History:   Procedure Laterality Date     LAPAROSCOPIC SALPINGECTOMY Bilateral 6/23/2017    Procedure: LAPAROSCOPIC SALPINGECTOMY;  Operative Laparoscopy, Bilateral Salpingectomy ;  Surgeon: Apryl West MD;  Location: [facility] OR     Family History   Problem Relation Age of Onset     Hypertension Maternal Grandmother      Autoimmune Disease Maternal Grandmother         Autoimmune hepatitis     Cancer Maternal Grandfather      Hypertension Paternal Grandmother      Cancer - colorectal Paternal Grandfather      Cardiovascular Paternal Grandfather      Other Cancer Paternal Grandfather      Hypertension Mother      Autoimmune Disease Mother         Autoimmune hepatitis     Hyperlipidemia Mother      Asthma Mother      Hypertension Father      Diabetes Father         Type 2     Multiple Sclerosis Maternal Aunt      Cerebrovascular Disease No family hx of      Thyroid Disease  "No family hx of      Glaucoma No family hx of      Macular Degeneration No family hx of      Breast Cancer No family hx of      Colon Cancer No family hx of      Physical exam:  /85   Pulse 91   Temp 98.1  F (36.7  C) (Oral)   Resp 18   Ht 1.651 m (5' 5\")   Wt 49 kg (108 lb 0.4 oz)   SpO2 100%   BMI 17.98 kg/m    Genl: appears well no distress  Neck: no thyromegaly  CV: RRR no murmurs  Resp: CTA bilaterally  Abd: soft, NT, ND  Pelvic: deferred to OR  Ext: no edema    HGB: 11.8 g/dL  Glucose: 88 mg/dL    Pelvic Ultrasound: Uterus 4.7 x 4.6 x 3.3 cm, fluid and blood in cavity with possible polyp.  Normal ovaries bilaterally    Assessment/Plan:  37 yo  with AUB and possible polyp  - no contraindication to surgery today  - plan operative hysteroscopy and possible polypectomy under MAC anesthesia.  - Risks and benefits and recovery reviewed with patient and her .  Informed consent was signed.    Apryl West MD    "

## 2018-12-12 NOTE — OR NURSING
[facility]  Pre/Post Care Unit 3A        Abby Foy  [address] [address] [facility] [place] [address]      Date: 12/12/2018      TO WHOM IT MAY CONCERN:    Abby Foy was seen at our hospital for a procedure on 12/12/2018.  Patient may return to school or work 12/14/18.  The patient has the following activity restrictions: no Gym or vigorous exercise for 24 hours post procedure.     Sincerely,      Cora Pollard RN  12/12/2018  3:07 PM       Pre/Post Care Unit

## 2018-12-12 NOTE — OP NOTE
[facility]  Operative Note    Date: 2018    Preoperative diagnosis:    - Abnormal uterine bleeding  - Possible intrauterine polyp identified on US    Postoperative diagnosis:  Same, no polyp identified    Procedure:  Hysteroscopy, dilation and curettage    Surgeon:  Apryl West MD    Assistant: Lucille Epperson MD PGY-1    Anesthesia:  MAC and Paracervical block using 20 ml 1% lidocaine    Specimens:  Endometrial curettings    Complications: None apparent    EBL:  5 cc    IVF:  800 cc crystalloid     UOP:   Not measured    Fluid deficit:  80 cc    Findings:  Normal bimanual exam, mid position/slightly anteverted uterus. No adnexal masses palpable. Normal uterine cavity. No abnormal structures, polyps or fibroids identified. Normal tubal ostia bilaterally.     Indications:  Abby Foy is a 38 year old  that initially presented for abnormal bleeding, including continuous spotting and this is associated with significant pelvic pain. She has a history of bilateral salpingectomy. There was a possible intrauterine polyp identified on ultrasound. Risks, benefits, and alternatives to the procedure were discussed with the patient who elected to proceed.  All questions were answered and an informed consent was obtained.    Procedure:  The patient was taken to the operating room where she underwent MAC anesthesia without difficulty.  She was placed in a dorsal lithotomy position using yellow fin stirrups.  The patient was examined for the above noted findings and then prepped and draped in the usual sterile fashion. A safety timeout was performed.  A medium graves speculum was inserted into the vagina.  Approximately 1 cc of 1%lidocaine plain was injected at the anterior lip of the cervix after which a single tooth tenaculum was placed.  A total of 19 cc of 1% lidocaine plain was then injected at 4 o'clock and 8 o'clock positions of the cervicovaginal junction. The  endocervical canal was serially dilated to 6 mm using Hegar dilators.  The hysteroscope was inserted without difficulty with the above findings noted.  Visualization was achieved using NS as a distending medium.  The hysteroscope was removed and sharp curettage was performed.  The tissue was sent to pathology. All instruments were then removed.  The tenaculum was removed from the cervix and the puncture sites were hemostatic with pressure and silver nitrate.  The patient was repositioned to the supine position.  The patient tolerated the procedure well and was taken to the recovery room in stable condition.  All sponge, lap, and needle counts were correct x 2. Dr. West was scrubbed and present for the entire procedure.    Lucille Epperson MD  OBGYN PGY-1  12/12/18 2:52 PM    I was present and scrubbed throughout the procedure,  I agree with the note above.  Apryl West MD

## 2018-12-12 NOTE — ANESTHESIA PREPROCEDURE EVALUATION
Anesthesia Pre-Procedure Evaluation    Patient: Abby Foy   MRN:     [id number] Gender:   female   Age:    38 year old :      1980        Preoperative Diagnosis: Abnormal Uterine Bleeding   Procedure(s):  OPERATIVE HYSTEROSCOPY WITH POLYPECTOMY     Past Medical History:   Diagnosis Date     Angioedema of lips episode in 3/13--idiopathic     Contraception 2009     Diagnostic skin and sensitization tests 3/27/13 skin tests all NEGATIVE for environmental and food allergens including shellfish and nuts.      Nonallergic rhinitis     3/27/13 skin tests all NEGATIVE for environmental and food allergens including shellfish and nuts. 3/27/13 followup IgE tests NEG to Peanut, SNF, shrimp, macadamia nut, pistachio and walnut.     Rheumatoid arthritis of multiple sites with negative rheumatoid factor (H) 2015      Past Surgical History:   Procedure Laterality Date     LAPAROSCOPIC SALPINGECTOMY Bilateral 2017    Procedure: LAPAROSCOPIC SALPINGECTOMY;  Operative Laparoscopy, Bilateral Salpingectomy ;  Surgeon: Apryl West MD;  Location: [facility] OR          Anesthesia Evaluation     . Pt has had prior anesthetic.     No history of anesthetic complications          ROS/MED HX    ENT/Pulmonary: Comment: H/o iritis - neg pulmonary ROS     Neurologic: Comment: headaches - neg neurologic ROS     Cardiovascular:  - neg cardiovascular ROS       METS/Exercise Tolerance:     Hematologic:  - neg hematologic  ROS       Musculoskeletal: Comment: TMJ affected  (+) arthritis, , , -       GI/Hepatic:  - neg GI/hepatic ROS       Renal/Genitourinary:  - ROS Renal section negative       Endo:  - neg endo ROS       Psychiatric:  - neg psychiatric ROS       Infectious Disease:  - neg infectious disease ROS       Malignancy:         Other: Comment: Chronic back pain. ? Spondyloarthropathy?                        PHYSICAL EXAM:   Mental Status/Neuro: A/A/O   Airway: Facies: Feasible  Mallampati: I  Mouth/Opening:  "Full  TM distance: > 6 cm  Neck ROM: Full   Respiratory: Auscultation: CTAB     Resp. Rate: Normal     Resp. Effort: Normal      CV: Rhythm: Regular  Rate: Age appropriate  Heart: Normal Sounds   Comments:      Dental: Normal                  Lab Results   Component Value Date    WBC 5.4 12/07/2018    HGB 12.1 12/07/2018    HCT 38.0 12/07/2018     12/07/2018    CRP <2.9 09/14/2018    SED 7 09/14/2018     06/30/2015    POTASSIUM 4.1 06/30/2015    CHLORIDE 105 06/30/2015    CO2 30 06/30/2015    BUN 9 06/30/2015    CR 0.77 12/07/2018    GLC 73 06/30/2015    DAISY 9.4 06/30/2015    ALBUMIN 4.1 12/07/2018    PROTTOTAL 7.6 12/07/2018    ALT 23 12/07/2018    AST 18 12/07/2018    ALKPHOS 58 12/07/2018    BILITOTAL 0.6 12/07/2018    LIPASE 56 02/02/2009    AMYLASE 49 02/02/2009    TSH 0.84 11/09/2018    HCG Negative 06/23/2017       Preop Vitals  BP Readings from Last 3 Encounters:   12/12/18 120/85   11/09/18 116/79   10/19/18 114/79    Pulse Readings from Last 3 Encounters:   12/12/18 91   11/09/18 73   10/19/18 83      Resp Readings from Last 3 Encounters:   12/12/18 18   10/19/18 16   06/15/18 14    SpO2 Readings from Last 3 Encounters:   12/12/18 100%   10/19/18 100%   06/15/18 100%      Temp Readings from Last 1 Encounters:   12/12/18 36.7  C (98.1  F) (Oral)    Ht Readings from Last 1 Encounters:   12/12/18 1.651 m (5' 5\")      Wt Readings from Last 1 Encounters:   12/12/18 49 kg (108 lb 0.4 oz)    Estimated body mass index is 17.98 kg/m  as calculated from the following:    Height as of this encounter: 1.651 m (5' 5\").    Weight as of this encounter: 49 kg (108 lb 0.4 oz).     LDA:  Epidural Catheter 06/18/11 (Active)   Number of days: 2734       Intrathecal/Epidural Catheter 04/04/14 (Active)   Number of days: 1713       Urethral Catheter Double-lumen;Non-latex (Active)   Number of days: 2734            Assessment:   ASA SCORE: 2    NPO Status: > 2 hours since completed Clear Liquids; > 6 hours since " completed Solid Foods   Documentation: H&P complete; Preop Testing complete; Consents complete   Proceeding: Proceed without further delay     Plan:   Anes. Type:  MAC      Induction:  Not applicable   Airway: Native Airway   Access/Monitoring: PIV   Maintenance: N/a   Emergence: N/a   Logistics: Same Day Surgery     Postop Pain/Sedation Strategy:  Standard-Options: Opioids PRN     PONV Management:  Adult Risk Factors: Female, Postop Opioids     CONSENT: Direct conversation   Plan and risks discussed with: Patient          Comments for Plan/Consent:  Risks, benefits, and alternatives of MAC discussed with patient. They understand the risks including possible recall of events in the room. They understand there is a possibility that conversion to general anesthesia may be needed. Patient consents.                           Wendie Kirkland MD

## 2018-12-14 LAB — COPATH REPORT: NORMAL

## 2019-01-08 ENCOUNTER — OFFICE VISIT (OUTPATIENT)
Dept: OBGYN | Facility: CLINIC | Age: 39
End: 2019-01-08
Attending: OBSTETRICS & GYNECOLOGY
Payer: COMMERCIAL

## 2019-01-08 VITALS
WEIGHT: 108 LBS | SYSTOLIC BLOOD PRESSURE: 137 MMHG | BODY MASS INDEX: 17.97 KG/M2 | DIASTOLIC BLOOD PRESSURE: 83 MMHG | HEART RATE: 83 BPM

## 2019-01-08 DIAGNOSIS — Z98.890 STATUS POST HYSTEROSCOPY: ICD-10-CM

## 2019-01-08 DIAGNOSIS — N93.9 ABNORMAL UTERINE BLEEDING (AUB): Primary | ICD-10-CM

## 2019-01-08 PROCEDURE — G0463 HOSPITAL OUTPT CLINIC VISIT: HCPCS | Mod: ZF

## 2019-01-08 RX ORDER — LEVONORGESTREL/ETHIN.ESTRADIOL 0.1-0.02MG
1 TABLET ORAL DAILY
Qty: 84 TABLET | Refills: 4 | Status: SHIPPED | OUTPATIENT
Start: 2019-01-08 | End: 2019-11-22

## 2019-01-08 ASSESSMENT — PAIN SCALES - GENERAL: PAINLEVEL: NO PAIN (0)

## 2019-01-08 NOTE — LETTER
2019       RE: Maria Foy  [address] [address] [facility] [address]     Dear Colleague,    Thank you for referring your patient, Maria Foy, to the [facility] at [facility]. Please see a copy of my visit note below.    Gyn Clinic Postoperative Visit Note  2019    S: Maria Foy is a 38 year old  here for post-operative visit following hysteroscopy and D&C on 18. She reports feeling well overall but does report ongoing issues with abnormal uterine bleeding. She bled for about a week following her procedure and then began spotting again on 1/3, with her bleeding steadily increasing until today. It now resembles a period and she also complains of pelvic cramping. She also reports headache yesterday that is now resolving. She denies passing any clots but does endorse feeling mildly lightheaded or dizzy when standing. She is concerned about her blood pressure today and states that this is high for her. She denies any other abdominal pain or abnormal discharge.    O:   /83   Pulse 83   Wt 49 kg (108 lb)   LMP 2019   Breastfeeding? No   BMI 17.97 kg/m       Gen: Alert, cooperative, no acute distress    Labs:   Hemoglobin   Date Value Ref Range Status   2018 11.8 11.7 - 15.7 g/dL Final     Pathology:   Copath Report   Date Value Ref Range Status   2018   Final    Patient Name: MARIA FOY  MR#: [id number]  Specimen #: [id number]  Collected: 2018  Received: 2018  Reported: 2018 13:59  Ordering Phy(s): PEDRO JIANG    For improved result formatting, select 'View Enhanced Report Format' under   Linked Documents section.    SPECIMEN(S):  Endometrial curettings    FINAL DIAGNOSIS:  ENDOMETRIAL CURETTINGS:  - Proliferative-type endometrium  - Negative for hyperplasia or atypia    I have personally reviewed all specimens and/or slides, including the   listed special  "stains, and used them  with my medical judgement to determine or confirm the final diagnosis.    Electronically signed out by:    Yeni Nuno M.D., PhD, [facility]ians    CLINICAL HISTORY:  Abnormal uterine bleeding    GROSS:  The specimen is received in formalin with proper patient identification,   labeled \"endometrial curettings\".  The specimen consists of a 4.1 x 2.1 x 0.2 cm aggregate of red-brown soft   tissue and blood clot, which is  wrapped and entirely submitted in cassette A1. (Dictated by: Wendie Becerra 12/12/2018 04:02 PM)    MICROSCOPIC:  Microscopic examination was performed.    CPT Codes:  A: 28079-ST0    TESTING LAB LOCATION:  [address] [address] [phone]    COLLECTION SITE:  Client: [facility]  Location: [facility] (B)         A: 38 year old female POD#27 s/p hysteroscopy and D&C. Continued AUB after surgery, otherwise recovering well.    P:   - Explained to patient that immediate postsurgical bleeding was likely expected and normal, but acknowledged patient desire for management of ongoing AUB. Discussed that pathology was benign and that structural causes have been ruled out, suggesting that this AUB may be either endometrial or ovulatory in nature. Discussed options for management including OCPs, POPs, IUD, and ablation. Advised that POPs may cause more spotting and patient declines IUD and ablation at this time. She has tolerated OCPs well in the past.  - Will prescribe Aviane for endometrial suppression and reassess bleeding. Also recommend follow-up with PCP in 4-6 weeks after starting pills to recheck blood pressure, given high-normal reading in clinic today. No history of migraines with aura.    Return to clinic if symptoms do not improve in 4-6 weeks, otherwise prn for other Gyn concerns.    Total visit time was 20 minutes with 20 minutes spent in counseling " and coordination of care for abnormal uterine bleeding.    Patricia Curtis MD  Ob/Gyn Resident, PGY-1  01/08/19 3:10 PM    Appreciate Dr. Curtis's note above, patient also seen and examined by me. I personally counseled the patient .  I agree with the note above.   Apryl West MD

## 2019-01-08 NOTE — PROGRESS NOTES
Gyn Clinic Postoperative Visit Note  2019    S: Maria Foy is a 38 year old  here for post-operative visit following hysteroscopy and D&C on 18. She reports feeling well overall but does report ongoing issues with abnormal uterine bleeding. She bled for about a week following her procedure and then began spotting again on 1/3, with her bleeding steadily increasing until today. It now resembles a period and she also complains of pelvic cramping. She also reports headache yesterday that is now resolving. She denies passing any clots but does endorse feeling mildly lightheaded or dizzy when standing. She is concerned about her blood pressure today and states that this is high for her. She denies any other abdominal pain or abnormal discharge.    O:   /83   Pulse 83   Wt 49 kg (108 lb)   LMP 2019   Breastfeeding? No   BMI 17.97 kg/m      Gen: Alert, cooperative, no acute distress    Labs:   Hemoglobin   Date Value Ref Range Status   2018 11.8 11.7 - 15.7 g/dL Final     Pathology:   Copath Report   Date Value Ref Range Status   2018   Final    Patient Name: MARIA FOY  MR#: [id number]  Specimen #: [id number]  Collected: 2018  Received: 2018  Reported: 2018 13:59  Ordering Phy(s): PEDRO JIANG    For improved result formatting, select 'View Enhanced Report Format' under   Linked Documents section.    SPECIMEN(S):  Endometrial curettings    FINAL DIAGNOSIS:  ENDOMETRIAL CURETTINGS:  - Proliferative-type endometrium  - Negative for hyperplasia or atypia    I have personally reviewed all specimens and/or slides, including the   listed special stains, and used them  with my medical judgement to determine or confirm the final diagnosis.    Electronically signed out by:    Yeni Nuno M.D., PhD, [facility]    CLINICAL HISTORY:  Abnormal uterine bleeding    GROSS:  The specimen is received in formalin with proper patient identification,  "  labeled \"endometrial curettings\".  The specimen consists of a 4.1 x 2.1 x 0.2 cm aggregate of red-brown soft   tissue and blood clot, which is  wrapped and entirely submitted in cassette A1. (Dictated by: Wendie Becerra 12/12/2018 04:02 PM)    MICROSCOPIC:  Microscopic examination was performed.    CPT Codes:  A: 35683-US0    TESTING LAB LOCATION:  [address] [address] [phone]    COLLECTION SITE:  Client: [facility]  Location: [facility] (B)         A: 38 year old female POD#27 s/p hysteroscopy and D&C. Continued AUB after surgery, otherwise recovering well.    P:   - Explained to patient that immediate postsurgical bleeding was likely expected and normal, but acknowledged patient desire for management of ongoing AUB. Discussed that pathology was benign and that structural causes have been ruled out, suggesting that this AUB may be either endometrial or ovulatory in nature. Discussed options for management including OCPs, POPs, IUD, and ablation. Advised that POPs may cause more spotting and patient declines IUD and ablation at this time. She has tolerated OCPs well in the past.  - Will prescribe Aviane for endometrial suppression and reassess bleeding. Also recommend follow-up with PCP in 4-6 weeks after starting pills to recheck blood pressure, given high-normal reading in clinic today. No history of migraines with aura.    Return to clinic if symptoms do not improve in 4-6 weeks, otherwise prn for other Gyn concerns.    Total visit time was 20 minutes with 20 minutes spent in counseling and coordination of care for abnormal uterine bleeding.    Patricia Curtis MD  Ob/Gyn Resident, PGY-1  01/08/19 3:10 PM    Appreciate Dr. Curtis's note above, patient also seen and examined by me. I personally counseled the patient .  I agree with the note above.   Apryl West MD      "

## 2019-01-08 NOTE — NURSING NOTE
Chief Complaint   Patient presents with     Surgical Followup     D&C 12/12/2018   Cecilia Steward LPN

## 2019-02-15 ENCOUNTER — OFFICE VISIT (OUTPATIENT)
Dept: OPTOMETRY | Facility: CLINIC | Age: 39
End: 2019-02-15
Payer: COMMERCIAL

## 2019-02-15 DIAGNOSIS — H18.20 INFILTRATE OF CORNEAS OF BOTH EYES: ICD-10-CM

## 2019-02-15 DIAGNOSIS — H52.223 REGULAR ASTIGMATISM OF BOTH EYES: ICD-10-CM

## 2019-02-15 DIAGNOSIS — H52.13 MYOPIA OF BOTH EYES: Primary | ICD-10-CM

## 2019-02-15 PROCEDURE — 92015 DETERMINE REFRACTIVE STATE: CPT | Performed by: OPTOMETRIST

## 2019-02-15 PROCEDURE — 92014 COMPRE OPH EXAM EST PT 1/>: CPT | Performed by: OPTOMETRIST

## 2019-02-15 RX ORDER — NEOMYCIN POLYMYXIN B SULFATES AND DEXAMETHASONE 3.5; 10000; 1 MG/ML; [USP'U]/ML; MG/ML
1 SUSPENSION/ DROPS OPHTHALMIC 4 TIMES DAILY
Qty: 5 ML | Refills: 0 | Status: SHIPPED | OUTPATIENT
Start: 2019-02-15 | End: 2019-08-05

## 2019-02-15 ASSESSMENT — VISUAL ACUITY
OD_CC: 20/20
OS_CC: 20/20
OS_CC: 20/20
OD_CC: 20/20
CORRECTION_TYPE: GLASSES
METHOD: SNELLEN - LINEAR

## 2019-02-15 ASSESSMENT — REFRACTION_WEARINGRX
SPECS_TYPE: EXAM
OS_SPHERE: -2.75
OD_AXIS: 174
OD_CYLINDER: +0.50
OS_AXIS: 30
OS_SPHERE: -2.50
OS_CYLINDER: +0.50
OD_CYLINDER: +0.50
OD_SPHERE: -2.75
OD_AXIS: 150
OD_SPHERE: -2.50

## 2019-02-15 ASSESSMENT — REFRACTION_CURRENTRX
OS_DIAMETER: 14.0
OS_SPHERE: -2.50
OD_BRAND: ACUVUE OASYS
OD_SPHERE: -2.50
OS_BRAND: ACUVUE OASYS
OS_BASECURVE: 8.40
OD_DIAMETER: 14.0
OD_BASECURVE: 8.40

## 2019-02-15 ASSESSMENT — REFRACTION_MANIFEST
OD_CYLINDER: +0.50
OD_AXIS: 150
OS_CYLINDER: +0.50
OS_SPHERE: -2.75
OD_SPHERE: -2.75
OS_AXIS: 030

## 2019-02-15 ASSESSMENT — TONOMETRY
OS_IOP_MMHG: 15
IOP_METHOD: TONOPEN
OD_IOP_MMHG: 13

## 2019-02-15 ASSESSMENT — CONF VISUAL FIELD
METHOD: COUNTING FINGERS
OS_NORMAL: 1
OD_NORMAL: 1

## 2019-02-15 ASSESSMENT — EXTERNAL EXAM - LEFT EYE: OS_EXAM: NORMAL

## 2019-02-15 ASSESSMENT — SLIT LAMP EXAM - LIDS
COMMENTS: NORMAL
COMMENTS: NORMAL

## 2019-02-15 ASSESSMENT — CUP TO DISC RATIO
OS_RATIO: 0.45
OD_RATIO: 0.35

## 2019-02-15 ASSESSMENT — EXTERNAL EXAM - RIGHT EYE: OD_EXAM: NORMAL

## 2019-02-15 NOTE — PROGRESS NOTES
Chief Complaint   Patient presents with     Annual Eye Exam        Previous contact lens wearer? Yes:    Comfort of contact lenses :good but had 1 pair left and stretched it out/ eyes were turning red when put in- right eye > left eye.  May have slept in lenses recently with norovirus  Satisfied with current lenses: Yes      Last Eye Exam: 11/16  Dilated Previously: Yes    What are you currently using to see?  contacts    Distance Vision Acuity: Satisfied with vision    Near Vision Acuity: Satisfied with vision while reading with contacts    Eye Comfort: good  Do you use eye drops? : No     Medical, surgical and family histories reviewed and updated 2/15/2019.       OBJECTIVE: See Ophthalmology exam    ASSESSMENT:    ICD-10-CM    1. Myopia of both eyes H52.13 REFRACTION   2. Regular astigmatism of both eyes H52.223 REFRACTION   3. 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  No change in eyeglass prescription.    Discontinue contact lens wear until instructed to do do.    Maxitrol- 1 drop both eyes for 10 days.    Return for cl fit in 2 weeks.    Return in 1 year for a complete eye exam or sooner if needed.    Demian Andrade, OD

## 2019-02-15 NOTE — PATIENT INSTRUCTIONS
Eyeglass prescription given.  No change in eyeglass prescription.    Discontinue contact lens wear until instructed to do do.    Maxitrol- 1 drop both eyes for 10 days.    Return for cl fit in 2 weeks.    Return in 1 year for a complete eye exam or sooner if needed.    Demian Andrade, JUNIOR    The affects of the dilating drops last for 4- 6 hours.  You will be more sensitive to light and vision will be blurry up close.  Mydriatic sunglasses were given if needed.      Optometry Providers       Clinic Locations                                 Telephone Number   Dr. Jeanne Hennessy [place]   [facility] [place]   [place] [phone]     [facility] Hours:                [facility] [place] [facility] Hours:       [facility] Hours:   [address]   [address] [facility]     [address] [place] [address]   [place] [address]    [place] [address]  Phone: [phone]                    Phone: [phone]     Phone: [phone]                      Monday 8:00-7:00                          Monday 8:00-7:00                          Monday 8:00-7:00              Tuesday 8:00-6:00                          Tuesday 8:00-7:00                          Tuesday 8:00-7:00              Wednesday 8:00-6:00                  Wednesday 8:00-7:00                   Wednesday 8:00-7:00      Thursday 8:00-6:00                        Thursday 8:00-7:00                         Thursday 8:00-7:00            Friday 8:00-5:00                              Friday 8:00-5:00                              Friday 8:00-5:00    [facility] Hours:   [address] [place] [address]  [phone]    Monday 8:00-7:00  Tuesday 8:00-7:00  Wednesday 8:00-7:00  Thursday 8:00-7:00  Friday 8:00-5:00  Please log on to [email or web address] to order your contact lenses.  The link is found on the [facility] page.  As always,  Thank you for trusting us with your health care needs!

## 2019-02-21 ENCOUNTER — OFFICE VISIT (OUTPATIENT)
Dept: RHEUMATOLOGY | Facility: CLINIC | Age: 39
End: 2019-02-21
Payer: COMMERCIAL

## 2019-02-21 ENCOUNTER — TELEPHONE (OUTPATIENT)
Dept: RHEUMATOLOGY | Facility: CLINIC | Age: 39
End: 2019-02-21

## 2019-02-21 VITALS
OXYGEN SATURATION: 100 % | WEIGHT: 112 LBS | HEART RATE: 77 BPM | DIASTOLIC BLOOD PRESSURE: 73 MMHG | SYSTOLIC BLOOD PRESSURE: 113 MMHG | BODY MASS INDEX: 18.66 KG/M2 | HEIGHT: 65 IN | TEMPERATURE: 98.1 F

## 2019-02-21 DIAGNOSIS — Z79.899 HIGH RISK MEDICATION USE: ICD-10-CM

## 2019-02-21 DIAGNOSIS — M47.819 SERONEGATIVE SPONDYLOARTHROPATHY: Primary | ICD-10-CM

## 2019-02-21 LAB
ALBUMIN SERPL-MCNC: 3.9 G/DL (ref 3.4–5)
ALP SERPL-CCNC: 44 U/L (ref 40–150)
ALT SERPL W P-5'-P-CCNC: 22 U/L (ref 0–50)
AST SERPL W P-5'-P-CCNC: 20 U/L (ref 0–45)
BASOPHILS # BLD AUTO: 0 10E9/L (ref 0–0.2)
BASOPHILS NFR BLD AUTO: 0.4 %
BILIRUB DIRECT SERPL-MCNC: 0.1 MG/DL (ref 0–0.2)
BILIRUB SERPL-MCNC: 0.3 MG/DL (ref 0.2–1.3)
CREAT SERPL-MCNC: 0.76 MG/DL (ref 0.52–1.04)
CRP SERPL-MCNC: <2.9 MG/L (ref 0–8)
DIFFERENTIAL METHOD BLD: ABNORMAL
EOSINOPHIL # BLD AUTO: 0.1 10E9/L (ref 0–0.7)
EOSINOPHIL NFR BLD AUTO: 1.5 %
ERYTHROCYTE [DISTWIDTH] IN BLOOD BY AUTOMATED COUNT: 13.2 % (ref 10–15)
ERYTHROCYTE [SEDIMENTATION RATE] IN BLOOD BY WESTERGREN METHOD: 8 MM/H (ref 0–20)
GFR SERPL CREATININE-BSD FRML MDRD: >90 ML/MIN/{1.73_M2}
HCT VFR BLD AUTO: 35.8 % (ref 35–47)
HGB BLD-MCNC: 11.3 G/DL (ref 11.7–15.7)
LYMPHOCYTES # BLD AUTO: 1.9 10E9/L (ref 0.8–5.3)
LYMPHOCYTES NFR BLD AUTO: 40.9 %
MCH RBC QN AUTO: 26.7 PG (ref 26.5–33)
MCHC RBC AUTO-ENTMCNC: 31.6 G/DL (ref 31.5–36.5)
MCV RBC AUTO: 84 FL (ref 78–100)
MONOCYTES # BLD AUTO: 0.4 10E9/L (ref 0–1.3)
MONOCYTES NFR BLD AUTO: 8 %
NEUTROPHILS # BLD AUTO: 2.3 10E9/L (ref 1.6–8.3)
NEUTROPHILS NFR BLD AUTO: 49.2 %
PLATELET # BLD AUTO: 183 10E9/L (ref 150–450)
PROT SERPL-MCNC: 7.5 G/DL (ref 6.8–8.8)
RBC # BLD AUTO: 4.24 10E12/L (ref 3.8–5.2)
WBC # BLD AUTO: 4.6 10E9/L (ref 4–11)

## 2019-02-21 PROCEDURE — 85652 RBC SED RATE AUTOMATED: CPT | Performed by: INTERNAL MEDICINE

## 2019-02-21 PROCEDURE — 85025 COMPLETE CBC W/AUTO DIFF WBC: CPT | Performed by: INTERNAL MEDICINE

## 2019-02-21 PROCEDURE — 99213 OFFICE O/P EST LOW 20 MIN: CPT | Performed by: INTERNAL MEDICINE

## 2019-02-21 PROCEDURE — 36415 COLL VENOUS BLD VENIPUNCTURE: CPT | Performed by: INTERNAL MEDICINE

## 2019-02-21 PROCEDURE — 80076 HEPATIC FUNCTION PANEL: CPT | Performed by: INTERNAL MEDICINE

## 2019-02-21 PROCEDURE — 82565 ASSAY OF CREATININE: CPT | Performed by: INTERNAL MEDICINE

## 2019-02-21 PROCEDURE — 86140 C-REACTIVE PROTEIN: CPT | Performed by: INTERNAL MEDICINE

## 2019-02-21 RX ORDER — LEFLUNOMIDE 10 MG/1
10 TABLET ORAL DAILY
Qty: 90 TABLET | Refills: 2 | Status: SHIPPED | OUTPATIENT
Start: 2019-02-21 | End: 2019-10-25

## 2019-02-21 ASSESSMENT — MIFFLIN-ST. JEOR: SCORE: 1193.91

## 2019-02-21 NOTE — NURSING NOTE
RAPID3 (0-30) Cumulative Score  1.5          RAPID3 Weighted Score (divide #4 by 3 and that is the weighted score)  0.5         Yecenia Reddy MA

## 2019-02-21 NOTE — TELEPHONE ENCOUNTER
PA Initiation    Medication: Humira - PA Initiated  Insurance Company: [employer] - Phone [phone] Fax [phone]  Pharmacy Filling the Rx: [facility] - [place], [place] - [facility] [address]  Filling Pharmacy Phone:    Filling Pharmacy Fax:    Start Date: 2/21/2019

## 2019-02-21 NOTE — PROGRESS NOTES
Rheumatology Clinic Visit      Abby Foy MRN# [id number]   YOB: 1980 Age: 38 year old      Date of visit: 2/21/19   PCP: Sandi Duffy  Dermatologist: Amy Patel  Ophthalmologist: Dr. Mathis     Chief Complaint   Patient presents with:  RECHECK: 3-4 months follow up Seronegative Spondyloarthropathy    Assessment and Plan   1. 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 However, given her continued back pain that improved with activity but no radiographic evidence for inflammatory arthritis or osteoarthritis, there was concern that she had inflammatory back pain that would be more consistent with a spondylarthritis. Humira was started and resulted in improvement of her back pain, suggesting axial disease.  Arthritis improved with methotrexate and sulfasalazine, but she was having headaches and therefore the most likely culprit methotrexate was reduced until her headaches worsened significantly and therefore sulfasalazine and methotrexate were both discontinued. She had resolution of her headaches after discontinuing both sulfasalazine and methotrexate. I believe that the sulfasalazine was the cause of her headaches. Therefore, cautious retrial of methotrexate in the future could be done.  She was doing well on a combination of leflunomide 10 mg daily and Humira 40 mg SQ every 14 days but Humira had to be stopped because of issues with the  program; so Simponi was Rx'd but never started.  More active inflammatory symptoms now so will restart Humira. Note again that Simponi was never started by the patient.   - Start Humira 40 mg every 14 days  - Continue leflunomide 10 mg daily  - Labs today and in 3 months: CBC, Creatinine, Hepatic Panel, ESR, CRP     # Pregnancy  Planning: s/p salpingectomy;  had a vasectomy    2. 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 Interestingly when leflunomide was stopped between 3/2018 and today, 6/15/2018, she experienced increased L>R eye irritation that when bothering her only affected one eye at a time.  Leflunomide was restarted with improvement of the eye inflammation.     3. Headaches: Likely due to SSZ. Headaches stopped with discontinuation of SSZ. One headache since last seen.    4. Vaccinations: Vaccinations reviewed with Ms. Foy.  Risks and benefits of vaccinations were discussed. Data and lack of data for shingrix reviewed.  CDC stance on shingrix when on moderate to high immunosuppression reviewed.   - Influenza: up to date  - [id number]: up to date  - [id number]: up to date  - Shingrix: She will check on insurance coverage; may receive at a pharmacy or next clinic visit      Ms. Foy verbalized agreement with and understanding of the rational for the diagnosis and treatment plan.  All questions were answered to best of my ability and the patient's satisfaction. Ms. Foy was advised to contact the clinic with any questions that may arise after the clinic visit.      Thank you for involving me in the care of the patient    Return to clinic: 3-4 months    HPI   Abby Foy is a 38 year old female with medical history significant for urticaria, H. pylori infection, and iritis? who returns for f/u of seronegative spondyloarthropathy.    Today, Ms. Foy reports that she has not Humira since September.  Issue with Humira was that she had financial assistance ran out.  Simponi was then prescribed but she never started it.  She says that she has been doing okay.  She then continues to explain that she has pain in her hands that wakes her up at night and she has difficulty falling asleep again because of the pain until she is just so tired  that she falls asleep.  Morning stiffness for about an hour.  Joint pain improves with activity and worsens with inactivity.  Positive gelling phenomenon.  She does not feels good now as she did when she was on Humira.  She says that her back is feeling okay right now though.      Denies fevers, chills, nausea, vomiting, constipation, diarrhea. No abdominal pain. No chest pain/pressure, palpitations, or shortness of breath. No neck pain. Occasional cold sores..     Tobacco: None  EtOH: 1-2 drinks on the weekends  Drugs: None  Occupation: Dietitian at the [employer]    ROS   GEN: No fevers/chills  SKIN: See HPI  HEENT: see HPI.  CV: No chest pain, pressure, palpitations, or dyspnea on exertion.  PULM: No SOB. No cough or wheezing  GI: No nausea, vomiting, constipation, diarrhea. No blood in stool. +Abdominal bloating.  : No blood in urine.  MSK: See HPI.  NEURO: negative  PSYCH: negative    Active Problem List     Patient Active Problem List   Diagnosis     CARDIOVASCULAR SCREENING; LDL GOAL LESS THAN 160     Urticaria     Diagnostic skin and sensitization tests     Nonallergic rhinitis     Angioedema of lips     H. pylori infection     GPC (giant papillary conjunctivitis)     Seronegative spondyloarthropathy     Midline low back pain without sciatica     Abnormal uterine bleeding (AUB)- on OCPs     Past Medical History     Past Medical History:   Diagnosis Date     Angioedema of lips episode in 3/13--idiopathic     Contraception 1/28/2009     Diagnostic skin and sensitization tests 3/27/13 skin tests all NEGATIVE for environmental and food allergens including shellfish and nuts.      Nonallergic rhinitis     3/27/13 skin tests all NEGATIVE for environmental and food allergens including shellfish and nuts. 3/27/13 followup IgE tests NEG to Peanut, SNF, shrimp, macadamia nut, pistachio and walnut.     Rheumatoid arthritis of multiple sites with negative rheumatoid factor (H) 12/31/2015     Past Surgical  History     Past Surgical History:   Procedure Laterality Date     LAPAROSCOPIC SALPINGECTOMY Bilateral 6/23/2017    Procedure: LAPAROSCOPIC SALPINGECTOMY;  Operative Laparoscopy, Bilateral Salpingectomy ;  Surgeon: Apryl West MD;  Location: [facility] OR     OPERATIVE HYSTEROSCOPY WITH MORCELLATOR N/A 12/12/2018    Procedure: DIAGNOSTIC HYSTEROSCOPY AND D AND C;  Surgeon: Apryl West MD;  Location: [facility] OR        Allergy     Allergies   Allergen Reactions     No Known Drug Allergy      Shrimp Swelling     Lips and tongue     Walnuts [Nuts] Swelling     Lips and tongue       Current Medication List     Current Outpatient Medications   Medication Sig     Fexofenadine HCl (ALLEGRA PO) Take 180 mg by mouth daily     ibuprofen (ADVIL/MOTRIN) 600 MG tablet Take 1 tablet (600 mg) by mouth every 6 hours as needed for pain (mild)     leflunomide (ARAVA) 10 MG tablet Take 1 tablet (10 mg) by mouth daily     levonorgestrel-ethinyl estradiol (AVIANE/ALESSE/LESSINA) 0.1-20 MG-MCG tablet Take 1 tablet by mouth daily     multivitamin peds with iron (FLINTSTONES COMPLETE) 60 MG chewable tablet Take 1 chew tab by mouth daily.     neomycin-polymixin-dexamethasone (MAXITROL) 0.1 % ophthalmic suspension Place 1 drop into both eyes 4 times daily for 10 days     No current facility-administered medications for this visit.      Social History   See HPI    Family History     Family History   Problem Relation Age of Onset     Hypertension Maternal Grandmother      Autoimmune Disease Maternal Grandmother         Autoimmune hepatitis     Cancer Maternal Grandfather      Hypertension Paternal Grandmother      Cancer - colorectal Paternal Grandfather      Cardiovascular Paternal Grandfather      Other Cancer Paternal Grandfather      Hypertension Mother      Autoimmune Disease Mother         Autoimmune hepatitis     Hyperlipidemia Mother      Asthma Mother      Hypertension Father      Diabetes Father         Type 2     Multiple Sclerosis  "Maternal Aunt      Cerebrovascular Disease No family hx of      Thyroid Disease No family hx of      Glaucoma No family hx of      Macular Degeneration No family hx of      Breast Cancer No family hx of      Colon Cancer No family hx of      Physical Exam     Temp Readings from Last 3 Encounters:   02/21/19 98.1  F (36.7  C) (Oral)   12/12/18 97.8  F (36.6  C) (Oral)   11/30/17 98.4  F (36.9  C) (Oral)     BP Readings from Last 5 Encounters:   02/21/19 113/73   01/08/19 137/83   12/12/18 114/77   11/09/18 116/79   10/19/18 114/79     Pulse Readings from Last 1 Encounters:   02/21/19 77     Resp Readings from Last 1 Encounters:   12/12/18 20     Estimated body mass index is 18.64 kg/m  as calculated from the following:    Height as of this encounter: 1.651 m (5' 5\").    Weight as of this encounter: 50.8 kg (112 lb).    GEN: NAD  HEENT: MMM. Anicteric, noninjected sclera; eyes appear to have good moisture by gross examination  CV: S1, S2. RRR. No m/r/g.  PULM: CTA bilaterally. No w/c.  MSK: MCPs without swelling or tenderness palpation.  Subtle synovial swelling of the right second and third PIPs that were nontender to palpation.  Other PIPs without swelling or tenderness palpation.  DIPs without swelling or tenderness palpation.  Wrists, elbows, and shoulders without swelling or tenderness palpation.  Hips nontender to palpation.  Knees, ankles, and MTPs without swelling or tenderness to palpation.  Achilles tendons nontender to palpation.    SKIN: No rash.  No nail pitting.  EXT: No LE edema  PSYCH: Alert. Appropriate.    Labs / Imaging (select studies)   RF/CCP  Recent Labs   Lab Test 12/21/15  1447   CCPABY <20  Interpretation:  Negative     RHF <20     CBC  Recent Labs   Lab Test 12/12/18  1047 12/07/18  0909 09/14/18  0952 02/13/18  1754   WBC  --  5.4 6.1 5.9   RBC  --  4.52 4.38 4.37   HGB 11.8 12.1 11.8 11.6*   HCT  --  38.0 37.3 36.7   MCV  --  84 85 84   RDW  --  12.3 12.4 12.9   PLT  --  167 186 218   MCH  " --  26.8 26.9 26.5   MCHC  --  31.8 31.6 31.6   NEUTROPHIL  --  57.2 63.2 50.2   LYMPH  --  32.5 28.1 39.5   MONOCYTE  --  8.0 7.4 7.6   EOSINOPHIL  --  1.7 0.8 2.5   BASOPHIL  --  0.6 0.5 0.2   ANEU  --  3.1 3.9 3.0   ALYM  --  1.8 1.7 2.3   ANA MARÍA  --  0.4 0.5 0.5   AEOS  --  0.1 0.1 0.2   ABAS  --  0.0 0.0 0.0     CMP  Recent Labs   Lab Test 12/12/18  1047 12/07/18  0909 09/14/18  0952 02/13/18  1754  06/30/15  1636   NA  --   --   --   --   --  140   POTASSIUM  --   --   --   --   --  4.1   CHLORIDE  --   --   --   --   --  105   CO2  --   --   --   --   --  30   ANIONGAP  --   --   --   --   --  5   GLC 88  --   --   --   --  73   BUN  --   --   --   --   --  9   CR  --  0.77 0.87 0.74   < > 0.80   GFRESTIMATED  --  85 73 88   < > 83   GFRESTBLACK  --  >90 88 >90   < > >90  African American GFR Calc     DAISY  --   --   --   --   --  9.4   BILITOTAL  --  0.6 0.3 0.5   < > 0.5   ALBUMIN  --  4.1 4.0 4.0   < > 4.1   PROTTOTAL  --  7.6 7.3 7.3   < > 8.1   ALKPHOS  --  58 55 59   < > 65   AST  --  18 21 22   < > 19   ALT  --  23 24 26   < > 26    < > = values in this interval not displayed.     Calcium/VitaminD  Recent Labs   Lab Test 01/13/17  1355 12/21/15  1447 06/30/15  1636 01/09/14  0906   DAISY  --   --  9.4  --    VITDT 45 74  --  35     ESR/CRP  Recent Labs   Lab Test 09/14/18  0952 02/13/18  1754 08/22/17  1819   SED 7 7 7   CRP <2.9 <2.9 <2.9     Hepatitis B  Recent Labs   Lab Test 03/20/17  1506 03/28/16  1442 08/06/13  1500   AUSAB  --  264.69*  --    HBCAB Nonreactive  --   --    HEPBANG  --  Nonreactive Negative     Hepatitis C  Recent Labs   Lab Test 12/21/15  1447   HCVAB Nonreactive   Assay performance characteristics have not been established for newborns,   infants, and children       Lyme confirmation testing by Western Blot  Recent Labs   Lab Test 08/05/15  1621   LYWG Negative  Reference range: Negative  (Note)  Band(s) present: NONE  (Insufficient number of bands for positive result)  INTERPRETIVE  "INFORMATION: Borrelia Burgdorferi Ab, IgG  Western Blot  For this assay, a positive result is reported when any 5 or  more of the following 10 bands are present: 18, 23, 28, 30,  39, 41, 45, 58, 66, or 93 kDa.  All other banding patterns  are reported as negative.  Performed by [facility],  [address] [place] [address] [phone]  [email or web address], Pete Saha MD, Lab. Director       Tuberculosis Screening  Recent Labs   Lab Test 03/20/17  1508 03/28/16  1443   TBRSLT Negative Negative   TBAGN 0.00 0.04     HIV Screening  Recent Labs   Lab Test 12/21/15  1447   HIAGAB Nonreactive   HIV-1 p24 Ag & HIV-1/HIV-2 Ab Not Detected       \"XR SACROILIAC JOINT 1/2 VW 12/21/2015 3:23 PM  HISTORY: Pain.  COMPARISON: None.  FINDINGS: Sacroiliac joints are patent and symmetric. No acute osseous  abnormality.  IMPRESSION  IMPRESSION: Normal sacroiliac joints.  YUE JARRELL MD\"    \"XR LUMBAR SPINE 2-3 VIEWS 12/21/2015 3:23 PM  HISTORY: Pain.  COMPARISON: None.  FINDINGS: Lumbar alignment is anatomic. Vertebral body heights and  intervertebral disc spaces are preserved.  IMPRESSION  IMPRESSION: Normal lumbar spine.  YUE JARRELL MD\"    \"XR HAND BILATERAL G/E 3 VW 12/21/2015 3:50 PM  HISTORY: Pain in unspecified joint   IMPRESSION  IMPRESSION: Negative.  LINNEA ZELAYA MD\"    \"XR CHEST 2 VW 6/30/2015 4:54 PM  HISTORY: Pain.  COMPARISON: None.  IMPRESSION  IMPRESSION: The lungs are clear. No focal pulmonary opacities. Heart  and mediastinum are unremarkable. No acute cardiopulmonary  abnormalities.  SO PHAN MD\"    Immunization History     Immunization History   Administered Date(s) Administered     Influenza (IIV3) PF 10/12/2011, 10/03/2013     Influenza Vaccine, 3 YRS +, IM (QUADRIVALENT W/PRESERVATIVES) 10/01/2015     Pneumo Conj 13-V (2010&after) 09/26/2016     Pneumococcal 23 valent 12/22/2016     TD (ADULT, 7+) 08/15/2001     TDAP Vaccine (Adacel) 06/20/2011     TDAP Vaccine (Boostrix) 03/06/2014      "     Chart documentation done in part with Dragon Voice recognition Software. Although reviewed after completion, some word and grammatical error may remain.    Hunter Monroy MD

## 2019-02-26 NOTE — TELEPHONE ENCOUNTER
Prior Authorization Approval    Authorization Effective Date: 2/26/2019  Authorization Expiration Date: 2/26/2020  Medication: Humira - PA Approved  Approved Dose/Quantity:  Reference #: [id number]   Insurance Company: [employer] - Phone [phone] Fax [phone]  Expected CoPay: 3328.69 (5 with copay card)     CoPay Card Available: Yes    Foundation Assistance Needed:    Which Pharmacy is filling the prescription (Not needed for infusion/clinic administered): [facility] - [place], [place] - [address]  Pharmacy Notified: Yes  Patient Notified: Yes

## 2019-03-08 ENCOUNTER — OFFICE VISIT (OUTPATIENT)
Dept: OPTOMETRY | Facility: CLINIC | Age: 39
End: 2019-03-08
Payer: COMMERCIAL

## 2019-03-08 DIAGNOSIS — H52.13 MYOPIA OF BOTH EYES: Primary | ICD-10-CM

## 2019-03-08 PROCEDURE — 92310 CONTACT LENS FITTING OU: CPT | Performed by: OPTOMETRIST

## 2019-03-08 ASSESSMENT — SLIT LAMP EXAM - LIDS
COMMENTS: NORMAL
COMMENTS: NORMAL

## 2019-03-08 ASSESSMENT — EXTERNAL EXAM - RIGHT EYE: OD_EXAM: NORMAL

## 2019-03-08 ASSESSMENT — VISUAL ACUITY
OD_CC: 20/20
CORRECTION_TYPE: GLASSES
METHOD: SNELLEN - LINEAR
OS_CC: 20/20

## 2019-03-08 ASSESSMENT — REFRACTION_CURRENTRX
OS_BRAND: J&J 1-DAY ACUVUE MOIST BC 8.5, D 14.2
OD_SPHERE: -2.50
OS_SPHERE: -2.50
OD_BRAND: J&J 1-DAY ACUVUE MOIST BC 8.5, D 14.2

## 2019-03-08 ASSESSMENT — EXTERNAL EXAM - LEFT EYE: OS_EXAM: NORMAL

## 2019-03-08 ASSESSMENT — TONOMETRY
IOP_METHOD: TONOPEN
OS_IOP_MMHG: 15
OD_IOP_MMHG: 17

## 2019-03-08 NOTE — PATIENT INSTRUCTIONS
1 use lenses are the healthiest option.  Contact lens prescription given.    Return in 1 year for a complete eye exam or sooner if needed.    Demian Andrade, OD

## 2019-03-08 NOTE — PROGRESS NOTES
Red Eye Recheck    Reason:   Chief Complaint   Patient presents with     Contact Lens Fitting         Eyes feel: better    Medications used: maxitrol    How often: therapy completed      Jojo Bonds, Optometric Assistant, A.B.O.C.     OBJECTIVE:     See ophthalmology exam      ASSESSMENT:    Keratitis resolved        ICD-10-CM    1. Myopia of both eyes H52.13           PLAN:       Patient Instructions   1 use lenses are the healthiest option.  Contact lens prescription given.    Return in 1 year for a complete eye exam or sooner if needed.    Demian Andrade, JUNIOR

## 2019-05-20 ENCOUNTER — MYC MEDICAL ADVICE (OUTPATIENT)
Dept: OBGYN | Facility: CLINIC | Age: 39
End: 2019-05-20

## 2019-05-24 DIAGNOSIS — M47.819 SERONEGATIVE SPONDYLOARTHROPATHY: ICD-10-CM

## 2019-05-24 DIAGNOSIS — Z79.899 HIGH RISK MEDICATION USE: ICD-10-CM

## 2019-05-24 LAB
ALBUMIN SERPL-MCNC: 3.8 G/DL (ref 3.4–5)
ALP SERPL-CCNC: 47 U/L (ref 40–150)
ALT SERPL W P-5'-P-CCNC: 34 U/L (ref 0–50)
AST SERPL W P-5'-P-CCNC: 25 U/L (ref 0–45)
BASOPHILS # BLD AUTO: 0 10E9/L (ref 0–0.2)
BASOPHILS NFR BLD AUTO: 0.4 %
BILIRUB DIRECT SERPL-MCNC: <0.1 MG/DL (ref 0–0.2)
BILIRUB SERPL-MCNC: 0.3 MG/DL (ref 0.2–1.3)
CREAT SERPL-MCNC: 0.81 MG/DL (ref 0.52–1.04)
CRP SERPL-MCNC: <2.9 MG/L (ref 0–8)
DIFFERENTIAL METHOD BLD: ABNORMAL
EOSINOPHIL # BLD AUTO: 0.1 10E9/L (ref 0–0.7)
EOSINOPHIL NFR BLD AUTO: 1.2 %
ERYTHROCYTE [DISTWIDTH] IN BLOOD BY AUTOMATED COUNT: 12.5 % (ref 10–15)
ERYTHROCYTE [SEDIMENTATION RATE] IN BLOOD BY WESTERGREN METHOD: 11 MM/H (ref 0–20)
GFR SERPL CREATININE-BSD FRML MDRD: >90 ML/MIN/{1.73_M2}
HCT VFR BLD AUTO: 35.9 % (ref 35–47)
HGB BLD-MCNC: 11.4 G/DL (ref 11.7–15.7)
LYMPHOCYTES # BLD AUTO: 1.7 10E9/L (ref 0.8–5.3)
LYMPHOCYTES NFR BLD AUTO: 32.6 %
MCH RBC QN AUTO: 26.6 PG (ref 26.5–33)
MCHC RBC AUTO-ENTMCNC: 31.8 G/DL (ref 31.5–36.5)
MCV RBC AUTO: 84 FL (ref 78–100)
MONOCYTES # BLD AUTO: 0.3 10E9/L (ref 0–1.3)
MONOCYTES NFR BLD AUTO: 5.5 %
NEUTROPHILS # BLD AUTO: 3.1 10E9/L (ref 1.6–8.3)
NEUTROPHILS NFR BLD AUTO: 60.3 %
PLATELET # BLD AUTO: 203 10E9/L (ref 150–450)
PROT SERPL-MCNC: 7.6 G/DL (ref 6.8–8.8)
RBC # BLD AUTO: 4.29 10E12/L (ref 3.8–5.2)
WBC # BLD AUTO: 5.1 10E9/L (ref 4–11)

## 2019-05-24 PROCEDURE — 85652 RBC SED RATE AUTOMATED: CPT | Performed by: INTERNAL MEDICINE

## 2019-05-24 PROCEDURE — 85025 COMPLETE CBC W/AUTO DIFF WBC: CPT | Performed by: INTERNAL MEDICINE

## 2019-05-24 PROCEDURE — 86140 C-REACTIVE PROTEIN: CPT | Performed by: INTERNAL MEDICINE

## 2019-05-24 PROCEDURE — 80076 HEPATIC FUNCTION PANEL: CPT | Performed by: INTERNAL MEDICINE

## 2019-05-24 PROCEDURE — 82565 ASSAY OF CREATININE: CPT | Performed by: INTERNAL MEDICINE

## 2019-05-24 PROCEDURE — 36415 COLL VENOUS BLD VENIPUNCTURE: CPT | Performed by: INTERNAL MEDICINE

## 2019-06-21 ENCOUNTER — OFFICE VISIT (OUTPATIENT)
Dept: RHEUMATOLOGY | Facility: CLINIC | Age: 39
End: 2019-06-21
Payer: COMMERCIAL

## 2019-06-21 VITALS
WEIGHT: 109.4 LBS | HEIGHT: 65 IN | OXYGEN SATURATION: 100 % | DIASTOLIC BLOOD PRESSURE: 68 MMHG | SYSTOLIC BLOOD PRESSURE: 114 MMHG | BODY MASS INDEX: 18.23 KG/M2 | HEART RATE: 74 BPM

## 2019-06-21 DIAGNOSIS — Z79.899 HIGH RISK MEDICATIONS (NOT ANTICOAGULANTS) LONG-TERM USE: ICD-10-CM

## 2019-06-21 DIAGNOSIS — M47.819 SERONEGATIVE SPONDYLOARTHROPATHY: Primary | ICD-10-CM

## 2019-06-21 PROCEDURE — 99213 OFFICE O/P EST LOW 20 MIN: CPT | Performed by: INTERNAL MEDICINE

## 2019-06-21 ASSESSMENT — MIFFLIN-ST. JEOR: SCORE: 1177.12

## 2019-06-21 NOTE — NURSING NOTE
RAPID3 (0-30) Cumulative Score  1.0          RAPID3 Weighted Score (divide #4 by 3 and that is the weighted score)  0.3       Maddie Hennessy [facility]  6/21/2019 10:12 AM

## 2019-06-21 NOTE — PROGRESS NOTES
Rheumatology Clinic Visit      Abby Foy MRN# [id number]   YOB: 1980 Age: 39 year old      Date of visit: 6/21/19   PCP: Sandi Duffy  Dermatologist: Amy Patel  Ophthalmologist: Dr. Mathis     Chief Complaint   Patient presents with:  Arthritis: Seronegative spondyloarthropathy.    Assessment and Plan   1. 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 However, given her continued back pain that improved with activity but no radiographic evidence for inflammatory arthritis or osteoarthritis, there was concern that she had inflammatory back pain that would be more consistent with a spondylarthritis. Humira was started and resulted in improvement of her back pain, suggesting axial disease.  Arthritis improved with methotrexate and sulfasalazine, but she was having headaches and therefore the most likely culprit methotrexate was reduced until her headaches worsened significantly and therefore sulfasalazine and methotrexate were both discontinued. She had resolution of her headaches after discontinuing both sulfasalazine and methotrexate. I believe that the sulfasalazine was the cause of her headaches. Therefore, cautious retrial of methotrexate in the future could be done.  She was doing well on a combination of leflunomide 10 mg daily and Humira 40 mg SQ every 14 days but Humira had to be stopped because of issues with the  program; so Simponi was Rx'd but never started.  At her last clinic visit Humira was prescribed again and going to be restarted because of active inflammatory arthritis but she chose not to do so because she is feeling well.  However, on exam today she does have mild active inflammatory arthritis.  I encouraged her to start Humira.    - Start Humira  40 mg every 14 days  - Continue leflunomide 10 mg daily  - Labs every 3 months: CBC, Creatinine, Hepatic Panel    # Pregnancy Planning: s/p salpingectomy;  had a vasectomy    2. 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 Interestingly when leflunomide was stopped between 3/2018 and today, 6/15/2018, she experienced increased L>R eye irritation that when bothering her only affected one eye at a time.  Leflunomide was restarted with improvement of the eye inflammation.     3. Headaches: Likely due to SSZ. Headaches stopped with discontinuation of SSZ. One headache since last seen.    4. Vaccinations: Vaccinations reviewed with Ms. Foy.   - Influenza: encouraged yearly vaccination  - [id number]: up to date  - [id number]: up to date  - Shingrix: Declined by patient because it is not covered by insurance; she checked with her insurance about coverage    Ms. Foy verbalized agreement with and understanding of the rational for the diagnosis and treatment plan.  All questions were answered to best of my ability and the patient's satisfaction. Ms. Foy was advised to contact the clinic with any questions that may arise after the clinic visit.      Thank you for involving me in the care of the patient    Return to clinic: 3-4 months    HPI   Abby Foy is a 39 year old female with medical history significant for urticaria, H. pylori infection, and iritis? who returns for f/u of seronegative spondyloarthropathy.    Today, Ms. Foy reports that she never started Humira because she has been doing well.  Occasional joint aches in her hands.  Morning stiffness for no more than 10-20 minutes.  Positive gelling phenomenon that resolves quickly.  She is able to do all of her daily activities without issue.  She is taking leflunomide 10 mg daily.     She also notes that she did not start Humira because when she called her pharmacy  they were told that they needed a prior authorization completed.  She called her pharmacy back 1 week later and they said that they still needed it.  She did not contact this clinic regarding this issue.  I advised her to notify us if there is an issue such as this.  From record review I see that Humira was approved    Denies fevers, chills, nausea, vomiting, constipation, diarrhea. No abdominal pain. No chest pain/pressure, palpitations, or shortness of breath. No neck pain. Occasional cold sores..     Tobacco: None  EtOH: 1-2 drinks on the weekends  Drugs: None  Occupation: Dietitian at the [employer]    ROS   GEN: No fevers/chills  SKIN: See HPI  HEENT: see HPI.  CV: No chest pain, pressure, palpitations, or dyspnea on exertion.  PULM: No SOB. No cough or wheezing  GI: No nausea, vomiting, constipation, diarrhea. No blood in stool. +Abdominal bloating.  : No blood in urine.  MSK: See HPI.  NEURO: negative  PSYCH: negative    Active Problem List     Patient Active Problem List   Diagnosis     CARDIOVASCULAR SCREENING; LDL GOAL LESS THAN 160     Urticaria     Diagnostic skin and sensitization tests     Nonallergic rhinitis     Angioedema of lips     H. pylori infection     GPC (giant papillary conjunctivitis)     Seronegative spondyloarthropathy     Midline low back pain without sciatica     Abnormal uterine bleeding (AUB)- on OCPs     Past Medical History     Past Medical History:   Diagnosis Date     Angioedema of lips episode in 3/13--idiopathic     Contraception 1/28/2009     Diagnostic skin and sensitization tests 3/27/13 skin tests all NEGATIVE for environmental and food allergens including shellfish and nuts.      Nonallergic rhinitis     3/27/13 skin tests all NEGATIVE for environmental and food allergens including shellfish and nuts. 3/27/13 followup IgE tests NEG to Peanut, SNF, shrimp, macadamia nut, pistachio and walnut.     Rheumatoid arthritis of multiple sites with negative rheumatoid  factor (H) 12/31/2015     Past Surgical History     Past Surgical History:   Procedure Laterality Date     LAPAROSCOPIC SALPINGECTOMY Bilateral 6/23/2017    Procedure: LAPAROSCOPIC SALPINGECTOMY;  Operative Laparoscopy, Bilateral Salpingectomy ;  Surgeon: Apryl West MD;  Location: [facility] OR     OPERATIVE HYSTEROSCOPY WITH MORCELLATOR N/A 12/12/2018    Procedure: DIAGNOSTIC HYSTEROSCOPY AND D AND C;  Surgeon: Apryl West MD;  Location: [facility] OR        Allergy     Allergies   Allergen Reactions     No Known Drug Allergy      Shrimp Swelling     Lips and tongue     Walnuts [Nuts] Swelling     Lips and tongue       Current Medication List     Current Outpatient Medications   Medication Sig     adalimumab (HUMIRA *CF*) 40 MG/0.4ML pen kit Inject 0.4 mLs (40 mg) Subcutaneous every 14 days . Hold for signs of infection, then seek medical attention.     Fexofenadine HCl (ALLEGRA PO) Take 180 mg by mouth daily     ibuprofen (ADVIL/MOTRIN) 600 MG tablet Take 1 tablet (600 mg) by mouth every 6 hours as needed for pain (mild)     leflunomide (ARAVA) 10 MG tablet Take 1 tablet (10 mg) by mouth daily     levonorgestrel-ethinyl estradiol (AVIANE/ALESSE/LESSINA) 0.1-20 MG-MCG tablet Take 1 tablet by mouth daily     multivitamin peds with iron (FLINTSTONES COMPLETE) 60 MG chewable tablet Take 1 chew tab by mouth daily.     No current facility-administered medications for this visit.      Social History   See HPI    Family History     Family History   Problem Relation Age of Onset     Hypertension Maternal Grandmother      Autoimmune Disease Maternal Grandmother         Autoimmune hepatitis     Cancer Maternal Grandfather      Hypertension Paternal Grandmother      Cancer - colorectal Paternal Grandfather      Cardiovascular Paternal Grandfather      Other Cancer Paternal Grandfather      Hypertension Mother      Autoimmune Disease Mother         Autoimmune hepatitis     Hyperlipidemia Mother      Asthma Mother       "Hypertension Father      Diabetes Father         Type 2     Multiple Sclerosis Maternal Aunt      Cerebrovascular Disease No family hx of      Thyroid Disease No family hx of      Glaucoma No family hx of      Macular Degeneration No family hx of      Breast Cancer No family hx of      Colon Cancer No family hx of      Physical Exam     Temp Readings from Last 3 Encounters:   02/21/19 98.1  F (36.7  C) (Oral)   12/12/18 97.8  F (36.6  C) (Oral)   11/30/17 98.4  F (36.9  C) (Oral)     BP Readings from Last 5 Encounters:   02/21/19 113/73   01/08/19 137/83   12/12/18 114/77   11/09/18 116/79   10/19/18 114/79     Pulse Readings from Last 1 Encounters:   02/21/19 77     Resp Readings from Last 1 Encounters:   12/12/18 20     Estimated body mass index is 18.64 kg/m  as calculated from the following:    Height as of 2/21/19: 1.651 m (5' 5\").    Weight as of 2/21/19: 50.8 kg (112 lb).    GEN: NAD  HEENT: MMM. Anicteric, noninjected sclera; eyes appear to have good moisture by gross examination  CV: S1, S2. RRR. No m/r/g.  PULM: CTA bilaterally. No w/c.  MSK: Subtle synovial swelling and tenderness palpation of the right third MCP; right third and fourth PIP and left third PIP; tenderness palpation without swelling of the right fourth MCP.  Subtle synovial swelling and tenderness palpation of both wrists.  Elbows, shoulders, knees, ankles, and MTPs without swelling or tenderness to palpation. Hips nontender to palpation.    Achilles tendons nontender to palpation.    SKIN: No rash.  No nail pitting.  EXT: No LE edema  PSYCH: Alert. Appropriate.    Labs / Imaging (select studies)   RF/CCP  Recent Labs   Lab Test 12/21/15  1447   CCPABY <20  Interpretation:  Negative     RHF <20     CBC  Recent Labs   Lab Test 05/24/19  1143 02/21/19  1427 12/12/18  1047 12/07/18  0909   WBC 5.1 4.6  --  5.4   RBC 4.29 4.24  --  4.52   HGB 11.4* 11.3* 11.8 12.1   HCT 35.9 35.8  --  38.0   MCV 84 84  --  84   RDW 12.5 13.2  --  12.3    " 183  --  167   MCH 26.6 26.7  --  26.8   MCHC 31.8 31.6  --  31.8   NEUTROPHIL 60.3 49.2  --  57.2   LYMPH 32.6 40.9  --  32.5   MONOCYTE 5.5 8.0  --  8.0   EOSINOPHIL 1.2 1.5  --  1.7   BASOPHIL 0.4 0.4  --  0.6   ANEU 3.1 2.3  --  3.1   ALYM 1.7 1.9  --  1.8   ANA MARÍA 0.3 0.4  --  0.4   AEOS 0.1 0.1  --  0.1   ABAS 0.0 0.0  --  0.0     CMP  Recent Labs   Lab Test 05/24/19  1143 02/21/19  1427 12/12/18  1047 12/07/18  0909  06/30/15  1636   NA  --   --   --   --   --  140   POTASSIUM  --   --   --   --   --  4.1   CHLORIDE  --   --   --   --   --  105   CO2  --   --   --   --   --  30   ANIONGAP  --   --   --   --   --  5   GLC  --   --  88  --   --  73   BUN  --   --   --   --   --  9   CR 0.81 0.76  --  0.77   < > 0.80   GFRESTIMATED >90 >90  --  85   < > 83   GFRESTBLACK >90 >90  --  >90   < > >90  African American GFR Calc     DAISY  --   --   --   --   --  9.4   BILITOTAL 0.3 0.3  --  0.6   < > 0.5   ALBUMIN 3.8 3.9  --  4.1   < > 4.1   PROTTOTAL 7.6 7.5  --  7.6   < > 8.1   ALKPHOS 47 44  --  58   < > 65   AST 25 20  --  18   < > 19   ALT 34 22  --  23   < > 26    < > = values in this interval not displayed.     Calcium/VitaminD  Recent Labs   Lab Test 01/13/17  1355 12/21/15  1447 06/30/15  1636 01/09/14  0906   DAISY  --   --  9.4  --    VITDT 45 74  --  35     ESR/CRP  Recent Labs   Lab Test 05/24/19  1143 02/21/19  1427 09/14/18  0952   SED 11 8 7   CRP <2.9 <2.9 <2.9     Hepatitis C  Recent Labs   Lab Test 12/21/15  1447   HCVAB Nonreactive   Assay performance characteristics have not been established for newborns,   infants, and children       Lyme confirmation testing by Western Blot  Recent Labs   Lab Test 08/05/15  1621   LYWG Negative  Reference range: Negative  (Note)  Band(s) present: NONE  (Insufficient number of bands for positive result)  INTERPRETIVE INFORMATION: Borrelia Burgdorferi Ab, IgG  Western Blot  For this assay, a positive result is reported when any 5 or  more of the following 10 bands are  "present: 18, 23, 28, 30,  39, 41, 45, 58, 66, or 93 kDa.  All other banding patterns  are reported as negative.  Performed by [facility],  [address] [place] [address] [phone]  [email or web address], Pete Saha MD, Lab. Director       Tuberculosis Screening  Recent Labs   Lab Test 03/20/17  1508 03/28/16  1443   TBRSLT Negative Negative   TBAGN 0.00 0.04     HIV Screening  Recent Labs   Lab Test 12/21/15  1447   HIAGAB Nonreactive   HIV-1 p24 Ag & HIV-1/HIV-2 Ab Not Detected       \"XR SACROILIAC JOINT 1/2 VW 12/21/2015 3:23 PM  HISTORY: Pain.  COMPARISON: None.  FINDINGS: Sacroiliac joints are patent and symmetric. No acute osseous  abnormality.  IMPRESSION  IMPRESSION: Normal sacroiliac joints.  YUE JARRELL MD\"    \"XR LUMBAR SPINE 2-3 VIEWS 12/21/2015 3:23 PM  HISTORY: Pain.  COMPARISON: None.  FINDINGS: Lumbar alignment is anatomic. Vertebral body heights and  intervertebral disc spaces are preserved.  IMPRESSION  IMPRESSION: Normal lumbar spine.  YUE JARRELL MD\"    \"XR HAND BILATERAL G/E 3 VW 12/21/2015 3:50 PM  HISTORY: Pain in unspecified joint   IMPRESSION  IMPRESSION: Negative.  LINNEA ZELAYA MD\"    \"XR CHEST 2 VW 6/30/2015 4:54 PM  HISTORY: Pain.  COMPARISON: None.  IMPRESSION  IMPRESSION: The lungs are clear. No focal pulmonary opacities. Heart  and mediastinum are unremarkable. No acute cardiopulmonary  abnormalities.  SO PHAN MD\"    Immunization History     Immunization History   Administered Date(s) Administered     Influenza (IIV3) PF 10/12/2011, 10/03/2013     Influenza Vaccine, 3 YRS +, IM (QUADRIVALENT W/PRESERVATIVES) 10/01/2015     Pneumo Conj 13-V (2010&after) 09/26/2016     Pneumococcal 23 valent 12/22/2016     TD (ADULT, 7+) 08/15/2001     TDAP Vaccine (Adacel) 06/20/2011     TDAP Vaccine (Boostrix) 03/06/2014          Chart documentation done in part with Dragon Voice recognition Software. Although reviewed after completion, some word and grammatical error may " remain.    Hunter Monroy MD

## 2019-06-21 NOTE — PATIENT INSTRUCTIONS
Rheumatology    Dr. Hunter Monroy         [facility]   (Monday)  [address]  [place] [address]       [facility]   (Tuesday)  [address]  [place] [address]    [facility]   (Wed., Thurs., and Friday)  [address] [place] [address]    Phone number: [phone]  Thank you for choosing [facility].  Maddie Hennessy CMA

## 2019-08-05 ENCOUNTER — OFFICE VISIT (OUTPATIENT)
Dept: FAMILY MEDICINE | Facility: CLINIC | Age: 39
End: 2019-08-05
Payer: COMMERCIAL

## 2019-08-05 VITALS
DIASTOLIC BLOOD PRESSURE: 76 MMHG | RESPIRATION RATE: 16 BRPM | HEART RATE: 85 BPM | WEIGHT: 109 LBS | OXYGEN SATURATION: 99 % | TEMPERATURE: 98.7 F | BODY MASS INDEX: 18.14 KG/M2 | SYSTOLIC BLOOD PRESSURE: 139 MMHG

## 2019-08-05 DIAGNOSIS — H60.332 ACUTE SWIMMER'S EAR OF LEFT SIDE: ICD-10-CM

## 2019-08-05 DIAGNOSIS — H61.22 IMPACTED CERUMEN OF LEFT EAR: Primary | ICD-10-CM

## 2019-08-05 PROCEDURE — 99213 OFFICE O/P EST LOW 20 MIN: CPT | Mod: 25 | Performed by: NURSE PRACTITIONER

## 2019-08-05 PROCEDURE — 69210 REMOVE IMPACTED EAR WAX UNI: CPT | Mod: LT | Performed by: NURSE PRACTITIONER

## 2019-08-05 RX ORDER — OFLOXACIN 3 MG/ML
5 SOLUTION AURICULAR (OTIC) DAILY
Qty: 2 ML | Refills: 0 | Status: SHIPPED | OUTPATIENT
Start: 2019-08-05 | End: 2019-11-22

## 2019-08-05 NOTE — PROGRESS NOTES
[facility]  SUBJECTIVE:  Abby Foy  is a 39 year old  female  who presents with the following concerns;    Symptom duration:  Saturday   Sympom severity:  mild- moderate- worsening   Treatments tried:  none   [place]  [place]              Symptoms: Present Comment   Fever/Chills     Fatigue     Muscle Aches     Eye Irritation     Sneezing     Nasal Blair/Drg     Sinus Pressure/Pain     Loss of smell     Dental pain     Sore Throat     Swollen Glands     Ear Pain/Fullness x Left ear pain, pressure, hearing issue   Cough     Wheeze     Chest Pain     Shortness of breath     Rash     Other       Medications updated and reviewed.  Past, family and surgical history is updated and reviewed in the record.  Patient Active Problem List    Diagnosis Date Noted     Abnormal uterine bleeding (AUB)- on OCPs 01/13/2017     Priority: Medium     GPC (giant papillary conjunctivitis) 12/31/2015     Priority: Medium     Seronegative spondyloarthropathy 12/31/2015     Priority: Medium     Midline low back pain without sciatica 12/31/2015     Priority: Medium     H. pylori infection 08/25/2015     Priority: Medium     Angioedema of lips      Priority: Medium     Diagnostic skin and sensitization tests      Priority: Medium     Nonallergic rhinitis      Priority: Medium     3/27/13 skin tests all NEGATIVE for environmental and food allergens including shellfish and nuts.        Urticaria 03/04/2013     Priority: Medium     CARDIOVASCULAR SCREENING; LDL GOAL LESS THAN 160 05/09/2010     Priority: Medium     Past Medical History:   Diagnosis Date     Angioedema of lips episode in 3/13--idiopathic     Contraception 1/28/2009     Diagnostic skin and sensitization tests 3/27/13 skin tests all NEGATIVE for environmental and food allergens including shellfish and nuts.      Nonallergic rhinitis     3/27/13 skin tests all NEGATIVE for environmental and food allergens including shellfish and nuts. 3/27/13 followup IgE  tests NEG to Peanut, SNF, shrimp, macadamia nut, pistachio and walnut.     Rheumatoid arthritis of multiple sites with negative rheumatoid factor (H) 12/31/2015      Family History   Problem Relation Age of Onset     Hypertension Maternal Grandmother      Autoimmune Disease Maternal Grandmother         Autoimmune hepatitis     Cancer Maternal Grandfather      Hypertension Paternal Grandmother      Cancer - colorectal Paternal Grandfather      Cardiovascular Paternal Grandfather      Other Cancer Paternal Grandfather      Hypertension Mother      Autoimmune Disease Mother         Autoimmune hepatitis     Hyperlipidemia Mother      Asthma Mother      Hypertension Father      Diabetes Father         Type 2     Multiple Sclerosis Maternal Aunt      Cerebrovascular Disease No family hx of      Thyroid Disease No family hx of      Glaucoma No family hx of      Macular Degeneration No family hx of      Breast Cancer No family hx of      Colon Cancer No family hx of      ROS:  Other than noted above, general, HEENT, respiratory, cardiac and gastrointestinal systems are negative.    OBJECTIVE:  GENERAL:  Alert, no acute distress  HEENT:  POSITIVE for L ear blocked with cerumen, once cleared by MA- R Ear and TMs normal, oral mucosa and posterior oropharynx normal POSITIVE for L ear canal edematous, erythematous  RESP:  Lungs clear to auscultation.  CV:  Normal rate, regular rhythm, no murmur or gallop.  LYMPHATICS:  No cervical, supraclavicular adenopathy  SKIN:  No suspicious rashes.    Assessment/Plan:     ICD-10-CM    1. Impacted cerumen of left ear H61.22 REMOVE IMPACTED CERUMEN   2. Acute swimmer's ear of left side H60.332 ofloxacin (FLOXIN) 0.3 % otic solution        See patient instructions: discussed-use drops as prescribed for full 7 days, once drops instilled, stay on side for at least  Min for absorption.  Follow up if needed. Ear plugs.     CLARISSA Cohen, FNP-BC

## 2019-08-05 NOTE — NURSING NOTE
Patient identified using two patient identifiers.  Ear exam showing wax occlusion completed by provider.  H202/H20 was placed in the left ear(s) via irrigation tool: elephant ear. Redness seen. Provider informed. Hilda Faith MA

## 2019-08-05 NOTE — PATIENT INSTRUCTIONS
Reminders: If you are signed up for mychart please be aware your results and communications will be sent within your mychart. Please remember to arrive 5-10 minutes early for your appointments. If you are late you may need to reschedule your appointment.    Patient Education     External Ear Infection (Adult)    External otitis (also called  swimmer s ear ) is an infection in the ear canal. It is often caused by bacteria or fungus. It can occur a few days after water gets trapped in the ear canal (from swimming or bathing). It can also occur after cleaning too deeply in the ear canal with a cotton swab or other object. Sometimes, hair care products get into the ear canal and cause this problem.  Symptoms can include pain, fever, itching, redness, drainage, or swelling of the ear canal. Temporary hearing loss may also occur.  Home care    Do not try to clean the ear canal. This can push pus and bacteria deeper into the canal.    Use prescribed ear drops as directed. These help reduce swelling and fight the infection. If an ear wick was placed in the ear canal, apply drops right onto the end of the wick. The wick will draw the medicine into the ear canal even if it is swollen closed.    A cotton ball may be loosely placed in the outer ear to absorb any drainage.    You may use acetaminophen or ibuprofen to control pain, unless another medicine was prescribed. Note: If you have chronic liver or kidney disease or ever had a stomach ulcer or GI bleeding, talk to your healthcare provider before taking any of these medicines.    Do not allow water to get into your ear when bathing. Also, don't swim until the infection has cleared.  Prevention    Keep your ears dry. This helps lower the risk of infection. Dry your ears with a towel or hair dryer after getting wet. Also, use ear plugs when swimming.    Do not stick any objects in the ear to remove wax.    If you feel water trapped in your ear, use ear drops right away. You  can get these drops over the counter at most drugstores. They work by removing water from the ear canal.  Follow-up care  Follow up with your healthcare provider in 1 week, or as advised.  When to seek medical advice  Call your healthcare provider right away if any of these occur:    Ear pain becomes worse or doesn t improve after 3 days of treatment    Redness or swelling of the outer ear occurs or gets worse    Headache    Painful or stiff neck    Drowsiness or confusion    Fever of 100.4 F (38 C) or higher, or as directed by your healthcare provider    Seizure  Date Last Reviewed: 10/1/2017    [id number] [employer]. [address] [place] [address], [place] [address]. All rights reserved. This information is not intended as a substitute for professional medical care. Always follow your healthcare professional's instructions.

## 2019-08-30 DIAGNOSIS — M47.819 SERONEGATIVE SPONDYLOARTHROPATHY: ICD-10-CM

## 2019-08-30 LAB
ALBUMIN SERPL-MCNC: 3.9 G/DL (ref 3.4–5)
ALP SERPL-CCNC: 43 U/L (ref 40–150)
ALT SERPL W P-5'-P-CCNC: 27 U/L (ref 0–50)
AST SERPL W P-5'-P-CCNC: 26 U/L (ref 0–45)
BASOPHILS # BLD AUTO: 0 10E9/L (ref 0–0.2)
BASOPHILS NFR BLD AUTO: 0.3 %
BILIRUB DIRECT SERPL-MCNC: <0.1 MG/DL (ref 0–0.2)
BILIRUB SERPL-MCNC: 0.4 MG/DL (ref 0.2–1.3)
CREAT SERPL-MCNC: 0.83 MG/DL (ref 0.52–1.04)
DIFFERENTIAL METHOD BLD: NORMAL
EOSINOPHIL # BLD AUTO: 0.1 10E9/L (ref 0–0.7)
EOSINOPHIL NFR BLD AUTO: 1.3 %
ERYTHROCYTE [DISTWIDTH] IN BLOOD BY AUTOMATED COUNT: 12.5 % (ref 10–15)
GFR SERPL CREATININE-BSD FRML MDRD: 89 ML/MIN/{1.73_M2}
HCT VFR BLD AUTO: 37.6 % (ref 35–47)
HGB BLD-MCNC: 12.1 G/DL (ref 11.7–15.7)
LYMPHOCYTES # BLD AUTO: 1.2 10E9/L (ref 0.8–5.3)
LYMPHOCYTES NFR BLD AUTO: 19.4 %
MCH RBC QN AUTO: 27.3 PG (ref 26.5–33)
MCHC RBC AUTO-ENTMCNC: 32.2 G/DL (ref 31.5–36.5)
MCV RBC AUTO: 85 FL (ref 78–100)
MONOCYTES # BLD AUTO: 0.4 10E9/L (ref 0–1.3)
MONOCYTES NFR BLD AUTO: 5.9 %
NEUTROPHILS # BLD AUTO: 4.7 10E9/L (ref 1.6–8.3)
NEUTROPHILS NFR BLD AUTO: 73.1 %
PLATELET # BLD AUTO: 194 10E9/L (ref 150–450)
PROT SERPL-MCNC: 7.2 G/DL (ref 6.8–8.8)
RBC # BLD AUTO: 4.44 10E12/L (ref 3.8–5.2)
WBC # BLD AUTO: 6.4 10E9/L (ref 4–11)

## 2019-08-30 PROCEDURE — 82565 ASSAY OF CREATININE: CPT | Performed by: INTERNAL MEDICINE

## 2019-08-30 PROCEDURE — 80076 HEPATIC FUNCTION PANEL: CPT | Performed by: INTERNAL MEDICINE

## 2019-08-30 PROCEDURE — 85025 COMPLETE CBC W/AUTO DIFF WBC: CPT | Performed by: INTERNAL MEDICINE

## 2019-08-30 PROCEDURE — 36415 COLL VENOUS BLD VENIPUNCTURE: CPT | Performed by: INTERNAL MEDICINE

## 2019-09-17 DIAGNOSIS — N93.9 ABNORMAL UTERINE BLEEDING (AUB): Primary | ICD-10-CM

## 2019-09-27 DIAGNOSIS — N93.9 ABNORMAL UTERINE BLEEDING (AUB): ICD-10-CM

## 2019-09-27 LAB
PROLACTIN SERPL-MCNC: 4 UG/L (ref 3–27)
TSH SERPL DL<=0.005 MIU/L-ACNC: 1.38 MU/L (ref 0.4–4)

## 2019-09-27 PROCEDURE — 84146 ASSAY OF PROLACTIN: CPT | Performed by: OBSTETRICS & GYNECOLOGY

## 2019-09-27 PROCEDURE — 36415 COLL VENOUS BLD VENIPUNCTURE: CPT | Performed by: OBSTETRICS & GYNECOLOGY

## 2019-09-27 PROCEDURE — 84443 ASSAY THYROID STIM HORMONE: CPT | Performed by: OBSTETRICS & GYNECOLOGY

## 2019-10-25 ENCOUNTER — OFFICE VISIT (OUTPATIENT)
Dept: RHEUMATOLOGY | Facility: CLINIC | Age: 39
End: 2019-10-25
Payer: COMMERCIAL

## 2019-10-25 VITALS
SYSTOLIC BLOOD PRESSURE: 120 MMHG | OXYGEN SATURATION: 100 % | WEIGHT: 112.4 LBS | DIASTOLIC BLOOD PRESSURE: 78 MMHG | BODY MASS INDEX: 18.7 KG/M2 | HEART RATE: 68 BPM

## 2019-10-25 DIAGNOSIS — Z79.899 HIGH RISK MEDICATIONS (NOT ANTICOAGULANTS) LONG-TERM USE: ICD-10-CM

## 2019-10-25 DIAGNOSIS — L40.50 PSORIATIC ARTHRITIS (H): Primary | ICD-10-CM

## 2019-10-25 LAB
ALBUMIN SERPL-MCNC: 4.1 G/DL (ref 3.4–5)
ALP SERPL-CCNC: 51 U/L (ref 40–150)
ALT SERPL W P-5'-P-CCNC: 25 U/L (ref 0–50)
AST SERPL W P-5'-P-CCNC: 19 U/L (ref 0–45)
BASOPHILS # BLD AUTO: 0 10E9/L (ref 0–0.2)
BASOPHILS NFR BLD AUTO: 0.4 %
BILIRUB DIRECT SERPL-MCNC: 0.1 MG/DL (ref 0–0.2)
BILIRUB SERPL-MCNC: 0.3 MG/DL (ref 0.2–1.3)
CREAT SERPL-MCNC: 0.75 MG/DL (ref 0.52–1.04)
DIFFERENTIAL METHOD BLD: ABNORMAL
EOSINOPHIL # BLD AUTO: 0.1 10E9/L (ref 0–0.7)
EOSINOPHIL NFR BLD AUTO: 1.8 %
ERYTHROCYTE [DISTWIDTH] IN BLOOD BY AUTOMATED COUNT: 12.5 % (ref 10–15)
GFR SERPL CREATININE-BSD FRML MDRD: >90 ML/MIN/{1.73_M2}
HCT VFR BLD AUTO: 38.1 % (ref 35–47)
HGB BLD-MCNC: 11.8 G/DL (ref 11.7–15.7)
LYMPHOCYTES # BLD AUTO: 1.4 10E9/L (ref 0.8–5.3)
LYMPHOCYTES NFR BLD AUTO: 25 %
MCH RBC QN AUTO: 26.3 PG (ref 26.5–33)
MCHC RBC AUTO-ENTMCNC: 31 G/DL (ref 31.5–36.5)
MCV RBC AUTO: 85 FL (ref 78–100)
MONOCYTES # BLD AUTO: 0.4 10E9/L (ref 0–1.3)
MONOCYTES NFR BLD AUTO: 7.2 %
NEUTROPHILS # BLD AUTO: 3.6 10E9/L (ref 1.6–8.3)
NEUTROPHILS NFR BLD AUTO: 65.6 %
PLATELET # BLD AUTO: 195 10E9/L (ref 150–450)
PROT SERPL-MCNC: 8.1 G/DL (ref 6.8–8.8)
RBC # BLD AUTO: 4.49 10E12/L (ref 3.8–5.2)
WBC # BLD AUTO: 5.6 10E9/L (ref 4–11)

## 2019-10-25 PROCEDURE — 99214 OFFICE O/P EST MOD 30 MIN: CPT | Performed by: INTERNAL MEDICINE

## 2019-10-25 PROCEDURE — 36415 COLL VENOUS BLD VENIPUNCTURE: CPT | Performed by: INTERNAL MEDICINE

## 2019-10-25 PROCEDURE — 82565 ASSAY OF CREATININE: CPT | Performed by: INTERNAL MEDICINE

## 2019-10-25 PROCEDURE — 85025 COMPLETE CBC W/AUTO DIFF WBC: CPT | Performed by: INTERNAL MEDICINE

## 2019-10-25 PROCEDURE — 80076 HEPATIC FUNCTION PANEL: CPT | Performed by: INTERNAL MEDICINE

## 2019-10-25 RX ORDER — LEFLUNOMIDE 10 MG/1
10 TABLET ORAL DAILY
Qty: 90 TABLET | Refills: 2 | Status: SHIPPED | OUTPATIENT
Start: 2019-10-25 | End: 2020-06-16

## 2019-10-25 NOTE — NURSING NOTE
RAPID3 (0-30) Cumulative Score  2.0          RAPID3 Weighted Score (divide #4 by 3 and that is the weighted score)  0.6

## 2019-10-25 NOTE — PROGRESS NOTES
Rheumatology Clinic Visit      Abby Foy MRN# [id number]   YOB: 1980 Age: 39 year old      Date of visit: 10/25/19   PCP: Sandi Duffy  Dermatologist: Amy Patel  Ophthalmologist: Dr. Mathis     Chief Complaint   Patient presents with:  RECHECK: Spondyloarthropathy    Assessment and Plan   1. 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 However, given her continued back pain that improved with activity but no radiographic evidence for inflammatory arthritis or osteoarthritis, there was concern that she had inflammatory back pain that would be more consistent with a spondylarthritis. Humira was started and resulted in improvement of her back pain, suggesting axial disease.  Arthritis improved with methotrexate and sulfasalazine, but she was having headaches and therefore the most likely culprit methotrexate was reduced until her headaches worsened significantly and therefore sulfasalazine and methotrexate were both discontinued. She had resolution of her headaches after discontinuing both sulfasalazine and methotrexate. I believe that the sulfasalazine was the cause of her headaches. Therefore, cautious retrial of methotrexate in the future could be done.  She was doing well on a combination of leflunomide 10 mg daily and Humira 40 mg SQ every 14 days but Humira had to be stopped because of issues with the  program; so Simponi was Rx'd but never started.  At her last clinic visit Humira was prescribed again and going to be restarted because of active inflammatory arthritis but she chose not to do so because she is feeling well.  She then presented again with mild active inflammatory arthritis and Humira was again encouraged.  Today she is on  leflunomide 10 mg daily and did not restart Humira.  Still with active inflammatory arthritis.  We discussed other treatment options including Xeljanz and Otezla; she would like to try Otezla.    - Do not start Humira  - Continue leflunomide 10 mg daily  - Start Otezla, starter pack followed by 30mg BID  - Labs today and in 3 months: CBC, Creatinine, Hepatic Panel    # Apremilast (Otezla) Risks and Benefits: The risks and benefits of apremilast were discussed in detail and the patient verbalized understanding.  The risks discussed include, but are not limited to, the risk for hypersensitivity, anaphylaxis, anaphylactoid reactions, depression, weight decrease, diarrhea, nausea, headache, and infections such as upper respiratory tract infection.  If severe renal impairment, the dose may be reduced.  I encouraged reviewing the package insert and asking any questions about the medication.      # Pregnancy Planning: s/p salpingectomy;  had a vasectomy    2. 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 Interestingly when leflunomide was stopped between 3/2018 and today, 6/15/2018, she experienced increased L>R eye irritation that when bothering her only affected one eye at a time.  Leflunomide was restarted with improvement of the eye inflammation.     3. Headaches: Likely due to SSZ. Headaches stopped with discontinuation of SSZ. One headache since last seen.    4.  Right medial epicondylitis: Reviewed the diagnosis treatment options.  Conservative management at this time.  RICE.  Avoid aggravating activities.    5. Vaccinations: Vaccinations reviewed with Ms. Foy.   - Influenza: encouraged yearly vaccination; reportedly already up to date for the 3043-1043 season  - [id number]: up to date  - [id number]: up to date  - Shingrix: Declined by patient because it is not covered by insurance; she checked with her insurance about coverage    Ms.  Danii verbalized agreement with and understanding of the rational for the diagnosis and treatment plan.  All questions were answered to best of my ability and the patient's satisfaction. Ms. Foy was advised to contact the clinic with any questions that may arise after the clinic visit.      Thank you for involving me in the care of the patient    Return to clinic: 3-4 months    HPI   Abby Foy is a 39 year old female with medical history significant for urticaria, H. pylori infection, and iritis? who returns for f/u of seronegative spondyloarthropathy.    Today, Ms. Foy reports that she did not start Humira.  Some joint aches in her hands at the MCPs and PIPs.  Positive gelling phenomenon.  Morning stiffness for 2 hours.  She says that the stiffness and aching in the morning is longer than what it was in the past.  She is also been having a stressful time because of lack of coverage at work because of a coworker who is about, and aunt who went through chemotherapy, and the family dog recently .       Denies fevers, chills, nausea, vomiting, constipation, diarrhea. No abdominal pain. No chest pain/pressure, palpitations, or shortness of breath. No neck pain. Occasional cold sores..     Tobacco: None  EtOH: 1-2 drinks on the weekends  Drugs: None  Occupation: Dietitian at the [employer]    ROS   GEN: No fevers/chills  SKIN: See HPI  HEENT: see HPI.  CV: No chest pain, pressure, palpitations, or dyspnea on exertion.  PULM: No SOB. No cough or wheezing  GI: No nausea, vomiting, constipation, diarrhea. No blood in stool. +Abdominal bloating.  : No blood in urine.  MSK: See HPI.  NEURO: negative  PSYCH: negative    Active Problem List     Patient Active Problem List   Diagnosis     CARDIOVASCULAR SCREENING; LDL GOAL LESS THAN 160     Urticaria     Diagnostic skin and sensitization tests     Nonallergic rhinitis     Angioedema of lips     H. pylori infection     GPC (giant  papillary conjunctivitis)     Seronegative spondyloarthropathy (H)     Midline low back pain without sciatica     Abnormal uterine bleeding (AUB)- on OCPs     Past Medical History     Past Medical History:   Diagnosis Date     Angioedema of lips episode in 3/13--idiopathic     Contraception 1/28/2009     Diagnostic skin and sensitization tests 3/27/13 skin tests all NEGATIVE for environmental and food allergens including shellfish and nuts.      Nonallergic rhinitis     3/27/13 skin tests all NEGATIVE for environmental and food allergens including shellfish and nuts. 3/27/13 followup IgE tests NEG to Peanut, SNF, shrimp, macadamia nut, pistachio and walnut.     Rheumatoid arthritis of multiple sites with negative rheumatoid factor (H) 12/31/2015     Past Surgical History     Past Surgical History:   Procedure Laterality Date     LAPAROSCOPIC SALPINGECTOMY Bilateral 6/23/2017    Procedure: LAPAROSCOPIC SALPINGECTOMY;  Operative Laparoscopy, Bilateral Salpingectomy ;  Surgeon: Apryl West MD;  [facility]     OPERATIVE HYSTEROSCOPY WITH MORCELLATOR N/A 12/12/2018    Procedure: DIAGNOSTIC HYSTEROSCOPY AND D AND C;  Surgeon: Apryl West MD;  [facility]        Allergy     Allergies   Allergen Reactions     Shrimp Swelling     Lips and tongue     Walnuts [Nuts] Swelling     Lips and tongue       Current Medication List     Current Outpatient Medications   Medication Sig     Fexofenadine HCl (ALLEGRA PO) Take 180 mg by mouth daily     leflunomide (ARAVA) 10 MG tablet Take 1 tablet (10 mg) by mouth daily     levonorgestrel-ethinyl estradiol (AVIANE/ALESSE/LESSINA) 0.1-20 MG-MCG tablet Take 1 tablet by mouth daily     multivitamin peds with iron (FLINTSTONES COMPLETE) 60 MG chewable tablet Take 1 chew tab by mouth daily.     No current facility-administered medications for this visit.      Social History   See HPI    Family History     Family History   Problem Relation Age of Onset     Hypertension  "Maternal Grandmother      Autoimmune Disease Maternal Grandmother         Autoimmune hepatitis     Cancer Maternal Grandfather      Hypertension Paternal Grandmother      Cancer - colorectal Paternal Grandfather      Cardiovascular Paternal Grandfather      Other Cancer Paternal Grandfather      Hypertension Mother      Autoimmune Disease Mother         Autoimmune hepatitis     Hyperlipidemia Mother      Asthma Mother      Hypertension Father      Diabetes Father         Type 2     Multiple Sclerosis Maternal Aunt      Cerebrovascular Disease No family hx of      Thyroid Disease No family hx of      Glaucoma No family hx of      Macular Degeneration No family hx of      Breast Cancer No family hx of      Colon Cancer No family hx of      Physical Exam     Temp Readings from Last 3 Encounters:   08/05/19 98.7  F (37.1  C) (Oral)   02/21/19 98.1  F (36.7  C) (Oral)   12/12/18 97.8  F (36.6  C) (Oral)     BP Readings from Last 5 Encounters:   10/25/19 120/78   08/05/19 139/76   06/21/19 114/68   02/21/19 113/73   01/08/19 137/83     Pulse Readings from Last 1 Encounters:   10/25/19 68     Resp Readings from Last 1 Encounters:   08/05/19 16     Estimated body mass index is 18.7 kg/m  as calculated from the following:    Height as of 6/21/19: 1.651 m (5' 5\").    Weight as of this encounter: 51 kg (112 lb 6.4 oz).    GEN: NAD  HEENT: MMM. Anicteric, noninjected sclera; eyes appear to have good moisture by gross examination  CV: S1, S2. RRR. No m/r/g.  PULM: CTA bilaterally. No w/c.  MSK: Synovial swelling and tenderness palpation of the right second-third MCPs, second-fourth PIPs; and the left second MCP and fourth PIP.  Wrists without swelling or tenderness palpation.  Left elbow without swelling or tenderness palpation.  Right elbow without swelling but was tender palpation over the medial epicondyle.  Shoulders, knees, ankles, and MTPs without swelling or tenderness palpation.  Achilles tendons nontender to palpation.  "   SKIN: No rash.  No nail pitting.  EXT: No LE edema  PSYCH: Alert. Appropriate.    Labs / Imaging (select studies)   RF/CCP  Recent Labs   Lab Test 12/21/15  1447   CCPABY <20  Interpretation:  Negative     RHF <20     CBC  Recent Labs   Lab Test 08/30/19  0851 05/24/19  1143 02/21/19  1427   WBC 6.4 5.1 4.6   RBC 4.44 4.29 4.24   HGB 12.1 11.4* 11.3*   HCT 37.6 35.9 35.8   MCV 85 84 84   RDW 12.5 12.5 13.2    203 183   MCH 27.3 26.6 26.7   MCHC 32.2 31.8 31.6   NEUTROPHIL 73.1 60.3 49.2   LYMPH 19.4 32.6 40.9   MONOCYTE 5.9 5.5 8.0   EOSINOPHIL 1.3 1.2 1.5   BASOPHIL 0.3 0.4 0.4   ANEU 4.7 3.1 2.3   ALYM 1.2 1.7 1.9   ANA MARÍA 0.4 0.3 0.4   AEOS 0.1 0.1 0.1   ABAS 0.0 0.0 0.0     CMP  Recent Labs   Lab Test 08/30/19  0851 05/24/19  1143 02/21/19  1427 12/12/18  1047  06/30/15  1636   NA  --   --   --   --   --  140   POTASSIUM  --   --   --   --   --  4.1   CHLORIDE  --   --   --   --   --  105   CO2  --   --   --   --   --  30   ANIONGAP  --   --   --   --   --  5   GLC  --   --   --  88  --  73   BUN  --   --   --   --   --  9   CR 0.83 0.81 0.76  --    < > 0.80   GFRESTIMATED 89 >90 >90  --    < > 83   GFRESTBLACK >90 >90 >90  --    < > >90   GFR Calc     DAISY  --   --   --   --   --  9.4   BILITOTAL 0.4 0.3 0.3  --    < > 0.5   ALBUMIN 3.9 3.8 3.9  --    < > 4.1   PROTTOTAL 7.2 7.6 7.5  --    < > 8.1   ALKPHOS 43 47 44  --    < > 65   AST 26 25 20  --    < > 19   ALT 27 34 22  --    < > 26    < > = values in this interval not displayed.     Calcium/VitaminD  Recent Labs   Lab Test 01/13/17  1355 12/21/15  1447 06/30/15  1636 01/09/14  0906   DAISY  --   --  9.4  --    VITDT 45 74  --  35     ESR/CRP  Recent Labs   Lab Test 05/24/19  1143 02/21/19  1427 09/14/18  0952   SED 11 8 7   CRP <2.9 <2.9 <2.9     Hepatitis B  Recent Labs   Lab Test 03/20/17  1506 03/28/16  1442 08/06/13  1500   AUSAB  --  264.69*  --    HBCAB Nonreactive  --   --    HEPBANG  --  Nonreactive Negative     Hepatitis  "C  Recent Labs   Lab Test 12/21/15  1447   HCVAB Nonreactive   Assay performance characteristics have not been established for newborns,   infants, and children       Lyme confirmation testing by Western Blot  Recent Labs   Lab Test 08/05/15  1621   LYWG Negative  Reference range: Negative  (Note)  Band(s) present: NONE  (Insufficient number of bands for positive result)  INTERPRETIVE INFORMATION: Borrelia Burgdorferi Ab, IgG  Western Blot  For this assay, a positive result is reported when any 5 or  more of the following 10 bands are present: 18, 23, 28, 30,  39, 41, 45, 58, 66, or 93 kDa.  All other banding patterns  are reported as negative.  Performed by [facility],  [address] [place] [id number] [phone]  [email or web address], Pete Saha MD, Lab. Director       Tuberculosis Screening  Recent Labs   Lab Test 03/20/17  1508 03/28/16  1443   TBRSLT Negative Negative   TBAGN 0.00 0.04     HIV Screening  Recent Labs   Lab Test 12/21/15  1447   HIAGAB Nonreactive   HIV-1 p24 Ag & HIV-1/HIV-2 Ab Not Detected         \"XR SACROILIAC JOINT 1/2 VW 12/21/2015 3:23 PM  HISTORY: Pain.  COMPARISON: None.  FINDINGS: Sacroiliac joints are patent and symmetric. No acute osseous  abnormality.  IMPRESSION  IMPRESSION: Normal sacroiliac joints.  YUE JARRELL MD\"    \"XR LUMBAR SPINE 2-3 VIEWS 12/21/2015 3:23 PM  HISTORY: Pain.  COMPARISON: None.  FINDINGS: Lumbar alignment is anatomic. Vertebral body heights and  intervertebral disc spaces are preserved.  IMPRESSION  IMPRESSION: Normal lumbar spine.  YUE JARRELL MD\"    \"XR HAND BILATERAL G/E 3 VW 12/21/2015 3:50 PM  HISTORY: Pain in unspecified joint   IMPRESSION  IMPRESSION: Negative.  LINNEA ZELAYA MD\"    \"XR CHEST 2 VW 6/30/2015 4:54 PM  HISTORY: Pain.  COMPARISON: None.  IMPRESSION  IMPRESSION: The lungs are clear. No focal pulmonary opacities. Heart  and mediastinum are unremarkable. No acute cardiopulmonary  abnormalities.  SO PHAN MD\"    Immunization " History     Immunization History   Administered Date(s) Administered     Influenza (IIV3) PF 10/12/2011, 10/03/2013     Influenza Vaccine, 3 YRS +, IM (QUADRIVALENT W/PRESERVATIVES) 10/01/2015     Pneumo Conj 13-V (2010&after) 09/26/2016     Pneumococcal 23 valent 12/22/2016     TD (ADULT, 7+) 08/15/2001     TDAP Vaccine (Adacel) 06/20/2011     TDAP Vaccine (Boostrix) 03/06/2014          Chart documentation done in part with Dragon Voice recognition Software. Although reviewed after completion, some word and grammatical error may remain.    Hunter Monroy MD

## 2019-10-25 NOTE — PATIENT INSTRUCTIONS
Rheumatology    Dr. Hunter Monroy       [facility] in [place]   (Monday)  [address]  [place] [address]       [facility] in [place]   (Tuesday)  [address] [place] [address]    [facility] in [place]   (Wed., Thurs., and Friday)  [address] [place] [address]    Phone number: [phone]  Thank you for choosing [facility].  FERNANDO Barajas RMA

## 2019-10-28 ENCOUNTER — TELEPHONE (OUTPATIENT)
Dept: RHEUMATOLOGY | Facility: CLINIC | Age: 39
End: 2019-10-28

## 2019-10-28 NOTE — TELEPHONE ENCOUNTER
PA Initiation    Medication: otezla pa pending   Insurance Company: [employer] - Phone [phone] Fax [phone]  Pharmacy Filling the Rx: [facility] - [place], [place] - [facility] [address]  Filling Pharmacy Phone:    Filling Pharmacy Fax:    Start Date: 10/28/2019

## 2019-10-29 NOTE — TELEPHONE ENCOUNTER
Prior Authorization Approval    Authorization Effective Date: 10/29/2019  Authorization Expiration Date: 10/28/2020  Medication: otezla pa approved  Approved Dose/Quantity: starter and maintenance   Reference #: [id number]   Insurance Company: [employer] - Phone [phone] Fax [phone]  Expected CoPay: $1361.01     CoPay Card Available: Yes    Foundation Assistance Needed: na  Which Pharmacy is filling the prescription (Not needed for infusion/clinic administered): [facility] - [place], [place] - [facility] [address]  Pharmacy Notified: Yes  Patient Notified: Yes

## 2019-11-06 ENCOUNTER — HEALTH MAINTENANCE LETTER (OUTPATIENT)
Age: 39
End: 2019-11-06

## 2019-11-20 ENCOUNTER — NURSE TRIAGE (OUTPATIENT)
Dept: NURSING | Facility: CLINIC | Age: 39
End: 2019-11-20

## 2019-11-20 NOTE — TELEPHONE ENCOUNTER
Abby is calling and states that stomach pain started on Sunday morning, severe.  Bad after eating.  Fever on Sunday night and then went away.  Pain is present after eating.  Pain is in the upper abdominal pain.  Currently no pain but not eating.  Abby drank water and then cramping started.  Abby cannot go to work.  Abby states that she will go to ED.      Reason for Disposition    [1] SEVERE pain (e.g., excruciating) AND [2] present > 1 hour    Additional Information    Negative: Shock suspected (e.g., cold/pale/clammy skin, too weak to stand, low BP, rapid pulse)    Negative: Difficult to awaken or acting confused (e.g., disoriented, slurred speech)    Negative: Passed out (i.e., lost consciousness, collapsed and was not responding)    Negative: Sounds like a life-threatening emergency to the triager    Protocols used: ABDOMINAL PAIN - FEMALE-A-

## 2019-11-22 ENCOUNTER — OFFICE VISIT (OUTPATIENT)
Dept: OBGYN | Facility: CLINIC | Age: 39
End: 2019-11-22
Attending: OBSTETRICS & GYNECOLOGY
Payer: COMMERCIAL

## 2019-11-22 VITALS
DIASTOLIC BLOOD PRESSURE: 80 MMHG | WEIGHT: 109.3 LBS | SYSTOLIC BLOOD PRESSURE: 128 MMHG | BODY MASS INDEX: 18.21 KG/M2 | HEIGHT: 65 IN

## 2019-11-22 DIAGNOSIS — N93.9 ABNORMAL UTERINE BLEEDING (AUB): Primary | ICD-10-CM

## 2019-11-22 PROCEDURE — G0463 HOSPITAL OUTPT CLINIC VISIT: HCPCS | Mod: ZF

## 2019-11-22 RX ORDER — NORGESTIMATE AND ETHINYL ESTRADIOL 0.25-0.035
1 KIT ORAL DAILY
Qty: 84 TABLET | Refills: 3 | Status: SHIPPED | OUTPATIENT
Start: 2019-11-22 | End: 2020-10-06

## 2019-11-22 RX ORDER — FAMOTIDINE 20 MG/1
20 TABLET, FILM COATED ORAL
COMMUNITY
Start: 2019-11-20 | End: 2019-12-04

## 2019-11-22 ASSESSMENT — MIFFLIN-ST. JEOR: SCORE: 1176.66

## 2019-11-22 NOTE — NURSING NOTE
Chief Complaint   Patient presents with     Follow Up     Follow up labs       See FERNANDO Reddy 11/22/2019

## 2019-11-22 NOTE — PROGRESS NOTES
"Abby cisneros 38 yo  returns to discuss AUB.  Patient has had tubal ligation (bilateral salpingectomy) in 2017 with a normal appearing pelvis.  She had abnormal uterine bleeding prior to this surgery and after.  She restarted OCPs to try to decrease bleeding, but it has continued.  It is not heavy but bothersome.  \"the only time I know I won't be spotting is the first week of my pill pack\".  An ultrasound in 2018 showed a possible polyp, but there was none present at hysteroscopy.  Pathology from then was benign.  She is frustrated with this bleeding and wonders what else to do.    Recently she has had more generalized abdominal cramping.  She was seen in the emergency department after an episode of pain after a fatty meal.  Their evaluation was negative for gallstones and she was discharged to home.  Her hemoglobin then was slightly low (11.3).  She was started on pepcid bid and has follow-up with her PCP.    Past Medical History:   Diagnosis Date     Angioedema of lips episode in 3/13--idiopathic     Contraception 2009     Diagnostic skin and sensitization tests 3/27/13 skin tests all NEGATIVE for environmental and food allergens including shellfish and nuts.      Nonallergic rhinitis     3/27/13 skin tests all NEGATIVE for environmental and food allergens including shellfish and nuts. 3/27/13 followup IgE tests NEG to Peanut, SNF, shrimp, macadamia nut, pistachio and walnut.     Rheumatoid arthritis of multiple sites with negative rheumatoid factor (H) 2015     Past Surgical History:   Procedure Laterality Date     LAPAROSCOPIC SALPINGECTOMY Bilateral 2017    Procedure: LAPAROSCOPIC SALPINGECTOMY;  Operative Laparoscopy, Bilateral Salpingectomy ;  Surgeon: Apryl West MD;  [facility]     OPERATIVE HYSTEROSCOPY WITH MORCELLATOR N/A 2018    Procedure: DIAGNOSTIC HYSTEROSCOPY AND D AND C;  Surgeon: Apryl West MD;  [facility]     Family History   Problem Relation Age of " "Onset     Hypertension Maternal Grandmother      Autoimmune Disease Maternal Grandmother         Autoimmune hepatitis     Cancer Maternal Grandfather      Hypertension Paternal Grandmother      Cancer - colorectal Paternal Grandfather      Cardiovascular Paternal Grandfather      Other Cancer Paternal Grandfather      Hypertension Mother      Autoimmune Disease Mother         Autoimmune hepatitis     Hyperlipidemia Mother      Asthma Mother      Hypertension Father      Diabetes Father         Type 2     Multiple Sclerosis Maternal Aunt      Cerebrovascular Disease No family hx of      Thyroid Disease No family hx of      Glaucoma No family hx of      Macular Degeneration No family hx of      Breast Cancer No family hx of      Colon Cancer No family hx of      Physical exam:  /80 (BP Location: Right arm, Patient Position: Chair)   Ht 1.651 m (5' 5\")   Wt 49.6 kg (109 lb 4.8 oz)   BMI 18.19 kg/m    Gen'l: appears well    Labs:   11/20/19 hgb 11.3, BMP normal, Cr 0.81  10/25/19 hgb 11.8, Cr 0.75  9/27/19 Prolactin 4  9/27/19 TSH 1.38    Assessment/Plan:  40 yo with AUB likely breakthrough bleeding on low dose ocp    - we discussed that we had used low dose ocp because of her borderline elevated blood pressure. She may have less breakthrough bleeding with a slightly higher dose pill.  Patient is willing to try and will check BP weekly when starting the ortho-cyclen  - discussed options if unable to control bleeding including IUD (declines), ablation (not ideal candidate given young age), vs hysterectomy.  Will follow response and reassess in 3 months.  Patient will call if BP elevated with new pill.  Apryl West MD     Total visit time was 15 minutes with 15 minutes spent in counseling and coordination of care for AUB .    "

## 2019-11-22 NOTE — LETTER
"2019       RE: Abby Foy  [address] [facility] [address]     Dear Colleague,    Thank you for referring your patient, Abby Foy, to the [facility] at [facility]. Please see a copy of my visit note below.    Abby cisneros 40 yo  returns to discuss AUB.  Patient has had tubal ligation (bilateral salpingectomy) in 2017 with a normal appearing pelvis.  She had abnormal uterine bleeding prior to this surgery and after.  She restarted OCPs to try to decrease bleeding, but it has continued.  It is not heavy but bothersome.  \"the only time I know I won't be spotting is the first week of my pill pack\".  An ultrasound in 2018 showed a possible polyp, but there was none present at hysteroscopy.  Pathology from then was benign.  She is frustrated with this bleeding and wonders what else to do.    Recently she has had more generalized abdominal cramping.  She was seen in the emergency department after an episode of pain after a fatty meal.  Their evaluation was negative for gallstones and she was discharged to home.  Her hemoglobin then was slightly low (11.3).  She was started on pepcid bid and has follow-up with her PCP.    Past Medical History:   Diagnosis Date     Angioedema of lips episode in 3/13--idiopathic     Contraception 2009     Diagnostic skin and sensitization tests 3/27/13 skin tests all NEGATIVE for environmental and food allergens including shellfish and nuts.      Nonallergic rhinitis     3/27/13 skin tests all NEGATIVE for environmental and food allergens including shellfish and nuts. 3/27/13 followup IgE tests NEG to Peanut, SNF, shrimp, macadamia nut, pistachio and walnut.     Rheumatoid arthritis of multiple sites with negative rheumatoid factor (H) 2015     Past Surgical History:   Procedure Laterality Date     LAPAROSCOPIC SALPINGECTOMY Bilateral 2017    Procedure: LAPAROSCOPIC SALPINGECTOMY;  " "Operative Laparoscopy, Bilateral Salpingectomy ;  Surgeon: Aprly West MD;  Location: [facility]     OPERATIVE HYSTEROSCOPY WITH MORCELLATOR N/A 12/12/2018    Procedure: DIAGNOSTIC HYSTEROSCOPY AND D AND C;  Surgeon: Apryl West MD;  [facility] OR     Family History   Problem Relation Age of Onset     Hypertension Maternal Grandmother      Autoimmune Disease Maternal Grandmother         Autoimmune hepatitis     Cancer Maternal Grandfather      Hypertension Paternal Grandmother      Cancer - colorectal Paternal Grandfather      Cardiovascular Paternal Grandfather      Other Cancer Paternal Grandfather      Hypertension Mother      Autoimmune Disease Mother         Autoimmune hepatitis     Hyperlipidemia Mother      Asthma Mother      Hypertension Father      Diabetes Father         Type 2     Multiple Sclerosis Maternal Aunt      Cerebrovascular Disease No family hx of      Thyroid Disease No family hx of      Glaucoma No family hx of      Macular Degeneration No family hx of      Breast Cancer No family hx of      Colon Cancer No family hx of      Physical exam:  /80 (BP Location: Right arm, Patient Position: Chair)   Ht 1.651 m (5' 5\")   Wt 49.6 kg (109 lb 4.8 oz)   BMI 18.19 kg/m     Gen'l: appears well    Labs:   11/20/19 hgb 11.3, BMP normal, Cr 0.81  10/25/19 hgb 11.8, Cr 0.75  9/27/19 Prolactin 4  9/27/19 TSH 1.38    Assessment/Plan:  38 yo with AUB likely breakthrough bleeding on low dose ocp    - we discussed that we had used low dose ocp because of her borderline elevated blood pressure. She may have less breakthrough bleeding with a slightly higher dose pill.  Patient is willing to try and will check BP weekly when starting the ortho-cyclen  - discussed options if unable to control bleeding including IUD (declines), ablation (not ideal candidate given young age), vs hysterectomy.  Will follow response and reassess in 3 months.  Patient will call if BP elevated with new pill.    Apryl Vargas " MD Brett     Total visit time was 15 minutes with 15 minutes spent in counseling and coordination of care for AUB .

## 2020-01-24 DIAGNOSIS — L40.50 PSORIATIC ARTHRITIS (H): ICD-10-CM

## 2020-01-24 LAB
ALBUMIN SERPL-MCNC: 3.4 G/DL (ref 3.4–5)
ALP SERPL-CCNC: 73 U/L (ref 40–150)
ALT SERPL W P-5'-P-CCNC: 26 U/L (ref 0–50)
AST SERPL W P-5'-P-CCNC: 21 U/L (ref 0–45)
BASOPHILS # BLD AUTO: 0 10E9/L (ref 0–0.2)
BASOPHILS NFR BLD AUTO: 0.2 %
BILIRUB DIRECT SERPL-MCNC: <0.1 MG/DL (ref 0–0.2)
BILIRUB SERPL-MCNC: 0.3 MG/DL (ref 0.2–1.3)
CREAT SERPL-MCNC: 0.8 MG/DL (ref 0.52–1.04)
DIFFERENTIAL METHOD BLD: ABNORMAL
EOSINOPHIL # BLD AUTO: 0.1 10E9/L (ref 0–0.7)
EOSINOPHIL NFR BLD AUTO: 1.7 %
ERYTHROCYTE [DISTWIDTH] IN BLOOD BY AUTOMATED COUNT: 13.6 % (ref 10–15)
GFR SERPL CREATININE-BSD FRML MDRD: >90 ML/MIN/{1.73_M2}
HCT VFR BLD AUTO: 37 % (ref 35–47)
HGB BLD-MCNC: 11.5 G/DL (ref 11.7–15.7)
LYMPHOCYTES # BLD AUTO: 1.2 10E9/L (ref 0.8–5.3)
LYMPHOCYTES NFR BLD AUTO: 22.9 %
MCH RBC QN AUTO: 26.1 PG (ref 26.5–33)
MCHC RBC AUTO-ENTMCNC: 31.1 G/DL (ref 31.5–36.5)
MCV RBC AUTO: 84 FL (ref 78–100)
MONOCYTES # BLD AUTO: 0.5 10E9/L (ref 0–1.3)
MONOCYTES NFR BLD AUTO: 8.9 %
NEUTROPHILS # BLD AUTO: 3.6 10E9/L (ref 1.6–8.3)
NEUTROPHILS NFR BLD AUTO: 66.3 %
PLATELET # BLD AUTO: 282 10E9/L (ref 150–450)
PROT SERPL-MCNC: 7.4 G/DL (ref 6.8–8.8)
RBC # BLD AUTO: 4.4 10E12/L (ref 3.8–5.2)
WBC # BLD AUTO: 5.4 10E9/L (ref 4–11)

## 2020-01-24 PROCEDURE — 36415 COLL VENOUS BLD VENIPUNCTURE: CPT | Performed by: INTERNAL MEDICINE

## 2020-01-24 PROCEDURE — 85025 COMPLETE CBC W/AUTO DIFF WBC: CPT | Performed by: INTERNAL MEDICINE

## 2020-01-24 PROCEDURE — 82565 ASSAY OF CREATININE: CPT | Performed by: INTERNAL MEDICINE

## 2020-01-24 PROCEDURE — 80076 HEPATIC FUNCTION PANEL: CPT | Performed by: INTERNAL MEDICINE

## 2020-02-14 DIAGNOSIS — N93.9 ABNORMAL UTERINE BLEEDING (AUB): Primary | ICD-10-CM

## 2020-02-17 ENCOUNTER — TELEPHONE (OUTPATIENT)
Dept: OBGYN | Facility: CLINIC | Age: 40
End: 2020-02-17

## 2020-02-18 ENCOUNTER — TELEPHONE (OUTPATIENT)
Dept: OBGYN | Facility: CLINIC | Age: 40
End: 2020-02-18

## 2020-02-18 ENCOUNTER — HOSPITAL ENCOUNTER (OUTPATIENT)
Facility: CLINIC | Age: 40
End: 2020-02-18
Attending: OBSTETRICS & GYNECOLOGY | Admitting: OBSTETRICS & GYNECOLOGY
Payer: COMMERCIAL

## 2020-02-18 NOTE — TELEPHONE ENCOUNTER
Confirmed surgery date, time and location, 4/17/20 with arrival time at 5:30a.m with nothing to eat eight hours before scheduled surgery time and clear liquids up to two hours before scheduled surgery time, informed patient that a pre op physical is needed within thirty days of surgery and that a map and letter will be mailed out.     to complete the following fields:            CHECKLIST     Google Calendar : Yes     Resident notified: Not Applicable     Clinic schedule blocked:  Not Applicable    Patient notified:Yes      Pre op information sent: Yes     Given to patient over the phone.Yes    Comments:

## 2020-02-28 ENCOUNTER — TELEPHONE (OUTPATIENT)
Dept: RHEUMATOLOGY | Facility: CLINIC | Age: 40
End: 2020-02-28

## 2020-02-28 ENCOUNTER — OFFICE VISIT (OUTPATIENT)
Dept: RHEUMATOLOGY | Facility: CLINIC | Age: 40
End: 2020-02-28
Payer: COMMERCIAL

## 2020-02-28 VITALS
HEART RATE: 71 BPM | SYSTOLIC BLOOD PRESSURE: 114 MMHG | HEIGHT: 65 IN | OXYGEN SATURATION: 100 % | DIASTOLIC BLOOD PRESSURE: 72 MMHG | BODY MASS INDEX: 18.8 KG/M2 | WEIGHT: 112.8 LBS

## 2020-02-28 DIAGNOSIS — L40.50 PSORIATIC ARTHRITIS (H): Primary | ICD-10-CM

## 2020-02-28 DIAGNOSIS — Z79.899 HIGH RISK MEDICATIONS (NOT ANTICOAGULANTS) LONG-TERM USE: ICD-10-CM

## 2020-02-28 PROCEDURE — 99214 OFFICE O/P EST MOD 30 MIN: CPT | Performed by: INTERNAL MEDICINE

## 2020-02-28 ASSESSMENT — MIFFLIN-ST. JEOR: SCORE: 1192.54

## 2020-02-28 NOTE — TELEPHONE ENCOUNTER
PA Initiation    Medication: otezla   Insurance Company: [employer] - Phone [phone] Fax [phone]  Pharmacy Filling the Rx:    Filling Pharmacy Phone:    Filling Pharmacy Fax:    Start Date: 2/28/2020

## 2020-02-28 NOTE — NURSING NOTE
RAPID3 (0-30) Cumulative Score  0.5          RAPID3 Weighted Score (divide #4 by 3 and that is the weighted score)  0.16     Maddie Hennessy [facility]  2/28/2020 10:19 AM

## 2020-02-28 NOTE — PROGRESS NOTES
Rheumatology Clinic Visit      Abby Foy MRN# [id number]   YOB: 1980 Age: 39 year old      Date of visit: 2/28/20   PCP: Sandi Duffy  Dermatologist: Amy Patel  Ophthalmologist: Dr. Mathis     Chief Complaint   Patient presents with:  Arthritis: Psoriatic. Patient has minimal pain.    Assessment and Plan   1. 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 However, given her continued back pain that improved with activity but no radiographic evidence for inflammatory arthritis or osteoarthritis, there was concern that she had inflammatory back pain that would be more consistent with a spondylarthritis. Humira was started and resulted in improvement of her back pain, suggesting axial disease.  Arthritis improved with methotrexate and sulfasalazine, but she was having headaches and therefore the most likely culprit methotrexate was reduced until her headaches worsened significantly and therefore sulfasalazine and methotrexate were both discontinued. She had resolution of her headaches after discontinuing both sulfasalazine and methotrexate. I believe that the sulfasalazine was the cause of her headaches. Therefore, cautious retrial of methotrexate in the future could be done.  She was doing well on a combination of leflunomide 10 mg daily and Humira 40 mg SQ every 14 days but Humira had to be stopped because of issues with the  program; so Simponi was Rx'd but never started.  Intermittent inflammatory symptoms on Otezla was prescribed but never started.  Then she wanted to start Otezla but the insurance changed and she needed another prior authorization so she waited until today.  PA Otezla.  Start Otezla.    - Continue leflunomide 10 mg daily  -  Start Otezla, starter pack followed by 30mg BID  - Labs in mid April: CBC, Creatinine, Hepatic Panel, ESR, CRP    # Pregnancy Planning: s/p salpingectomy;  had a vasectomy    2. 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 Interestingly when leflunomide was stopped between 3/2018 and today, 6/15/2018, she experienced increased L>R eye irritation that when bothering her only affected one eye at a time.  Leflunomide was restarted with improvement of the eye inflammation.     3. Headaches: Likely due to SSZ. Headaches stopped with discontinuation of SSZ. One headache since last seen.    4.  Right medial epicondylitis: Reviewed the diagnosis treatment options.  Conservative management at this time.  RICE.  Avoid aggravating activities.    5. Vaccinations: Vaccinations reviewed with Ms. Foy.   - Influenza: encouraged yearly vaccination; reportedly already up to date for the 7715-6306 season  - [id number]: up to date  - [id number]: up to date  - Shingrix: Declined by patient because it is not covered by insurance; she checked with her insurance about coverage    Ms. Foy verbalized agreement with and understanding of the rational for the diagnosis and treatment plan.  All questions were answered to best of my ability and the patient's satisfaction. Ms. Foy was advised to contact the clinic with any questions that may arise after the clinic visit.      Thank you for involving me in the care of the patient    Return to clinic: 3-4 months    HPI   Abby Foy is a 39 year old female with medical history significant for urticaria, H. pylori infection, and iritis? who returns for f/u of seronegative spondyloarthropathy.    Today, Ms. Foy reports that she was going to start Otezla but then when she went to fill it her insurance had changed and she needed another prior authorization completed.  About once weekly she has  significant joint pains at her MCPs and PIPs with worsening morning stiffness.  Morning stiffness for at least 1 hour.  Positive gelling phenomenon.  Tolerating leflunomide.    Denies fevers, chills, nausea, vomiting, constipation, diarrhea. No abdominal pain. No chest pain/pressure, palpitations, or shortness of breath. No neck pain. Occasional cold sores..     Tobacco: None  EtOH: 1-2 drinks on the weekends  Drugs: None  Occupation: Dietitian at the [employer]    ROS   GEN: No fevers/chills  SKIN: See HPI  HEENT: see HPI.  CV: No chest pain, pressure, palpitations, or dyspnea on exertion.  PULM: No SOB. No cough or wheezing  GI: No nausea, vomiting, constipation, diarrhea. No blood in stool. +Abdominal bloating.  : No blood in urine.  MSK: See HPI.  NEURO: negative  PSYCH: negative    Active Problem List     Patient Active Problem List   Diagnosis     CARDIOVASCULAR SCREENING; LDL GOAL LESS THAN 160     Urticaria     Diagnostic skin and sensitization tests     Nonallergic rhinitis     Angioedema of lips     H. pylori infection     GPC (giant papillary conjunctivitis)     Seronegative spondyloarthropathy (H)     Midline low back pain without sciatica     Abnormal uterine bleeding (AUB)- on OCPs     Psoriatic arthritis (H)     Past Medical History     Past Medical History:   Diagnosis Date     Angioedema of lips episode in 3/13--idiopathic     Contraception 1/28/2009     Diagnostic skin and sensitization tests 3/27/13 skin tests all NEGATIVE for environmental and food allergens including shellfish and nuts.      Nonallergic rhinitis     3/27/13 skin tests all NEGATIVE for environmental and food allergens including shellfish and nuts. 3/27/13 followup IgE tests NEG to Peanut, SNF, shrimp, macadamia nut, pistachio and walnut.     Rheumatoid arthritis of multiple sites with negative rheumatoid factor (H) 12/31/2015     Past Surgical History     Past Surgical History:   Procedure Laterality Date      LAPAROSCOPIC SALPINGECTOMY Bilateral 6/23/2017    Procedure: LAPAROSCOPIC SALPINGECTOMY;  Operative Laparoscopy, Bilateral Salpingectomy ;  Surgeon: Apryl West MD;  [facility]     OPERATIVE HYSTEROSCOPY WITH MORCELLATOR N/A 12/12/2018    Procedure: DIAGNOSTIC HYSTEROSCOPY AND D AND C;  Surgeon: Apryl West MD;  [facility] OR        Allergy     Allergies   Allergen Reactions     Shrimp Swelling     Lips and tongue     Walnuts [Nuts] Swelling     Lips and tongue       Current Medication List     Current Outpatient Medications   Medication Sig     Apremilast (OTEZLA) 10 & 20 & 30 MG TBPK Take as directed.     apremilast (OTEZLA) 30 MG tablet Take 1 tablet (30 mg) by mouth 2 times daily     Fexofenadine HCl (ALLEGRA PO) Take 180 mg by mouth daily     leflunomide (ARAVA) 10 MG tablet Take 1 tablet (10 mg) by mouth daily     multivitamin peds with iron (FLINTSTONES COMPLETE) 60 MG chewable tablet Take 1 chew tab by mouth daily.     norgestimate-ethinyl estradiol (ORTHO-CYCLEN/SPRINTEC) 0.25-35 MG-MCG tablet Take 1 tablet by mouth daily     No current facility-administered medications for this visit.      Social History   See HPI    Family History     Family History   Problem Relation Age of Onset     Hypertension Maternal Grandmother      Autoimmune Disease Maternal Grandmother         Autoimmune hepatitis     Cancer Maternal Grandfather      Hypertension Paternal Grandmother      Cancer - colorectal Paternal Grandfather      Cardiovascular Paternal Grandfather      Other Cancer Paternal Grandfather      Hypertension Mother      Autoimmune Disease Mother         Autoimmune hepatitis     Hyperlipidemia Mother      Asthma Mother      Hypertension Father      Diabetes Father         Type 2     Multiple Sclerosis Maternal Aunt      Cerebrovascular Disease No family hx of      Thyroid Disease No family hx of      Glaucoma No family hx of      Macular Degeneration No family hx of      Breast Cancer No family  "hx of      Colon Cancer No family hx of      Physical Exam     Temp Readings from Last 3 Encounters:   08/05/19 98.7  F (37.1  C) (Oral)   02/21/19 98.1  F (36.7  C) (Oral)   12/12/18 97.8  F (36.6  C) (Oral)     BP Readings from Last 5 Encounters:   11/22/19 128/80   10/25/19 120/78   08/05/19 139/76   06/21/19 114/68   02/21/19 113/73     Pulse Readings from Last 1 Encounters:   10/25/19 68     Resp Readings from Last 1 Encounters:   08/05/19 16     Estimated body mass index is 18.19 kg/m  as calculated from the following:    Height as of this encounter: 1.651 m (5' 5\").    Weight as of 11/22/19: 49.6 kg (109 lb 4.8 oz).    GEN: NAD  HEENT: MMM. Anicteric, noninjected sclera; eyes appear to have good moisture by gross examination  CV: S1, S2. RRR. No m/r/g.  PULM: CTA bilaterally. No w/c.  MSK: Synovial swelling without tenderness to palpation of the right second-third MCPs.  PIPs without swelling or tenderness palpation.  DIPs without swelling or tenderness to palpation.  Wrists, elbows, shoulders, knees, and ankles without swelling or tenderness palpation.  MTPs without swelling or tenderness palpation.  Right Achilles tendon nontender to palpation.  Left Achilles enthesis mildly tender to palpation but no swelling, increased warmth, or overlying erythema. No dactylitis.   SKIN: No rash.  No nail pitting.  EXT: No LE edema  PSYCH: Alert. Appropriate.    Labs / Imaging (select studies)   RF/CCP  Recent Labs   Lab Test 12/21/15  1447   CCPABY <20  Interpretation:  Negative     RHF <20     CBC  Recent Labs   Lab Test 01/24/20  0921 10/25/19  1056 08/30/19  0851   WBC 5.4 5.6 6.4   RBC 4.40 4.49 4.44   HGB 11.5* 11.8 12.1   HCT 37.0 38.1 37.6   MCV 84 85 85   RDW 13.6 12.5 12.5    195 194   MCH 26.1* 26.3* 27.3   MCHC 31.1* 31.0* 32.2   NEUTROPHIL 66.3 65.6 73.1   LYMPH 22.9 25.0 19.4   MONOCYTE 8.9 7.2 5.9   EOSINOPHIL 1.7 1.8 1.3   BASOPHIL 0.2 0.4 0.3   ANEU 3.6 3.6 4.7   ALYM 1.2 1.4 1.2   ANA MARÍA 0.5 0.4 " 0.4   AEOS 0.1 0.1 0.1   ABAS 0.0 0.0 0.0     CMP  Recent Labs   Lab Test 01/24/20  0921 10/25/19  1056 08/30/19  0851  12/12/18  1047  06/30/15  1636   NA  --   --   --   --   --   --  140   POTASSIUM  --   --   --   --   --   --  4.1   CHLORIDE  --   --   --   --   --   --  105   CO2  --   --   --   --   --   --  30   ANIONGAP  --   --   --   --   --   --  5   GLC  --   --   --   --  88  --  73   BUN  --   --   --   --   --   --  9   CR 0.80 0.75 0.83   < >  --    < > 0.80   GFRESTIMATED >90 >90 89   < >  --    < > 83   GFRESTBLACK >90 >90 >90   < >  --    < > >90   GFR Calc     DAISY  --   --   --   --   --   --  9.4   BILITOTAL 0.3 0.3 0.4   < >  --    < > 0.5   ALBUMIN 3.4 4.1 3.9   < >  --    < > 4.1   PROTTOTAL 7.4 8.1 7.2   < >  --    < > 8.1   ALKPHOS 73 51 43   < >  --    < > 65   AST 21 19 26   < >  --    < > 19   ALT 26 25 27   < >  --    < > 26    < > = values in this interval not displayed.     Calcium/VitaminD  Recent Labs   Lab Test 01/13/17  1355 12/21/15  1447 06/30/15  1636 01/09/14  0906   DAISY  --   --  9.4  --    VITDT 45 74  --  35     ESR/CRP  Recent Labs   Lab Test 05/24/19  1143 02/21/19  1427 09/14/18  0952   SED 11 8 7   CRP <2.9 <2.9 <2.9     Hepatitis B  Recent Labs   Lab Test 03/20/17  1506 03/28/16  1442 08/06/13  1500   AUSAB  --  264.69*  --    HBCAB Nonreactive  --   --    HEPBANG  --  Nonreactive Negative     Hepatitis C  Recent Labs   Lab Test 12/21/15  1447   HCVAB Nonreactive   Assay performance characteristics have not been established for newborns,   infants, and children       Lyme confirmation testing by Western Blot  Recent Labs   Lab Test 08/05/15  1621   LYWG Negative  Reference range: Negative  (Note)  Band(s) present: NONE  (Insufficient number of bands for positive result)  INTERPRETIVE INFORMATION: Borrelia Burgdorferi Ab, IgG  Western Blot  For this assay, a positive result is reported when any 5 or  more of the following 10 bands are present: 18, 23,  "28, 30,  39, 41, 45, 58, 66, or 93 kDa.  All other banding patterns  are reported as negative.  Performed by [facility],  [address], [facility] [place] [address] [phone]  [email or web address], Pete Saha MD, Lab. Director       Tuberculosis Screening  Recent Labs   Lab Test 03/20/17  1508 03/28/16  1443   TBRSLT Negative Negative   TBAGN 0.00 0.04     HIV Screening  Recent Labs   Lab Test 12/21/15  1447   HIAGAB Nonreactive   HIV-1 p24 Ag & HIV-1/HIV-2 Ab Not Detected         \"XR SACROILIAC JOINT 1/2 VW 12/21/2015 3:23 PM  HISTORY: Pain.  COMPARISON: None.  FINDINGS: Sacroiliac joints are patent and symmetric. No acute osseous  abnormality.  IMPRESSION  IMPRESSION: Normal sacroiliac joints.  YUE JARRELL MD\"    \"XR LUMBAR SPINE 2-3 VIEWS 12/21/2015 3:23 PM  HISTORY: Pain.  COMPARISON: None.  FINDINGS: Lumbar alignment is anatomic. Vertebral body heights and  intervertebral disc spaces are preserved.  IMPRESSION  IMPRESSION: Normal lumbar spine.  YUE JARRELL MD\"    \"XR HAND BILATERAL G/E 3 VW 12/21/2015 3:50 PM  HISTORY: Pain in unspecified joint   IMPRESSION  IMPRESSION: Negative.  LINNEA ZELAYA MD\"    \"XR CHEST 2 VW 6/30/2015 4:54 PM  HISTORY: Pain.  COMPARISON: None.  IMPRESSION  IMPRESSION: The lungs are clear. No focal pulmonary opacities. Heart  and mediastinum are unremarkable. No acute cardiopulmonary  abnormalities.  SO PHAN MD\"    Immunization History     Immunization History   Administered Date(s) Administered     Influenza (IIV3) PF 10/12/2011, 10/03/2013     Influenza Vaccine, 6+MO IM (QUADRIVALENT W/PRESERVATIVES) 10/01/2015     Pneumo Conj 13-V (2010&after) 09/26/2016     Pneumococcal 23 valent 12/22/2016     TD (ADULT, 7+) 08/15/2001     TDAP Vaccine (Adacel) 06/20/2011     TDAP Vaccine (Boostrix) 03/06/2014          Chart documentation done in part with Dragon Voice recognition Software. Although reviewed after completion, some word and grammatical error may remain.    Hunter Monroy MD "

## 2020-02-28 NOTE — PATIENT INSTRUCTIONS
Rheumatology    Dr. Hunter Monroy       [facility] in [place]   (Monday)  [address]  [place] [address]       [facility] in [place]   (Tuesday)  [address] [place] [address]    [facility] in [place]   (Wed., Thurs., and Friday)  [address] [place] [address]    Phone number: [phone]  Thank you for choosing [facility].  Maddie Hennessy CMA

## 2020-02-28 NOTE — TELEPHONE ENCOUNTER
Prior Authorization Approval    Authorization Effective Date: 2/28/2020  Authorization Expiration Date: 2/28/2021  Medication: otezla   Approved Dose/Quantity: loading and maintenance   Reference #: [id number]   Insurance Company: [employer] - Phone [phone] Fax [phone]  Expected CoPay: $30     CoPay Card Available: Yes    Foundation Assistance Needed: na  Which Pharmacy is filling the prescription (Not needed for infusion/clinic administered): [facility] - [place], [place] - [address]  Pharmacy Notified: Yes  Patient Notified: Yes

## 2020-03-31 ENCOUNTER — DOCUMENTATION ONLY (OUTPATIENT)
Dept: LAB | Facility: CLINIC | Age: 40
End: 2020-03-31

## 2020-03-31 NOTE — TELEPHONE ENCOUNTER
The need for performing scheduled test(s) has been reviewed, and test(s) should be performed at this time, as ordered.  Hunter Monroy MD  3/31/2020 12:36 PM

## 2020-03-31 NOTE — PROGRESS NOTES
Patient has lab only appointment scheduled for 4.10.20.   Due to current coronavirus pandemic, please consider whether these lab tests can be deferred.     Please open ORDER REVIEW, review orders, and then confirm or defer orders ASAP.  Enter <dot>keep or <dot>defer smart phrase into NOTES, complete documentation, and route encounter.  [facility]: /team  [facility]: individual provider pool  [facility] primary care:   [facility] specialty: scheduling pool

## 2020-04-02 ENCOUNTER — MYC MEDICAL ADVICE (OUTPATIENT)
Dept: OBGYN | Facility: CLINIC | Age: 40
End: 2020-04-02

## 2020-04-10 DIAGNOSIS — L40.50 PSORIATIC ARTHRITIS (H): ICD-10-CM

## 2020-04-10 LAB
ALBUMIN SERPL-MCNC: 3.8 G/DL (ref 3.4–5)
ALP SERPL-CCNC: 60 U/L (ref 40–150)
ALT SERPL W P-5'-P-CCNC: 28 U/L (ref 0–50)
AST SERPL W P-5'-P-CCNC: 22 U/L (ref 0–45)
BASOPHILS # BLD AUTO: 0 10E9/L (ref 0–0.2)
BASOPHILS NFR BLD AUTO: 0.5 %
BILIRUB DIRECT SERPL-MCNC: 0.1 MG/DL (ref 0–0.2)
BILIRUB SERPL-MCNC: 0.3 MG/DL (ref 0.2–1.3)
CREAT SERPL-MCNC: 0.86 MG/DL (ref 0.52–1.04)
CRP SERPL-MCNC: 6.9 MG/L (ref 0–8)
DIFFERENTIAL METHOD BLD: NORMAL
EOSINOPHIL # BLD AUTO: 0.1 10E9/L (ref 0–0.7)
EOSINOPHIL NFR BLD AUTO: 1.3 %
ERYTHROCYTE [DISTWIDTH] IN BLOOD BY AUTOMATED COUNT: 12.7 % (ref 10–15)
ERYTHROCYTE [SEDIMENTATION RATE] IN BLOOD BY WESTERGREN METHOD: 16 MM/H (ref 0–20)
GFR SERPL CREATININE-BSD FRML MDRD: 85 ML/MIN/{1.73_M2}
HCT VFR BLD AUTO: 37 % (ref 35–47)
HGB BLD-MCNC: 11.7 G/DL (ref 11.7–15.7)
LYMPHOCYTES # BLD AUTO: 1.4 10E9/L (ref 0.8–5.3)
LYMPHOCYTES NFR BLD AUTO: 22.5 %
MCH RBC QN AUTO: 26.5 PG (ref 26.5–33)
MCHC RBC AUTO-ENTMCNC: 31.6 G/DL (ref 31.5–36.5)
MCV RBC AUTO: 84 FL (ref 78–100)
MONOCYTES # BLD AUTO: 0.4 10E9/L (ref 0–1.3)
MONOCYTES NFR BLD AUTO: 6.8 %
NEUTROPHILS # BLD AUTO: 4.1 10E9/L (ref 1.6–8.3)
NEUTROPHILS NFR BLD AUTO: 68.9 %
PLATELET # BLD AUTO: 249 10E9/L (ref 150–450)
PROT SERPL-MCNC: 7.5 G/DL (ref 6.8–8.8)
RBC # BLD AUTO: 4.42 10E12/L (ref 3.8–5.2)
WBC # BLD AUTO: 6 10E9/L (ref 4–11)

## 2020-04-10 PROCEDURE — 80076 HEPATIC FUNCTION PANEL: CPT | Performed by: INTERNAL MEDICINE

## 2020-04-10 PROCEDURE — 85652 RBC SED RATE AUTOMATED: CPT | Performed by: INTERNAL MEDICINE

## 2020-04-10 PROCEDURE — 85025 COMPLETE CBC W/AUTO DIFF WBC: CPT | Performed by: INTERNAL MEDICINE

## 2020-04-10 PROCEDURE — 86140 C-REACTIVE PROTEIN: CPT | Performed by: INTERNAL MEDICINE

## 2020-04-10 PROCEDURE — 36415 COLL VENOUS BLD VENIPUNCTURE: CPT | Performed by: INTERNAL MEDICINE

## 2020-04-10 PROCEDURE — 82565 ASSAY OF CREATININE: CPT | Performed by: INTERNAL MEDICINE

## 2020-05-14 ENCOUNTER — MYC MEDICAL ADVICE (OUTPATIENT)
Dept: RHEUMATOLOGY | Facility: CLINIC | Age: 40
End: 2020-05-14

## 2020-06-16 ENCOUNTER — VIRTUAL VISIT (OUTPATIENT)
Dept: RHEUMATOLOGY | Facility: CLINIC | Age: 40
End: 2020-06-16
Payer: COMMERCIAL

## 2020-06-16 DIAGNOSIS — Z79.899 HIGH RISK MEDICATIONS (NOT ANTICOAGULANTS) LONG-TERM USE: ICD-10-CM

## 2020-06-16 DIAGNOSIS — L40.50 PSORIATIC ARTHRITIS (H): Primary | ICD-10-CM

## 2020-06-16 PROCEDURE — 99213 OFFICE O/P EST LOW 20 MIN: CPT | Mod: GT | Performed by: INTERNAL MEDICINE

## 2020-06-16 RX ORDER — APREMILAST 30 MG/1
30 TABLET, FILM COATED ORAL DAILY
Qty: 60 TABLET | Refills: 5 | Status: SHIPPED | OUTPATIENT
Start: 2020-06-16 | End: 2020-08-06

## 2020-06-16 RX ORDER — LEFLUNOMIDE 10 MG/1
10 TABLET ORAL DAILY
Qty: 90 TABLET | Refills: 2 | Status: SHIPPED | OUTPATIENT
Start: 2020-06-16 | End: 2021-01-13

## 2020-06-16 NOTE — PROGRESS NOTES
"Abby Foy is a 40 year old female who is being evaluated via a billable video visit.      The patient has been notified of following:     \"This video visit will be conducted via a call between you and your physician/provider. We have found that certain health care needs can be provided without the need for an in-person physical exam.  This service lets us provide the care you need with a video conversation.  If a prescription is necessary we can send it directly to your pharmacy.  If lab work is needed we can place an order for that and you can then stop by our lab to have the test done at a later time.    Video visits are billed at different rates depending on your insurance coverage.  Please reach out to your insurance provider with any questions.    If during the course of the call the physician/provider feels a video visit is not appropriate, you will not be charged for this service.\"    Patient has given verbal consent for Video visit? Yes    Will anyone else be joining your video visit? No    Rheumatology Video Visit      Abby Foy MRN# [id number]   YOB: 1980 Age: 40 year old      Date of visit: 6/16/20   PCP: Sandi Duffy  Dermatologist: Amy Patel  Ophthalmologist: Dr. Mathis     Chief Complaint   Patient presents with:  Arthritis: Doing really well, one one day with pain since starting otezla    Assessment and Plan   1. 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 However, given her continued back pain that improved with activity but no radiographic evidence for inflammatory arthritis or osteoarthritis, there was concern that she had inflammatory back pain that would be more consistent with a spondylarthritis. Humira was started and resulted in " improvement of her back pain, suggesting axial disease.  Arthritis improved with methotrexate and sulfasalazine, but she was having headaches and therefore the most likely culprit methotrexate was reduced until her headaches worsened significantly and therefore sulfasalazine and methotrexate were both discontinued. She had resolution of her headaches after discontinuing both sulfasalazine and methotrexate. I believe that the sulfasalazine was the cause of her headaches. Therefore, cautious retrial of methotrexate in the future could be done.  She was doing well on a combination of leflunomide 10 mg daily and Humira 40 mg SQ every 14 days but Humira had to be stopped because of issues with the  program; so Simponi was Rx'd but never started.  Intermittent inflammatory symptom so Otezla was prescribed but only started in March 2020; arthritis doing very well on Otezla but having headaches 4/7 mornings; so will reduce Otezla to 30mg daily. If headaches persist then consider DMARD change.   - Continue leflunomide 10 mg daily  - Reduce Otezla from 30mg BID to 30mg daily  - Labs every 3 months: CBC, Creatinine, Hepatic Panel, ESR, CRP    # Pregnancy Planning: s/p salpingectomy;  had a vasectomy    2. 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 Interestingly when leflunomide was stopped between 3/2018 and today, 6/15/2018, she experienced increased L>R eye irritation that when bothering her only affected one eye at a time.  Leflunomide was restarted with improvement of the eye inflammation.     3. Headaches: Likely due to SSZ. Headaches stopped with discontinuation of SSZ. How headache with otezla; see #1. She differentiates this from hormone related headaches because those have been consistent and associated with menstrual cycles she says.     # Relevant labs and imaging were reviewed with the patient    # High risk medication  toxicity monitoring: discussion and labs reviewed; appropriate labs ordered. See above.  Instructed that if confirmed to have COVID-19 or exposure to someone with confirmed COVID-19 to call this clinic for directions on DMARD management.    # Note that this is a virtual visit to reduce the risk of COVID-19 exposure during this current pandemic.      # Considered to be at high risk of complications from the COVID-19 virus.  It is recommended to limit contact with other people and if possible to work remotely or provide a leave of absence to reduce the risk for COVID-19.          Ms. Foy verbalized agreement with and understanding of the rational for the diagnosis and treatment plan.  All questions were answered to best of my ability and the patient's satisfaction. Ms. Foy was advised to contact the clinic with any questions that may arise after the clinic visit.      Thank you for involving me in the care of the patient    Return to clinic: 3-4 months    HPI   Abby Foy is a 40 year old female with medical history significant for urticaria, H. pylori infection, and iritis? who returns for f/u of seronegative spondyloarthropathy.    Today, Ms. Foy reports that she has only had one day of joint pain since being on Otezla; and other than that one day she has felt very good.  No associated diarrhea. No morning stiffness. Headaches associated with otezla though, different than the menstrual cycle related headaches that have been stable.     Denies fevers, chills, nausea, vomiting, constipation, diarrhea. No abdominal pain. No chest pain/pressure, palpitations, or shortness of breath. No neck pain. Occasional cold sores..     Tobacco: None  EtOH: 1-2 drinks on the weekends  Drugs: None  Occupation: Dietitian at the [employer]    ROS   GEN: No fevers/chills  SKIN: See HPI  HEENT: see HPI.  CV: No chest pain, pressure, palpitations, or dyspnea on exertion.  PULM: No SOB. No cough or  wheezing  GI: No nausea, vomiting, constipation, diarrhea. No blood in stool. +Abdominal bloating.  : No blood in urine.  MSK: See HPI.  NEURO: negative  PSYCH: negative    Active Problem List     Patient Active Problem List   Diagnosis     CARDIOVASCULAR SCREENING; LDL GOAL LESS THAN 160     Urticaria     Diagnostic skin and sensitization tests     Nonallergic rhinitis     Angioedema of lips     H. pylori infection     GPC (giant papillary conjunctivitis)     Seronegative spondyloarthropathy     Midline low back pain without sciatica     Abnormal uterine bleeding (AUB)- on OCPs     Psoriatic arthritis (H)     Past Medical History     Past Medical History:   Diagnosis Date     Angioedema of lips episode in 3/13--idiopathic     Contraception 1/28/2009     Diagnostic skin and sensitization tests 3/27/13 skin tests all NEGATIVE for environmental and food allergens including shellfish and nuts.      Nonallergic rhinitis     3/27/13 skin tests all NEGATIVE for environmental and food allergens including shellfish and nuts. 3/27/13 followup IgE tests NEG to Peanut, SNF, shrimp, macadamia nut, pistachio and walnut.     Rheumatoid arthritis of multiple sites with negative rheumatoid factor (H) 12/31/2015     Past Surgical History     Past Surgical History:   Procedure Laterality Date     LAPAROSCOPIC SALPINGECTOMY Bilateral 6/23/2017    Procedure: LAPAROSCOPIC SALPINGECTOMY;  Operative Laparoscopy, Bilateral Salpingectomy ;  Surgeon: Apryl West MD;  Location: [facility] OR     OPERATIVE HYSTEROSCOPY WITH MORCELLATOR N/A 12/12/2018    Procedure: DIAGNOSTIC HYSTEROSCOPY AND D AND C;  Surgeon: Apryl West MD;  Location: [facility] OR        Allergy     Allergies   Allergen Reactions     Shrimp Swelling     Lips and tongue     Walnuts [Nuts] Swelling     Lips and tongue       Current Medication List     Current Outpatient Medications   Medication Sig     apremilast (OTEZLA) 30 MG tablet Take 1 tablet (30 mg) by mouth 2 times  "daily     leflunomide (ARAVA) 10 MG tablet Take 1 tablet (10 mg) by mouth daily     multivitamin peds with iron (FLINTSTONES COMPLETE) 60 MG chewable tablet Take 1 chew tab by mouth daily.     norgestimate-ethinyl estradiol (ORTHO-CYCLEN/SPRINTEC) 0.25-35 MG-MCG tablet Take 1 tablet by mouth daily     Apremilast (OTEZLA) 10 & 20 & 30 MG TBPK Take as directed. (Patient not taking: Reported on 6/16/2020)     Fexofenadine HCl (ALLEGRA PO) Take 180 mg by mouth daily     No current facility-administered medications for this visit.      Social History   See HPI    Family History     Family History   Problem Relation Age of Onset     Hypertension Maternal Grandmother      Autoimmune Disease Maternal Grandmother         Autoimmune hepatitis     Cancer Maternal Grandfather      Hypertension Paternal Grandmother      Cancer - colorectal Paternal Grandfather      Cardiovascular Paternal Grandfather      Other Cancer Paternal Grandfather      Hypertension Mother      Autoimmune Disease Mother         Autoimmune hepatitis     Hyperlipidemia Mother      Asthma Mother      Hypertension Father      Diabetes Father         Type 2     Multiple Sclerosis Maternal Aunt      Cerebrovascular Disease No family hx of      Thyroid Disease No family hx of      Glaucoma No family hx of      Macular Degeneration No family hx of      Breast Cancer No family hx of      Colon Cancer No family hx of      Physical Exam     Temp Readings from Last 3 Encounters:   08/05/19 98.7  F (37.1  C) (Oral)   02/21/19 98.1  F (36.7  C) (Oral)   12/12/18 97.8  F (36.6  C) (Oral)     BP Readings from Last 5 Encounters:   02/28/20 114/72   11/22/19 128/80   10/25/19 120/78   08/05/19 139/76   06/21/19 114/68     Pulse Readings from Last 1 Encounters:   02/28/20 71     Resp Readings from Last 1 Encounters:   08/05/19 16     Estimated body mass index is 18.77 kg/m  as calculated from the following:    Height as of 2/28/20: 1.651 m (5' 5\").    Weight as of 2/28/20: " 51.2 kg (112 lb 12.8 oz).      GEN: NAD  HEENT: MMM.  Anicteric, noninjected sclera  PULM: No increased work of breathing  MSK:  Hands and wrists without swelling.   PSYCH: Alert. Appropriate.        Labs / Imaging (select studies)   RF/CCP  Recent Labs   Lab Test 12/21/15  1447   CCPABY <20  Interpretation:  Negative     RHF <20     CBC  Recent Labs   Lab Test 04/10/20  1034 01/24/20  0921 10/25/19  1056   WBC 6.0 5.4 5.6   RBC 4.42 4.40 4.49   HGB 11.7 11.5* 11.8   HCT 37.0 37.0 38.1   MCV 84 84 85   RDW 12.7 13.6 12.5    282 195   MCH 26.5 26.1* 26.3*   MCHC 31.6 31.1* 31.0*   NEUTROPHIL 68.9 66.3 65.6   LYMPH 22.5 22.9 25.0   MONOCYTE 6.8 8.9 7.2   EOSINOPHIL 1.3 1.7 1.8   BASOPHIL 0.5 0.2 0.4   ANEU 4.1 3.6 3.6   ALYM 1.4 1.2 1.4   ANA MARÍA 0.4 0.5 0.4   AEOS 0.1 0.1 0.1   ABAS 0.0 0.0 0.0     CMP  Recent Labs   Lab Test 04/10/20  1034 01/24/20  0921 10/25/19  1056  12/12/18  1047  06/30/15  1636   NA  --   --   --   --   --   --  140   POTASSIUM  --   --   --   --   --   --  4.1   CHLORIDE  --   --   --   --   --   --  105   CO2  --   --   --   --   --   --  30   ANIONGAP  --   --   --   --   --   --  5   GLC  --   --   --   --  88  --  73   BUN  --   --   --   --   --   --  9   CR 0.86 0.80 0.75   < >  --    < > 0.80   GFRESTIMATED 85 >90 >90   < >  --    < > 83   GFRESTBLACK >90 >90 >90   < >  --    < > >90   GFR Calc     DAISY  --   --   --   --   --   --  9.4   BILITOTAL 0.3 0.3 0.3   < >  --    < > 0.5   ALBUMIN 3.8 3.4 4.1   < >  --    < > 4.1   PROTTOTAL 7.5 7.4 8.1   < >  --    < > 8.1   ALKPHOS 60 73 51   < >  --    < > 65   AST 22 21 19   < >  --    < > 19   ALT 28 26 25   < >  --    < > 26    < > = values in this interval not displayed.     Calcium/VitaminD  Recent Labs   Lab Test 01/13/17  1355 12/21/15  1447 06/30/15  1636 01/09/14  0906   DAISY  --   --  9.4  --    VITDT 45 74  --  35     ESR/CRP  Recent Labs   Lab Test 04/10/20  1034 05/24/19  1143 02/21/19  1427   SED 16 11 8  "  CRP 6.9 <2.9 <2.9     Lipid Panel  No results for input(s): CHOL, TRIG, HDL, LDL, VLDL, CHOLHDLRATIO, NHDL in the last 22053 hours.  Hepatitis B  Recent Labs   Lab Test 03/20/17  1506 03/28/16  1442 08/06/13  1500   AUSAB  --  264.69*  --    HBCAB Nonreactive  --   --    HEPBANG  --  Nonreactive Negative     Hepatitis C  Recent Labs   Lab Test 12/21/15  1447   HCVAB Nonreactive   Assay performance characteristics have not been established for newborns,   infants, and children       Lyme confirmation testing by Western Blot  Recent Labs   Lab Test 08/05/15  1621   LYWG Negative  Reference range: Negative  (Note)  Band(s) present: NONE  (Insufficient number of bands for positive result)  INTERPRETIVE INFORMATION: Borrelia Burgdorferi Ab, IgG  Western Blot  For this assay, a positive result is reported when any 5 or  more of the following 10 bands are present: 18, 23, 28, 30,  39, 41, 45, 58, 66, or 93 kDa.  All other banding patterns  are reported as negative.  Performed by [facility],  [address] [place] [id number] [phone]  [email or web address], Pete Saha MD, Lab. Director       Tuberculosis Screening  Recent Labs   Lab Test 03/20/17  1508 03/28/16  1443   TBRSLT Negative Negative   TBAGN 0.00 0.04     HIV Screening  Recent Labs   Lab Test 12/21/15  1447   HIAGAB Nonreactive   HIV-1 p24 Ag & HIV-1/HIV-2 Ab Not Detected       \"XR SACROILIAC JOINT 1/2 VW 12/21/2015 3:23 PM  HISTORY: Pain.  COMPARISON: None.  FINDINGS: Sacroiliac joints are patent and symmetric. No acute osseous  abnormality.  IMPRESSION  IMPRESSION: Normal sacroiliac joints.  YUE JARRELL MD\"    \"XR LUMBAR SPINE 2-3 VIEWS 12/21/2015 3:23 PM  HISTORY: Pain.  COMPARISON: None.  FINDINGS: Lumbar alignment is anatomic. Vertebral body heights and  intervertebral disc spaces are preserved.  IMPRESSION  IMPRESSION: Normal lumbar spine.  YUE JARRELL MD\"    \"XR HAND BILATERAL G/E 3 VW 12/21/2015 3:50 PM  HISTORY: Pain in unspecified joint " "  IMPRESSION  IMPRESSION: Negative.  LINNEA ZELYAA MD\"    \"XR CHEST 2 VW 6/30/2015 4:54 PM  HISTORY: Pain.  COMPARISON: None.  IMPRESSION  IMPRESSION: The lungs are clear. No focal pulmonary opacities. Heart  and mediastinum are unremarkable. No acute cardiopulmonary  abnormalities.  SO PHAN MD\"    Immunization History     Immunization History   Administered Date(s) Administered     Influenza (IIV3) PF 10/12/2011, 10/03/2013     Influenza Vaccine, 6+MO IM (QUADRIVALENT W/PRESERVATIVES) 10/01/2015     Pneumo Conj 13-V (2010&after) 09/26/2016     Pneumococcal 23 valent 12/22/2016     TD (ADULT, 7+) 08/15/2001     TDAP Vaccine (Adacel) 06/20/2011     TDAP Vaccine (Boostrix) 03/06/2014          Chart documentation done in part with Dragon Voice recognition Software. Although reviewed after completion, some word and grammatical error may remain.    Hunter Monroy MD       Video-Visit Details    Type of service:  Video Visit    Video Start Time: 2:05 PM  Video End Time: 2:20 PM    Originating Location (pt. Location): Home    Distant Location (provider location):  Home    Platform used for Video Visit: Carley Monroy MD      "

## 2020-06-18 ENCOUNTER — TELEPHONE (OUTPATIENT)
Dept: OBGYN | Facility: CLINIC | Age: 40
End: 2020-06-18

## 2020-06-18 DIAGNOSIS — Z11.59 ENCOUNTER FOR SCREENING FOR OTHER VIRAL DISEASES: Primary | ICD-10-CM

## 2020-06-18 NOTE — TELEPHONE ENCOUNTER
Confirmed surgery date, time and location, 7/24/20 with arrival time at 11:30a.m with nothing to eat eight hours before scheduled surgery time and clear liquids up to two hours before scheduled surgery time, informed patient to have a pre op physical with in thirty days of surgery and that a map and letter will be mailed out.     to complete the following fields:            CHECKLIST     Google Calendar : Yes     Resident notified: Not Applicable     Clinic schedule blocked: Not Applicable    Patient notified:Yes      Pre op information sent: Yes     Given to patient over the phone.Yes    Comments:

## 2020-07-20 ENCOUNTER — TELEPHONE (OUTPATIENT)
Dept: RHEUMATOLOGY | Facility: CLINIC | Age: 40
End: 2020-07-20

## 2020-07-20 ENCOUNTER — OFFICE VISIT (OUTPATIENT)
Dept: FAMILY MEDICINE | Facility: CLINIC | Age: 40
End: 2020-07-20
Payer: COMMERCIAL

## 2020-07-20 VITALS
BODY MASS INDEX: 18.74 KG/M2 | TEMPERATURE: 96.5 F | HEART RATE: 72 BPM | SYSTOLIC BLOOD PRESSURE: 122 MMHG | WEIGHT: 109.8 LBS | DIASTOLIC BLOOD PRESSURE: 82 MMHG | OXYGEN SATURATION: 100 % | HEIGHT: 64 IN | RESPIRATION RATE: 16 BRPM

## 2020-07-20 DIAGNOSIS — Z79.899 HIGH RISK MEDICATIONS (NOT ANTICOAGULANTS) LONG-TERM USE: ICD-10-CM

## 2020-07-20 DIAGNOSIS — Z01.818 PREOP GENERAL PHYSICAL EXAM: Primary | ICD-10-CM

## 2020-07-20 DIAGNOSIS — L40.50 PSORIATIC ARTHRITIS (H): ICD-10-CM

## 2020-07-20 DIAGNOSIS — N92.6 IRREGULAR UTERINE BLEEDING: ICD-10-CM

## 2020-07-20 LAB
ALBUMIN SERPL-MCNC: 3.6 G/DL (ref 3.4–5)
ALP SERPL-CCNC: 52 U/L (ref 40–150)
ALT SERPL W P-5'-P-CCNC: 22 U/L (ref 0–50)
AST SERPL W P-5'-P-CCNC: 20 U/L (ref 0–45)
BASOPHILS # BLD AUTO: 0 10E9/L (ref 0–0.2)
BASOPHILS NFR BLD AUTO: 0.2 %
BILIRUB DIRECT SERPL-MCNC: <0.1 MG/DL (ref 0–0.2)
BILIRUB SERPL-MCNC: 0.3 MG/DL (ref 0.2–1.3)
CREAT SERPL-MCNC: 0.82 MG/DL (ref 0.52–1.04)
CRP SERPL-MCNC: 6 MG/L (ref 0–8)
DIFFERENTIAL METHOD BLD: ABNORMAL
EOSINOPHIL # BLD AUTO: 0.1 10E9/L (ref 0–0.7)
EOSINOPHIL NFR BLD AUTO: 1.1 %
ERYTHROCYTE [DISTWIDTH] IN BLOOD BY AUTOMATED COUNT: 12.6 % (ref 10–15)
ERYTHROCYTE [SEDIMENTATION RATE] IN BLOOD BY WESTERGREN METHOD: 11 MM/H (ref 0–20)
GFR SERPL CREATININE-BSD FRML MDRD: 90 ML/MIN/{1.73_M2}
HCT VFR BLD AUTO: 35.4 % (ref 35–47)
HGB BLD-MCNC: 11.2 G/DL (ref 11.7–15.7)
LYMPHOCYTES # BLD AUTO: 1.4 10E9/L (ref 0.8–5.3)
LYMPHOCYTES NFR BLD AUTO: 21.1 %
MCH RBC QN AUTO: 26.7 PG (ref 26.5–33)
MCHC RBC AUTO-ENTMCNC: 31.6 G/DL (ref 31.5–36.5)
MCV RBC AUTO: 84 FL (ref 78–100)
MONOCYTES # BLD AUTO: 0.3 10E9/L (ref 0–1.3)
MONOCYTES NFR BLD AUTO: 5.2 %
NEUTROPHILS # BLD AUTO: 4.7 10E9/L (ref 1.6–8.3)
NEUTROPHILS NFR BLD AUTO: 72.4 %
PLATELET # BLD AUTO: 218 10E9/L (ref 150–450)
PROT SERPL-MCNC: 7.4 G/DL (ref 6.8–8.8)
RBC # BLD AUTO: 4.2 10E12/L (ref 3.8–5.2)
WBC # BLD AUTO: 6.5 10E9/L (ref 4–11)

## 2020-07-20 PROCEDURE — 80076 HEPATIC FUNCTION PANEL: CPT | Performed by: INTERNAL MEDICINE

## 2020-07-20 PROCEDURE — 85025 COMPLETE CBC W/AUTO DIFF WBC: CPT | Performed by: INTERNAL MEDICINE

## 2020-07-20 PROCEDURE — 82565 ASSAY OF CREATININE: CPT | Performed by: INTERNAL MEDICINE

## 2020-07-20 PROCEDURE — 85652 RBC SED RATE AUTOMATED: CPT | Performed by: INTERNAL MEDICINE

## 2020-07-20 PROCEDURE — 99214 OFFICE O/P EST MOD 30 MIN: CPT | Performed by: PHYSICIAN ASSISTANT

## 2020-07-20 PROCEDURE — 86140 C-REACTIVE PROTEIN: CPT | Performed by: INTERNAL MEDICINE

## 2020-07-20 PROCEDURE — 36415 COLL VENOUS BLD VENIPUNCTURE: CPT | Performed by: INTERNAL MEDICINE

## 2020-07-20 ASSESSMENT — MIFFLIN-ST. JEOR: SCORE: 1160.8

## 2020-07-20 NOTE — TELEPHONE ENCOUNTER
RN: Please call to notify Ms. Foy that for her upcoming surgery (Sandi Duffy notified me of a Friday surgery):    1. Stop Otezla 3-5 days prior to surgery; then resume Otezla 3-5 days after after surgery is completed and in the absence of wound healing problems, surgical site infection, and systemic infection.      2. Continue leflunomide perioperatively     Hunter Monroy MD  7/20/2020 4:47 PM

## 2020-07-20 NOTE — PROGRESS NOTES
[facility]  [id number] [facility]  [place] [address]  [phone]  Dept: [phone]    PRE-OP EVALUATION:  Today's date: 2020    Abby Foy (: 1980) presents for pre-operative evaluation assessment as requested by Dr. Apryl West.  She requires evaluation and anesthesia risk assessment prior to undergoing surgery/procedure for treatment of Irregular bleeding    Fax number for surgical facility:   Primary Physician: Sandi Duffy  Type of Anesthesia Anticipated: unknown per patient    Patient has a Health Care Directive or Living Will:  NO    Preop Questions 2020   Who is doing your surgery? Dr. Apryl West   What are you having done? Hydrothermal endometrial ablation and biopsy   Date of Surgery/Procedure: 2020   Facility or Hospital where procedure/surgery will be performed: [facility]   1.  Do you have a history of Heart attack, stroke, stent, coronary bypass surgery, or other heart surgery? No   2.  Do you ever have any pain or discomfort in your chest? No   3.  Do you have a history of  Heart Failure? No   4.   Are you troubled by shortness of breath when:  walking on a level surface, or up a slight hill, or at night? No   5.  Do you currently have a cold, bronchitis or other respiratory infection? No   6.  Do you have a cough, shortness of breath, or wheezing? No   7.  Do you sometimes get pains in the calves of your legs when you walk? No   8. Do you or anyone in your family have previous history of blood clots? No   9.  Do you or does anyone in your family have a serious bleeding problem such as prolonged bleeding following surgeries or cuts? No   10. Have you ever had problems with anemia or been told to take iron pills? No   11. Have you had any abnormal blood loss such as black, tarry or bloody stools, or abnormal vaginal bleeding? YES - abnormal vag bleeding   12. Have you ever had a blood transfusion? No   13. Have you or any  of your relatives ever had problems with anesthesia? YES - patient, nausea   14. Do you have sleep apnea, excessive snoring or daytime drowsiness? No   15. Do you have any prosthetic heart valves? No   16. Do you have prosthetic joints? No   17. Is there any chance that you may be pregnant? No         HPI:     HPI related to upcoming procedure: Irregular bleeding      See problem list for active medical problems.  Problems all longstanding and stable, except as noted/documented.  See ROS for pertinent symptoms related to these conditions.      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 pylori infection 08/25/2015     Priority: Medium     Angioedema of lips      Priority: Medium     Diagnostic skin and sensitization tests      Priority: Medium     Nonallergic rhinitis      Priority: Medium     3/27/13 skin tests all NEGATIVE for environmental and food allergens including shellfish and nuts.        Urticaria 03/04/2013     Priority: Medium     CARDIOVASCULAR SCREENING; LDL GOAL LESS THAN 160 05/09/2010     Priority: Medium      Past Medical History:   Diagnosis Date     Angioedema of lips episode in 3/13--idiopathic     Contraception 1/28/2009     Diagnostic skin and sensitization tests 3/27/13 skin tests all NEGATIVE for environmental and food allergens including shellfish and nuts.      Nonallergic rhinitis     3/27/13 skin tests all NEGATIVE for environmental and food allergens including shellfish and nuts. 3/27/13 followup IgE tests NEG to Peanut, SNF, shrimp, macadamia nut, pistachio and walnut.     Rheumatoid arthritis of  "multiple sites with negative rheumatoid factor (H) 12/31/2015     Past Surgical History:   Procedure Laterality Date     LAPAROSCOPIC SALPINGECTOMY Bilateral 6/23/2017    Procedure: LAPAROSCOPIC SALPINGECTOMY;  Operative Laparoscopy, Bilateral Salpingectomy ;  Surgeon: Apryl West MD;  [facility]     OPERATIVE HYSTEROSCOPY WITH MORCELLATOR N/A 12/12/2018    Procedure: DIAGNOSTIC HYSTEROSCOPY AND D AND C;  Surgeon: Apryl West MD;  [facility] OR     Current Outpatient Medications   Medication Sig Dispense Refill     apremilast (OTEZLA) 30 MG tablet Take 1 tablet (30 mg) by mouth daily 60 tablet 5     leflunomide (ARAVA) 10 MG tablet Take 1 tablet (10 mg) by mouth daily 90 tablet 2     multivitamin peds with iron (FLINTSTONES COMPLETE) 60 MG chewable tablet Take 1 chew tab by mouth daily.       norgestimate-ethinyl estradiol (ORTHO-CYCLEN/SPRINTEC) 0.25-35 MG-MCG tablet Take 1 tablet by mouth daily 84 tablet 3     Fexofenadine HCl (ALLEGRA PO) Take 180 mg by mouth daily       OTC products: None, except as noted above    Allergies   Allergen Reactions     Shrimp Swelling     Lips and tongue     Walnuts [Nuts] Swelling     Lips and tongue        Latex Allergy: NO    Social History     Tobacco Use     Smoking status: Never Smoker     Smokeless tobacco: Never Used   Substance Use Topics     Alcohol use: Yes     Comment: 1-3 drinks/ week     History   Drug Use No       REVIEW OF SYSTEMS:   Constitutional, neuro, ENT, endocrine, pulmonary, cardiac, gastrointestinal, genitourinary, musculoskeletal, integument and psychiatric systems are negative, except as otherwise noted.    EXAM:   /82   Pulse 72   Temp 96.5  F (35.8  C) (Tympanic)   Resp 16   Ht 1.63 m (5' 4.17\")   Wt 49.8 kg (109 lb 12.8 oz)   SpO2 100%   BMI 18.75 kg/m      GENERAL APPEARANCE: healthy, alert and no distress     EYES: EOMI, PERRL     HENT: ear canals and TM's normal and nose and mouth without ulcers or lesions     NECK: no " adenopathy, no asymmetry, masses, or scars and thyroid normal to palpation     RESP: lungs clear to auscultation - no rales, rhonchi or wheezes     CV: regular rates and rhythm, normal S1 S2, no S3 or S4 and no murmur, click or rub     ABDOMEN:  soft, nontender, no HSM or masses and bowel sounds normal     MS: extremities normal- no gross deformities noted, no evidence of inflammation in joints, FROM in all extremities.     SKIN: no suspicious lesions or rashes     NEURO: Normal strength and tone, sensory exam grossly normal, mentation intact and speech normal     PSYCH: mentation appears normal. and affect normal/bright     LYMPHATICS: No cervical adenopathy    DIAGNOSTICS:     Labs Resulted Today:   Results for orders placed or performed in visit on 07/20/20   Hepatic panel     Status: None   Result Value Ref Range    Bilirubin Direct <0.1 0.0 - 0.2 mg/dL    Bilirubin Total 0.3 0.2 - 1.3 mg/dL    Albumin 3.6 3.4 - 5.0 g/dL    Protein Total 7.4 6.8 - 8.8 g/dL    Alkaline Phosphatase 52 40 - 150 U/L    ALT 22 0 - 50 U/L    AST 20 0 - 45 U/L   CRP inflammation     Status: None   Result Value Ref Range    CRP Inflammation 6.0 0.0 - 8.0 mg/L   Erythrocyte sedimentation rate auto     Status: None   Result Value Ref Range    Sed Rate 11 0 - 20 mm/h   Creatinine     Status: None   Result Value Ref Range    Creatinine 0.82 0.52 - 1.04 mg/dL    GFR Estimate 90 >60 mL/min/[1.73_m2]    GFR Estimate If Black >90 >60 mL/min/[1.73_m2]   CBC with platelets differential     Status: Abnormal   Result Value Ref Range    WBC 6.5 4.0 - 11.0 10e9/L    RBC Count 4.20 3.8 - 5.2 10e12/L    Hemoglobin 11.2 (L) 11.7 - 15.7 g/dL    Hematocrit 35.4 35.0 - 47.0 %    MCV 84 78 - 100 fl    MCH 26.7 26.5 - 33.0 pg    MCHC 31.6 31.5 - 36.5 g/dL    RDW 12.6 10.0 - 15.0 %    Platelet Count 218 150 - 450 10e9/L    % Neutrophils 72.4 %    % Lymphocytes 21.1 %    % Monocytes 5.2 %    % Eosinophils 1.1 %    % Basophils 0.2 %    Absolute Neutrophil 4.7  1.6 - 8.3 10e9/L    Absolute Lymphocytes 1.4 0.8 - 5.3 10e9/L    Absolute Monocytes 0.3 0.0 - 1.3 10e9/L    Absolute Eosinophils 0.1 0.0 - 0.7 10e9/L    Absolute Basophils 0.0 0.0 - 0.2 10e9/L    Diff Method Automated Method        Recent Labs   Lab Test 04/10/20  1034 01/24/20  0921  06/30/15  1636   HGB 11.7 11.5*   < > 12.0    282   < > 183   NA  --   --   --  140   POTASSIUM  --   --   --  4.1   CR 0.86 0.80   < > 0.80    < > = values in this interval not displayed.      IMPRESSION:   Reason for surgery/procedure: Irregular bleeding  Diagnosis/reason for consult: pre op consult    The proposed surgical procedure is considered INTERMEDIATE risk.    REVISED CARDIAC RISK INDEX  The patient has the following serious cardiovascular risks for perioperative complications such as (MI, PE, VFib and 3  AV Block):  No serious cardiac risks  INTERPRETATION: 0 risks: Class I (very low risk - 0.4% complication rate)    The patient has the following additional risks for perioperative complications:  No identified additional risks    1. Preop general physical exam    2. Irregular uterine bleeding    3. Psoriatic arthritis (H)    4. High risk medications (not anticoagulants) long-term use         RECOMMENDATIONS:     --Patient is to take all scheduled medications on the day of surgery EXCEPT for modifications listed below:  Stop Otezla 3-5 days before surgery and restart 3-5 days after surgery.      APPROVAL GIVEN to proceed with proposed procedure, without further diagnostic evaluation       Signed Electronically by: Sandi Duffy PA-C    Copy of this evaluation report is provided to requesting physician.    [facility] Preop Guidelines    Revised Cardiac Risk Index

## 2020-07-21 NOTE — TELEPHONE ENCOUNTER
Called patient and left a message to return my call. Upon chart review, Sandi Duffy notified patient of the message below via [employer] and patient reviewed and responded to the message. Closing this encounter.    Jeovanny Dominguez RN....7/21/2020 9:02 AM

## 2020-07-22 DIAGNOSIS — Z11.59 ENCOUNTER FOR SCREENING FOR OTHER VIRAL DISEASES: ICD-10-CM

## 2020-07-22 PROCEDURE — U0003 INFECTIOUS AGENT DETECTION BY NUCLEIC ACID (DNA OR RNA); SEVERE ACUTE RESPIRATORY SYNDROME CORONAVIRUS 2 (SARS-COV-2) (CORONAVIRUS DISEASE [COVID-19]), AMPLIFIED PROBE TECHNIQUE, MAKING USE OF HIGH THROUGHPUT TECHNOLOGIES AS DESCRIBED BY CMS-2020-01-R: HCPCS | Performed by: OBSTETRICS & GYNECOLOGY

## 2020-07-23 ENCOUNTER — TELEPHONE (OUTPATIENT)
Dept: OBGYN | Facility: CLINIC | Age: 40
End: 2020-07-23

## 2020-07-23 ENCOUNTER — ANESTHESIA EVENT (OUTPATIENT)
Dept: SURGERY | Facility: CLINIC | Age: 40
End: 2020-07-23
Payer: COMMERCIAL

## 2020-07-23 LAB
SARS-COV-2 RNA SPEC QL NAA+PROBE: NOT DETECTED
SPECIMEN SOURCE: NORMAL

## 2020-07-23 NOTE — ANESTHESIA PREPROCEDURE EVALUATION
"Anesthesia Pre-Procedure Evaluation    Patient: Abby Foy   MRN:     [id number] Gender:   female   Age:    40 year old :      1980        Preoperative Diagnosis: Abnormal uterine bleeding (AUB) [N93.9]   Procedure(s):  DILATION, CERVIX, WITH HYSTEROSCOPY AND HYDROTHERMAL ENDOMETRIAL ABLATION  BIOPSY, ENDOMETRIUM     LABS:  CBC:   Lab Results   Component Value Date    WBC 6.5 2020    WBC 6.0 04/10/2020    HGB 11.2 (L) 2020    HGB 11.7 04/10/2020    HCT 35.4 2020    HCT 37.0 04/10/2020     2020     04/10/2020     BMP:   Lab Results   Component Value Date     2015     2009    POTASSIUM 4.1 2015    POTASSIUM 4.1 2009    CHLORIDE 105 2015    CHLORIDE 103 2009    CO2 30 2015    CO2 25 2009    BUN 9 2015    BUN 8 2009    CR 0.82 2020    CR 0.86 04/10/2020    GLC 88 2018    GLC 73 2015     COAGS: No results found for: PTT, INR, FIBR  POC:   Lab Results   Component Value Date    BGM 97 2018    HCG Negative 2018     OTHER:   Lab Results   Component Value Date    DAISY 9.4 2015    ALBUMIN 3.6 2020    PROTTOTAL 7.4 2020    ALT 22 2020    AST 20 2020    ALKPHOS 52 2020    BILITOTAL 0.3 2020    LIPASE 56 2009    AMYLASE 49 2009    TSH 1.38 2019    CRP 6.0 2020    SED 11 2020        Preop Vitals    BP Readings from Last 3 Encounters:   20 122/82   20 114/72   19 128/80    Pulse Readings from Last 3 Encounters:   20 72   20 71   10/25/19 68      Resp Readings from Last 3 Encounters:   20 16   19 16   18 20    SpO2 Readings from Last 3 Encounters:   20 100%   20 100%   10/25/19 100%      Temp Readings from Last 1 Encounters:   20 35.8  C (96.5  F) (Tympanic)    Ht Readings from Last 1 Encounters:   20 1.63 m (5' 4.17\")      Wt Readings " "from Last 1 Encounters:   07/20/20 49.8 kg (109 lb 12.8 oz)    Estimated body mass index is 18.75 kg/m  as calculated from the following:    Height as of 7/20/20: 1.63 m (5' 4.17\").    Weight as of 7/20/20: 49.8 kg (109 lb 12.8 oz).     LDA:  Epidural Catheter 06/18/11 (Active)   Number of days: 3323       Intrathecal/Epidural Catheter 04/04/14 (Active)   Number of days: 2302       Urethral Catheter Double-lumen;Non-latex (Active)   Number of days: 3323        Past Medical History:   Diagnosis Date     Angioedema of lips episode in 3/13--idiopathic     Complication of anesthesia     ponv     Contraception 1/28/2009     Diagnostic skin and sensitization tests 3/27/13 skin tests all NEGATIVE for environmental and food allergens including shellfish and nuts.      Nonallergic rhinitis     3/27/13 skin tests all NEGATIVE for environmental and food allergens including shellfish and nuts. 3/27/13 followup IgE tests NEG to Peanut, SNF, shrimp, macadamia nut, pistachio and walnut.     Rheumatoid arthritis of multiple sites with negative rheumatoid factor (H) 12/31/2015      Past Surgical History:   Procedure Laterality Date     LAPAROSCOPIC SALPINGECTOMY Bilateral 6/23/2017    Procedure: LAPAROSCOPIC SALPINGECTOMY;  Operative Laparoscopy, Bilateral Salpingectomy ;  Surgeon: Apryl West MD;  [facility] OR     OPERATIVE HYSTEROSCOPY WITH MORCELLATOR N/A 12/12/2018    Procedure: DIAGNOSTIC HYSTEROSCOPY AND D AND C;  Surgeon: Apryl West MD;  [facility] OR      Allergies   Allergen Reactions     Shrimp Swelling     Lips and tongue     Walnuts [Nuts] Swelling     Lips and tongue                   PHYSICAL EXAM:   Mental Status/Neuro: A/A/O   Airway: Facies: Feasible  Mallampati: I  Mouth/Opening: Full  TM distance: > 6 cm  Neck ROM: Full   Respiratory:   Resp. Rate: Normal     Resp. Effort: Normal      CV:   Rate: Age appropriate  Edema: None   Comments:      Dental: Normal Dentition                Assessment: "   ASA SCORE: 2    H&P: History and physical reviewed and following examination; no interval change.    NPO Status: NPO Appropriate     Plan:   Anes. Type:  MAC   Pre-Medication: None   Induction:  N/a   Airway: Native Airway   Access/Monitoring: PIV   Maintenance: N/a     Postop Plan:   Postop Pain: None  Postop Sedation/Airway: Not planned  Disposition: Outpatient     PONV Management: Adult Risk Factors: Female   Prevention: Ondansetron, Dexamethasone     CONSENT: Direct conversation   Plan and risks discussed with: Patient                      Geovanni Zavala MD     ANESTHESIA PREOP EVALUATION    PROCEDURE: Procedure(s):  DILATION, CERVIX, WITH HYSTEROSCOPY AND HYDROTHERMAL ENDOMETRIAL ABLATION  BIOPSY, ENDOMETRIUM    HPI: Abby Foy is a 40 year old female who presents for above procedure.    PAST MEDICAL HISTORY:    Past Medical History:   Diagnosis Date     Angioedema of lips episode in 3/13--idiopathic     Complication of anesthesia     ponv     Contraception 1/28/2009     Diagnostic skin and sensitization tests 3/27/13 skin tests all NEGATIVE for environmental and food allergens including shellfish and nuts.      Nonallergic rhinitis     3/27/13 skin tests all NEGATIVE for environmental and food allergens including shellfish and nuts. 3/27/13 followup IgE tests NEG to Peanut, SNF, shrimp, macadamia nut, pistachio and walnut.     Rheumatoid arthritis of multiple sites with negative rheumatoid factor (H) 12/31/2015       PAST SURGICAL HISTORY:    Past Surgical History:   Procedure Laterality Date     LAPAROSCOPIC SALPINGECTOMY Bilateral 6/23/2017    Procedure: LAPAROSCOPIC SALPINGECTOMY;  Operative Laparoscopy, Bilateral Salpingectomy ;  Surgeon: Apryl West MD;  [facility]     OPERATIVE HYSTEROSCOPY WITH MORCELLATOR N/A 12/12/2018    Procedure: DIAGNOSTIC HYSTEROSCOPY AND D AND C;  Surgeon: Apryl West MD;  [facility]       SOCIAL HISTORY:       Social History     Tobacco Use      Smoking status: Never Smoker     Smokeless tobacco: Never Used   Substance Use Topics     Alcohol use: Yes     Comment: 1-3 drinks/ week       ALLERGIES:     Allergies   Allergen Reactions     Shrimp Swelling     Lips and tongue     Walnuts [Nuts] Swelling     Lips and tongue         MEDICATIONS:     No medications prior to admission.       Current Outpatient Medications   Medication Sig Dispense Refill     apremilast (OTEZLA) 30 MG tablet Take 1 tablet (30 mg) by mouth daily 60 tablet 5     leflunomide (ARAVA) 10 MG tablet Take 1 tablet (10 mg) by mouth daily 90 tablet 2     multivitamin peds with iron (FLINTSTONES COMPLETE) 60 MG chewable tablet Take 1 chew tab by mouth daily.       norgestimate-ethinyl estradiol (ORTHO-CYCLEN/SPRINTEC) 0.25-35 MG-MCG tablet Take 1 tablet by mouth daily 84 tablet 3       No current [facility]-ordered facility-administered medications on file.      Current Outpatient Medications Ordered in Epic   Medication Sig Dispense Refill     apremilast (OTEZLA) 30 MG tablet Take 1 tablet (30 mg) by mouth daily 60 tablet 5     leflunomide (ARAVA) 10 MG tablet Take 1 tablet (10 mg) by mouth daily 90 tablet 2     multivitamin peds with iron (FLINTSTONES COMPLETE) 60 MG chewable tablet Take 1 chew tab by mouth daily.       norgestimate-ethinyl estradiol (ORTHO-CYCLEN/SPRINTEC) 0.25-35 MG-MCG tablet Take 1 tablet by mouth daily 84 tablet 3       PHYSICAL EXAM:    Vitals: T Data Unavailable, P Data Unavailable, BP Data Unavailable, R Data Unavailable, SpO2  , Weight   Wt Readings from Last 2 Encounters:   07/20/20 49.8 kg (109 lb 12.8 oz)   02/28/20 51.2 kg (112 lb 12.8 oz)       See doc flowsheet    NPO STATUS: see doc flowsheet    LABS:    BMP:  Recent Labs   Lab Test 07/20/20  1117  12/12/18  1047  06/30/15  1636   NA  --   --   --   --  140   POTASSIUM  --   --   --   --  4.1   CHLORIDE  --   --   --   --  105   CO2  --   --   --   --  30   BUN  --   --   --   --  9   CR 0.82   < >  --    < > 0.80    GLC  --   --  88  --  73   DAISY  --   --   --   --  9.4    < > = values in this interval not displayed.       LFTs:   Recent Labs   Lab Test 07/20/20  1117   PROTTOTAL 7.4   ALBUMIN 3.6   BILITOTAL 0.3   ALKPHOS 52   AST 20   ALT 22       CBC:   Recent Labs   Lab Test 07/20/20  1117   WBC 6.5   RBC 4.20   HGB 11.2*   HCT 35.4   MCV 84   MCH 26.7   MCHC 31.6   RDW 12.6          Coags:  No results for input(s): INR, PTT, FIBR in the last 13888 hours.    Imaging:  No orders to display       Geovanni Zavala MD  Anesthesiology Staff  Pager [phone]    7/23/2020  6:24 PM

## 2020-07-24 ENCOUNTER — HOSPITAL ENCOUNTER (OUTPATIENT)
Facility: CLINIC | Age: 40
Discharge: HOME HEALTH | End: 2020-07-24
Attending: OBSTETRICS & GYNECOLOGY | Admitting: OBSTETRICS & GYNECOLOGY
Payer: COMMERCIAL

## 2020-07-24 ENCOUNTER — ANESTHESIA (OUTPATIENT)
Dept: SURGERY | Facility: CLINIC | Age: 40
End: 2020-07-24
Payer: COMMERCIAL

## 2020-07-24 VITALS
DIASTOLIC BLOOD PRESSURE: 80 MMHG | HEIGHT: 64 IN | BODY MASS INDEX: 18.78 KG/M2 | RESPIRATION RATE: 16 BRPM | TEMPERATURE: 98.1 F | HEART RATE: 65 BPM | SYSTOLIC BLOOD PRESSURE: 122 MMHG | WEIGHT: 110.01 LBS | OXYGEN SATURATION: 100 %

## 2020-07-24 DIAGNOSIS — N93.9 ABNORMAL UTERINE BLEEDING (AUB): Primary | ICD-10-CM

## 2020-07-24 LAB
ABO + RH BLD: NORMAL
ABO + RH BLD: NORMAL
B-HCG SERPL-ACNC: <1 IU/L (ref 0–5)
BLD GP AB SCN SERPL QL: NORMAL
BLOOD BANK CMNT PATIENT-IMP: NORMAL
HGB BLD-MCNC: 11.1 G/DL (ref 11.7–15.7)
SPECIMEN EXP DATE BLD: NORMAL

## 2020-07-24 PROCEDURE — 37000009 ZZH ANESTHESIA TECHNICAL FEE, EACH ADDTL 15 MIN: Performed by: OBSTETRICS & GYNECOLOGY

## 2020-07-24 PROCEDURE — 25000132 ZZH RX MED GY IP 250 OP 250 PS 637: Performed by: STUDENT IN AN ORGANIZED HEALTH CARE EDUCATION/TRAINING PROGRAM

## 2020-07-24 PROCEDURE — 86901 BLOOD TYPING SEROLOGIC RH(D): CPT | Performed by: OBSTETRICS & GYNECOLOGY

## 2020-07-24 PROCEDURE — 27210794 ZZH OR GENERAL SUPPLY STERILE: Performed by: OBSTETRICS & GYNECOLOGY

## 2020-07-24 PROCEDURE — 71000027 ZZH RECOVERY PHASE 2 EACH 15 MINS: Performed by: OBSTETRICS & GYNECOLOGY

## 2020-07-24 PROCEDURE — 36000057 ZZH SURGERY LEVEL 3 1ST 30 MIN - UMMC: Performed by: OBSTETRICS & GYNECOLOGY

## 2020-07-24 PROCEDURE — 84702 CHORIONIC GONADOTROPIN TEST: CPT | Performed by: OBSTETRICS & GYNECOLOGY

## 2020-07-24 PROCEDURE — 85018 HEMOGLOBIN: CPT | Performed by: OBSTETRICS & GYNECOLOGY

## 2020-07-24 PROCEDURE — 37000008 ZZH ANESTHESIA TECHNICAL FEE, 1ST 30 MIN: Performed by: OBSTETRICS & GYNECOLOGY

## 2020-07-24 PROCEDURE — 86850 RBC ANTIBODY SCREEN: CPT | Performed by: OBSTETRICS & GYNECOLOGY

## 2020-07-24 PROCEDURE — 86900 BLOOD TYPING SEROLOGIC ABO: CPT | Performed by: OBSTETRICS & GYNECOLOGY

## 2020-07-24 PROCEDURE — 25800030 ZZH RX IP 258 OP 636: Performed by: STUDENT IN AN ORGANIZED HEALTH CARE EDUCATION/TRAINING PROGRAM

## 2020-07-24 PROCEDURE — 25000125 ZZHC RX 250: Performed by: NURSE ANESTHETIST, CERTIFIED REGISTERED

## 2020-07-24 PROCEDURE — 36000059 ZZH SURGERY LEVEL 3 EA 15 ADDTL MIN UMMC: Performed by: OBSTETRICS & GYNECOLOGY

## 2020-07-24 PROCEDURE — 88305 TISSUE EXAM BY PATHOLOGIST: CPT | Performed by: OBSTETRICS & GYNECOLOGY

## 2020-07-24 PROCEDURE — 25000128 H RX IP 250 OP 636: Performed by: NURSE ANESTHETIST, CERTIFIED REGISTERED

## 2020-07-24 PROCEDURE — 25000125 ZZHC RX 250: Performed by: OBSTETRICS & GYNECOLOGY

## 2020-07-24 PROCEDURE — 40000170 ZZH STATISTIC PRE-PROCEDURE ASSESSMENT II: Performed by: OBSTETRICS & GYNECOLOGY

## 2020-07-24 PROCEDURE — 36415 COLL VENOUS BLD VENIPUNCTURE: CPT | Performed by: OBSTETRICS & GYNECOLOGY

## 2020-07-24 RX ORDER — LIDOCAINE HYDROCHLORIDE 20 MG/ML
INJECTION, SOLUTION INFILTRATION; PERINEURAL PRN
Status: DISCONTINUED | OUTPATIENT
Start: 2020-07-24 | End: 2020-07-24

## 2020-07-24 RX ORDER — PROPOFOL 10 MG/ML
INJECTION, EMULSION INTRAVENOUS CONTINUOUS PRN
Status: DISCONTINUED | OUTPATIENT
Start: 2020-07-24 | End: 2020-07-24

## 2020-07-24 RX ORDER — SODIUM CHLORIDE, SODIUM LACTATE, POTASSIUM CHLORIDE, CALCIUM CHLORIDE 600; 310; 30; 20 MG/100ML; MG/100ML; MG/100ML; MG/100ML
INJECTION, SOLUTION INTRAVENOUS CONTINUOUS
Status: DISCONTINUED | OUTPATIENT
Start: 2020-07-24 | End: 2020-07-24 | Stop reason: HOSPADM

## 2020-07-24 RX ORDER — OXYCODONE HYDROCHLORIDE 5 MG/1
5 TABLET ORAL EVERY 4 HOURS PRN
Status: DISCONTINUED | OUTPATIENT
Start: 2020-07-24 | End: 2020-07-24 | Stop reason: HOSPADM

## 2020-07-24 RX ORDER — OXYCODONE HYDROCHLORIDE 5 MG/1
5 TABLET ORAL ONCE
Status: DISCONTINUED | OUTPATIENT
Start: 2020-07-24 | End: 2020-07-24 | Stop reason: HOSPADM

## 2020-07-24 RX ORDER — ONDANSETRON 2 MG/ML
4 INJECTION INTRAMUSCULAR; INTRAVENOUS EVERY 30 MIN PRN
Status: DISCONTINUED | OUTPATIENT
Start: 2020-07-24 | End: 2020-07-24 | Stop reason: HOSPADM

## 2020-07-24 RX ORDER — FENTANYL CITRATE 50 UG/ML
25-50 INJECTION, SOLUTION INTRAMUSCULAR; INTRAVENOUS
Status: DISCONTINUED | OUTPATIENT
Start: 2020-07-24 | End: 2020-07-24 | Stop reason: HOSPADM

## 2020-07-24 RX ORDER — ONDANSETRON 2 MG/ML
INJECTION INTRAMUSCULAR; INTRAVENOUS PRN
Status: DISCONTINUED | OUTPATIENT
Start: 2020-07-24 | End: 2020-07-24

## 2020-07-24 RX ORDER — DEXAMETHASONE SODIUM PHOSPHATE 4 MG/ML
INJECTION, SOLUTION INTRA-ARTICULAR; INTRALESIONAL; INTRAMUSCULAR; INTRAVENOUS; SOFT TISSUE PRN
Status: DISCONTINUED | OUTPATIENT
Start: 2020-07-24 | End: 2020-07-24

## 2020-07-24 RX ORDER — ACETAMINOPHEN 325 MG/1
975 TABLET ORAL ONCE
Status: COMPLETED | OUTPATIENT
Start: 2020-07-24 | End: 2020-07-24

## 2020-07-24 RX ORDER — LIDOCAINE HYDROCHLORIDE 10 MG/ML
INJECTION, SOLUTION INFILTRATION; PERINEURAL PRN
Status: DISCONTINUED | OUTPATIENT
Start: 2020-07-24 | End: 2020-07-24 | Stop reason: HOSPADM

## 2020-07-24 RX ORDER — KETOROLAC TROMETHAMINE 30 MG/ML
INJECTION, SOLUTION INTRAMUSCULAR; INTRAVENOUS PRN
Status: DISCONTINUED | OUTPATIENT
Start: 2020-07-24 | End: 2020-07-24

## 2020-07-24 RX ORDER — MEPERIDINE HYDROCHLORIDE 25 MG/ML
12.5 INJECTION INTRAMUSCULAR; INTRAVENOUS; SUBCUTANEOUS
Status: DISCONTINUED | OUTPATIENT
Start: 2020-07-24 | End: 2020-07-24 | Stop reason: HOSPADM

## 2020-07-24 RX ORDER — PROPOFOL 10 MG/ML
INJECTION, EMULSION INTRAVENOUS PRN
Status: DISCONTINUED | OUTPATIENT
Start: 2020-07-24 | End: 2020-07-24

## 2020-07-24 RX ORDER — ACETAMINOPHEN 325 MG/1
650 TABLET ORAL
Status: DISCONTINUED | OUTPATIENT
Start: 2020-07-24 | End: 2020-07-24 | Stop reason: HOSPADM

## 2020-07-24 RX ORDER — NALOXONE HYDROCHLORIDE 0.4 MG/ML
.1-.4 INJECTION, SOLUTION INTRAMUSCULAR; INTRAVENOUS; SUBCUTANEOUS
Status: DISCONTINUED | OUTPATIENT
Start: 2020-07-24 | End: 2020-07-24 | Stop reason: HOSPADM

## 2020-07-24 RX ORDER — ONDANSETRON 4 MG/1
4 TABLET, ORALLY DISINTEGRATING ORAL EVERY 30 MIN PRN
Status: DISCONTINUED | OUTPATIENT
Start: 2020-07-24 | End: 2020-07-24 | Stop reason: HOSPADM

## 2020-07-24 RX ORDER — GABAPENTIN 100 MG/1
300 CAPSULE ORAL ONCE
Status: COMPLETED | OUTPATIENT
Start: 2020-07-24 | End: 2020-07-24

## 2020-07-24 RX ORDER — FENTANYL CITRATE 50 UG/ML
INJECTION, SOLUTION INTRAMUSCULAR; INTRAVENOUS PRN
Status: DISCONTINUED | OUTPATIENT
Start: 2020-07-24 | End: 2020-07-24

## 2020-07-24 RX ORDER — ACETAMINOPHEN 325 MG/1
975 TABLET ORAL ONCE
Status: DISCONTINUED | OUTPATIENT
Start: 2020-07-24 | End: 2020-07-24

## 2020-07-24 RX ORDER — HYDROMORPHONE HYDROCHLORIDE 1 MG/ML
.3-.5 INJECTION, SOLUTION INTRAMUSCULAR; INTRAVENOUS; SUBCUTANEOUS EVERY 10 MIN PRN
Status: DISCONTINUED | OUTPATIENT
Start: 2020-07-24 | End: 2020-07-24 | Stop reason: HOSPADM

## 2020-07-24 RX ORDER — LIDOCAINE 40 MG/G
CREAM TOPICAL
Status: DISCONTINUED | OUTPATIENT
Start: 2020-07-24 | End: 2020-07-24 | Stop reason: HOSPADM

## 2020-07-24 RX ADMIN — DEXAMETHASONE SODIUM PHOSPHATE 4 MG: 4 INJECTION, SOLUTION INTRAMUSCULAR; INTRAVENOUS at 07:40

## 2020-07-24 RX ADMIN — PROPOFOL 100 MCG/KG/MIN: 10 INJECTION, EMULSION INTRAVENOUS at 07:30

## 2020-07-24 RX ADMIN — KETOROLAC TROMETHAMINE 30 MG: 30 INJECTION, SOLUTION INTRAMUSCULAR at 08:13

## 2020-07-24 RX ADMIN — LIDOCAINE HYDROCHLORIDE 50 MG: 20 INJECTION, SOLUTION INFILTRATION; PERINEURAL at 07:30

## 2020-07-24 RX ADMIN — FENTANYL CITRATE 25 MCG: 50 INJECTION, SOLUTION INTRAMUSCULAR; INTRAVENOUS at 07:40

## 2020-07-24 RX ADMIN — OXYCODONE HYDROCHLORIDE 5 MG: 5 TABLET ORAL at 09:00

## 2020-07-24 RX ADMIN — GABAPENTIN 300 MG: 300 CAPSULE ORAL at 06:15

## 2020-07-24 RX ADMIN — FENTANYL CITRATE 50 MCG: 50 INJECTION, SOLUTION INTRAMUSCULAR; INTRAVENOUS at 08:24

## 2020-07-24 RX ADMIN — SODIUM CHLORIDE, POTASSIUM CHLORIDE, SODIUM LACTATE AND CALCIUM CHLORIDE: 600; 310; 30; 20 INJECTION, SOLUTION INTRAVENOUS at 07:25

## 2020-07-24 RX ADMIN — MIDAZOLAM 2 MG: 1 INJECTION INTRAMUSCULAR; INTRAVENOUS at 07:22

## 2020-07-24 RX ADMIN — ONDANSETRON 4 MG: 2 INJECTION INTRAMUSCULAR; INTRAVENOUS at 08:13

## 2020-07-24 RX ADMIN — FENTANYL CITRATE 25 MCG: 50 INJECTION, SOLUTION INTRAMUSCULAR; INTRAVENOUS at 07:45

## 2020-07-24 RX ADMIN — PROPOFOL 50 MG: 10 INJECTION, EMULSION INTRAVENOUS at 07:30

## 2020-07-24 RX ADMIN — ACETAMINOPHEN 975 MG: 325 TABLET, FILM COATED ORAL at 06:15

## 2020-07-24 ASSESSMENT — MIFFLIN-ST. JEOR: SCORE: 1161.75

## 2020-07-24 NOTE — BRIEF OP NOTE
[facility], [place]    Brief Operative Note    Pre-operative diagnosis: Abnormal uterine bleeding (AUB) [N93.9]  Post-operative diagnosis Same as pre-operative diagnosis    Procedure: Procedure(s):  DILATION, CERVIX, WITH HYSTEROSCOPY AND HYDROTHERMAL ENDOMETRIAL ABLATION  BIOPSY, ENDOMETRIUM  Surgeon: Surgeon(s) and Role:     * Apryl West MD - Primary     * Myhre, Alicia, DO - Resident - Assisting  Anesthesia: Monitor Anesthesia Care   Estimated blood loss: 2ml  IFV 300ml  Drains: None  Specimens:   ID Type Source Tests Collected by Time Destination   A : endometrial biopsy Tissue Uterus SURGICAL PATHOLOGY EXAM Apryl West MD 7/24/2020  7:48 AM      Findings:   Normal appearing external genitalia, Normal appearing cervix. Endometrial biopsy obtained without difficulty. Cervix serially dilated to 8mm using Hegar dilators. Bilateral tubal ostia visualized on hysteroscopy, normal appearing endometrial cavity.   Complications: None.  Implants: * No implants in log *     Alicia Myhre, DO  Obstetrics and Gynecology, PGY-4  July 24, 2020, 8:23 AM

## 2020-07-24 NOTE — ANESTHESIA POSTPROCEDURE EVALUATION
Anesthesia POST Procedure Evaluation    Patient: Abby Foy   MRN:     [id number] Gender:   female   Age:    40 year old :      1980        Preoperative Diagnosis: Abnormal uterine bleeding (AUB) [N93.9]   Procedure(s):  DILATION, CERVIX, WITH HYSTEROSCOPY AND HYDROTHERMAL ENDOMETRIAL ABLATION  BIOPSY, ENDOMETRIUM   Postop Comments: No value filed.     Anesthesia Type: MAC       Disposition: Outpatient   Postop Pain Control: Uneventful            Sign Out: Well controlled pain   PONV: No   Neuro/Psych: Uneventful            Sign Out: Acceptable/Baseline neuro status   Airway/Respiratory: Uneventful            Sign Out: Acceptable/Baseline resp. status   CV/Hemodynamics: Uneventful            Sign Out: Acceptable CV status   Other NRE: NONE   DID A NON-ROUTINE EVENT OCCUR? No         Last Anesthesia Record Vitals:  CRNA VITALS  2020 0748 - 2020 0848      2020             Temp 2:  36.8  C (98.2  F)    SpO2:  100 %    EKG:  Sinus rhythm          Last PACU Vitals:  Vitals Value Taken Time   BP     Temp     Pulse     Resp     SpO2     Temp src     NIBP 104/79 2020  8:16 AM   Pulse 62 2020  8:15 AM   SpO2 100 % 2020  8:15 AM   Resp     Temp     Ht Rate 66 2020  8:14 AM   Temp 2 36.5  C (97.7  F) 2020  8:15 AM         Electronically Signed By: Geovanni Zavala MD, 2020, 9:02 AM

## 2020-07-24 NOTE — OR NURSING
Pt to phase 2 - CRNA gave fentanyl  due to cramping - pt rested for 20 mins up to chair . Still having some cramping MDA called 1 time order for oxy 5mg given. Pt able to get some relief from oxy - pt has no urge to void. Discussed need to void in 8 hr post discharge discharge instructions review with  on the phone. vss - iv d/c'ed - pt to car via w/c

## 2020-07-24 NOTE — OP NOTE
[facility] Operative Note   Name: Abby Foy  MRN: [id number]  : 1980  Date of Surgery: 2020    Pre-operative Diagnosis: Abnormal Uterine Bleeding  Post-operative Diagnosis: Same  Procedure(s): DILATION, CERVIX, WITH HYSTEROSCOPY AND HYDROTHERMAL ENDOMETRIAL ABLATION  BIOPSY, ENDOMETRIUM    Surgeon: Apryl West MD   Assistants: Alicia Myhre, DO PGY-4  Anesthesia: MAC  EBL: 2 mL   Fluids: 300 mL crystalloid    Specimens: Specimens:       ID Type Source Tests Collected by Time Destination   A : endometrial biopsy Tissue Uterus SURGICAL PATHOLOGY EXAM Apryl West MD 2020  7:48 AM          Complications: None apparent.  Findings: Normal appearing external genitalia, Normal appearing cervix. Endometrial biopsy obtained without difficulty. Cervix serially dilated to 8mm using Hegar dilators. Bilateral tubal ostia visualized on hysteroscopy, normal appearing endometrial cavity.    Indications: Abby Foy is a 40 year old female with history of abnormal uterine bleeding that did not improve with OCPs. She desired more definitive management but did not desire hysterectomy. Hydrothermal ablation was recommended.  Risks, benefits, and alternatives to the procedure were discussed. The patient's questions were answered, understanding confirmed, and the patient signed written informed consent.      Technique:  The patient was taken to the operating room where she was placed in the dorsal lithotomy position. MAC anesthesia was administered and the patient was prepped and draped in the usual sterile fashion. A speculum was inserted into the vagina and the cervix visualized. A single-toothed tenaculum was placed at 12 o'clock on the cervix. A paracervical block with 1% plain lidocaine was performed at 4 and 8 o'clock. Endometrial biopsy was performed with pipelle. The cervix was dilated using sequential Hegar dilators up to 8 mm. The thermachoice hysteroscopy and ablation device was inserted  through the internal os and introduced into the uterine cavity which appeared normal.  After testing the uterine cavity size and ensuring an adequate seal at the cervical os the ablation device was set to run and temperature quickly gema to 91 degrees celsius. The device continued to run for 10 minutes while endometrial blanching was observed. After the 10 minute ablation the cooling period occurred. When this was done the uterine cavity was again examined and revealed appropriate post ablation changes. The tenaculum was removed from the cervix and good hemostasis was noted.  The speculum was removed from the vagina.  The patient tolerated the procedure well and was taken to the recovery room in stable condition.  Instrument, needle, and sponge counts were correct x2.  Dr. West was present and scrubbed for the entire procedure.    Alicia Myhre, DO  Obstetrics and Gynecology, PGY-4  July 24, 2020, 9:54 AM     I was present and scrubbed throughout the procedure,  I agree with the note above  Apryl West MD

## 2020-07-24 NOTE — DISCHARGE INSTRUCTIONS
Same-Day Surgery   Adult Discharge Orders & Instructions     For 24 hours after surgery:  1. Get plenty of rest.  A responsible adult must stay with you for at least 24 hours after you leave the hospital.   2. Pain medication can slow your reflexes. Do not drive or use heavy equipment.  If you have weakness or tingling, don't drive or use heavy equipment until this feeling goes away.  3. Mixing alcohol and pain medication can cause dizziness and slow your breathing. It can even be fatal. Do not drink alcohol while taking pain medication.  4. Avoid strenuous or risky activities.  Ask for help when climbing stairs.   5. You may feel lightheaded.  If so, sit for a few minutes before standing.  Have someone help you get up.   6. If you have nausea (feel sick to your stomach), drink only clear liquids such as apple juice, ginger ale, broth or 7-Up.  Rest may also help.  Be sure to drink enough fluids.  Move to a regular diet as you feel able. Take pain medications with a small amount of solid food, such as toast or crackers, to avoid nausea.   7. A slight fever is normal. Call the doctor if your fever is over 100 F (37.7 C) (taken under the tongue) or lasts longer than 24 hours.  8. You may have a dry mouth, muscle aches, trouble sleeping or a sore throat.  These symptoms should go away after 24 hours.  9. Do not make important or legal decisions.   Pain Management:      1. Take pain medication (if prescribed) for pain as directed by your physician.        2. WARNING: If the pain medication you have been prescribed contains Tylenol  (acetaminophen), DO NOT take additional doses of Tylenol (acetaminophen).     Call your doctor for any of the followin.  Signs of infection (fever, growing tenderness at the surgery site, severe pain, a large amount of drainage or bleeding, foul-smelling drainage, redness, swelling).    2.  It has been over 8 to 10 hours since surgery and you are still not able to urinate (pee).    3.   Headache for over 24 hours.    4.  Numbness, tingling or weakness the day after surgery (if you had spinal anesthesia).  To contact a doctor, call _____________________________________ or:      [phone] and ask for the Resident On Call for:          __________________________________________ (answered 24 hours a day)      Emergency Department:  [facility]: [phone]  [facility]: [phone]               Rev. 10/2014

## 2020-07-24 NOTE — ANESTHESIA CARE TRANSFER NOTE
Patient: Abby Foy    Procedure(s):  DILATION, CERVIX, WITH HYSTEROSCOPY AND HYDROTHERMAL ENDOMETRIAL ABLATION  BIOPSY, ENDOMETRIUM    Diagnosis: Abnormal uterine bleeding (AUB) [N93.9]  Diagnosis Additional Information: No value filed.    Anesthesia Type:   MAC     Note:  Airway :Nasal Cannula  Patient transferred to:Phase II  Handoff Report: Identifed the Patient, Identified the Reponsible Provider, Reviewed the pertinent medical history, Discussed the surgical course, Reviewed Intra-OP anesthesia mangement and issues during anesthesia, Set expectations for post-procedure period and Allowed opportunity for questions and acknowledgement of understanding      Vitals: (Last set prior to Anesthesia Care Transfer)    CRNA VITALS  7/24/2020 0748 - 7/24/2020 0829      7/24/2020             Temp 2:  36.8  C (98.2  F)    SpO2:  100 %    EKG:  Sinus rhythm                Electronically Signed By: CLARISSA Garcia CRNA  July 24, 2020  8:29 AM

## 2020-07-28 LAB — COPATH REPORT: NORMAL

## 2020-08-06 ENCOUNTER — OFFICE VISIT (OUTPATIENT)
Dept: OBGYN | Facility: CLINIC | Age: 40
End: 2020-08-06
Attending: OBSTETRICS & GYNECOLOGY
Payer: COMMERCIAL

## 2020-08-06 VITALS
BODY MASS INDEX: 18.61 KG/M2 | WEIGHT: 109 LBS | HEART RATE: 85 BPM | DIASTOLIC BLOOD PRESSURE: 88 MMHG | SYSTOLIC BLOOD PRESSURE: 135 MMHG

## 2020-08-06 DIAGNOSIS — Z98.890 STATUS POST HYSTEROSCOPIC ABLATION OF ENDOMETRIUM: Primary | ICD-10-CM

## 2020-08-06 PROCEDURE — G0463 HOSPITAL OUTPT CLINIC VISIT: HCPCS | Mod: ZF

## 2020-08-06 ASSESSMENT — PAIN SCALES - GENERAL: PAINLEVEL: NO PAIN (0)

## 2020-08-06 NOTE — NURSING NOTE
Chief Complaint   Patient presents with     Surgical Followup     Hysterescopy and D&C 7/24/2020   Cecilia Steward LPN

## 2020-08-06 NOTE — LETTER
8/6/2020       RE: Abby Foy  [address]  [place] [address]     Dear Colleague,    Thank you for referring your patient, Abby Foy, to the [facility] at [facility]. Please see a copy of my visit note below.    39 yo  now nearly 2 weeks s/p endometrial biopsy and HTA.  She has had some cramping pain which is now rare and rates as 3-4 when it occurs.  Was knee boarding this past weekend and got a bit banged up.  Has had minimal bleeding since the procedure.  Is hoping this helps her abnormal bleeding.     ROS: 10 point ROS neg other than the symptoms noted above in the HPI.    Past Medical History:   Diagnosis Date     Angioedema of lips episode in 3/13--idiopathic     Complication of anesthesia     ponv     Contraception 1/28/2009     Diagnostic skin and sensitization tests 3/27/13 skin tests all NEGATIVE for environmental and food allergens including shellfish and nuts.      Nonallergic rhinitis     3/27/13 skin tests all NEGATIVE for environmental and food allergens including shellfish and nuts. 3/27/13 followup IgE tests NEG to [facility], SNF, shrimp, macadamia nut, pistachio and walnut.     Rheumatoid arthritis of multiple sites with negative rheumatoid factor (H) 12/31/2015     Past Surgical History:   Procedure Laterality Date     DILATE CERVIX, HYSTEROSCOPY, ABLATE ENDOMETRIUM HYDROTHERMAL, COMBINED N/A 7/24/2020    Procedure: DILATION, CERVIX, WITH HYSTEROSCOPY AND HYDROTHERMAL ENDOMETRIAL ABLATION;  Surgeon: Apryl West MD;  [facility]     ENDOMETRIAL SAMPLING (BIOPSY) N/A 7/24/2020    Procedure: BIOPSY, ENDOMETRIUM;  Surgeon: Apryl West MD;  [facility]     LAPAROSCOPIC SALPINGECTOMY Bilateral 6/23/2017    Procedure: LAPAROSCOPIC SALPINGECTOMY;  Operative Laparoscopy, Bilateral Salpingectomy ;  Surgeon: Apryl West MD;  [facility]     OPERATIVE HYSTEROSCOPY WITH MORCELLATOR N/A  12/12/2018    Procedure: DIAGNOSTIC HYSTEROSCOPY AND D AND C;  Surgeon: Apryl West MD;  [facility]     Physical exam:  /88   Pulse 85   Wt 49.4 kg (109 lb)   LMP 07/24/2020   Breastfeeding No   BMI 18.61 kg/m    Gen'l: appears well  No further exam completed    Pathology reviewed and benign    Assessment/Plan  39 yo 2 weeks s/p HTA for AUB doing well    - reviewed typical post-op course  - anticipate maximal benefit from procedure by 3-6 months post-op  - Return for annual exam, sooner if concerns    Apryl West MD

## 2020-08-06 NOTE — PROGRESS NOTES
41 yo  now nearly 2 weeks s/p endometrial biopsy and HTA.  She has had some cramping pain which is now rare and rates as 3-4 when it occurs.  Was knee boarding this past weekend and got a bit banged up.  Has had minimal bleeding since the procedure.  Is hoping this helps her abnormal bleeding.     ROS: 10 point ROS neg other than the symptoms noted above in the HPI.    Past Medical History:   Diagnosis Date     Angioedema of lips episode in 3/13--idiopathic     Complication of anesthesia     ponv     Contraception 1/28/2009     Diagnostic skin and sensitization tests 3/27/13 skin tests all NEGATIVE for environmental and food allergens including shellfish and nuts.      Nonallergic rhinitis     3/27/13 skin tests all NEGATIVE for environmental and food allergens including shellfish and nuts. 3/27/13 followup IgE tests NEG to [facility], SNF, shrimp, macadamia nut, pistachio and walnut.     Rheumatoid arthritis of multiple sites with negative rheumatoid factor (H) 12/31/2015     Past Surgical History:   Procedure Laterality Date     DILATE CERVIX, HYSTEROSCOPY, ABLATE ENDOMETRIUM HYDROTHERMAL, COMBINED N/A 7/24/2020    Procedure: DILATION, CERVIX, WITH HYSTEROSCOPY AND HYDROTHERMAL ENDOMETRIAL ABLATION;  Surgeon: Apryl West MD;  [facility]     ENDOMETRIAL SAMPLING (BIOPSY) N/A 7/24/2020    Procedure: BIOPSY, ENDOMETRIUM;  Surgeon: Apryl West MD;  [facility]     LAPAROSCOPIC SALPINGECTOMY Bilateral 6/23/2017    Procedure: LAPAROSCOPIC SALPINGECTOMY;  Operative Laparoscopy, Bilateral Salpingectomy ;  Surgeon: Apryl West MD;  [facility]     OPERATIVE HYSTEROSCOPY WITH MORCELLATOR N/A 12/12/2018    Procedure: DIAGNOSTIC HYSTEROSCOPY AND D AND C;  Surgeon: Apryl West MD;  [facility]     Physical exam:  /88   Pulse 85   Wt 49.4 kg (109 lb)   LMP 07/24/2020   Breastfeeding No   BMI 18.61 kg/m    Gen'l: appears well  No further exam completed    Pathology reviewed  and benign    Assessment/Plan  39 yo 2 weeks s/p HTA for AUB doing well    - reviewed typical post-op course  - anticipate maximal benefit from procedure by 3-6 months post-op  - Return for annual exam, sooner if concerns    Apryl West MD

## 2020-09-02 ENCOUNTER — OFFICE VISIT (OUTPATIENT)
Dept: ORTHOPEDICS | Facility: CLINIC | Age: 40
End: 2020-09-02
Payer: COMMERCIAL

## 2020-09-02 ENCOUNTER — ANCILLARY PROCEDURE (OUTPATIENT)
Dept: GENERAL RADIOLOGY | Facility: CLINIC | Age: 40
End: 2020-09-02
Attending: FAMILY MEDICINE
Payer: COMMERCIAL

## 2020-09-02 VITALS
HEIGHT: 64 IN | DIASTOLIC BLOOD PRESSURE: 97 MMHG | WEIGHT: 109 LBS | SYSTOLIC BLOOD PRESSURE: 155 MMHG | BODY MASS INDEX: 18.61 KG/M2

## 2020-09-02 DIAGNOSIS — S69.92XA INJURY OF LEFT WRIST, INITIAL ENCOUNTER: ICD-10-CM

## 2020-09-02 DIAGNOSIS — S69.92XA INJURY OF LEFT WRIST, INITIAL ENCOUNTER: Primary | ICD-10-CM

## 2020-09-02 PROCEDURE — 99203 OFFICE O/P NEW LOW 30 MIN: CPT | Performed by: FAMILY MEDICINE

## 2020-09-02 PROCEDURE — 73110 X-RAY EXAM OF WRIST: CPT | Mod: LT

## 2020-09-02 ASSESSMENT — MIFFLIN-ST. JEOR: SCORE: 1158.39

## 2020-09-02 NOTE — PROGRESS NOTES
Abby Foy  :  1980  DOS: 2020  MRN: [id number]    Sports Medicine Clinic Visit    PCP: Sandi Duffy    Abby Foy is a 40 year old Right hand dominant female who is seen as an AIC patient presenting with left wrist injury.    Injury: Walking 2 dogs, bent over to pick something up, dog pulled and slipped on wet grass, falling backwards landing on buttocks and possible twisting leash in left hand yesterday (20).  Pain located over left ulnar wrist, base of fifth metatarsal, radiating ulnar forearm.  Additional Features:  Positive: swelling and weakness.  Symptoms are better with Rest.  Symptoms are worse with: direct pressure, grasping, typing, moving wrist.  Other evaluation and/or treatments so far consists of: Ice, Ibuprofen and Rest.  Recent imaging completed: No recent imaging completed.  Prior History of related problems: history of forearm/wrist contusion ~ 10 years ago from crush injury.    Social History: works as dietician    Review of Systems  Musculoskeletal: as above  Remainder of review of systems is negative including constitutional, CV, pulmonary, GI, Skin and Neurologic except as noted in HPI or medical history.    Past Medical History:   Diagnosis Date     Angioedema of lips episode in 3/13--idiopathic     Complication of anesthesia     ponv     Contraception 2009     Diagnostic skin and sensitization tests 3/27/13 skin tests all NEGATIVE for environmental and food allergens including shellfish and nuts.      Nonallergic rhinitis     3/27/13 skin tests all NEGATIVE for environmental and food allergens including shellfish and nuts. 3/27/13 followup IgE tests NEG to [facility], SNF, shrimp, macadamia nut, pistachio and walnut.     Rheumatoid arthritis of multiple sites with negative rheumatoid factor (H) 2015     Past Surgical History:   Procedure Laterality Date     DILATE CERVIX, HYSTEROSCOPY, ABLATE ENDOMETRIUM HYDROTHERMAL, COMBINED N/A 2020     "Procedure: DILATION, CERVIX, WITH HYSTEROSCOPY AND HYDROTHERMAL ENDOMETRIAL ABLATION;  Surgeon: Apryl West MD;  [facility]     ENDOMETRIAL SAMPLING (BIOPSY) N/A 7/24/2020    Procedure: BIOPSY, ENDOMETRIUM;  Surgeon: Apryl West MD;  [facility]     LAPAROSCOPIC SALPINGECTOMY Bilateral 6/23/2017    Procedure: LAPAROSCOPIC SALPINGECTOMY;  Operative Laparoscopy, Bilateral Salpingectomy ;  Surgeon: Apryl West MD;  [facility]     OPERATIVE HYSTEROSCOPY WITH MORCELLATOR N/A 12/12/2018    Procedure: DIAGNOSTIC HYSTEROSCOPY AND D AND C;  Surgeon: Apryl West MD;  [facility]     Family History   Problem Relation Age of Onset     Hypertension Maternal Grandmother      Autoimmune Disease Maternal Grandmother         Autoimmune hepatitis     Cancer Maternal Grandfather      Hypertension Paternal Grandmother      Cancer - colorectal Paternal Grandfather      Cardiovascular Paternal Grandfather      Other Cancer Paternal Grandfather      Hypertension Mother      Autoimmune Disease Mother         Autoimmune hepatitis     Hyperlipidemia Mother      Asthma Mother      Hypertension Father      Diabetes Father         Type 2     Multiple Sclerosis Maternal Aunt      Cerebrovascular Disease No family hx of      Thyroid Disease No family hx of      Glaucoma No family hx of      Macular Degeneration No family hx of      Breast Cancer No family hx of      Colon Cancer No family hx of      Objective  BP (!) 155/97   Ht 1.632 m (5' 4.25\")   Wt 49.4 kg (109 lb)   BMI 18.56 kg/m        General: healthy, alert and in no distress      HEENT: no scleral icterus or conjunctival erythema     Skin: no suspicious lesions or rash. No jaundice.     CV: regular rhythm by palpation, 2+ distal pulses, no pedal edema      Resp: normal respiratory effort without conversational dyspnea     Psych: normal mood and affect      Gait: nonantalgic, appropriate coordination and balance     Neuro: normal light touch " sensory exam of the extremities. Motor strength as noted below     Left Wrist and Hand exam    Inspection:       Swelling: very mild over ulnar/dorsal aspect of wrist, over pisiform, small early ecchymosis    Tender:       TFCC left and broadly over the RC and ulnocarpal joints, and generally in the ulnar wrist, TTP of the ECU, EDM, more mildly in flexor tendons as well    Non Tender:       Remainder of the Wrist and Hand left,      distal radius left and      anatomic snuffbox left    ROM:       Decreased active and passive ROM of the wrist with flexion, extension, ulnar >> radial deviation due to pain, though motor function intact    Strength:       5/5 strength in the muscles of the hand, wrist and forearm left    Special Tests:        Painful gentle TFCC compression test left    Neurovascular:       2+ radial pulses bilaterally with brisk capillary refill and      normal sensation to light touch in the radial, median and ulnar nerve distributions    Left Elbow exam:    Inspection:       no ecchymosis       no edema or effusion    ROM:       full with flexion, extension, forearm supination and pronation.       Mild wrist pain with terminal supination and pronation    Strength:       flexion 5/5       extension 5/5       forearm supination 5/5       forearm pronation 5/5    Tender:       olecranon    Non-Tender:       remainder of elbow area       lateral epicondyle       medial epicondyle       radial head       antecubital space    Sensation:       intact throughout hand       intact throughout forearm     Skin:       well perfused       capillary refill less than 2 seconds      Radiology:  Recent Results (from the past 744 hour(s))   XR Wrist Left G/E 3 Views    Narrative    XR WRIST LEFT G/E 3 VIEWS 9/2/2020 8:22 AM     HISTORY: Injury of left wrist, initial encounter      Impression    IMPRESSION: Negative exam.    SHANT RODRIGUEZ MD         Assessment:  1. Injury of left wrist, initial encounter         Plan:  Discussed the assessment with the patient.  Follow up: 1 week prn  Suspect ligamentous sprain and tendon strain, lower suspicion of occult fracture based imagine and exam  Could consider advanced imaging for labile or worsening sx, but full time brace/splint will protect for occult fracture for now  Advised RICE, activity modification, progressive gentle ROM and return to activity as tolerated based on progress  XR images independently visualized and reviewed with patient today in clinic  Agree no acute findings, does have some positive ulnar variance, related old wrist injury but both wrists have similar alignment, could be congenital/developmental  We discussed modified progressive pain-free activity as tolerated  Home handouts provided and supportive care reviewed  All questions were answered today  Contact us with additional questions or concerns  Signs and sx of concern reviewed      Ryan Page DO, ERON  Primary Care Sports Medicine  [facility]           Disclaimer: This note consists of symbols derived from keyboarding, dictation and/or voice recognition software. As a result, there may be errors in the script that have gone undetected. Please consider this when interpreting information found in this chart.

## 2020-09-02 NOTE — LETTER
2020         RE: Abby Foy  [address]  [place] [address]        Dear Colleague,    Thank you for referring your patient, Abby Foy, to the [facility]. Please see a copy of my visit note below.    Abyb Foy  :  1980  DOS: 2020  MRN: [id number]    Sports Medicine Clinic Visit    PCP: Sandi Duffy Naila    Abby Foy is a 40 year old Right hand dominant female who is seen as an AIC patient presenting with left wrist injury.    Injury: Walking 2 dogs, bent over to pick something up, dog pulled and slipped on wet grass, falling backwards landing on buttocks and possible twisting leash in left hand yesterday (20).  Pain located over left ulnar wrist, base of fifth metatarsal, radiating ulnar forearm.  Additional Features:  Positive: swelling and weakness.  Symptoms are better with Rest.  Symptoms are worse with: direct pressure, grasping, typing, moving wrist.  Other evaluation and/or treatments so far consists of: Ice, Ibuprofen and Rest.  Recent imaging completed: No recent imaging completed.  Prior History of related problems: history of forearm/wrist contusion ~ 10 years ago from crush injury.    Social History: works as dietician    Review of Systems  Musculoskeletal: as above  Remainder of review of systems is negative including constitutional, CV, pulmonary, GI, Skin and Neurologic except as noted in HPI or medical history.    Past Medical History:   Diagnosis Date     Angioedema of lips episode in 3/13--idiopathic     Complication of anesthesia     ponv     Contraception 2009     Diagnostic skin and sensitization tests 3/27/13 skin tests all NEGATIVE for environmental and food allergens including shellfish and nuts.      Nonallergic rhinitis     3/27/13 skin tests all NEGATIVE for environmental and food allergens including shellfish and nuts. 3/27/13 followup IgE tests NEG to [facility], SNF, shrimp,  "macadamia nut, pistachio and walnut.     Rheumatoid arthritis of multiple sites with negative rheumatoid factor (H) 12/31/2015     Past Surgical History:   Procedure Laterality Date     DILATE CERVIX, HYSTEROSCOPY, ABLATE ENDOMETRIUM HYDROTHERMAL, COMBINED N/A 7/24/2020    Procedure: DILATION, CERVIX, WITH HYSTEROSCOPY AND HYDROTHERMAL ENDOMETRIAL ABLATION;  Surgeon: Apryl West MD;  [facility]     ENDOMETRIAL SAMPLING (BIOPSY) N/A 7/24/2020    Procedure: BIOPSY, ENDOMETRIUM;  Surgeon: Apryl West MD;  [facility]     LAPAROSCOPIC SALPINGECTOMY Bilateral 6/23/2017    Procedure: LAPAROSCOPIC SALPINGECTOMY;  Operative Laparoscopy, Bilateral Salpingectomy ;  Surgeon: pAryl West MD;  [facility]     OPERATIVE HYSTEROSCOPY WITH MORCELLATOR N/A 12/12/2018    Procedure: DIAGNOSTIC HYSTEROSCOPY AND D AND C;  Surgeon: Apryl West MD;  [facility]     Family History   Problem Relation Age of Onset     Hypertension Maternal Grandmother      Autoimmune Disease Maternal Grandmother         Autoimmune hepatitis     Cancer Maternal Grandfather      Hypertension Paternal Grandmother      Cancer - colorectal Paternal Grandfather      Cardiovascular Paternal Grandfather      Other Cancer Paternal Grandfather      Hypertension Mother      Autoimmune Disease Mother         Autoimmune hepatitis     Hyperlipidemia Mother      Asthma Mother      Hypertension Father      Diabetes Father         Type 2     Multiple Sclerosis Maternal Aunt      Cerebrovascular Disease No family hx of      Thyroid Disease No family hx of      Glaucoma No family hx of      Macular Degeneration No family hx of      Breast Cancer No family hx of      Colon Cancer No family hx of      Objective  BP (!) 155/97   Ht 1.632 m (5' 4.25\")   Wt 49.4 kg (109 lb)   BMI 18.56 kg/m        General: healthy, alert and in no distress      HEENT: no scleral icterus or conjunctival erythema     Skin: no suspicious lesions or rash. No " jaundice.     CV: regular rhythm by palpation, 2+ distal pulses, no pedal edema      Resp: normal respiratory effort without conversational dyspnea     Psych: normal mood and affect      Gait: nonantalgic, appropriate coordination and balance     Neuro: normal light touch sensory exam of the extremities. Motor strength as noted below     Left Wrist and Hand exam    Inspection:       Swelling: very mild over ulnar/dorsal aspect of wrist, over pisiform, small early ecchymosis    Tender:       TFCC left and broadly over the RC and ulnocarpal joints, and generally in the ulnar wrist, TTP of the ECU, EDM, more mildly in flexor tendons as well    Non Tender:       Remainder of the Wrist and Hand left,      distal radius left and      anatomic snuffbox left    ROM:       Decreased active and passive ROM of the wrist with flexion, extension, ulnar >> radial deviation due to pain, though motor function intact    Strength:       5/5 strength in the muscles of the hand, wrist and forearm left    Special Tests:        Painful gentle TFCC compression test left    Neurovascular:       2+ radial pulses bilaterally with brisk capillary refill and      normal sensation to light touch in the radial, median and ulnar nerve distributions    Left Elbow exam:    Inspection:       no ecchymosis       no edema or effusion    ROM:       full with flexion, extension, forearm supination and pronation.       Mild wrist pain with terminal supination and pronation    Strength:       flexion 5/5       extension 5/5       forearm supination 5/5       forearm pronation 5/5    Tender:       olecranon    Non-Tender:       remainder of elbow area       lateral epicondyle       medial epicondyle       radial head       antecubital space    Sensation:       intact throughout hand       intact throughout forearm     Skin:       well perfused       capillary refill less than 2 seconds      Radiology:  Recent Results (from the past 744 hour(s))   XR Wrist  Left G/E 3 Views    Narrative    XR WRIST LEFT G/E 3 VIEWS 9/2/2020 8:22 AM     HISTORY: Injury of left wrist, initial encounter      Impression    IMPRESSION: Negative exam.    HSANT RODRIGUEZ MD         Assessment:  1. Injury of left wrist, initial encounter        Plan:  Discussed the assessment with the patient.  Follow up: 1 week prn  Suspect ligamentous sprain and tendon strain, lower suspicion of occult fracture based imagine and exam  Could consider advanced imaging for labile or worsening sx, but full time brace/splint will protect for occult fracture for now  Advised RICE, activity modification, progressive gentle ROM and return to activity as tolerated based on progress  XR images independently visualized and reviewed with patient today in clinic  Agree no acute findings, does have some positive ulnar variance, related old wrist injury but both wrists have similar alignment, could be congenital/developmental  We discussed modified progressive pain-free activity as tolerated  Home handouts provided and supportive care reviewed  All questions were answered today  Contact us with additional questions or concerns  Signs and sx of concern reviewed      Ryan Page DO, CAQ  Primary Care Sports Medicine  [facility]           Disclaimer: This note consists of symbols derived from keyboarding, dictation and/or voice recognition software. As a result, there may be errors in the script that have gone undetected. Please consider this when interpreting information found in this chart.    Again, thank you for allowing me to participate in the care of your patient.        Sincerely,        Ryan Page DO

## 2020-09-03 ENCOUNTER — MYC MEDICAL ADVICE (OUTPATIENT)
Dept: RHEUMATOLOGY | Facility: CLINIC | Age: 40
End: 2020-09-03

## 2020-09-03 DIAGNOSIS — N93.9 ABNORMAL UTERINE BLEEDING (AUB): ICD-10-CM

## 2020-10-06 RX ORDER — NORGESTIMATE AND ETHINYL ESTRADIOL 0.25-0.035
1 KIT ORAL DAILY
Qty: 84 TABLET | Refills: 0 | Status: SHIPPED | OUTPATIENT
Start: 2020-10-06 | End: 2021-07-08 | Stop reason: ALTCHOICE

## 2020-10-06 NOTE — TELEPHONE ENCOUNTER
Received refill request for OCPs.  Last in clinic 8/2020 for post hysteroscopy/D&C follow up.  Due for annual in November.  Refill sent and My Chart message to patient reminding her to schedule.

## 2020-10-10 ENCOUNTER — IMMUNIZATION (OUTPATIENT)
Dept: NURSING | Facility: CLINIC | Age: 40
End: 2020-10-10
Payer: COMMERCIAL

## 2020-10-10 DIAGNOSIS — Z23 NEED FOR PROPHYLACTIC VACCINATION AND INOCULATION AGAINST INFLUENZA: Primary | ICD-10-CM

## 2020-10-10 PROCEDURE — 90471 IMMUNIZATION ADMIN: CPT

## 2020-10-10 PROCEDURE — 90686 IIV4 VACC NO PRSV 0.5 ML IM: CPT

## 2020-11-29 ENCOUNTER — HEALTH MAINTENANCE LETTER (OUTPATIENT)
Age: 40
End: 2020-11-29

## 2020-12-04 DIAGNOSIS — L40.50 PSORIATIC ARTHRITIS (H): Primary | ICD-10-CM

## 2020-12-04 RX ORDER — APREMILAST 30 MG/1
30 TABLET, FILM COATED ORAL DAILY
Qty: 60 TABLET | Refills: 2 | Status: SHIPPED | OUTPATIENT
Start: 2020-12-04 | End: 2021-01-13

## 2020-12-22 NOTE — PATIENT INSTRUCTIONS
Sucrets lozenges and Cepacol throat spray.  Ibuprofen, Aleve, and Tylenol - pain relievers.    Increase your water intake in order to keep the secretions/mucous in your upper respiratory tract thin. Get plenty of rest and wash your hands well. Follow up if symptoms fail to improve or worsen.    
Varun Mclaughlin

## 2021-01-04 ENCOUNTER — TELEPHONE (OUTPATIENT)
Dept: RHEUMATOLOGY | Facility: CLINIC | Age: 41
End: 2021-01-04

## 2021-01-04 DIAGNOSIS — Z79.899 HIGH RISK MEDICATION USE: ICD-10-CM

## 2021-01-04 DIAGNOSIS — L40.50 PSORIATIC ARTHRITIS (H): Primary | ICD-10-CM

## 2021-01-04 NOTE — TELEPHONE ENCOUNTER
Patient has appointment scheduled on 01/13/2021.  Wondering if she needs to have labs done before appointment    Siri Hennessy MA

## 2021-01-04 NOTE — TELEPHONE ENCOUNTER
Patient needs labs ordered and then called when this has been done.    Per last office visit labs needed are:  Labs every 3 months: CBC, Creatinine, Hepatic Panel, ESR, CRP    Maddie Hennessy CMA Rheumatology  1/4/2021 2:42 PM

## 2021-01-12 DIAGNOSIS — L40.50 PSORIATIC ARTHRITIS (H): ICD-10-CM

## 2021-01-12 DIAGNOSIS — Z79.899 HIGH RISK MEDICATION USE: ICD-10-CM

## 2021-01-12 LAB
ALBUMIN SERPL-MCNC: 4.4 G/DL (ref 3.4–5)
ALP SERPL-CCNC: 60 U/L (ref 40–150)
ALT SERPL W P-5'-P-CCNC: 20 U/L (ref 0–50)
AST SERPL W P-5'-P-CCNC: 15 U/L (ref 0–45)
BASOPHILS # BLD AUTO: 0 10E9/L (ref 0–0.2)
BASOPHILS NFR BLD AUTO: 0.5 %
BILIRUB DIRECT SERPL-MCNC: 0.1 MG/DL (ref 0–0.2)
BILIRUB SERPL-MCNC: 0.4 MG/DL (ref 0.2–1.3)
CREAT SERPL-MCNC: 0.76 MG/DL (ref 0.52–1.04)
CRP SERPL-MCNC: <2.9 MG/L (ref 0–8)
DIFFERENTIAL METHOD BLD: NORMAL
EOSINOPHIL # BLD AUTO: 0.1 10E9/L (ref 0–0.7)
EOSINOPHIL NFR BLD AUTO: 2 %
ERYTHROCYTE [DISTWIDTH] IN BLOOD BY AUTOMATED COUNT: 12.7 % (ref 10–15)
ERYTHROCYTE [SEDIMENTATION RATE] IN BLOOD BY WESTERGREN METHOD: 7 MM/H (ref 0–20)
GFR SERPL CREATININE-BSD FRML MDRD: >90 ML/MIN/{1.73_M2}
HCT VFR BLD AUTO: 37.8 % (ref 35–47)
HGB BLD-MCNC: 12.1 G/DL (ref 11.7–15.7)
LYMPHOCYTES # BLD AUTO: 1.6 10E9/L (ref 0.8–5.3)
LYMPHOCYTES NFR BLD AUTO: 29.5 %
MCH RBC QN AUTO: 26.5 PG (ref 26.5–33)
MCHC RBC AUTO-ENTMCNC: 32 G/DL (ref 31.5–36.5)
MCV RBC AUTO: 83 FL (ref 78–100)
MONOCYTES # BLD AUTO: 0.4 10E9/L (ref 0–1.3)
MONOCYTES NFR BLD AUTO: 7.5 %
NEUTROPHILS # BLD AUTO: 3.3 10E9/L (ref 1.6–8.3)
NEUTROPHILS NFR BLD AUTO: 60.5 %
PLATELET # BLD AUTO: 221 10E9/L (ref 150–450)
PROT SERPL-MCNC: 7.7 G/DL (ref 6.8–8.8)
RBC # BLD AUTO: 4.56 10E12/L (ref 3.8–5.2)
WBC # BLD AUTO: 5.5 10E9/L (ref 4–11)

## 2021-01-12 PROCEDURE — 36415 COLL VENOUS BLD VENIPUNCTURE: CPT | Performed by: INTERNAL MEDICINE

## 2021-01-12 PROCEDURE — 85025 COMPLETE CBC W/AUTO DIFF WBC: CPT | Performed by: INTERNAL MEDICINE

## 2021-01-12 PROCEDURE — 82565 ASSAY OF CREATININE: CPT | Performed by: INTERNAL MEDICINE

## 2021-01-12 PROCEDURE — 86140 C-REACTIVE PROTEIN: CPT | Performed by: INTERNAL MEDICINE

## 2021-01-12 PROCEDURE — 80076 HEPATIC FUNCTION PANEL: CPT | Performed by: INTERNAL MEDICINE

## 2021-01-12 PROCEDURE — 85652 RBC SED RATE AUTOMATED: CPT | Performed by: INTERNAL MEDICINE

## 2021-01-12 NOTE — PATIENT INSTRUCTIONS
RHEUMATOLOGY    Dr. Hunter Monroy    [facility]  [address] [place] [address]    Our new phone number for Rheumatology is [phone], this number will be able to help you schedule appointments for Dr. Monroy or if you have any message you would like sent to us.    Thank you for choosing [facility]!  Maddie Hennessy [facility] Rheumatology

## 2021-01-12 NOTE — PROGRESS NOTES
Abby Foy is a 40 year old year old female who is being evaluated via a billable telephone visit.      What phone number would you like to be contacted at? [phone]  How would you like to obtain your AVS? [facility]    Rheumatology Telephone/Telehealth  Visit      Abby Foy MRN# [id number]   YOB: 1980 Age: 40 year old      Date of visit: 1/13/21   PCP: Sandi Duffy  Dermatologist: Amy Patel  Ophthalmologist: Dr. Mathis     Chief Complaint   Patient presents with:  Arthritis    Assessment and Plan   1. 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 However, given her continued back pain that improved with activity but no radiographic evidence for inflammatory arthritis or osteoarthritis, there was concern that she had inflammatory back pain that would be more consistent with a spondylarthritis. Humira was started and resulted in improvement of her back pain, suggesting axial disease.  Arthritis improved with methotrexate and sulfasalazine, but she was having headaches and therefore the most likely culprit methotrexate was reduced until her headaches worsened significantly and therefore sulfasalazine and methotrexate were both discontinued. She had resolution of her headaches after discontinuing both sulfasalazine and methotrexate. I believe that the sulfasalazine was the cause of her headaches. Therefore, cautious retrial of methotrexate in the future could be done.  She was doing well on a combination of leflunomide 10 mg daily and Humira 40 mg SQ every 14 days but Humira had to be stopped because of issues with the  program; so Simponi was Rx'd but never started.  Intermittent inflammatory symptom so Otezla was prescribed but only  started in March 2020; arthritis doing very well on Otezla but having headaches 4/7 mornings; so reduce Otezla to 30 mg daily and since then the headaches have resolved.  She is doing well at this time.  She notes that she missed taking both leflunomide and Otezla for about 3 days in December 2020 with worsening symptoms; it is suspected that leflunomide was not the culprit because of its half-life and therefore advised that she try stopping leflunomide and continuing Otezla.  If she continues to do well on Otezla monotherapy then she does not need to restart leflunomide but if symptoms return then she is to restart leflunomide.   Chronic disease, stable  - Stop taking leflunomide but if arthritis symptoms return then she is to restart leflunomide 10 mg daily; it is not being removed from her medication list at this time  - Continue Otezla 30mg daily  - Labs in 3 months: CBC, Creatinine, Hepatic Panel, ESR, CRP    # Pregnancy Planning: s/p salpingectomy;  had a vasectomy    Recent labs reviewed and independent interpretation of these tests reveal no evidence of medication toxicity, and disease appears well controlled.  Additional labs were ordered for HIGH RISK MEDICATION TOXICITY MONITORING and disease activity evaluation.  Management options discussed and implemented after shared medical decision making with the patient.      2. 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 Interestingly when leflunomide was stopped between 3/2018 and today, 6/15/2018, she experienced increased L>R eye irritation that when bothering her only affected one eye at a time.  Leflunomide was restarted with improvement of the eye inflammation.  Leflunomide is being adjusted as noted in #1; if eye inflammation returns then this would be another reason to continue leflunomide.  Chronic disease, stable     3. Headaches: Likely due to SSZ. Headaches stopped with  discontinuation of SSZ.  Then headaches with Otezla twice daily that resolved with reducing Otezla to once daily.  No headaches at this time    Current COVID-19 vaccines discussed    # Relevant labs and imaging were reviewed with the patient    # High risk medication toxicity monitoring: discussion and labs reviewed; appropriate labs ordered. See above.  Instructed that if confirmed to have COVID-19 or exposure to someone with confirmed COVID-19 to call this clinic for directions on DMARD management.    # Considered to be at high risk of complications from the COVID-19 virus.  It is recommended to limit contact with other people and if possible to work remotely or provide a leave of absence to reduce the risk for COVID-19.      Total minutes spent in evaluation with patient, review of pertinent lab tests and chart notes, and documentation: 15    Ms. Foy verbalized agreement with and understanding of the rational for the diagnosis and treatment plan.  All questions were answered to best of my ability and the patient's satisfaction. Ms. Foy was advised to contact the clinic with any questions that may arise after the clinic visit.      Thank you for involving me in the care of the patient    Return to clinic: 3-4 months    HPI   Abby Foy is a 40 year old female with medical history significant for urticaria, H. pylori infection, and iritis? who returns for f/u of seronegative spondyloarthropathy.    Today, Ms. Foy reports that she is doing well with regard to her arthritis.  She notes that she had to cancel her last appointment with me because of her children school schedule changes as they were shifted to distance learning.  Headaches have resolved with reducing Otezla to once daily.  She notes that she stopped taking leflunomide and Otezla on accident for a few days in December 2020 with worsening of her joint pain that then resolved with reinitiation of Otezla and leflunomide.  Doing  well at this time with no joint pain or morning stiffness.     Denies fevers, chills, nausea, vomiting, constipation, diarrhea. No abdominal pain. No chest pain/pressure, palpitations, or shortness of breath. No neck pain. Occasional cold sores..     Tobacco: None  EtOH: 1-2 drinks on the weekends  Drugs: None  Occupation: Dietitian at the [employer]    ROS   12 point review of systems completed and negative except as noted in the HPI    Active Problem List     Patient Active Problem List   Diagnosis     CARDIOVASCULAR SCREENING; LDL GOAL LESS THAN 160     Urticaria     Diagnostic skin and sensitization tests     Nonallergic rhinitis     Angioedema of lips     H. pylori infection     GPC (giant papillary conjunctivitis)     Seronegative spondyloarthropathy     Midline low back pain without sciatica     Abnormal uterine bleeding (AUB)- on OCPs     Psoriatic arthritis (H)     Past Medical History     Past Medical History:   Diagnosis Date     Angioedema of lips episode in 3/13--idiopathic     Complication of anesthesia     ponv     Contraception 1/28/2009     Diagnostic skin and sensitization tests 3/27/13 skin tests all NEGATIVE for environmental and food allergens including shellfish and nuts.      Nonallergic rhinitis     3/27/13 skin tests all NEGATIVE for environmental and food allergens including shellfish and nuts. 3/27/13 followup IgE tests NEG to Peanut, SN[facility], shrimp, macadamia nut, pistachio and walnut.     Rheumatoid arthritis of multiple sites with negative rheumatoid factor (H) 12/31/2015     Past Surgical History     Past Surgical History:   Procedure Laterality Date     DILATE CERVIX, HYSTEROSCOPY, ABLATE ENDOMETRIUM HYDROTHERMAL, COMBINED N/A 7/24/2020    Procedure: DILATION, CERVIX, WITH HYSTEROSCOPY AND HYDROTHERMAL ENDOMETRIAL ABLATION;  Surgeon: Apryl West MD;  Location: [facility] OR     ENDOMETRIAL SAMPLING (BIOPSY) N/A 7/24/2020    Procedure: BIOPSY, ENDOMETRIUM;  Surgeon: Brett  Apryl Vargas MD;  Location: [facility] OR     LAPAROSCOPIC SALPINGECTOMY Bilateral 6/23/2017    Procedure: LAPAROSCOPIC SALPINGECTOMY;  Operative Laparoscopy, Bilateral Salpingectomy ;  Surgeon: Apryl West MD;  Location: [facility] OR     OPERATIVE HYSTEROSCOPY WITH MORCELLATOR N/A 12/12/2018    Procedure: DIAGNOSTIC HYSTEROSCOPY AND D AND C;  Surgeon: Apryl West MD;  Location: [facility] OR        Allergy     Allergies   Allergen Reactions     Shrimp Swelling     Lips and tongue     Walnuts [Nuts] Swelling     Lips and tongue       Current Medication List     Current Outpatient Medications   Medication Sig     apremilast (OTEZLA) 30 MG tablet Take 1 tablet (30 mg) by mouth daily     leflunomide (ARAVA) 10 MG tablet Take 1 tablet (10 mg) by mouth daily     multivitamin peds with iron (FLINTSTONES COMPLETE) 60 MG chewable tablet Take 1 chew tab by mouth daily.     norgestimate-ethinyl estradiol (ORTHO-CYCLEN) 0.25-35 MG-MCG tablet Take 1 tablet by mouth daily     No current facility-administered medications for this visit.      Social History   See HPI    Family History     Family History   Problem Relation Age of Onset     Hypertension Maternal Grandmother      Autoimmune Disease Maternal Grandmother         Autoimmune hepatitis     Cancer Maternal Grandfather      Hypertension Paternal Grandmother      Cancer - colorectal Paternal Grandfather      Cardiovascular Paternal Grandfather      Other Cancer Paternal Grandfather      Hypertension Mother      Autoimmune Disease Mother         Autoimmune hepatitis     Hyperlipidemia Mother      Asthma Mother      Hypertension Father      Diabetes Father         Type 2     Multiple Sclerosis Maternal Aunt      Cerebrovascular Disease No family hx of      Thyroid Disease No family hx of      Glaucoma No family hx of      Macular Degeneration No family hx of      Breast Cancer No family hx of      Colon Cancer No family hx of      Physical Exam     Temp Readings from Last 3 Encounters:  "  07/24/20 98.1  F (36.7  C) (Oral)   07/20/20 96.5  F (35.8  C) (Tympanic)   08/05/19 98.7  F (37.1  C) (Oral)     BP Readings from Last 5 Encounters:   09/02/20 (!) 155/97   08/06/20 135/88   07/24/20 122/80   07/20/20 122/82   02/28/20 114/72     Pulse Readings from Last 1 Encounters:   08/06/20 85     Resp Readings from Last 1 Encounters:   07/24/20 16     Estimated body mass index is 18.56 kg/m  as calculated from the following:    Height as of 9/2/20: 1.632 m (5' 4.25\").    Weight as of 9/2/20: 49.4 kg (109 lb).    GEN: alert and no distress  PSYCH: Alert; coherent speech, normal rate and volume, able to articulate logical thoughts, able   to abstract reason, no tangential thoughts. Normal affect.   RESP: No cough, no audible wheezing, able to talk in full sentences  Remainder of exam unable to be completed due to telephone visits      Labs / Imaging (select studies)     RF/CCP  Recent Labs   Lab Test 12/21/15  1447   CCPABY <20  Interpretation:  Negative     RHF <20     CBC  Recent Labs   Lab Test 01/12/21  1437 07/24/20  0626 07/20/20  1117 04/10/20  1034   WBC 5.5  --  6.5 6.0   RBC 4.56  --  4.20 4.42   HGB 12.1 11.1* 11.2* 11.7   HCT 37.8  --  35.4 37.0   MCV 83  --  84 84   RDW 12.7  --  12.6 12.7     --  218 249   MCH 26.5  --  26.7 26.5   MCHC 32.0  --  31.6 31.6   NEUTROPHIL 60.5  --  72.4 68.9   LYMPH 29.5  --  21.1 22.5   MONOCYTE 7.5  --  5.2 6.8   EOSINOPHIL 2.0  --  1.1 1.3   BASOPHIL 0.5  --  0.2 0.5   ANEU 3.3  --  4.7 4.1   ALYM 1.6  --  1.4 1.4   ANA MARÍA 0.4  --  0.3 0.4   AEOS 0.1  --  0.1 0.1   ABAS 0.0  --  0.0 0.0     CMP  Recent Labs   Lab Test 01/12/21  1437 07/20/20  1117 04/10/20  1034 12/12/18  1047 12/12/18  1047 06/30/15  1636 06/30/15  1636   NA  --   --   --   --   --   --  140   POTASSIUM  --   --   --   --   --   --  4.1   CHLORIDE  --   --   --   --   --   --  105   CO2  --   --   --   --   --   --  30   ANIONGAP  --   --   --   --   --   --  5   GLC  --   --   --   --  88 "  --  73   BUN  --   --   --   --   --   --  9   CR 0.76 0.82 0.86   < >  --    < > 0.80   GFRESTIMATED >90 90 85   < >  --    < > 83   GFRESTBLACK >90 >90 >90   < >  --    < > >90   GFR Calc     DAISY  --   --   --   --   --   --  9.4   BILITOTAL 0.4 0.3 0.3   < >  --    < > 0.5   ALBUMIN 4.4 3.6 3.8   < >  --    < > 4.1   PROTTOTAL 7.7 7.4 7.5   < >  --    < > 8.1   ALKPHOS 60 52 60   < >  --    < > 65   AST 15 20 22   < >  --    < > 19   ALT 20 22 28   < >  --    < > 26    < > = values in this interval not displayed.     Calcium/VitaminD  Recent Labs   Lab Test 01/13/17  1355 12/21/15  1447 06/30/15  1636 01/09/14  0906   DAISY  --   --  9.4  --    VITDT 45 74  --  35     ESR/CRP  Recent Labs   Lab Test 01/12/21  1437 07/20/20  1117 04/10/20  1034   SED 7 11 16   CRP <2.9 6.0 6.9     Hepatitis B  Recent Labs   Lab Test 03/20/17  1506 03/28/16  1442 08/06/13  1500   AUSAB  --  264.69*  --    HBCAB Nonreactive  --   --    HEPBANG  --  Nonreactive Negative     Hepatitis C  Recent Labs   Lab Test 12/21/15  1447   HCVAB Nonreactive   Assay performance characteristics have not been established for newborns,   infants, and children         Lyme confirmation testing by Western Blot  Recent Labs   Lab Test 08/05/15  1621   LYWG Negative  Reference range: Negative  (Note)  Band(s) present: NONE  (Insufficient number of bands for positive result)  INTERPRETIVE INFORMATION: Borrelia Burgdorferi Ab, IgG  Western Blot  For this assay, a positive result is reported when any 5 or  more of the following 10 bands are present: 18, 23, 28, 30,  39, 41, 45, 58, 66, or 93 kDa.  All other banding patterns  are reported as negative.  Performed by [facility],  [address] [place] [id number] [phone]  [email or web address], Pete Saha MD, Lab. Director       Tuberculosis Screening  Recent Labs   Lab Test 03/20/17  1508 03/28/16  1443   TBRSLT Negative Negative   TBAGN 0.00 0.04     HIV Screening  Recent Labs   Lab  Test 12/21/15  1447   HIAGAB Nonreactive   HIV-1 p24 Ag & HIV-1/HIV-2 Ab Not Detected       Immunization History     Immunization History   Administered Date(s) Administered     Influenza (IIV3) PF 10/12/2011, 10/03/2013     Influenza Vaccine IM > 6 months Valent IIV4 10/10/2020     Influenza Vaccine, 6+MO IM (QUADRIVALENT W/PRESERVATIVES) 10/01/2015     Pneumo Conj 13-V (2010&after) 09/26/2016     Pneumococcal 23 valent 12/22/2016     TD (ADULT, 7+) 08/15/2001     TDAP Vaccine (Adacel) 06/20/2011     TDAP Vaccine (Boostrix) 03/06/2014          Chart documentation done in part with Dragon Voice recognition Software. Although reviewed after completion, some word and grammatical error may remain.    Phone call duration with patient (in minutes): 8    Location of patient: [place], [place]   Location of provider: kenji Monroy MD

## 2021-01-13 ENCOUNTER — VIRTUAL VISIT (OUTPATIENT)
Dept: RHEUMATOLOGY | Facility: CLINIC | Age: 41
End: 2021-01-13
Payer: COMMERCIAL

## 2021-01-13 DIAGNOSIS — L40.50 PSORIATIC ARTHRITIS (H): Primary | ICD-10-CM

## 2021-01-13 DIAGNOSIS — Z79.899 HIGH RISK MEDICATION USE: ICD-10-CM

## 2021-01-13 DIAGNOSIS — Z86.69 HISTORY OF IRITIS: ICD-10-CM

## 2021-01-13 PROCEDURE — 99214 OFFICE O/P EST MOD 30 MIN: CPT | Mod: TEL | Performed by: INTERNAL MEDICINE

## 2021-01-13 RX ORDER — APREMILAST 30 MG/1
30 TABLET, FILM COATED ORAL DAILY
Qty: 60 TABLET | Refills: 5 | Status: SHIPPED | OUTPATIENT
Start: 2021-01-13 | End: 2021-07-09

## 2021-01-13 RX ORDER — LEFLUNOMIDE 10 MG/1
10 TABLET ORAL DAILY
Qty: 90 TABLET | Refills: 0 | Status: SHIPPED | OUTPATIENT
Start: 2021-01-13 | End: 2021-07-09

## 2021-04-13 NOTE — PROGRESS NOTES
Abby Foy is a 40 year old year old female who is being evaluated via a billable telephone visit.      What phone number would you like to be contacted at? [phone]  How would you like to obtain your AVS? [facility]       Rheumatology Telephone/Telehealth  Visit      Abby Foy MRN# [id number]   YOB: 1980 Age: 40 year old      Date of visit: 4/14/21   PCP: Sandi Duffy  Dermatologist: Amy Patel  Ophthalmologist: Dr. Mathis     Chief Complaint   Patient presents with:  Arthritis: Psoriatic    Assessment and Plan   1. 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 However, given her continued back pain that improved with activity but no radiographic evidence for inflammatory arthritis or osteoarthritis, there was concern that she had inflammatory back pain that would be more consistent with a spondylarthritis. Humira was started and resulted in improvement of her back pain, suggesting axial disease.  Arthritis improved with methotrexate and sulfasalazine, but she was having headaches and therefore the most likely culprit methotrexate was reduced until her headaches worsened significantly and therefore sulfasalazine and methotrexate were both discontinued. She had resolution of her headaches after discontinuing both sulfasalazine and methotrexate. I believe that the sulfasalazine was the cause of her headaches. Therefore, cautious retrial of methotrexate in the future could be done.  She was doing well on a combination of leflunomide 10 mg daily and Humira 40 mg SQ every 14 days but Humira had to be stopped because of issues with the  program; so Simponi was Rx'd but never started.  Intermittent inflammatory symptom so Otezla was prescribed  but only started in March 2020; arthritis doing very well on Otezla but having headaches 4/7 mornings; so reduce Otezla to 30 mg daily and since then the headaches have resolved.  She was then doing well on a combination of leflunomide 10 mg daily and Otezla 30 mg daily; in an effort to reduce medication leflunomide was stopped when she has had worsening arthritis symptoms affecting her PIPs and is inflammatory in nature.  Therefore, discussed either increasing Otezla to be twice daily but cautioned that this caused headaches in the past, versus using leflunomide in addition to her current Otezla that worked well previously.  Restart leflunomide.   Chronic illness, progressive.    - Restart leflunomide 10 mg daily  - Continue Otezla 30mg daily  - Labs in 3 months: CBC, Creatinine, Hepatic Panel, ESR, CRP    High risk medication requiring intensive toxicity monitoring at least quarterly: labs ordered include CBC, Creatinine, Hepatic panel to monitor for cytopenia and hepatotoxicity; checking creatinine as it affects clearance of medication.      # Pregnancy Planning: s/p salpingectomy;  had a vasectomy    2. 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 Interestingly when leflunomide was stopped between 3/2018 and  6/15/2018, she experienced increased L>R eye irritation that when bothering her only affected one eye at a time.   Chronic illness, stable.       3. Headaches: Likely due to SSZ. Headaches stopped with discontinuation of SSZ.  Then headaches with Otezla twice daily that resolved with reducing Otezla to once daily.  No headaches at this time    # Status-post 2 doses of the Pfizer COVID-19 vaccine, received on 1/15/2021 and 2/4/2021    Total minutes spent in evaluation with patient, documentation, , and review of pertinent studies and chart notes: 14       Ms. Foy verbalized agreement with and understanding of the  rational for the diagnosis and treatment plan.  All questions were answered to best of my ability and the patient's satisfaction. Ms. Foy was advised to contact the clinic with any questions that may arise after the clinic visit.      Thank you for involving me in the care of the patient    Return to clinic: 3-4 months    HPI   Abby Foy is a 40 year old female with medical history significant for urticaria, H. pylori infection, and iritis? who returns for f/u of seronegative spondyloarthropathy.    Today, Ms. Foy reports that since stopping leflunomide she has had more pain and stiffness at the PIPs bilaterally.  Reduced  strength on occasion.  Morning stiffness for at least 2 hours.  And noticeable swelling at the right third PIP.  Taking Otezla once daily.  No headaches.      Denies fevers, chills, nausea, vomiting, constipation, diarrhea. No abdominal pain.  No blood in her stool.  No chest pain/pressure, palpitations, or shortness of breath. No neck pain. Occasional cold sores..     Tobacco: None  EtOH: 1-2 drinks on the weekends  Drugs: None  Occupation: Dietitian at the [employer]    ROS   12 point review of systems completed and negative except as noted in the HPI    Active Problem List     Patient Active Problem List   Diagnosis     CARDIOVASCULAR SCREENING; LDL GOAL LESS THAN 160     Urticaria     Diagnostic skin and sensitization tests     Nonallergic rhinitis     Angioedema of lips     H. pylori infection     GPC (giant papillary conjunctivitis)     Seronegative spondyloarthropathy     Midline low back pain without sciatica     Abnormal uterine bleeding (AUB)- on OCPs     Psoriatic arthritis (H)     Past Medical History     Past Medical History:   Diagnosis Date     Angioedema of lips episode in 3/13--idiopathic     Complication of anesthesia     ponv     Contraception 1/28/2009     Diagnostic skin and sensitization tests 3/27/13 skin tests all NEGATIVE for  environmental and food allergens including shellfish and nuts.      Nonallergic rhinitis     3/27/13 skin tests all NEGATIVE for environmental and food allergens including shellfish and nuts. 3/27/13 followup IgE tests NEG to [facility], [facility], shrimp, macadamia nut, pistachio and walnut.     Rheumatoid arthritis of multiple sites with negative rheumatoid factor (H) 12/31/2015     Past Surgical History     Past Surgical History:   Procedure Laterality Date     DILATE CERVIX, HYSTEROSCOPY, ABLATE ENDOMETRIUM HYDROTHERMAL, COMBINED N/A 7/24/2020    Procedure: DILATION, CERVIX, WITH HYSTEROSCOPY AND HYDROTHERMAL ENDOMETRIAL ABLATION;  Surgeon: Apryl West MD;  Location: [facility]     ENDOMETRIAL SAMPLING (BIOPSY) N/A 7/24/2020    Procedure: BIOPSY, ENDOMETRIUM;  Surgeon: Apryl West MD;  [facility]     LAPAROSCOPIC SALPINGECTOMY Bilateral 6/23/2017    Procedure: LAPAROSCOPIC SALPINGECTOMY;  Operative Laparoscopy, Bilateral Salpingectomy ;  Surgeon: Apryl West MD;  Location: [facility]     OPERATIVE HYSTEROSCOPY WITH MORCELLATOR N/A 12/12/2018    Procedure: DIAGNOSTIC HYSTEROSCOPY AND D AND C;  Surgeon: Apryl West MD;  Location: [facility]        Allergy     Allergies   Allergen Reactions     Shrimp Swelling     Lips and tongue     Walnuts [Nuts] Swelling     Lips and tongue       Current Medication List     Current Outpatient Medications   Medication Sig     apremilast (OTEZLA) 30 MG tablet Take 1 tablet (30 mg) by mouth daily     leflunomide (ARAVA) 10 MG tablet Take 1 tablet (10 mg) by mouth daily     multivitamin peds with iron (FLINTSTONES COMPLETE) 60 MG chewable tablet Take 1 chew tab by mouth daily.     norgestimate-ethinyl estradiol (ORTHO-CYCLEN) 0.25-35 MG-MCG tablet Take 1 tablet by mouth daily     No current facility-administered medications for this visit.      Social History   See HPI    Family History     Family History   Problem Relation Age of Onset     Hypertension Maternal Grandmother       "Autoimmune Disease Maternal Grandmother         Autoimmune hepatitis     Cancer Maternal Grandfather      Hypertension Paternal Grandmother      Cancer - colorectal Paternal Grandfather      Cardiovascular Paternal Grandfather      Other Cancer Paternal Grandfather      Hypertension Mother      Autoimmune Disease Mother         Autoimmune hepatitis     Hyperlipidemia Mother      Asthma Mother      Hypertension Father      Diabetes Father         Type 2     Multiple Sclerosis Maternal Aunt      Cerebrovascular Disease No family hx of      Thyroid Disease No family hx of      Glaucoma No family hx of      Macular Degeneration No family hx of      Breast Cancer No family hx of      Colon Cancer No family hx of      Physical Exam     Temp Readings from Last 3 Encounters:   07/24/20 98.1  F (36.7  C) (Oral)   07/20/20 96.5  F (35.8  C) (Tympanic)   08/05/19 98.7  F (37.1  C) (Oral)     BP Readings from Last 5 Encounters:   09/02/20 (!) 155/97   08/06/20 135/88   07/24/20 122/80   07/20/20 122/82   02/28/20 114/72     Pulse Readings from Last 1 Encounters:   08/06/20 85     Resp Readings from Last 1 Encounters:   07/24/20 16     Estimated body mass index is 18.56 kg/m  as calculated from the following:    Height as of 9/2/20: 1.632 m (5' 4.25\").    Weight as of 9/2/20: 49.4 kg (109 lb).    GEN: alert and no distress  PSYCH: Alert; coherent speech, normal rate and volume, able to articulate logical thoughts, able   to abstract reason, no tangential thoughts. Normal affect.   RESP: No cough, no audible wheezing, able to talk in full sentences  Remainder of exam unable to be completed due to telephone visits      Labs / Imaging (select studies)     RF/CCP  Recent Labs   Lab Test 12/21/15  1447   CCPABY <20  Interpretation:  Negative     RHF <20     CBC  Recent Labs   Lab Test 01/12/21  1437 07/24/20  0626 07/20/20  1117 04/10/20  1034   WBC 5.5  --  6.5 6.0   RBC 4.56  --  4.20 4.42   HGB 12.1 11.1* 11.2* 11.7   HCT 37.8  -- "  35.4 37.0   MCV 83  --  84 84   RDW 12.7  --  12.6 12.7     --  218 249   MCH 26.5  --  26.7 26.5   MCHC 32.0  --  31.6 31.6   NEUTROPHIL 60.5  --  72.4 68.9   LYMPH 29.5  --  21.1 22.5   MONOCYTE 7.5  --  5.2 6.8   EOSINOPHIL 2.0  --  1.1 1.3   BASOPHIL 0.5  --  0.2 0.5   ANEU 3.3  --  4.7 4.1   ALYM 1.6  --  1.4 1.4   ANA MARÍA 0.4  --  0.3 0.4   AEOS 0.1  --  0.1 0.1   ABAS 0.0  --  0.0 0.0     CMP  Recent Labs   Lab Test 01/12/21  1437 07/20/20  1117 04/10/20  1034 12/12/18  1047 12/12/18  1047 06/30/15  1636 06/30/15  1636   NA  --   --   --   --   --   --  140   POTASSIUM  --   --   --   --   --   --  4.1   CHLORIDE  --   --   --   --   --   --  105   CO2  --   --   --   --   --   --  30   ANIONGAP  --   --   --   --   --   --  5   GLC  --   --   --   --  88  --  73   BUN  --   --   --   --   --   --  9   CR 0.76 0.82 0.86   < >  --    < > 0.80   GFRESTIMATED >90 90 85   < >  --    < > 83   GFRESTBLACK >90 >90 >90   < >  --    < > >90   GFR Calc     DAISY  --   --   --   --   --   --  9.4   BILITOTAL 0.4 0.3 0.3   < >  --    < > 0.5   ALBUMIN 4.4 3.6 3.8   < >  --    < > 4.1   PROTTOTAL 7.7 7.4 7.5   < >  --    < > 8.1   ALKPHOS 60 52 60   < >  --    < > 65   AST 15 20 22   < >  --    < > 19   ALT 20 22 28   < >  --    < > 26    < > = values in this interval not displayed.     Calcium/VitaminD  Recent Labs   Lab Test 01/13/17  1355 12/21/15  1447 06/30/15  1636 01/09/14  0906   DAISY  --   --  9.4  --    VITDT 45 74  --  35     ESR/CRP  Recent Labs   Lab Test 01/12/21  1437 07/20/20  1117 04/10/20  1034   SED 7 11 16   CRP <2.9 6.0 6.9     Hepatitis B  Recent Labs   Lab Test 03/20/17  1506 03/28/16  1442 08/06/13  1500   AUSAB  --  264.69*  --    HBCAB Nonreactive  --   --    HEPBANG  --  Nonreactive Negative     Hepatitis C  Recent Labs   Lab Test 12/21/15  1447   HCVAB Nonreactive   Assay performance characteristics have not been established for newborns,   infants, and children       Lyme  confirmation testing by Western Blot  Recent Labs   Lab Test 08/05/15  1621   LYWG Negative  Reference range: Negative  (Note)  Band(s) present: NONE  (Insufficient number of bands for positive result)  INTERPRETIVE INFORMATION: Borrelia Burgdorferi Ab, IgG  Western Blot  For this assay, a positive result is reported when any 5 or  more of the following 10 bands are present: 18, 23, 28, 30,  39, 41, 45, 58, 66, or 93 kDa.  All other banding patterns  are reported as negative.  Performed by [facility],  [address] [place] [id number] [phone]  [email or web address], Pete Saha MD, Lab. Director       Tuberculosis Screening  Recent Labs   Lab Test 03/20/17  1508 03/28/16  1443   TBRSLT Negative Negative   TBAGN 0.00 0.04     HIV Screening  Recent Labs   Lab Test 12/21/15  1447   HIAGAB Nonreactive   HIV-1 p24 Ag & HIV-1/HIV-2 Ab Not Detected         Immunization History     Immunization History   Administered Date(s) Administered     Influenza (IIV3) PF 10/12/2011, 10/03/2013     Influenza Vaccine IM > 6 months Valent IIV4 10/10/2020     Influenza Vaccine, 6+MO IM (QUADRIVALENT W/PRESERVATIVES) 10/01/2015     Pneumo Conj 13-V (2010&after) 09/26/2016     Pneumococcal 23 valent 12/22/2016     TD (ADULT, 7+) 08/15/2001     TDAP Vaccine (Adacel) 06/20/2011     TDAP Vaccine (Boostrix) 03/06/2014          Chart documentation done in part with Dragon Voice recognition Software. Although reviewed after completion, some word and grammatical error may remain.    Phone call duration with patient (in minutes): 5    Location of patient: [place], [place]   Location of provider: [place]    Hunter Monroy MD

## 2021-04-13 NOTE — PATIENT INSTRUCTIONS
RHEUMATOLOGY    Dr. Hunter Monroy    [facility]  [address] [place] [address]    Our new phone number for Rheumatology is [phone], this number will be able to help you schedule appointments for Dr. Monroy or if you have any message you would like sent to us.    Thank you for choosing [facility]!    Maddie Hennessy [facility]

## 2021-04-14 ENCOUNTER — VIRTUAL VISIT (OUTPATIENT)
Dept: RHEUMATOLOGY | Facility: CLINIC | Age: 41
End: 2021-04-14
Payer: COMMERCIAL

## 2021-04-14 DIAGNOSIS — Z79.899 HIGH RISK MEDICATION USE: ICD-10-CM

## 2021-04-14 DIAGNOSIS — L40.50 PSORIATIC ARTHRITIS (H): Primary | ICD-10-CM

## 2021-04-14 PROCEDURE — 99214 OFFICE O/P EST MOD 30 MIN: CPT | Mod: 95 | Performed by: INTERNAL MEDICINE

## 2021-07-02 DIAGNOSIS — L40.50 PSORIATIC ARTHRITIS (H): ICD-10-CM

## 2021-07-02 DIAGNOSIS — Z79.899 HIGH RISK MEDICATION USE: ICD-10-CM

## 2021-07-02 LAB
ALBUMIN SERPL-MCNC: 4.2 G/DL (ref 3.4–5)
ALP SERPL-CCNC: 59 U/L (ref 40–150)
ALT SERPL W P-5'-P-CCNC: 20 U/L (ref 0–50)
AST SERPL W P-5'-P-CCNC: 12 U/L (ref 0–45)
BASOPHILS # BLD AUTO: 0 10E9/L (ref 0–0.2)
BASOPHILS NFR BLD AUTO: 0.4 %
BILIRUB DIRECT SERPL-MCNC: 0.1 MG/DL (ref 0–0.2)
BILIRUB SERPL-MCNC: 0.5 MG/DL (ref 0.2–1.3)
CREAT SERPL-MCNC: 0.72 MG/DL (ref 0.52–1.04)
CRP SERPL-MCNC: <2.9 MG/L (ref 0–8)
DIFFERENTIAL METHOD BLD: ABNORMAL
EOSINOPHIL # BLD AUTO: 0.1 10E9/L (ref 0–0.7)
EOSINOPHIL NFR BLD AUTO: 1.8 %
ERYTHROCYTE [DISTWIDTH] IN BLOOD BY AUTOMATED COUNT: 12.5 % (ref 10–15)
ERYTHROCYTE [SEDIMENTATION RATE] IN BLOOD BY WESTERGREN METHOD: 8 MM/H (ref 0–20)
GFR SERPL CREATININE-BSD FRML MDRD: >90 ML/MIN/{1.73_M2}
HCT VFR BLD AUTO: 37.7 % (ref 35–47)
HGB BLD-MCNC: 11.9 G/DL (ref 11.7–15.7)
LYMPHOCYTES # BLD AUTO: 1.6 10E9/L (ref 0.8–5.3)
LYMPHOCYTES NFR BLD AUTO: 29 %
MCH RBC QN AUTO: 26.3 PG (ref 26.5–33)
MCHC RBC AUTO-ENTMCNC: 31.6 G/DL (ref 31.5–36.5)
MCV RBC AUTO: 83 FL (ref 78–100)
MONOCYTES # BLD AUTO: 0.5 10E9/L (ref 0–1.3)
MONOCYTES NFR BLD AUTO: 8.5 %
NEUTROPHILS # BLD AUTO: 3.3 10E9/L (ref 1.6–8.3)
NEUTROPHILS NFR BLD AUTO: 60.3 %
PLATELET # BLD AUTO: 195 10E9/L (ref 150–450)
PROT SERPL-MCNC: 7.6 G/DL (ref 6.8–8.8)
RBC # BLD AUTO: 4.52 10E12/L (ref 3.8–5.2)
WBC # BLD AUTO: 5.4 10E9/L (ref 4–11)

## 2021-07-02 PROCEDURE — 85652 RBC SED RATE AUTOMATED: CPT | Performed by: INTERNAL MEDICINE

## 2021-07-02 PROCEDURE — 36415 COLL VENOUS BLD VENIPUNCTURE: CPT | Performed by: INTERNAL MEDICINE

## 2021-07-02 PROCEDURE — 86140 C-REACTIVE PROTEIN: CPT | Performed by: INTERNAL MEDICINE

## 2021-07-02 PROCEDURE — 85025 COMPLETE CBC W/AUTO DIFF WBC: CPT | Performed by: INTERNAL MEDICINE

## 2021-07-02 PROCEDURE — 82565 ASSAY OF CREATININE: CPT | Performed by: INTERNAL MEDICINE

## 2021-07-02 PROCEDURE — 80076 HEPATIC FUNCTION PANEL: CPT | Performed by: INTERNAL MEDICINE

## 2021-07-08 DIAGNOSIS — N93.9 ABNORMAL UTERINE BLEEDING (AUB): Primary | ICD-10-CM

## 2021-07-08 NOTE — PROGRESS NOTES
Abby Foy  is a 41 year old year old female who is being evaluated via a billable video visit.      How would you like to obtain your AVS? MyChart  If the video visit is dropped, the invitation should be resent by: Text to cell phone: [phone]  Will anyone else be joining your video visit? No    Rheumatology Video Visit      Abby Foy MRN# [id number]   YOB: 1980 Age: 41 year old      Date of visit: 7/09/21   PCP: Sandi Duffy  Dermatologist: Amy Patel  Ophthalmologist: Dr. Mathis     Chief Complaint   Patient presents with:  Arthritis: Psoriatic    Assessment and Plan   1. 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 However, given her continued back pain that improved with activity but no radiographic evidence for inflammatory arthritis or osteoarthritis, there was concern that she had inflammatory back pain that would be more consistent with a spondylarthritis. Humira was started and resulted in improvement of her back pain, suggesting axial disease.  Arthritis improved with methotrexate and sulfasalazine, but she was having headaches and therefore the most likely culprit methotrexate was reduced until her headaches worsened significantly and therefore sulfasalazine and methotrexate were both discontinued. She had resolution of her headaches after discontinuing both sulfasalazine and methotrexate. I believe that the sulfasalazine was the cause of her headaches. Therefore, cautious retrial of methotrexate in the future could be done.  She was doing well on a combination of leflunomide 10 mg daily and Humira 40 mg SQ every 14 days but Humira had to be stopped because of issues with the  program; so Simponi was Rx'd but never  started.  Intermittent inflammatory symptom so Otezla was prescribed but but delayed starting until March 2020, it was found be effective but the twice daily dose was resulting in worsening headaches that resolved with a once daily dose. Was on doing well on a combination of leflunomide 10 mg daily and Otezla developed daily; leflunomide was discontinued in an effort to get to the lowest effective dose but she had return of symptoms primarily affecting the PIPs so it was restarted. Currently on leflunomide 10 mg daily and Otezla 30 mg daily; doing well except for mild swelling at the PIPs that is not clearly inflammatory versus degenerative. Doing fairly well this time. Follow-up in clinic for next visit.   Chronic illness, stable.    - Continue leflunomide 10 mg daily  - Continue Otezla 30mg daily  - Labs in 3 months: CBC, Creatinine, Hepatic Panel, ESR, CRP    High risk medication requiring intensive toxicity monitoring at least quarterly: labs ordered include CBC, Creatinine, Hepatic panel to monitor for cytopenia and hepatotoxicity; checking creatinine as it affects clearance of medication.      # Pregnancy Planning: s/p salpingectomy;  had a vasectomy    2. 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 Interestingly when leflunomide was stopped between 3/2018 and  6/15/2018, she experienced increased L>R eye irritation that when bothering her only affected one eye at a time.   Chronic illness, stable.       3. Headaches: Likely due to SSZ. Headaches stopped with discontinuation of SSZ.  Then headaches with Otezla twice daily that resolved with reducing Otezla to once daily. Infrequent headaches at this time    # Status-post 2 doses of the Pfizer COVID-19 vaccine, received on 1/15/2021 and 2/4/2021    Total minutes spent in evaluation with patient, documentation, , and review of pertinent studies and chart notes: 12    Ms.  Danii verbalized agreement with and understanding of the rational for the diagnosis and treatment plan.  All questions were answered to best of my ability and the patient's satisfaction. Ms. Foy was advised to contact the clinic with any questions that may arise after the clinic visit.      Thank you for involving me in the care of the patient    Return to clinic: 3-4 months    HPI   Abby Foy is a 41 year old female with medical history significant for urticaria, H. pylori infection, and iritis? who returns for f/u of seronegative spondyloarthropathy.    Today, 7/9/2021: Doing better since restarting leflunomide. Tolerating leflunomide 10 mg daily and Otezla 30 mg daily. Had a headache yesterday that she thinks is hormone related; otherwise has been doing well. Bumped her PIP and felt like it was more sensitive recently. Difficulty getting her rings on and off, similar to previous. Morning stiffness for less than 30 minutes. Overall feels like the addition of leflunomide has been very helpful.    Denies fevers, chills, nausea, vomiting, constipation, diarrhea. No abdominal pain.  No blood in her stool.  No chest pain/pressure, palpitations, or shortness of breath. No neck pain. Occasional cold sores..     Tobacco: None  EtOH: 1-2 drinks on the weekends  Drugs: None  Occupation: Dietitian at the [employer]    ROS   12 point review of systems completed and negative except as noted in the HPI    Active Problem List     Patient Active Problem List   Diagnosis     CARDIOVASCULAR SCREENING; LDL GOAL LESS THAN 160     Urticaria     Diagnostic skin and sensitization tests     Nonallergic rhinitis     Angioedema of lips     H. pylori infection     GPC (giant papillary conjunctivitis)     Seronegative spondyloarthropathy     Midline low back pain without sciatica     Abnormal uterine bleeding (AUB)- on OCPs     Psoriatic arthritis (H)     Past Medical History     Past Medical History:    Diagnosis Date     Angioedema of lips episode in 3/13--idiopathic     Complication of anesthesia     ponv     Contraception 1/28/2009     Diagnostic skin and sensitization tests 3/27/13 skin tests all NEGATIVE for environmental and food allergens including shellfish and nuts.      Nonallergic rhinitis     3/27/13 skin tests all NEGATIVE for environmental and food allergens including shellfish and nuts. 3/27/13 followup IgE tests NEG to Peanut, [facility], shrimp, macadamia nut, pistachio and walnut.     Rheumatoid arthritis of multiple sites with negative rheumatoid factor (H) 12/31/2015     Past Surgical History     Past Surgical History:   Procedure Laterality Date     DILATE CERVIX, HYSTEROSCOPY, ABLATE ENDOMETRIUM HYDROTHERMAL, COMBINED N/A 7/24/2020    Procedure: DILATION, CERVIX, WITH HYSTEROSCOPY AND HYDROTHERMAL ENDOMETRIAL ABLATION;  Surgeon: Apryl West MD;  Location: [facility]     ENDOMETRIAL SAMPLING (BIOPSY) N/A 7/24/2020    Procedure: BIOPSY, ENDOMETRIUM;  Surgeon: Apryl West MD;  Location: [facility]     LAPAROSCOPIC SALPINGECTOMY Bilateral 6/23/2017    Procedure: LAPAROSCOPIC SALPINGECTOMY;  Operative Laparoscopy, Bilateral Salpingectomy ;  Surgeon: Apryl West MD;  Location: [facility]     OPERATIVE HYSTEROSCOPY WITH MORCELLATOR N/A 12/12/2018    Procedure: DIAGNOSTIC HYSTEROSCOPY AND D AND C;  Surgeon: Apryl West MD;  Location: [facility]        Allergy     Allergies   Allergen Reactions     Shrimp Swelling     Lips and tongue     Walnuts [Nuts] Swelling     Lips and tongue       Current Medication List     Current Outpatient Medications   Medication Sig     apremilast (OTEZLA) 30 MG tablet Take 1 tablet (30 mg) by mouth daily     leflunomide (ARAVA) 10 MG tablet Take 1 tablet (10 mg) by mouth daily     multivitamin peds with iron (FLINTSTONES COMPLETE) 60 MG chewable tablet Take 1 chew tab by mouth daily.     No current facility-administered medications for this visit.      Social History   See  "HPI    Family History     Family History   Problem Relation Age of Onset     Hypertension Maternal Grandmother      Autoimmune Disease Maternal Grandmother         Autoimmune hepatitis     Cancer Maternal Grandfather      Hypertension Paternal Grandmother      Cancer - colorectal Paternal Grandfather      Cardiovascular Paternal Grandfather      Other Cancer Paternal Grandfather      Hypertension Mother      Autoimmune Disease Mother         Autoimmune hepatitis     Hyperlipidemia Mother      Asthma Mother      Hypertension Father      Diabetes Father         Type 2     Multiple Sclerosis Maternal Aunt      Cerebrovascular Disease No family hx of      Thyroid Disease No family hx of      Glaucoma No family hx of      Macular Degeneration No family hx of      Breast Cancer No family hx of      Colon Cancer No family hx of      Physical Exam     Temp Readings from Last 3 Encounters:   07/24/20 98.1  F (36.7  C) (Oral)   07/20/20 96.5  F (35.8  C) (Tympanic)   08/05/19 98.7  F (37.1  C) (Oral)     BP Readings from Last 5 Encounters:   09/02/20 (!) 155/97   08/06/20 135/88   07/24/20 122/80   07/20/20 122/82   02/28/20 114/72     Pulse Readings from Last 1 Encounters:   08/06/20 85     Resp Readings from Last 1 Encounters:   07/24/20 16     Estimated body mass index is 18.56 kg/m  as calculated from the following:    Height as of 9/2/20: 1.632 m (5' 4.25\").    Weight as of 9/2/20: 49.4 kg (109 lb).      GEN: NAD. Healthy appearing adult.   HEENT: MMM.  Anicteric, noninjected sclera. No obvious external lesions of the ear and nose. Hearing intact.  PULM: No increased work of breathing  MSK: Mild swelling of several PIP joints that was not clearly soft tissue swelling versus bony hypertrophy based on video inspection. PIPs and DIPs without swelling. Wrists without swelling.   SKIN: No rash or jaundice seen  PSYCH: Alert. Appropriate.        Labs / Imaging (select studies)     RF/CCP  Recent Labs   Lab Test 12/21/15  6213 "   CCPABY <20  Interpretation:  Negative     RHF <20     CBC  Recent Labs   Lab Test 07/02/21  0922 01/12/21  1437 07/24/20  0626 07/20/20  1117   WBC 5.4 5.5  --  6.5   RBC 4.52 4.56  --  4.20   HGB 11.9 12.1 11.1* 11.2*   HCT 37.7 37.8  --  35.4   MCV 83 83  --  84   RDW 12.5 12.7  --  12.6    221  --  218   MCH 26.3* 26.5  --  26.7   MCHC 31.6 32.0  --  31.6   NEUTROPHIL 60.3 60.5  --  72.4   LYMPH 29.0 29.5  --  21.1   MONOCYTE 8.5 7.5  --  5.2   EOSINOPHIL 1.8 2.0  --  1.1   BASOPHIL 0.4 0.5  --  0.2   ANEU 3.3 3.3  --  4.7   ALYM 1.6 1.6  --  1.4   ANA MARÍA 0.5 0.4  --  0.3   AEOS 0.1 0.1  --  0.1   ABAS 0.0 0.0  --  0.0     CMP  Recent Labs   Lab Test 07/02/21  0922 01/12/21  1437 07/20/20  1117 12/12/18  1047 12/12/18  1047 06/30/15  1636 06/30/15  1636   NA  --   --   --   --   --   --  140   POTASSIUM  --   --   --   --   --   --  4.1   CHLORIDE  --   --   --   --   --   --  105   CO2  --   --   --   --   --   --  30   ANIONGAP  --   --   --   --   --   --  5   GLC  --   --   --   --  88  --  73   BUN  --   --   --   --   --   --  9   CR 0.72 0.76 0.82   < >  --    < > 0.80   GFRESTIMATED >90 >90 90   < >  --    < > 83   GFRESTBLACK >90 >90 >90   < >  --    < > >90   GFR Calc     DAISY  --   --   --   --   --   --  9.4   BILITOTAL 0.5 0.4 0.3   < >  --    < > 0.5   ALBUMIN 4.2 4.4 3.6   < >  --    < > 4.1   PROTTOTAL 7.6 7.7 7.4   < >  --    < > 8.1   ALKPHOS 59 60 52   < >  --    < > 65   AST 12 15 20   < >  --    < > 19   ALT 20 20 22   < >  --    < > 26    < > = values in this interval not displayed.     Calcium/VitaminD  Recent Labs   Lab Test 01/13/17  1355 12/21/15  1447 06/30/15  1636 01/09/14  0906   DAISY  --   --  9.4  --    VITDT 45 74  --  35     ESR/CRP  Recent Labs   Lab Test 07/02/21  0922 01/12/21  1437 07/20/20  1117   SED 8 7 11   CRP <2.9 <2.9 6.0     Hepatitis B  Recent Labs   Lab Test 03/20/17  1506 03/28/16  1442 08/06/13  1500   AUSAB  --  264.69*  --    HBCAB  Nonreactive  --   --    HEPBANG  --  Nonreactive Negative     Hepatitis C  Recent Labs   Lab Test 12/21/15  1447   HCVAB Nonreactive   Assay performance characteristics have not been established for newborns,   infants, and children         Lyme confirmation testing by Western Blot  Recent Labs   Lab Test 08/05/15  1621   LYWG Negative  Reference range: Negative  (Note)  Band(s) present: NONE  (Insufficient number of bands for positive result)  INTERPRETIVE INFORMATION: Borrelia Burgdorferi Ab, IgG  Western Blot  For this assay, a positive result is reported when any 5 or  more of the following 10 bands are present: 18, 23, 28, 30,  39, 41, 45, 58, 66, or 93 kDa.  All other banding patterns  are reported as negative.  Performed by [facility],  [address] [place] [id number] [phone]  [email or web address], Pete Saha MD, Lab. Director       Tuberculosis Screening  Recent Labs   Lab Test 03/20/17  1508 03/28/16  1443   TBRSLT Negative Negative   TBAGN 0.00 0.04     HIV Screening  Recent Labs   Lab Test 12/21/15  1447   HIAGAB Nonreactive   HIV-1 p24 Ag & HIV-1/HIV-2 Ab Not Detected         Immunization History     Immunization History   Administered Date(s) Administered     Influenza (IIV3) PF 10/12/2011, 10/03/2013     Influenza Vaccine IM > 6 months Valent IIV4 10/10/2020     Influenza Vaccine, 6+MO IM (QUADRIVALENT W/PRESERVATIVES) 10/01/2015     Pneumo Conj 13-V (2010&after) 09/26/2016     Pneumococcal 23 valent 12/22/2016     TD (ADULT, 7+) 08/15/2001     TDAP Vaccine (Adacel) 06/20/2011     TDAP Vaccine (Boostrix) 03/06/2014          Chart documentation done in part with Dragon Voice recognition Software. Although reviewed after completion, some word and grammatical error may remain.      Video-Visit Details    Type of service:  Video Visit    Video Start Time: 9:33 AM    Video End Time: 9:40 AM    Originating Location (pt. Location): [place], [place]    Distant Location (provider location):   Home    Platform used for Video Visit: Carley Monroy MD

## 2021-07-08 NOTE — PATIENT INSTRUCTIONS
RHEUMATOLOGY    Dr. Hunter Monroy    [facility]  [address] [place] [address]    Our new phone number for Rheumatology is [phone], this number will be able to help you schedule appointments for Dr. Monroy or if you have any message you would like sent to us.    Thank you for choosing [facility]!    Maddie Hennessy [facility] Rheumatology

## 2021-07-09 ENCOUNTER — VIRTUAL VISIT (OUTPATIENT)
Dept: RHEUMATOLOGY | Facility: CLINIC | Age: 41
End: 2021-07-09
Payer: COMMERCIAL

## 2021-07-09 DIAGNOSIS — L40.50 PSORIATIC ARTHRITIS (H): Primary | ICD-10-CM

## 2021-07-09 DIAGNOSIS — Z79.899 HIGH RISK MEDICATION USE: ICD-10-CM

## 2021-07-09 PROCEDURE — 99214 OFFICE O/P EST MOD 30 MIN: CPT | Mod: 95 | Performed by: INTERNAL MEDICINE

## 2021-07-09 RX ORDER — LEFLUNOMIDE 10 MG/1
10 TABLET ORAL DAILY
Qty: 90 TABLET | Refills: 1 | Status: SHIPPED | OUTPATIENT
Start: 2021-07-09 | End: 2021-11-05

## 2021-07-09 RX ORDER — APREMILAST 30 MG/1
30 TABLET, FILM COATED ORAL DAILY
Qty: 60 TABLET | Refills: 4 | Status: SHIPPED | OUTPATIENT
Start: 2021-07-09 | End: 2021-11-05

## 2021-07-16 ENCOUNTER — ANCILLARY PROCEDURE (OUTPATIENT)
Dept: ULTRASOUND IMAGING | Facility: CLINIC | Age: 41
End: 2021-07-16
Attending: OBSTETRICS & GYNECOLOGY
Payer: COMMERCIAL

## 2021-07-16 DIAGNOSIS — N93.9 ABNORMAL UTERINE BLEEDING (AUB): ICD-10-CM

## 2021-07-16 PROCEDURE — 76830 TRANSVAGINAL US NON-OB: CPT

## 2021-07-16 PROCEDURE — 76830 TRANSVAGINAL US NON-OB: CPT | Mod: 26 | Performed by: OBSTETRICS & GYNECOLOGY

## 2021-08-03 ENCOUNTER — PREP FOR PROCEDURE (OUTPATIENT)
Dept: OBGYN | Facility: CLINIC | Age: 41
End: 2021-08-03

## 2021-08-03 DIAGNOSIS — N93.9 ABNORMAL UTERINE BLEEDING (AUB): Primary | ICD-10-CM

## 2021-08-03 RX ORDER — ACETAMINOPHEN 325 MG/1
975 TABLET ORAL ONCE
Status: CANCELLED | OUTPATIENT
Start: 2021-08-03 | End: 2021-08-03

## 2021-08-03 RX ORDER — KETOROLAC TROMETHAMINE 30 MG/ML
15 INJECTION, SOLUTION INTRAMUSCULAR; INTRAVENOUS ONCE
Status: CANCELLED | OUTPATIENT
Start: 2021-08-03 | End: 2021-08-03

## 2021-08-05 ENCOUNTER — TELEPHONE (OUTPATIENT)
Dept: OBGYN | Facility: CLINIC | Age: 41
End: 2021-08-05

## 2021-08-10 ENCOUNTER — TELEPHONE (OUTPATIENT)
Dept: OBGYN | Facility: CLINIC | Age: 41
End: 2021-08-10

## 2021-08-10 NOTE — TELEPHONE ENCOUNTER
Confirmed surgery date, time and location, 9/10/21, arrival time at 11:35a.m with nothing to eat eight hours before scheduled surgery time, clear liquids up to two hours before, h&p within 30 days, patient made aware, COVID testing 96 hours prior, map and letter mailed out.     to complete the following fields:            CHECKLIST     Google Calendar : Yes     Resident notified: Not Applicable     Clinic schedule blocked:  Not Applicable    Patient notified:Yes      Pre op information sent: Yes     Given to patient over the phone.Yes    Comments:

## 2021-08-17 DIAGNOSIS — Z11.59 ENCOUNTER FOR SCREENING FOR OTHER VIRAL DISEASES: ICD-10-CM

## 2021-08-27 ENCOUNTER — OFFICE VISIT (OUTPATIENT)
Dept: OBGYN | Facility: CLINIC | Age: 41
End: 2021-08-27
Attending: OBSTETRICS & GYNECOLOGY
Payer: COMMERCIAL

## 2021-08-27 VITALS
HEART RATE: 78 BPM | SYSTOLIC BLOOD PRESSURE: 123 MMHG | BODY MASS INDEX: 17.71 KG/M2 | DIASTOLIC BLOOD PRESSURE: 82 MMHG | WEIGHT: 104 LBS

## 2021-08-27 DIAGNOSIS — N93.9 ABNORMAL UTERINE BLEEDING (AUB): Primary | ICD-10-CM

## 2021-08-27 PROCEDURE — 88305 TISSUE EXAM BY PATHOLOGIST: CPT | Mod: TC | Performed by: OBSTETRICS & GYNECOLOGY

## 2021-08-27 PROCEDURE — 58100 BIOPSY OF UTERUS LINING: CPT | Performed by: OBSTETRICS & GYNECOLOGY

## 2021-08-27 PROCEDURE — 88305 TISSUE EXAM BY PATHOLOGIST: CPT | Mod: 26 | Performed by: PATHOLOGY

## 2021-08-27 PROCEDURE — 99213 OFFICE O/P EST LOW 20 MIN: CPT | Mod: 25 | Performed by: OBSTETRICS & GYNECOLOGY

## 2021-08-27 PROCEDURE — G0463 HOSPITAL OUTPT CLINIC VISIT: HCPCS | Mod: 25

## 2021-08-27 ASSESSMENT — PAIN SCALES - GENERAL: PAINLEVEL: NO PAIN (0)

## 2021-08-27 NOTE — LETTER
2021       RE: Abby Foy  [id number]  [place] [address]     Dear Colleague,    Thank you for referring your patient, Abby Foy, to the [facility] [place] at [facility]. Please see a copy of my visit note below.    [facility]  Gynecology Visit    Reason for Visit: AUB follow up w/ endometrial biopsy    HPI:    Abby Foy is a 41 year old , here for concerns as listed above.    She has tried OCPs in the past for her AUB. She has a history of polyp on imaging that was not present at time of hysteroscopy. Other options for controlling her bleeding that have been discussed in the past include IUD which she declined and hysterectomy which she would like to avoid. She decided to proceed with ablation after OCPs did not work for her. These options were again discussed with her and she would like to attempt a repeat ablation before trying other options. She presents today for EMB prior to her procedure in early September.     Today, she notes persistent spotting that has been occurring for about the last 9 days. Prior to this she had spotting for 10-12 days at the end of July. She will also have intermittent spotting with activity which is very frustrating for her. Occasionally during these episodes of spotting, she will have 3 days of heavier flow that resembles more of menstrual cycle for her.      GYN History  Lab Results   Component Value Date    PAP NIL 2018    PAP NIL 2017    PAP NIL 2014   Contraception: Bilateral salpingectomy in   S/p endometrial ablation and EMB in  which was negative for hyperplasia or atypia    OBHx  OB History    Para Term  AB Living   2 2 1 0 0 2   SAB TAB Ectopic Multiple Live Births   0 0 0 0 2      # Outcome Date GA Lbr Benjy/2nd Weight Sex Delivery Anes PTL Lv   2 Term 14 40w1d 09:13 / 00:17 3.062 kg (6 lb 12 oz) F  Vag-Spont  N ANABEL      Name: RUDOLPH YOUNG      Apgar1: 9  Apgar5: 9   1 Para 06/18/11 39w6d  2.58 kg (5 lb 11 oz) F Vag-Spont EPI  ANABEL      Name: Nely       Past Medical History:   Diagnosis Date     Angioedema of lips episode in 3/13--idiopathic     Complication of anesthesia     ponv     Contraception 1/28/2009     Diagnostic skin and sensitization tests 3/27/13 skin tests all NEGATIVE for environmental and food allergens including shellfish and nuts.      Nonallergic rhinitis     3/27/13 skin tests all NEGATIVE for environmental and food allergens including shellfish and nuts. 3/27/13 followup IgE tests NEG to [facility], SNF, shrimp, macadamia nut, pistachio and walnut.     Rheumatoid arthritis of multiple sites with negative rheumatoid factor (H) 12/31/2015       Past Surgical History:   Procedure Laterality Date     DILATE CERVIX, HYSTEROSCOPY, ABLATE ENDOMETRIUM HYDROTHERMAL, COMBINED N/A 7/24/2020    Procedure: DILATION, CERVIX, WITH HYSTEROSCOPY AND HYDROTHERMAL ENDOMETRIAL ABLATION;  Surgeon: Apryl West MD;  [facility]     ENDOMETRIAL SAMPLING (BIOPSY) N/A 7/24/2020    Procedure: BIOPSY, ENDOMETRIUM;  Surgeon: Apryl West MD;  [facility]     LAPAROSCOPIC SALPINGECTOMY Bilateral 6/23/2017    Procedure: LAPAROSCOPIC SALPINGECTOMY;  Operative Laparoscopy, Bilateral Salpingectomy ;  Surgeon: Apryl West MD;  [facility]     OPERATIVE HYSTEROSCOPY WITH MORCELLATOR N/A 12/12/2018    Procedure: DIAGNOSTIC HYSTEROSCOPY AND D AND C;  Surgeon: Apryl West MD;  [facility]         Current Outpatient Medications:      apremilast (OTEZLA) 30 MG tablet, Take 1 tablet (30 mg) by mouth daily, Disp: 60 tablet, Rfl: 4     leflunomide (ARAVA) 10 MG tablet, Take 1 tablet (10 mg) by mouth daily, Disp: 90 tablet, Rfl: 1     multivitamin peds with iron (FLINTSTONES COMPLETE) 60 MG chewable tablet, Take 1 chew tab by mouth daily., Disp: , Rfl:     Allergies   Allergen Reactions      Shrimp Swelling     Lips and tongue     Walnuts [Nuts] Swelling     Lips and tongue         Family History   Problem Relation Age of Onset     Hypertension Maternal Grandmother      Autoimmune Disease Maternal Grandmother         Autoimmune hepatitis     Cancer Maternal Grandfather      Hypertension Paternal Grandmother      Cancer - colorectal Paternal Grandfather      Cardiovascular Paternal Grandfather      Other Cancer Paternal Grandfather      Hypertension Mother      Autoimmune Disease Mother         Autoimmune hepatitis     Hyperlipidemia Mother      Asthma Mother      Hypertension Father      Diabetes Father         Type 2     Multiple Sclerosis Maternal Aunt      Cerebrovascular Disease No family hx of      Thyroid Disease No family hx of      Glaucoma No family hx of      Macular Degeneration No family hx of      Breast Cancer No family hx of      Colon Cancer No family hx of        Social History     Socioeconomic History     Marital status:      Spouse name: Not on file     Number of children: 1     Years of education: Not on file     Highest education level: Not on file   Occupational History     Employer: [employer]    Tobacco Use     Smoking status: Never Smoker     Smokeless tobacco: Never Used   Substance and Sexual Activity     Alcohol use: Yes     Comment: 1-3 drinks/ week     Drug use: No     Sexual activity: Yes     Partners: Male     Birth control/protection: Pill, Male Surgical, Female Surgical     Comment: bilateral salpingectomy   Other Topics Concern     Parent/sibling w/ CABG, MI or angioplasty before 65F 55M? No   Social History Narrative    How much exercise per week? 4 times    How much calcium per day? 2 servings and multivits       How much caffeine per day? 1 cup    How much vitamin D per day? In foods and sunlight    Do you/your family wear seatbelts?  Yes    Do you/your family use safety helmets? Yes    Do you/your family use sunscreen? Yes    Do you/your family keep  firearms in the home? No    Do you/your family have a smoke detector(s)? Yes        Do you feel safe in your home? Yes    Has anyone ever touched you in an unwanted manner? No     Explain          Social Determinants of Health     Financial Resource Strain:      Difficulty of Paying Living Expenses:    Food Insecurity:      Worried About Running Out of Food in the Last Year:      Ran Out of Food in the Last Year:    Transportation Needs:      Lack of Transportation (Medical):      Lack of Transportation (Non-Medical):    Physical Activity:      Days of Exercise per Week:      Minutes of Exercise per Session:    Stress:      Feeling of Stress :    Social Connections:      Frequency of Communication with Friends and Family:      Frequency of Social Gatherings with Friends and Family:      Attends Advent Services:      Active Member of Clubs or Organizations:      Attends Club or Organization Meetings:      Marital Status:    Intimate Partner Violence:      Fear of Current or Ex-Partner:      Emotionally Abused:      Physically Abused:      Sexually Abused:        ROS: 10-Point ROS negative except as noted in HPI.    Physical Exam  /82   Pulse 78   Wt 47.2 kg (104 lb)   LMP 2021   Breastfeeding No   BMI 17.71 kg/m    Gen: Well-appearing, NAD  HEENT: Normocephalic, atraumatic  CV:  RR, well perfused  Pulm: Breathing comfortably on room air  Ext: No LE edema, extremities warm and well perfused    Pelvic:  Normal appearing external female genitalia. Normal hair distribution. Vagina is without lesions. Some bloody discharge noted from the cervical os. Cervix multiparous, no lesions, no cervical motion tenderness. Endometrial biopsy obtained, see procedure note below.      Assessment/Plan:  Abby Foy is a 41 year old  female here for AUB follow up with EMB in preparation for repeat endometrial ablation next month.    AUB  - S/p endometrial ablation  - Endometrial biopsy collected today  -  "Repeat ablation scheduled for 9/10/2021    Appreciate Dr. Esparza's note above, patient also seen and examined by me. I agree with the note above.   Apryl West MD    Endometrial Biopsy    Time Out - \"Pause for the Cause\"  Just before the procedure begins, through verbal and active participation of team members, verify:                      Initials   Patient Name KJO   Patient  KJO   Procedure to be performed KJO                                                                                                                                      Indication: menorrhagia  Faculty:  I was present with the resident throughout the entire procedure. My additional comments are difficult to pass through the internal os.  Os finder was used, still maximum sounding was 5 cm.  2 passes were made.    Consent: Verbal consent obtained from patient. , Risks, benefits of treatment, and no treatment were discussed.  Patient's questions were elicited and answered. , Written consent signed and scanned into medical record. and Patient received and verbalized understanding of discharge instructions    Using a medium Graves speculum, the cervix was visualized. The cervix was prepped with Betadine.  A single tooth tenaculum was applied to the anterior lip of the cervix. The endometrial pipelle was advanced through the cervix with difficulty and a sample collected. One additional pass was made.  The tenaculum was removed from the cervix and the tenaculum site made hemostatic with Silver Nitrate sticks .    EBL: 5 mL    Complications:  None apparent  Pathology: EMB sample was sent to pathology.  Tolerance of Procedure:  Patient tolerated the procedure well.     Patient was instructed to call if she experiences any heavy bleeding, severe cramping, or abnormal vaginal discharge.  May take ibuprofen 400-800 mg PO TID PRN or naproxen 500 mg PO BID for cramping.    Will notify patient of results.    Seen and discussed with Dr." Brett.    Brian Esparza MD, MPH  OB/GYN Resident, PGY-1  08/27/21 8:53 AM     I was present and participated throughout the procedure,  I agree with the note above  Apryl West MD

## 2021-08-27 NOTE — PROGRESS NOTES
[facility]  Gynecology Visit    Reason for Visit: AUB follow up w/ endometrial biopsy    HPI:    Abby Foy is a 41 year old , here for concerns as listed above.    She has tried OCPs in the past for her AUB. She has a history of polyp on imaging that was not present at time of hysteroscopy. Other options for controlling her bleeding that have been discussed in the past include IUD which she declined and hysterectomy which she would like to avoid. She decided to proceed with ablation after OCPs did not work for her. These options were again discussed with her and she would like to attempt a repeat ablation before trying other options. She presents today for EMB prior to her procedure in early September.     Today, she notes persistent spotting that has been occurring for about the last 9 days. Prior to this she had spotting for 10-12 days at the end of July. She will also have intermittent spotting with activity which is very frustrating for her. Occasionally during these episodes of spotting, she will have 3 days of heavier flow that resembles more of menstrual cycle for her.      GYN History  Lab Results   Component Value Date    PAP NIL 2018    PAP NIL 2017    PAP NIL 2014   Contraception: Bilateral salpingectomy in   S/p endometrial ablation and EMB in  which was negative for hyperplasia or atypia    OBHx  OB History    Para Term  AB Living   2 2 1 0 0 2   SAB TAB Ectopic Multiple Live Births   0 0 0 0 2      # Outcome Date GA Lbr Benjy/2nd Weight Sex Delivery Anes PTL Lv   2 Term 14 40w1d 09:13 / 00:17 3.062 kg (6 lb 12 oz) F Vag-Spont  N ANABEL      Name: HECTOR,USHA1 ABBY MENDEZ      Apgar1: 9  Apgar5: 9   1 Para 11 39w6d  2.58 kg (5 lb 11 oz) F Vag-Spont EPI  ANABEL      Name: Nely       Past Medical History:   Diagnosis Date     Angioedema of lips episode in 3/13--idiopathic     Complication of anesthesia     ponv     Contraception 2009      Diagnostic skin and sensitization tests 3/27/13 skin tests all NEGATIVE for environmental and food allergens including shellfish and nuts.      Nonallergic rhinitis     3/27/13 skin tests all NEGATIVE for environmental and food allergens including shellfish and nuts. 3/27/13 followup IgE tests NEG to [facility], SNF, shrimp, macadamia nut, pistachio and walnut.     Rheumatoid arthritis of multiple sites with negative rheumatoid factor (H) 12/31/2015       Past Surgical History:   Procedure Laterality Date     DILATE CERVIX, HYSTEROSCOPY, ABLATE ENDOMETRIUM HYDROTHERMAL, COMBINED N/A 7/24/2020    Procedure: DILATION, CERVIX, WITH HYSTEROSCOPY AND HYDROTHERMAL ENDOMETRIAL ABLATION;  Surgeon: Apryl West MD;  [facility]     ENDOMETRIAL SAMPLING (BIOPSY) N/A 7/24/2020    Procedure: BIOPSY, ENDOMETRIUM;  Surgeon: Apryl West MD;  [facility]     LAPAROSCOPIC SALPINGECTOMY Bilateral 6/23/2017    Procedure: LAPAROSCOPIC SALPINGECTOMY;  Operative Laparoscopy, Bilateral Salpingectomy ;  Surgeon: Apryl West MD;  [facility]     OPERATIVE HYSTEROSCOPY WITH MORCELLATOR N/A 12/12/2018    Procedure: DIAGNOSTIC HYSTEROSCOPY AND D AND C;  Surgeon: Apryl West MD;  [facility]         Current Outpatient Medications:      apremilast (OTEZLA) 30 MG tablet, Take 1 tablet (30 mg) by mouth daily, Disp: 60 tablet, Rfl: 4     leflunomide (ARAVA) 10 MG tablet, Take 1 tablet (10 mg) by mouth daily, Disp: 90 tablet, Rfl: 1     multivitamin peds with iron (FLINTSTONES COMPLETE) 60 MG chewable tablet, Take 1 chew tab by mouth daily., Disp: , Rfl:     Allergies   Allergen Reactions     Shrimp Swelling     Lips and tongue     Walnuts [Nuts] Swelling     Lips and tongue         Family History   Problem Relation Age of Onset     Hypertension Maternal Grandmother      Autoimmune Disease Maternal Grandmother         Autoimmune hepatitis     Cancer Maternal Grandfather      Hypertension Paternal Grandmother       Cancer - colorectal Paternal Grandfather      Cardiovascular Paternal Grandfather      Other Cancer Paternal Grandfather      Hypertension Mother      Autoimmune Disease Mother         Autoimmune hepatitis     Hyperlipidemia Mother      Asthma Mother      Hypertension Father      Diabetes Father         Type 2     Multiple Sclerosis Maternal Aunt      Cerebrovascular Disease No family hx of      Thyroid Disease No family hx of      Glaucoma No family hx of      Macular Degeneration No family hx of      Breast Cancer No family hx of      Colon Cancer No family hx of        Social History     Socioeconomic History     Marital status:      Spouse name: Not on file     Number of children: 1     Years of education: Not on file     Highest education level: Not on file   Occupational History     Employer: [employer]    Tobacco Use     Smoking status: Never Smoker     Smokeless tobacco: Never Used   Substance and Sexual Activity     Alcohol use: Yes     Comment: 1-3 drinks/ week     Drug use: No     Sexual activity: Yes     Partners: Male     Birth control/protection: Pill, Male Surgical, Female Surgical     Comment: bilateral salpingectomy   Other Topics Concern     Parent/sibling w/ CABG, MI or angioplasty before 65F 55M? No   Social History Narrative    How much exercise per week? 4 times    How much calcium per day? 2 servings and multivits       How much caffeine per day? 1 cup    How much vitamin D per day? In foods and sunlight    Do you/your family wear seatbelts?  Yes    Do you/your family use safety helmets? Yes    Do you/your family use sunscreen? Yes    Do you/your family keep firearms in the home? No    Do you/your family have a smoke detector(s)? Yes        Do you feel safe in your home? Yes    Has anyone ever touched you in an unwanted manner? No     Explain          Social Determinants of Health     Financial Resource Strain:      Difficulty of Paying Living Expenses:    Food Insecurity:       "Worried About Running Out of Food in the Last Year:      Ran Out of Food in the Last Year:    Transportation Needs:      Lack of Transportation (Medical):      Lack of Transportation (Non-Medical):    Physical Activity:      Days of Exercise per Week:      Minutes of Exercise per Session:    Stress:      Feeling of Stress :    Social Connections:      Frequency of Communication with Friends and Family:      Frequency of Social Gatherings with Friends and Family:      Attends Restorationist Services:      Active Member of Clubs or Organizations:      Attends Club or Organization Meetings:      Marital Status:    Intimate Partner Violence:      Fear of Current or Ex-Partner:      Emotionally Abused:      Physically Abused:      Sexually Abused:        ROS: 10-Point ROS negative except as noted in HPI.    Physical Exam  /82   Pulse 78   Wt 47.2 kg (104 lb)   LMP 2021   Breastfeeding No   BMI 17.71 kg/m    Gen: Well-appearing, NAD  HEENT: Normocephalic, atraumatic  CV:  RR, well perfused  Pulm: Breathing comfortably on room air  Ext: No LE edema, extremities warm and well perfused    Pelvic:  Normal appearing external female genitalia. Normal hair distribution. Vagina is without lesions. Some bloody discharge noted from the cervical os. Cervix multiparous, no lesions, no cervical motion tenderness. Endometrial biopsy obtained, see procedure note below.      Assessment/Plan:  Abby Foy is a 41 year old  female here for AUB follow up with EMB in preparation for repeat endometrial ablation next month.    AUB  - S/p endometrial ablation  - Endometrial biopsy collected today  - Repeat ablation scheduled for 9/10/2021    Appreciate Dr. Esparza's note above, patient also seen and examined by me. I agree with the note above.   Apryl West MD    Endometrial Biopsy    Time Out - \"Pause for the Cause\"  Just before the procedure begins, through verbal and active participation of team members, verify:  "                     Initials   Patient Name KJO   Patient  KJO   Procedure to be performed KJO                                                                                                                                      Indication: menorrhagia  Faculty:  I was present with the resident throughout the entire procedure. My additional comments are difficult to pass through the internal os.  Os finder was used, still maximum sounding was 5 cm.  2 passes were made.    Consent: Verbal consent obtained from patient. , Risks, benefits of treatment, and no treatment were discussed.  Patient's questions were elicited and answered. , Written consent signed and scanned into medical record. and Patient received and verbalized understanding of discharge instructions    Using a medium Graves speculum, the cervix was visualized. The cervix was prepped with Betadine.  A single tooth tenaculum was applied to the anterior lip of the cervix. The endometrial pipelle was advanced through the cervix with difficulty and a sample collected. One additional pass was made.  The tenaculum was removed from the cervix and the tenaculum site made hemostatic with Silver Nitrate sticks .    EBL: 5 mL    Complications:  None apparent  Pathology: EMB sample was sent to pathology.  Tolerance of Procedure:  Patient tolerated the procedure well.     Patient was instructed to call if she experiences any heavy bleeding, severe cramping, or abnormal vaginal discharge.  May take ibuprofen 400-800 mg PO TID PRN or naproxen 500 mg PO BID for cramping.    Will notify patient of results.    Seen and discussed with Dr. West.    Brian Esparza MD, MPH  OB/GYN Resident, PGY-1  21 8:53 AM     I was present and participated throughout the procedure,  I agree with the note above  Apryl West MD

## 2021-08-30 ENCOUNTER — OFFICE VISIT (OUTPATIENT)
Dept: FAMILY MEDICINE | Facility: CLINIC | Age: 41
End: 2021-08-30
Payer: COMMERCIAL

## 2021-08-30 VITALS
SYSTOLIC BLOOD PRESSURE: 125 MMHG | OXYGEN SATURATION: 100 % | TEMPERATURE: 97.3 F | BODY MASS INDEX: 18.19 KG/M2 | HEART RATE: 74 BPM | DIASTOLIC BLOOD PRESSURE: 83 MMHG | RESPIRATION RATE: 16 BRPM | WEIGHT: 106.8 LBS

## 2021-08-30 DIAGNOSIS — L40.50 PSORIATIC ARTHRITIS (H): ICD-10-CM

## 2021-08-30 DIAGNOSIS — N93.9 ABNORMAL UTERINE BLEEDING: ICD-10-CM

## 2021-08-30 DIAGNOSIS — Z01.818 PREOP GENERAL PHYSICAL EXAM: Primary | ICD-10-CM

## 2021-08-30 PROCEDURE — 99214 OFFICE O/P EST MOD 30 MIN: CPT | Performed by: PHYSICIAN ASSISTANT

## 2021-08-30 NOTE — H&P (VIEW-ONLY)
[facility] [place]  [address] [address]  [place] [address]  Phone: [phone]  Primary Provider: Sandi Duffy  Pre-op Performing Provider: SANDI DUFFY      PREOPERATIVE EVALUATION:  Today's date: 8/30/2021    Abby Foy is a 41 year old female who presents for a preoperative evaluation.    Surgical Information:  Surgery/Procedure: DILATION, CERVIX, WITH HYSTEROSCOPY AND HYDROTHERMAL ENDOMETRIAL ABLATION  Surgery Location: [facility]  Surgeon: Apryl West MD  Surgery Date: 09/10/21  Time of Surgery: 1:35pm  Where patient plans to recover: At home with family  Fax number for surgical facility: Note does not need to be faxed, will be available electronically in Epic.    Type of Anesthesia Anticipated: Combined MAC with local    Assessment & Plan     The proposed surgical procedure is considered INTERMEDIATE risk.    1. Preop general physical exam    2. Abnormal uterine bleeding    3. Psoriatic arthritis (H)        1,2) She has labs for her surgeon already ordered for day of. No need to do labs today.    3) Will contact her rheumatologist regarding whether or not she needs to stop her medications prior to surgery.      Risks and Recommendations:  The patient has the following additional risks and recommendations for perioperative complications:   - No identified additional risk factors other than previously addressed    Medication Instructions:  Will message rheumatology for further recommendations       RECOMMENDATION:  APPROVAL GIVEN to proceed with proposed procedure, without further diagnostic evaluation.          Subjective     HPI related to upcoming procedure: Abnormal uterine bleeding    Preop Questions 8/27/2021   1. Have you ever had a heart attack or stroke? No   2. Have you ever had surgery on your heart or blood vessels, such as a stent placement, a coronary artery bypass, or surgery on an artery in your head, neck, heart, or legs? No    3. Do you have chest pain with activity? No   4. Do you have a history of  heart failure? No   5. Do you currently have a cold, bronchitis or symptoms of other infection? No   6. Do you have a cough, shortness of breath, or wheezing? No   7. Do you or anyone in your family have previous history of blood clots? No   8. Do you or does anyone in your family have a serious bleeding problem such as prolonged bleeding following surgeries or cuts? No   9. Have you ever had problems with anemia or been told to take iron pills? No   10. Have you had any abnormal blood loss such as black, tarry or bloody stools, or abnormal vaginal bleeding? YES - abnormal uterine bleeding   11. Have you ever had a blood transfusion? No   12. Are you willing to have a blood transfusion if it is medically needed before, during, or after your surgery? Yes   13. Have you or any of your relatives ever had problems with anesthesia? No   14. Do you have sleep apnea, excessive snoring or daytime drowsiness? No   15. Do you have any artifical heart valves or other implanted medical devices like a pacemaker, defibrillator, or continuous glucose monitor? No   16. Do you have artificial joints? No   17. Are you allergic to latex? No   18. Is there any chance that you may be pregnant? No       Health Care Directive:  Patient does not have a Health Care Directive or Living Will: Discussed advance care planning with patient; however, patient declined at this time.    Preoperative Review of :   reviewed - no record of controlled substances prescribed.      Status of Chronic Conditions:  See problem list for active medical problems.  Problems all longstanding and stable, except as noted/documented.  See ROS for pertinent symptoms related to these conditions.      Review of Systems  Constitutional, neuro, ENT, endocrine, pulmonary, cardiac, gastrointestinal, genitourinary, musculoskeletal, integument and psychiatric systems are negative, except as  otherwise noted.    Patient Active Problem List    Diagnosis Date Noted     Psoriatic arthritis (H) 10/25/2019     Priority: Medium     Abnormal uterine bleeding (AUB)- on OCPs 01/13/2017     Priority: Medium     GPC (giant papillary conjunctivitis) 12/31/2015     Priority: Medium     Seronegative spondyloarthropathy 12/31/2015     Priority: Medium     Midline low back pain without sciatica 12/31/2015     Priority: Medium     H. pylori infection 08/25/2015     Priority: Medium     Angioedema of lips      Priority: Medium     Diagnostic skin and sensitization tests      Priority: Medium     Nonallergic rhinitis      Priority: Medium     3/27/13 skin tests all NEGATIVE for environmental and food allergens including shellfish and nuts.        Urticaria 03/04/2013     Priority: Medium     CARDIOVASCULAR SCREENING; LDL GOAL LESS THAN 160 05/09/2010     Priority: Medium      Past Medical History:   Diagnosis Date     Angioedema of lips episode in 3/13--idiopathic     Complication of anesthesia     ponv     Contraception 1/28/2009     Diagnostic skin and sensitization tests 3/27/13 skin tests all NEGATIVE for environmental and food allergens including shellfish and nuts.      Nonallergic rhinitis     3/27/13 skin tests all NEGATIVE for environmental and food allergens including shellfish and nuts. 3/27/13 followup IgE tests NEG to [facility], [facility], shrimp, macadamia nut, pistachio and walnut.     Rheumatoid arthritis of multiple sites with negative rheumatoid factor (H) 12/31/2015     Past Surgical History:   Procedure Laterality Date     DILATE CERVIX, HYSTEROSCOPY, ABLATE ENDOMETRIUM HYDROTHERMAL, COMBINED N/A 7/24/2020    Procedure: DILATION, CERVIX, WITH HYSTEROSCOPY AND HYDROTHERMAL ENDOMETRIAL ABLATION;  Surgeon: Apryl West MD;  [facility]     ENDOMETRIAL SAMPLING (BIOPSY) N/A 7/24/2020    Procedure: BIOPSY, ENDOMETRIUM;  Surgeon: Apryl West MD;  [facility]     LAPAROSCOPIC SALPINGECTOMY Bilateral  6/23/2017    Procedure: LAPAROSCOPIC SALPINGECTOMY;  Operative Laparoscopy, Bilateral Salpingectomy ;  Surgeon: Apryl West MD;  [facility]     OPERATIVE HYSTEROSCOPY WITH MORCELLATOR N/A 12/12/2018    Procedure: DIAGNOSTIC HYSTEROSCOPY AND D AND C;  Surgeon: Apryl West MD;  [facility]     Current Outpatient Medications   Medication Sig Dispense Refill     apremilast (OTEZLA) 30 MG tablet Take 1 tablet (30 mg) by mouth daily 60 tablet 4     leflunomide (ARAVA) 10 MG tablet Take 1 tablet (10 mg) by mouth daily 90 tablet 1     multivitamin peds with iron (FLINTSTONES COMPLETE) 60 MG chewable tablet Take 1 chew tab by mouth daily.         Allergies   Allergen Reactions     Shrimp Swelling     Lips and tongue     Walnuts [Nuts] Swelling     Lips and tongue          Social History     Tobacco Use     Smoking status: Never Smoker     Smokeless tobacco: Never Used   Substance Use Topics     Alcohol use: Yes     Comment: 1-3 drinks/ week     Family History   Problem Relation Age of Onset     Hypertension Maternal Grandmother      Autoimmune Disease Maternal Grandmother         Autoimmune hepatitis     Cancer Maternal Grandfather      Hypertension Paternal Grandmother      Cancer - colorectal Paternal Grandfather      Cardiovascular Paternal Grandfather      Other Cancer Paternal Grandfather      Hypertension Mother      Autoimmune Disease Mother         Autoimmune hepatitis     Hyperlipidemia Mother      Asthma Mother      Hypertension Father      Diabetes Father         Type 2     Multiple Sclerosis Maternal Aunt      Cerebrovascular Disease No family hx of      Thyroid Disease No family hx of      Glaucoma No family hx of      Macular Degeneration No family hx of      Breast Cancer No family hx of      Colon Cancer No family hx of      History   Drug Use No         Objective     /83   Pulse 74   Temp 97.3  F (36.3  C) (Tympanic)   Resp 16   Wt 48.4 kg (106 lb 12.8 oz)   LMP 08/16/2021    SpO2 100%   BMI 18.19 kg/m      Physical Exam    GENERAL APPEARANCE: healthy, alert and no distress     EYES: EOMI, PERRL     HENT: ear canals and TM's normal and nose and mouth without ulcers or lesions     NECK: no adenopathy, no asymmetry, masses, or scars and thyroid normal to palpation     RESP: lungs clear to auscultation - no rales, rhonchi or wheezes     CV: regular rates and rhythm, normal S1 S2, no S3 or S4 and no murmur, click or rub     ABDOMEN:  soft, nontender, no HSM or masses and bowel sounds normal     MS: extremities normal- no gross deformities noted, no evidence of inflammation in joints, FROM in all extremities.     SKIN: no suspicious lesions or rashes     NEURO: Normal strength and tone, sensory exam grossly normal, mentation intact and speech normal     PSYCH: mentation appears normal. and affect normal/bright     LYMPHATICS: No cervical adenopathy    Recent Labs   Lab Test 07/02/21  0922 01/12/21  1437   HGB 11.9 12.1    221   CR 0.72 0.76        Diagnostics:  No labs were ordered during this visit.   No EKG required, no history of coronary heart disease, significant arrhythmia, peripheral arterial disease or other structural heart disease.    Revised Cardiac Risk Index (RCRI):  The patient has the following serious cardiovascular risks for perioperative complications:   - No serious cardiac risks = 0 points     RCRI Interpretation: 0 points: Class I (very low risk - 0.4% complication rate)           Signed Electronically by: Sandi Duffy PA-C  Copy of this evaluation report is provided to requesting physician.

## 2021-08-30 NOTE — PROGRESS NOTES
[facility] [place]  [address] [address]  [place] [address]  Phone: [phone]  Primary Provider: Sandi Duffy  Pre-op Performing Provider: SANDI DUFFY      PREOPERATIVE EVALUATION:  Today's date: 8/30/2021    Abby Foy is a 41 year old female who presents for a preoperative evaluation.    Surgical Information:  Surgery/Procedure: DILATION, CERVIX, WITH HYSTEROSCOPY AND HYDROTHERMAL ENDOMETRIAL ABLATION  Surgery Location: [facility]  Surgeon: Apryl West MD  Surgery Date: 09/10/21  Time of Surgery: 1:35pm  Where patient plans to recover: At home with family  Fax number for surgical facility: Note does not need to be faxed, will be available electronically in Epic.    Type of Anesthesia Anticipated: Combined MAC with local    Assessment & Plan     The proposed surgical procedure is considered INTERMEDIATE risk.    1. Preop general physical exam    2. Abnormal uterine bleeding    3. Psoriatic arthritis (H)        1,2) She has labs for her surgeon already ordered for day of. No need to do labs today.    3) Will contact her rheumatologist regarding whether or not she needs to stop her medications prior to surgery.      Risks and Recommendations:  The patient has the following additional risks and recommendations for perioperative complications:   - No identified additional risk factors other than previously addressed    Medication Instructions:  Will message rheumatology for further recommendations       RECOMMENDATION:  APPROVAL GIVEN to proceed with proposed procedure, without further diagnostic evaluation.          Subjective     HPI related to upcoming procedure: Abnormal uterine bleeding    Preop Questions 8/27/2021   1. Have you ever had a heart attack or stroke? No   2. Have you ever had surgery on your heart or blood vessels, such as a stent placement, a coronary artery bypass, or surgery on an artery in your head, neck, heart, or legs? No    3. Do you have chest pain with activity? No   4. Do you have a history of  heart failure? No   5. Do you currently have a cold, bronchitis or symptoms of other infection? No   6. Do you have a cough, shortness of breath, or wheezing? No   7. Do you or anyone in your family have previous history of blood clots? No   8. Do you or does anyone in your family have a serious bleeding problem such as prolonged bleeding following surgeries or cuts? No   9. Have you ever had problems with anemia or been told to take iron pills? No   10. Have you had any abnormal blood loss such as black, tarry or bloody stools, or abnormal vaginal bleeding? YES - abnormal uterine bleeding   11. Have you ever had a blood transfusion? No   12. Are you willing to have a blood transfusion if it is medically needed before, during, or after your surgery? Yes   13. Have you or any of your relatives ever had problems with anesthesia? No   14. Do you have sleep apnea, excessive snoring or daytime drowsiness? No   15. Do you have any artifical heart valves or other implanted medical devices like a pacemaker, defibrillator, or continuous glucose monitor? No   16. Do you have artificial joints? No   17. Are you allergic to latex? No   18. Is there any chance that you may be pregnant? No       Health Care Directive:  Patient does not have a Health Care Directive or Living Will: Discussed advance care planning with patient; however, patient declined at this time.    Preoperative Review of :   reviewed - no record of controlled substances prescribed.      Status of Chronic Conditions:  See problem list for active medical problems.  Problems all longstanding and stable, except as noted/documented.  See ROS for pertinent symptoms related to these conditions.      Review of Systems  Constitutional, neuro, ENT, endocrine, pulmonary, cardiac, gastrointestinal, genitourinary, musculoskeletal, integument and psychiatric systems are negative, except as  otherwise noted.    Patient Active Problem List    Diagnosis Date Noted     Psoriatic arthritis (H) 10/25/2019     Priority: Medium     Abnormal uterine bleeding (AUB)- on OCPs 01/13/2017     Priority: Medium     GPC (giant papillary conjunctivitis) 12/31/2015     Priority: Medium     Seronegative spondyloarthropathy 12/31/2015     Priority: Medium     Midline low back pain without sciatica 12/31/2015     Priority: Medium     H. pylori infection 08/25/2015     Priority: Medium     Angioedema of lips      Priority: Medium     Diagnostic skin and sensitization tests      Priority: Medium     Nonallergic rhinitis      Priority: Medium     3/27/13 skin tests all NEGATIVE for environmental and food allergens including shellfish and nuts.        Urticaria 03/04/2013     Priority: Medium     CARDIOVASCULAR SCREENING; LDL GOAL LESS THAN 160 05/09/2010     Priority: Medium      Past Medical History:   Diagnosis Date     Angioedema of lips episode in 3/13--idiopathic     Complication of anesthesia     ponv     Contraception 1/28/2009     Diagnostic skin and sensitization tests 3/27/13 skin tests all NEGATIVE for environmental and food allergens including shellfish and nuts.      Nonallergic rhinitis     3/27/13 skin tests all NEGATIVE for environmental and food allergens including shellfish and nuts. 3/27/13 followup IgE tests NEG to [facility], [facility], shrimp, macadamia nut, pistachio and walnut.     Rheumatoid arthritis of multiple sites with negative rheumatoid factor (H) 12/31/2015     Past Surgical History:   Procedure Laterality Date     DILATE CERVIX, HYSTEROSCOPY, ABLATE ENDOMETRIUM HYDROTHERMAL, COMBINED N/A 7/24/2020    Procedure: DILATION, CERVIX, WITH HYSTEROSCOPY AND HYDROTHERMAL ENDOMETRIAL ABLATION;  Surgeon: Apryl West MD;  [facility]     ENDOMETRIAL SAMPLING (BIOPSY) N/A 7/24/2020    Procedure: BIOPSY, ENDOMETRIUM;  Surgeon: Apryl West MD;  [facility]     LAPAROSCOPIC SALPINGECTOMY Bilateral  6/23/2017    Procedure: LAPAROSCOPIC SALPINGECTOMY;  Operative Laparoscopy, Bilateral Salpingectomy ;  Surgeon: Apryl West MD;  [facility]     OPERATIVE HYSTEROSCOPY WITH MORCELLATOR N/A 12/12/2018    Procedure: DIAGNOSTIC HYSTEROSCOPY AND D AND C;  Surgeon: Apryl West MD;  [facility]     Current Outpatient Medications   Medication Sig Dispense Refill     apremilast (OTEZLA) 30 MG tablet Take 1 tablet (30 mg) by mouth daily 60 tablet 4     leflunomide (ARAVA) 10 MG tablet Take 1 tablet (10 mg) by mouth daily 90 tablet 1     multivitamin peds with iron (FLINTSTONES COMPLETE) 60 MG chewable tablet Take 1 chew tab by mouth daily.         Allergies   Allergen Reactions     Shrimp Swelling     Lips and tongue     Walnuts [Nuts] Swelling     Lips and tongue          Social History     Tobacco Use     Smoking status: Never Smoker     Smokeless tobacco: Never Used   Substance Use Topics     Alcohol use: Yes     Comment: 1-3 drinks/ week     Family History   Problem Relation Age of Onset     Hypertension Maternal Grandmother      Autoimmune Disease Maternal Grandmother         Autoimmune hepatitis     Cancer Maternal Grandfather      Hypertension Paternal Grandmother      Cancer - colorectal Paternal Grandfather      Cardiovascular Paternal Grandfather      Other Cancer Paternal Grandfather      Hypertension Mother      Autoimmune Disease Mother         Autoimmune hepatitis     Hyperlipidemia Mother      Asthma Mother      Hypertension Father      Diabetes Father         Type 2     Multiple Sclerosis Maternal Aunt      Cerebrovascular Disease No family hx of      Thyroid Disease No family hx of      Glaucoma No family hx of      Macular Degeneration No family hx of      Breast Cancer No family hx of      Colon Cancer No family hx of      History   Drug Use No         Objective     /83   Pulse 74   Temp 97.3  F (36.3  C) (Tympanic)   Resp 16   Wt 48.4 kg (106 lb 12.8 oz)   LMP 08/16/2021    SpO2 100%   BMI 18.19 kg/m      Physical Exam    GENERAL APPEARANCE: healthy, alert and no distress     EYES: EOMI, PERRL     HENT: ear canals and TM's normal and nose and mouth without ulcers or lesions     NECK: no adenopathy, no asymmetry, masses, or scars and thyroid normal to palpation     RESP: lungs clear to auscultation - no rales, rhonchi or wheezes     CV: regular rates and rhythm, normal S1 S2, no S3 or S4 and no murmur, click or rub     ABDOMEN:  soft, nontender, no HSM or masses and bowel sounds normal     MS: extremities normal- no gross deformities noted, no evidence of inflammation in joints, FROM in all extremities.     SKIN: no suspicious lesions or rashes     NEURO: Normal strength and tone, sensory exam grossly normal, mentation intact and speech normal     PSYCH: mentation appears normal. and affect normal/bright     LYMPHATICS: No cervical adenopathy    Recent Labs   Lab Test 07/02/21  0922 01/12/21  1437   HGB 11.9 12.1    221   CR 0.72 0.76        Diagnostics:  No labs were ordered during this visit.   No EKG required, no history of coronary heart disease, significant arrhythmia, peripheral arterial disease or other structural heart disease.    Revised Cardiac Risk Index (RCRI):  The patient has the following serious cardiovascular risks for perioperative complications:   - No serious cardiac risks = 0 points     RCRI Interpretation: 0 points: Class I (very low risk - 0.4% complication rate)           Signed Electronically by: Sandi Duffy PA-C  Copy of this evaluation report is provided to requesting physician.

## 2021-08-30 NOTE — PATIENT INSTRUCTIONS

## 2021-08-30 NOTE — Clinical Note
Milagros Harrisone is having surgery on 9/10 - does she need to stop either of her medications prior to surgery?  Sandi

## 2021-09-01 ENCOUNTER — TELEPHONE (OUTPATIENT)
Dept: RHEUMATOLOGY | Facility: CLINIC | Age: 41
End: 2021-09-01

## 2021-09-02 LAB
PATH REPORT.COMMENTS IMP SPEC: NORMAL
PATH REPORT.COMMENTS IMP SPEC: NORMAL
PATH REPORT.FINAL DX SPEC: NORMAL
PATH REPORT.GROSS SPEC: NORMAL
PATH REPORT.MICROSCOPIC SPEC OTHER STN: NORMAL
PATH REPORT.RELEVANT HX SPEC: NORMAL
PHOTO IMAGE: NORMAL

## 2021-09-02 NOTE — TELEPHONE ENCOUNTER
RN: Please call to notify Abby Foy that for perioperative DMARD management:    1. Continue leflunomide perioperatively  2. Stop taking Otezla 3 days before surgery, and resume Otezla 5 days after surgery is completed and in the absence of wound healing problems, surgical site infection, and systemic infection.    Hunter Monroy MD  9/1/2021 9:35 PM

## 2021-09-08 ENCOUNTER — LAB (OUTPATIENT)
Dept: LAB | Facility: CLINIC | Age: 41
End: 2021-09-08
Attending: OBSTETRICS & GYNECOLOGY
Payer: COMMERCIAL

## 2021-09-08 DIAGNOSIS — Z11.59 ENCOUNTER FOR SCREENING FOR OTHER VIRAL DISEASES: ICD-10-CM

## 2021-09-08 PROCEDURE — U0003 INFECTIOUS AGENT DETECTION BY NUCLEIC ACID (DNA OR RNA); SEVERE ACUTE RESPIRATORY SYNDROME CORONAVIRUS 2 (SARS-COV-2) (CORONAVIRUS DISEASE [COVID-19]), AMPLIFIED PROBE TECHNIQUE, MAKING USE OF HIGH THROUGHPUT TECHNOLOGIES AS DESCRIBED BY CMS-2020-01-R: HCPCS

## 2021-09-08 PROCEDURE — U0005 INFEC AGEN DETEC AMPLI PROBE: HCPCS

## 2021-09-09 ENCOUNTER — ANESTHESIA EVENT (OUTPATIENT)
Dept: SURGERY | Facility: CLINIC | Age: 41
End: 2021-09-09
Payer: COMMERCIAL

## 2021-09-09 LAB — SARS-COV-2 RNA RESP QL NAA+PROBE: NEGATIVE

## 2021-09-10 ENCOUNTER — ANESTHESIA (OUTPATIENT)
Dept: SURGERY | Facility: CLINIC | Age: 41
End: 2021-09-10
Payer: COMMERCIAL

## 2021-09-10 ENCOUNTER — HOSPITAL ENCOUNTER (OUTPATIENT)
Facility: CLINIC | Age: 41
Discharge: HOME | End: 2021-09-10
Attending: OBSTETRICS & GYNECOLOGY | Admitting: OBSTETRICS & GYNECOLOGY
Payer: COMMERCIAL

## 2021-09-10 VITALS
SYSTOLIC BLOOD PRESSURE: 122 MMHG | WEIGHT: 106.48 LBS | OXYGEN SATURATION: 99 % | RESPIRATION RATE: 14 BRPM | HEART RATE: 68 BPM | TEMPERATURE: 97.7 F | BODY MASS INDEX: 18.18 KG/M2 | HEIGHT: 64 IN | DIASTOLIC BLOOD PRESSURE: 78 MMHG

## 2021-09-10 DIAGNOSIS — N93.9 ABNORMAL UTERINE BLEEDING (AUB): Primary | ICD-10-CM

## 2021-09-10 LAB
ABO/RH(D): NORMAL
ANTIBODY SCREEN: NEGATIVE
GLUCOSE BLDC GLUCOMTR-MCNC: 71 MG/DL (ref 70–99)
HCG SERPL QL: NEGATIVE
HGB BLD-MCNC: 11.3 G/DL (ref 11.7–15.7)
HOLD SPECIMEN: NORMAL
SPECIMEN EXPIRATION DATE: NORMAL

## 2021-09-10 PROCEDURE — 250N000011 HC RX IP 250 OP 636: Performed by: STUDENT IN AN ORGANIZED HEALTH CARE EDUCATION/TRAINING PROGRAM

## 2021-09-10 PROCEDURE — 58563 HYSTEROSCOPY ABLATION: CPT | Mod: GC | Performed by: OBSTETRICS & GYNECOLOGY

## 2021-09-10 PROCEDURE — 370N000017 HC ANESTHESIA TECHNICAL FEE, PER MIN: Performed by: OBSTETRICS & GYNECOLOGY

## 2021-09-10 PROCEDURE — 85018 HEMOGLOBIN: CPT | Performed by: OBSTETRICS & GYNECOLOGY

## 2021-09-10 PROCEDURE — 84703 CHORIONIC GONADOTROPIN ASSAY: CPT | Performed by: OBSTETRICS & GYNECOLOGY

## 2021-09-10 PROCEDURE — 250N000009 HC RX 250: Performed by: OBSTETRICS & GYNECOLOGY

## 2021-09-10 PROCEDURE — 710N000012 HC RECOVERY PHASE 2, PER MINUTE: Performed by: OBSTETRICS & GYNECOLOGY

## 2021-09-10 PROCEDURE — 999N000141 HC STATISTIC PRE-PROCEDURE NURSING ASSESSMENT: Performed by: OBSTETRICS & GYNECOLOGY

## 2021-09-10 PROCEDURE — 250N000013 HC RX MED GY IP 250 OP 250 PS 637: Performed by: STUDENT IN AN ORGANIZED HEALTH CARE EDUCATION/TRAINING PROGRAM

## 2021-09-10 PROCEDURE — 86901 BLOOD TYPING SEROLOGIC RH(D): CPT | Performed by: OBSTETRICS & GYNECOLOGY

## 2021-09-10 PROCEDURE — 258N000003 HC RX IP 258 OP 636: Performed by: STUDENT IN AN ORGANIZED HEALTH CARE EDUCATION/TRAINING PROGRAM

## 2021-09-10 PROCEDURE — 258N000001 HC RX 258: Performed by: OBSTETRICS & GYNECOLOGY

## 2021-09-10 PROCEDURE — 250N000013 HC RX MED GY IP 250 OP 250 PS 637: Performed by: OBSTETRICS & GYNECOLOGY

## 2021-09-10 PROCEDURE — 250N000011 HC RX IP 250 OP 636: Performed by: NURSE ANESTHETIST, CERTIFIED REGISTERED

## 2021-09-10 PROCEDURE — 250N000011 HC RX IP 250 OP 636: Performed by: OBSTETRICS & GYNECOLOGY

## 2021-09-10 PROCEDURE — 272N000001 HC OR GENERAL SUPPLY STERILE: Performed by: OBSTETRICS & GYNECOLOGY

## 2021-09-10 PROCEDURE — 360N000076 HC SURGERY LEVEL 3, PER MIN: Performed by: OBSTETRICS & GYNECOLOGY

## 2021-09-10 PROCEDURE — 36415 COLL VENOUS BLD VENIPUNCTURE: CPT | Performed by: OBSTETRICS & GYNECOLOGY

## 2021-09-10 RX ORDER — ACETAMINOPHEN 325 MG/1
975 TABLET ORAL ONCE
Status: DISCONTINUED | OUTPATIENT
Start: 2021-09-10 | End: 2021-09-10 | Stop reason: HOSPADM

## 2021-09-10 RX ORDER — KETOROLAC TROMETHAMINE 30 MG/ML
15 INJECTION, SOLUTION INTRAMUSCULAR; INTRAVENOUS ONCE
Status: DISCONTINUED | OUTPATIENT
Start: 2021-09-10 | End: 2021-09-10

## 2021-09-10 RX ORDER — SODIUM CHLORIDE, SODIUM LACTATE, POTASSIUM CHLORIDE, CALCIUM CHLORIDE 600; 310; 30; 20 MG/100ML; MG/100ML; MG/100ML; MG/100ML
INJECTION, SOLUTION INTRAVENOUS CONTINUOUS
Status: DISCONTINUED | OUTPATIENT
Start: 2021-09-10 | End: 2021-09-10 | Stop reason: HOSPADM

## 2021-09-10 RX ORDER — ONDANSETRON 4 MG/1
4 TABLET, ORALLY DISINTEGRATING ORAL EVERY 30 MIN PRN
Status: DISCONTINUED | OUTPATIENT
Start: 2021-09-10 | End: 2021-09-10 | Stop reason: HOSPADM

## 2021-09-10 RX ORDER — LABETALOL HYDROCHLORIDE 5 MG/ML
10 INJECTION, SOLUTION INTRAVENOUS
Status: DISCONTINUED | OUTPATIENT
Start: 2021-09-10 | End: 2021-09-10 | Stop reason: HOSPADM

## 2021-09-10 RX ORDER — MEPERIDINE HYDROCHLORIDE 25 MG/ML
12.5 INJECTION INTRAMUSCULAR; INTRAVENOUS; SUBCUTANEOUS
Status: DISCONTINUED | OUTPATIENT
Start: 2021-09-10 | End: 2021-09-10 | Stop reason: HOSPADM

## 2021-09-10 RX ORDER — DEXAMETHASONE SODIUM PHOSPHATE 4 MG/ML
INJECTION, SOLUTION INTRA-ARTICULAR; INTRALESIONAL; INTRAMUSCULAR; INTRAVENOUS; SOFT TISSUE PRN
Status: DISCONTINUED | OUTPATIENT
Start: 2021-09-10 | End: 2021-09-10

## 2021-09-10 RX ORDER — OXYCODONE HYDROCHLORIDE 5 MG/1
5 TABLET ORAL EVERY 4 HOURS PRN
Status: DISCONTINUED | OUTPATIENT
Start: 2021-09-10 | End: 2021-09-10 | Stop reason: HOSPADM

## 2021-09-10 RX ORDER — KETOROLAC TROMETHAMINE 30 MG/ML
30 INJECTION, SOLUTION INTRAMUSCULAR; INTRAVENOUS EVERY 6 HOURS PRN
Status: DISCONTINUED | OUTPATIENT
Start: 2021-09-10 | End: 2021-09-10 | Stop reason: HOSPADM

## 2021-09-10 RX ORDER — FENTANYL CITRATE 50 UG/ML
INJECTION, SOLUTION INTRAMUSCULAR; INTRAVENOUS PRN
Status: DISCONTINUED | OUTPATIENT
Start: 2021-09-10 | End: 2021-09-10

## 2021-09-10 RX ORDER — ACETAMINOPHEN 325 MG/1
975 TABLET ORAL ONCE
Status: COMPLETED | OUTPATIENT
Start: 2021-09-10 | End: 2021-09-10

## 2021-09-10 RX ORDER — FENTANYL CITRATE 50 UG/ML
25 INJECTION, SOLUTION INTRAMUSCULAR; INTRAVENOUS EVERY 5 MIN PRN
Status: DISCONTINUED | OUTPATIENT
Start: 2021-09-10 | End: 2021-09-10 | Stop reason: HOSPADM

## 2021-09-10 RX ORDER — ACETAMINOPHEN 500 MG
1000 TABLET ORAL ONCE
Status: DISCONTINUED | OUTPATIENT
Start: 2021-09-10 | End: 2021-09-10 | Stop reason: HOSPADM

## 2021-09-10 RX ORDER — IBUPROFEN 800 MG/1
800 TABLET, FILM COATED ORAL ONCE
Status: DISCONTINUED | OUTPATIENT
Start: 2021-09-10 | End: 2021-09-10 | Stop reason: HOSPADM

## 2021-09-10 RX ORDER — ONDANSETRON 2 MG/ML
4 INJECTION INTRAMUSCULAR; INTRAVENOUS EVERY 30 MIN PRN
Status: DISCONTINUED | OUTPATIENT
Start: 2021-09-10 | End: 2021-09-10 | Stop reason: HOSPADM

## 2021-09-10 RX ORDER — LIDOCAINE HYDROCHLORIDE 10 MG/ML
INJECTION, SOLUTION INFILTRATION; PERINEURAL PRN
Status: DISCONTINUED | OUTPATIENT
Start: 2021-09-10 | End: 2021-09-10 | Stop reason: HOSPADM

## 2021-09-10 RX ORDER — OMEGA-3 FATTY ACIDS/FISH OIL 300-1000MG
600 CAPSULE ORAL EVERY 6 HOURS PRN
COMMUNITY
Start: 2021-09-10

## 2021-09-10 RX ORDER — HYDROMORPHONE HYDROCHLORIDE 1 MG/ML
0.2 INJECTION, SOLUTION INTRAMUSCULAR; INTRAVENOUS; SUBCUTANEOUS EVERY 5 MIN PRN
Status: DISCONTINUED | OUTPATIENT
Start: 2021-09-10 | End: 2021-09-10 | Stop reason: HOSPADM

## 2021-09-10 RX ORDER — ONDANSETRON 2 MG/ML
INJECTION INTRAMUSCULAR; INTRAVENOUS PRN
Status: DISCONTINUED | OUTPATIENT
Start: 2021-09-10 | End: 2021-09-10

## 2021-09-10 RX ORDER — PROPOFOL 10 MG/ML
INJECTION, EMULSION INTRAVENOUS CONTINUOUS PRN
Status: DISCONTINUED | OUTPATIENT
Start: 2021-09-10 | End: 2021-09-10

## 2021-09-10 RX ORDER — HYDRALAZINE HYDROCHLORIDE 20 MG/ML
2.5-5 INJECTION INTRAMUSCULAR; INTRAVENOUS EVERY 10 MIN PRN
Status: DISCONTINUED | OUTPATIENT
Start: 2021-09-10 | End: 2021-09-10 | Stop reason: HOSPADM

## 2021-09-10 RX ORDER — LIDOCAINE 40 MG/G
CREAM TOPICAL
Status: DISCONTINUED | OUTPATIENT
Start: 2021-09-10 | End: 2021-09-10 | Stop reason: HOSPADM

## 2021-09-10 RX ADMIN — KETOROLAC TROMETHAMINE 30 MG: 30 INJECTION, SOLUTION INTRAMUSCULAR; INTRAVENOUS at 13:23

## 2021-09-10 RX ADMIN — PROPOFOL 40 MG: 10 INJECTION, EMULSION INTRAVENOUS at 13:43

## 2021-09-10 RX ADMIN — FENTANYL CITRATE 25 MCG: 50 INJECTION INTRAMUSCULAR; INTRAVENOUS at 15:27

## 2021-09-10 RX ADMIN — FENTANYL CITRATE 25 MCG: 50 INJECTION INTRAMUSCULAR; INTRAVENOUS at 14:59

## 2021-09-10 RX ADMIN — OXYCODONE HYDROCHLORIDE 5 MG: 5 TABLET ORAL at 16:37

## 2021-09-10 RX ADMIN — MIDAZOLAM 2 MG: 1 INJECTION INTRAMUSCULAR; INTRAVENOUS at 13:31

## 2021-09-10 RX ADMIN — PROPOFOL 200 MCG/KG/MIN: 10 INJECTION, EMULSION INTRAVENOUS at 13:44

## 2021-09-10 RX ADMIN — DEXAMETHASONE SODIUM PHOSPHATE 6 MG: 4 INJECTION, SOLUTION INTRAMUSCULAR; INTRAVENOUS at 13:45

## 2021-09-10 RX ADMIN — SODIUM CHLORIDE, POTASSIUM CHLORIDE, SODIUM LACTATE AND CALCIUM CHLORIDE: 600; 310; 30; 20 INJECTION, SOLUTION INTRAVENOUS at 13:26

## 2021-09-10 RX ADMIN — ACETAMINOPHEN 975 MG: 325 TABLET, FILM COATED ORAL at 12:38

## 2021-09-10 RX ADMIN — FENTANYL CITRATE 50 MCG: 50 INJECTION, SOLUTION INTRAMUSCULAR; INTRAVENOUS at 13:48

## 2021-09-10 RX ADMIN — PROPOFOL 175 MG: 10 INJECTION, EMULSION INTRAVENOUS at 13:53

## 2021-09-10 RX ADMIN — ONDANSETRON 4 MG: 2 INJECTION INTRAMUSCULAR; INTRAVENOUS at 14:27

## 2021-09-10 RX ADMIN — ONDANSETRON 4 MG: 2 INJECTION INTRAMUSCULAR; INTRAVENOUS at 16:50

## 2021-09-10 RX ADMIN — FENTANYL CITRATE 25 MCG: 50 INJECTION INTRAMUSCULAR; INTRAVENOUS at 14:49

## 2021-09-10 ASSESSMENT — MIFFLIN-ST. JEOR: SCORE: 1138

## 2021-09-10 NOTE — ANESTHESIA PREPROCEDURE EVALUATION
Anesthesia Pre-Procedure Evaluation    Patient: Abby Foy   MRN: [id number] : 1980        Preoperative Diagnosis: Abnormal uterine bleeding (AUB) [N93.9]   Procedure : Procedure(s):  DILATION, CERVIX, WITH HYSTEROSCOPY AND HYDROTHERMAL ENDOMETRIAL ABLATION     Past Medical History:   Diagnosis Date     Angioedema of lips episode in 3/13--idiopathic     Complication of anesthesia     ponv     Contraception 2009     Diagnostic skin and sensitization tests 3/27/13 skin tests all NEGATIVE for environmental and food allergens including shellfish and nuts.      Nonallergic rhinitis     3/27/13 skin tests all NEGATIVE for environmental and food allergens including shellfish and nuts. 3/27/13 followup IgE tests NEG to [facility], SN[facility], shrimp, macadamia nut, pistachio and walnut.     Rheumatoid arthritis of multiple sites with negative rheumatoid factor (H) 2015      Past Surgical History:   Procedure Laterality Date     DILATE CERVIX, HYSTEROSCOPY, ABLATE ENDOMETRIUM HYDROTHERMAL, COMBINED N/A 2020    Procedure: DILATION, CERVIX, WITH HYSTEROSCOPY AND HYDROTHERMAL ENDOMETRIAL ABLATION;  Surgeon: Apryl West MD;  [facility]     ENDOMETRIAL SAMPLING (BIOPSY) N/A 2020    Procedure: BIOPSY, ENDOMETRIUM;  Surgeon: Apryl West MD;  [facility]     LAPAROSCOPIC SALPINGECTOMY Bilateral 2017    Procedure: LAPAROSCOPIC SALPINGECTOMY;  Operative Laparoscopy, Bilateral Salpingectomy ;  Surgeon: Apryl West MD;  [facility]     OPERATIVE HYSTEROSCOPY WITH MORCELLATOR N/A 2018    Procedure: DIAGNOSTIC HYSTEROSCOPY AND D AND C;  Surgeon: Apryl West MD;  [facility]      Allergies   Allergen Reactions     Shrimp Swelling     Lips and tongue     Walnuts [Nuts] Swelling     Lips and tongue        Social History     Tobacco Use     Smoking status: Never Smoker     Smokeless tobacco: Never Used   Substance Use Topics     Alcohol use: Yes     Comment: 1-3  drinks/ week      Wt Readings from Last 1 Encounters:   08/30/21 48.4 kg (106 lb 12.8 oz)        Anesthesia Evaluation   Pt has had prior anesthetic. Type: MAC and General.    History of anesthetic complications  - PONV.      ROS/MED HX  ENT/Pulmonary:  - neg pulmonary ROS     Neurologic:  - neg neurologic ROS     Cardiovascular:  - neg cardiovascular ROS     METS/Exercise Tolerance:     Hematologic: Comments: Immunosuppressed      Musculoskeletal: Comment: Psoriatic arthritis, seronegative spondyloarthropathy, low back pain      GI/Hepatic:  - neg GI/hepatic ROS     Renal/Genitourinary:  - neg Renal ROS     Endo:  - neg endo ROS     Psychiatric/Substance Use:  - neg psychiatric ROS     Infectious Disease: Comment: covid negative 9/8      Malignancy:  - neg malignancy ROS     Other:            Physical Exam    Airway        Mallampati: II   TM distance: > 3 FB   Neck ROM: full   Mouth opening: > 3 cm    Respiratory Devices and Support         Dental  no notable dental history         Cardiovascular   cardiovascular exam normal          Pulmonary   pulmonary exam normal                OUTSIDE LABS:  CBC:   Lab Results   Component Value Date    WBC 5.4 07/02/2021    WBC 5.5 01/12/2021    HGB 11.9 07/02/2021    HGB 12.1 01/12/2021    HCT 37.7 07/02/2021    HCT 37.8 01/12/2021     07/02/2021     01/12/2021     BMP:   Lab Results   Component Value Date     06/30/2015     02/02/2009    POTASSIUM 4.1 06/30/2015    POTASSIUM 4.1 02/02/2009    CHLORIDE 105 06/30/2015    CHLORIDE 103 02/02/2009    CO2 30 06/30/2015    CO2 25 02/02/2009    BUN 9 06/30/2015    BUN 8 02/02/2009    CR 0.72 07/02/2021    CR 0.76 01/12/2021    GLC 88 12/12/2018    GLC 73 06/30/2015     COAGS: No results found for: PTT, INR, FIBR  POC:   Lab Results   Component Value Date    BGM 97 12/12/2018    HCG Negative 12/12/2018     HEPATIC:   Lab Results   Component Value Date    ALBUMIN 4.2 07/02/2021    PROTTOTAL 7.6 07/02/2021     ALT 20 07/02/2021    AST 12 07/02/2021    ALKPHOS 59 07/02/2021    BILITOTAL 0.5 07/02/2021     OTHER:   Lab Results   Component Value Date    DAISY 9.4 06/30/2015    LIPASE 56 02/02/2009    AMYLASE 49 02/02/2009    TSH 1.38 09/27/2019    CRP <2.9 07/02/2021    SED 8 07/02/2021       Anesthesia Plan    ASA Status:  2      Anesthesia Type: MAC.     - Reason for MAC: straight local not clinically adequate              Consents    Anesthesia Plan(s) and associated risks, benefits, and realistic alternatives discussed. Questions answered and patient/representative(s) expressed understanding.     - Discussed with:  Patient    Use of blood products discussed: Yes.     - Consented: consented to blood products            Reason for refusal: other.     Postoperative Care    Pain management: IV analgesics, Oral pain medications.   PONV prophylaxis: Ondansetron (or other 5HT-3), Dexamethasone or Solumedrol     Comments:                Jessica Morley

## 2021-09-10 NOTE — PROGRESS NOTES
Pt pain improving, now has taken Oxycodone with crackers.  Continues to have nausea with movement or position change.  Was up to BR, voided good amount.  Her pad was bloody, 1/4 saturated.  Ambulated back to room with SBA and had immediate return of nausea.  Pt reclined, feet up, VS all stable, second dose of 4mg IV Zofran given.  Will give patient another period of rest, and try getting up and dressed again.  Pt is firm she does not want to stay over night on Observation status.  
0 = swallows foods/liquids without difficulty

## 2021-09-10 NOTE — OP NOTE
[facility]  Operative Note - Hydrothermal Ablation  Name: Abby Foy  MRN: [id number]  : 1980  Date of Surgery: 09/10/2021  Pre-operative Diagnosis: Abnormal Uterine Bleed   Post-operative Diagnosis: Same, s/p procedure  Procedure(s): EUA, Dilation of cervix with Hysteroscopy and hydrothermal ablation   Surgeon: Apryl West MD   Assistants: Arielle Cardenas DO, MS PGY-1,   Anesthesia: MAC and Local  EBL: 2 mL   Fluids:  500mL crystalloid  Specimens: None  Complications: None apparent.  Findings: EUA revealed normal cervix and antevrted uterus, no adnexal masses. On Hysteroscopy there is significant scar tissue note in the cavity with minimal active endometrial tissue identified. Bilateral tubal ostia were difficult to identify, likely secondary to scarring.  Indications: Abby Foy is a 41 year old female who presents today for repeat endometrial ablation for management of abnormal uterine bleeding that did not resolved with OCPs, or after first HTA last year (20). She desires a trial of repeat ablation as she continues to have spotting and does not want hysterectomy. Risks, benefits, and alternatives to the procedure were discussed. EMB on 21 was negative for hyperplasia, atypia, or malignancy. The patient's questions were answered, understanding confirmed, and the patient signed written informed consent, and she agreed to proceed.    Technique:  The patient was taken to the operating room where she was placed in the dorsal lithotomy position. MAC  anesthesia was administered and the patient was prepped and draped in the usual sterile fashion. A speculum was inserted into the vagina and the cervix visualized. A single-toothed tenaculum was placed at 12 o'clock on the cervix. A paracervical block with 1% plain lidocaine was performed at 4 and 8 o'clock. The cervix was dilated using sequential dilators up to 8 mm. Ultrasound  guidance confirmed dilation of the cervix and access to the endometrial cavity.  The Hydrothermal hysteroscope and ablation device was inserted through the internal os and introduced into the uterine cavity notable for significant scarring throughout cavity with minimal active tissue.  After testing the uterine cavity size and ensuring an adequate seal at the cervical os the ablation device was set to run and temperature quickly gema to 90 degrees celsius. The device continued to run for 10 minutes while endometrial blanching was observed. After the 10 minute ablation the cooling period occurred. When this was done the uterine cavity was again examined and revealed appropriate post ablation changes. The tenaculum was removed from the cervix and good hemostasis was noted.  The speculum was removed from the vagina.  The patient tolerated the procedure well and was taken to the recovery room in stable condition.  Instrument, needle, and sponge counts were correct x2.  Dr. West was present and scrubbed for the entire procedure.    Arielle Cardenas DO, MS  Obstetrics, Gynecology & Women's Health   Resident, PGY-1  09/10/2021 2:28 PM    I was present and scrubbed throughout the procedure,  I agree with the note above  Apryl West MD

## 2021-09-10 NOTE — DISCHARGE INSTRUCTIONS
Same-Day Surgery   Adult Discharge Orders & Instructions     For 24 hours after surgery:  1. Get plenty of rest.  A responsible adult must stay with you for at least 24 hours after you leave the hospital.   2. Pain medication can slow your reflexes. Do not drive or use heavy equipment.  If you have weakness or tingling, don't drive or use heavy equipment until this feeling goes away.  3. Mixing alcohol and pain medication can cause dizziness and slow your breathing. It can even be fatal. Do not drink alcohol while taking pain medication.  4. Avoid strenuous or risky activities.  Ask for help when climbing stairs.   5. You may feel lightheaded.  If so, sit for a few minutes before standing.  Have someone help you get up.   6. If you have nausea (feel sick to your stomach), drink only clear liquids such as apple juice, ginger ale, broth or 7-Up.  Rest may also help.  Be sure to drink enough fluids.  Move to a regular diet as you feel able. Take pain medications with a small amount of solid food, such as toast or crackers, to avoid nausea.   7. A slight fever is normal. Call the doctor if your fever is over 100 F (37.7 C) (taken under the tongue) or lasts longer than 24 hours.  8. You may have a dry mouth, muscle aches, trouble sleeping or a sore throat.  These symptoms should go away after 24 hours.  9. Do not make important or legal decisions.   Pain Management:      1. Take pain medication (if prescribed) for pain as directed by your physician.        2. WARNING: If the pain medication you have been prescribed contains Tylenol  (acetaminophen), DO NOT take additional doses of Tylenol (acetaminophen).     Call your doctor for any of the followin.  Signs of infection (fever, growing tenderness at the surgery site, severe pain, a large amount of drainage or bleeding, foul-smelling drainage, redness, swelling).    2.  It has been over 8 to 10 hours since surgery and you are still not able to urinate (pee).    3.   Headache for over 24 hours.    4.  Numbness, tingling or weakness the day after surgery (if you had spinal anesthesia).  To contact a doctor, call Dr. West at [phone] or:      [phone] and ask for the Resident On Call for:          OB/GYN (answered 24 hours a day)      Emergency Department:  [facility]: [phone]  [facility]: [phone]               Rev. 10/2014       What happens after hysteroscopy?    You may have cramps and bleeding for 24 hours after the procedure. This is normal. Use pads instead of tampons.    Do not douche or use tampons until your health care provider says it s OK.    Do not use any vaginal medicines until you are told it s OK.    Ask your health care provider when it s OK to have sex again.  When should I call my health care provider?  Call your health care provider if you have:    Heavy bleeding (more than 1 pad an hour for 2 or more hours)    A fever over 100.4 F (38.0 C)    Increasing abdominal pain or tenderness    Foul-smelling discharge  Follow-up care  Schedule a follow-up visit with your health care provider. Based on the results of your test, you may need more treatment. Be sure to follow instructions and keep your appointments.    Important numbers  [facility] ([address]) [facility]: [phone]   [facility] ([address]) : [phone]      [id number] [employer]. [address] [place] [address], [place] [address]. All rights reserved. This information is not intended as a substitute for professional medical care. Always follow your healthcare professional's instructions.

## 2021-09-10 NOTE — ANESTHESIA CARE TRANSFER NOTE
Patient: Abby Foy    Procedure(s):  DILATION, CERVIX, WITH HYSTEROSCOPY AND HYDROTHERMAL ENDOMETRIAL ABLATION    Diagnosis: Abnormal uterine bleeding (AUB) [N93.9]  Diagnosis Additional Information: No value filed.    Anesthesia Type:   MAC     Note:    Oropharynx: oropharynx clear of all foreign objects    Oxygen Supplementation: room air    Independent Airway: airway patency satisfactory and stable  Dentition: dentition unchanged      Patient transferred to: Phase II    Handoff Report: Identifed the Patient, Identified the Reponsible Provider, Reviewed the pertinent medical history, Discussed the surgical course, Reviewed Intra-OP anesthesia mangement and issues during anesthesia, Set expectations for post-procedure period and Allowed opportunity for questions and acknowledgement of understanding      Vitals:  Vitals Value Taken Time   /94 09/10/21 1440   Temp     Pulse 80 09/10/21 1440   Resp     SpO2 98 % 09/10/21 1441   Vitals shown include unvalidated device data.    Electronically Signed By: CLARISSA Eduardo CRNA  September 10, 2021  2:42 PM

## 2021-09-10 NOTE — ANESTHESIA POSTPROCEDURE EVALUATION
Patient: Abby Foy    Procedure(s):  DILATION, CERVIX, WITH HYSTEROSCOPY AND HYDROTHERMAL ENDOMETRIAL ABLATION    Diagnosis:Abnormal uterine bleeding (AUB) [N93.9]  Diagnosis Additional Information: No value filed.    Anesthesia Type:  MAC    Note:  Disposition: Outpatient   Postop Pain Control: Uneventful            Sign Out: Well controlled pain   PONV: No   Neuro/Psych: Uneventful            Sign Out: Acceptable/Baseline neuro status   Airway/Respiratory: Uneventful            Sign Out: Acceptable/Baseline resp. status   CV/Hemodynamics: Uneventful            Sign Out: Acceptable CV status; No obvious hypovolemia; No obvious fluid overload   Other NRE:    DID A NON-ROUTINE EVENT OCCUR?            Last vitals:  Vitals Value Taken Time   /82 09/10/21 1500   Temp 36.5  C (97.7  F) 09/10/21 1436   Pulse 70 09/10/21 1500   Resp 16 09/10/21 1445   SpO2 97 % 09/10/21 1503   Vitals shown include unvalidated device data.    Electronically Signed By: Vicente Sims MD  September 10, 2021  3:04 PM

## 2021-09-19 ENCOUNTER — HEALTH MAINTENANCE LETTER (OUTPATIENT)
Age: 41
End: 2021-09-19

## 2021-09-23 ENCOUNTER — VIRTUAL VISIT (OUTPATIENT)
Dept: OBGYN | Facility: CLINIC | Age: 41
End: 2021-09-23
Attending: OBSTETRICS & GYNECOLOGY
Payer: COMMERCIAL

## 2021-09-23 DIAGNOSIS — Z98.890 STATUS POST ENDOMETRIAL ABLATION: Primary | ICD-10-CM

## 2021-09-23 DIAGNOSIS — N93.9 ABNORMAL UTERINE BLEEDING (AUB): ICD-10-CM

## 2021-09-23 PROCEDURE — 99212 OFFICE O/P EST SF 10 MIN: CPT | Mod: 95 | Performed by: OBSTETRICS & GYNECOLOGY

## 2021-09-23 NOTE — LETTER
"9/23/2021       RE: Abby Foy  [address] [facility] [id number]     Dear Colleague,    Thank you for referring your patient, Abby Foy, to the [facility] [place] at [facility]. Please see a copy of my visit note below.    The patient has been notified of the following:      \"We have found that certain health care needs can be provided without the need for a face to face visit.  This service lets us provide the care you need with a phone conversation.       I will have full access to your [facility] medical record during this entire phone call.   I will be taking notes for your medical record.      Since this is like an office visit, we will bill your insurance company for this service.       There are potential benefits and risks of telephone visits (e.g. limits to patient confidentiality) that differ from in-person visits.?  Confidentiality still applies for telephone services, and nobody will record the visit.  It is important to be in a quiet, private space that is free of distractions (including cell phone or other devices) during the visit.??      If during the course of the call I believe a telephone visit is not appropriate, you will not be charged for this service\"     Consent has been obtained for this service by care team member: Yes     Abby is 40 yo P2 now 2 weeks s/p repeat HTA for abnormal uterine bleeding.  She states that compared to last time she felt she had more cramping and passed more clots, but now the discharge and bleeding has stopped.  She was able to work throughout this time, but it just seemed a little more difficult than she remembered.     Past Medical History:   Diagnosis Date     Angioedema of lips episode in 3/13--idiopathic     Complication of anesthesia     ponv     Contraception 1/28/2009     Diagnostic skin and sensitization tests 3/27/13 skin tests all NEGATIVE for environmental " and food allergens including shellfish and nuts.      Nonallergic rhinitis     3/27/13 skin tests all NEGATIVE for environmental and food allergens including shellfish and nuts. 3/27/13 followup IgE tests NEG to [facility], [facility], shrimp, macadamia nut, pistachio and walnut.     Rheumatoid arthritis of multiple sites with negative rheumatoid factor (H) 12/31/2015     Past Surgical History:   Procedure Laterality Date     DILATE CERVIX, HYSTEROSCOPY, ABLATE ENDOMETRIUM HYDROTHERMAL, COMBINED N/A 7/24/2020    Procedure: DILATION, CERVIX, WITH HYSTEROSCOPY AND HYDROTHERMAL ENDOMETRIAL ABLATION;  Surgeon: Apryl West MD;  [facility]     DILATE CERVIX, HYSTEROSCOPY, ABLATE ENDOMETRIUM HYDROTHERMAL, COMBINED N/A 9/10/2021    Procedure: DILATION, CERVIX, WITH HYSTEROSCOPY AND HYDROTHERMAL ENDOMETRIAL ABLATION;  Surgeon: Apryl West MD;  [facility]     ENDOMETRIAL SAMPLING (BIOPSY) N/A 7/24/2020    Procedure: BIOPSY, ENDOMETRIUM;  Surgeon: Apryl West MD;  [facility]     LAPAROSCOPIC SALPINGECTOMY Bilateral 6/23/2017    Procedure: LAPAROSCOPIC SALPINGECTOMY;  Operative Laparoscopy, Bilateral Salpingectomy ;  Surgeon: Apryl West MD;  [facility]     OPERATIVE HYSTEROSCOPY WITH MORCELLATOR N/A 12/12/2018    Procedure: DIAGNOSTIC HYSTEROSCOPY AND D AND C;  Surgeon: Apryl West MD;  [facility]     Physical exam:  No vitals for this phone visit  Patient talks easily and affect is appropriate.    Assessment/Plan:  40 yo 2 weeks s/p HTA (repeat) doing well    - reviewed expected recovery and continued improvement in bleeding expected over next several months.  - RTC with any concerns.  Update progress via [employer] in next 4-6 months.    Apryl West MD     Telephone call time: 11 minutes

## 2021-09-24 NOTE — PROGRESS NOTES
"The patient has been notified of the following:      \"We have found that certain health care needs can be provided without the need for a face to face visit.  This service lets us provide the care you need with a phone conversation.       I will have full access to your [facility] medical record during this entire phone call.   I will be taking notes for your medical record.      Since this is like an office visit, we will bill your insurance company for this service.       There are potential benefits and risks of telephone visits (e.g. limits to patient confidentiality) that differ from in-person visits.?  Confidentiality still applies for telephone services, and nobody will record the visit.  It is important to be in a quiet, private space that is free of distractions (including cell phone or other devices) during the visit.??      If during the course of the call I believe a telephone visit is not appropriate, you will not be charged for this service\"     Consent has been obtained for this service by care team member: Yes     Abby is 42 yo P2 now 2 weeks s/p repeat HTA for abnormal uterine bleeding.  She states that compared to last time she felt she had more cramping and passed more clots, but now the discharge and bleeding has stopped.  She was able to work throughout this time, but it just seemed a little more difficult than she remembered.     Past Medical History:   Diagnosis Date     Angioedema of lips episode in 3/13--idiopathic     Complication of anesthesia     ponv     Contraception 1/28/2009     Diagnostic skin and sensitization tests 3/27/13 skin tests all NEGATIVE for environmental and food allergens including shellfish and nuts.      Nonallergic rhinitis     3/27/13 skin tests all NEGATIVE for environmental and food allergens including shellfish and nuts. 3/27/13 followup IgE tests NEG to [facility], SNF, shrimp, macadamia nut, pistachio and walnut.     Rheumatoid arthritis of multiple sites with " negative rheumatoid factor (H) 12/31/2015     Past Surgical History:   Procedure Laterality Date     DILATE CERVIX, HYSTEROSCOPY, ABLATE ENDOMETRIUM HYDROTHERMAL, COMBINED N/A 7/24/2020    Procedure: DILATION, CERVIX, WITH HYSTEROSCOPY AND HYDROTHERMAL ENDOMETRIAL ABLATION;  Surgeon: Apryl West MD;  [facility]     DILATE CERVIX, HYSTEROSCOPY, ABLATE ENDOMETRIUM HYDROTHERMAL, COMBINED N/A 9/10/2021    Procedure: DILATION, CERVIX, WITH HYSTEROSCOPY AND HYDROTHERMAL ENDOMETRIAL ABLATION;  Surgeon: Apryl West MD;  [facility]     ENDOMETRIAL SAMPLING (BIOPSY) N/A 7/24/2020    Procedure: BIOPSY, ENDOMETRIUM;  Surgeon: Apryl West MD;  [facility]     LAPAROSCOPIC SALPINGECTOMY Bilateral 6/23/2017    Procedure: LAPAROSCOPIC SALPINGECTOMY;  Operative Laparoscopy, Bilateral Salpingectomy ;  Surgeon: Apryl West MD;  [facility]     OPERATIVE HYSTEROSCOPY WITH MORCELLATOR N/A 12/12/2018    Procedure: DIAGNOSTIC HYSTEROSCOPY AND D AND C;  Surgeon: Apryl Wset MD;  [facility]     Physical exam:  No vitals for this phone visit  Patient talks easily and affect is appropriate.    Assessment/Plan:  42 yo 2 weeks s/p HTA (repeat) doing well    - reviewed expected recovery and continued improvement in bleeding expected over next several months.  - RTC with any concerns.  Update progress via [employer] in next 4-6 months.    Apryl West MD     Telephone call time: 11 minutes

## 2021-10-27 ENCOUNTER — MYC MEDICAL ADVICE (OUTPATIENT)
Dept: OBGYN | Facility: CLINIC | Age: 41
End: 2021-10-27

## 2021-11-01 ENCOUNTER — LAB (OUTPATIENT)
Dept: LAB | Facility: CLINIC | Age: 41
End: 2021-11-01
Payer: COMMERCIAL

## 2021-11-01 DIAGNOSIS — L40.50 PSORIATIC ARTHRITIS (H): ICD-10-CM

## 2021-11-01 DIAGNOSIS — Z79.899 HIGH RISK MEDICATION USE: ICD-10-CM

## 2021-11-01 LAB
ALBUMIN SERPL-MCNC: 3.8 G/DL (ref 3.4–5)
ALP SERPL-CCNC: 61 U/L (ref 40–150)
ALT SERPL W P-5'-P-CCNC: 20 U/L (ref 0–50)
AST SERPL W P-5'-P-CCNC: 16 U/L (ref 0–45)
BASOPHILS # BLD AUTO: 0 10E3/UL (ref 0–0.2)
BASOPHILS NFR BLD AUTO: 1 %
BILIRUB DIRECT SERPL-MCNC: 0.1 MG/DL (ref 0–0.2)
BILIRUB SERPL-MCNC: 0.4 MG/DL (ref 0.2–1.3)
CREAT SERPL-MCNC: 0.63 MG/DL (ref 0.52–1.04)
CRP SERPL-MCNC: <2.9 MG/L (ref 0–8)
EOSINOPHIL # BLD AUTO: 0.1 10E3/UL (ref 0–0.7)
EOSINOPHIL NFR BLD AUTO: 2 %
ERYTHROCYTE [DISTWIDTH] IN BLOOD BY AUTOMATED COUNT: 12.3 % (ref 10–15)
ERYTHROCYTE [SEDIMENTATION RATE] IN BLOOD BY WESTERGREN METHOD: 8 MM/HR (ref 0–20)
GFR SERPL CREATININE-BSD FRML MDRD: >90 ML/MIN/1.73M2
HCT VFR BLD AUTO: 32.5 % (ref 35–47)
HGB BLD-MCNC: 10.5 G/DL (ref 11.7–15.7)
IMM GRANULOCYTES # BLD: 0 10E3/UL
IMM GRANULOCYTES NFR BLD: 0 %
LYMPHOCYTES # BLD AUTO: 1.6 10E3/UL (ref 0.8–5.3)
LYMPHOCYTES NFR BLD AUTO: 40 %
MCH RBC QN AUTO: 26.8 PG (ref 26.5–33)
MCHC RBC AUTO-ENTMCNC: 32.3 G/DL (ref 31.5–36.5)
MCV RBC AUTO: 83 FL (ref 78–100)
MONOCYTES # BLD AUTO: 0.3 10E3/UL (ref 0–1.3)
MONOCYTES NFR BLD AUTO: 6 %
NEUTROPHILS # BLD AUTO: 2.1 10E3/UL (ref 1.6–8.3)
NEUTROPHILS NFR BLD AUTO: 51 %
PLATELET # BLD AUTO: 187 10E3/UL (ref 150–450)
PROT SERPL-MCNC: 6.8 G/DL (ref 6.8–8.8)
RBC # BLD AUTO: 3.92 10E6/UL (ref 3.8–5.2)
WBC # BLD AUTO: 4.1 10E3/UL (ref 4–11)

## 2021-11-01 PROCEDURE — 36415 COLL VENOUS BLD VENIPUNCTURE: CPT

## 2021-11-01 PROCEDURE — 85652 RBC SED RATE AUTOMATED: CPT

## 2021-11-01 PROCEDURE — 80076 HEPATIC FUNCTION PANEL: CPT

## 2021-11-01 PROCEDURE — 82728 ASSAY OF FERRITIN: CPT | Performed by: INTERNAL MEDICINE

## 2021-11-01 PROCEDURE — 82565 ASSAY OF CREATININE: CPT

## 2021-11-01 PROCEDURE — 85025 COMPLETE CBC W/AUTO DIFF WBC: CPT

## 2021-11-01 PROCEDURE — 83550 IRON BINDING TEST: CPT | Performed by: INTERNAL MEDICINE

## 2021-11-01 PROCEDURE — 86140 C-REACTIVE PROTEIN: CPT

## 2021-11-05 ENCOUNTER — OFFICE VISIT (OUTPATIENT)
Dept: RHEUMATOLOGY | Facility: CLINIC | Age: 41
End: 2021-11-05
Payer: COMMERCIAL

## 2021-11-05 VITALS
SYSTOLIC BLOOD PRESSURE: 120 MMHG | WEIGHT: 107.2 LBS | DIASTOLIC BLOOD PRESSURE: 77 MMHG | BODY MASS INDEX: 18.3 KG/M2 | OXYGEN SATURATION: 100 % | HEART RATE: 97 BPM | HEIGHT: 64 IN

## 2021-11-05 DIAGNOSIS — M19.042 PRIMARY OSTEOARTHRITIS OF BOTH HANDS: ICD-10-CM

## 2021-11-05 DIAGNOSIS — M19.041 PRIMARY OSTEOARTHRITIS OF BOTH HANDS: ICD-10-CM

## 2021-11-05 DIAGNOSIS — L40.50 PSORIATIC ARTHRITIS (H): Primary | ICD-10-CM

## 2021-11-05 DIAGNOSIS — Z79.899 HIGH RISK MEDICATION USE: ICD-10-CM

## 2021-11-05 DIAGNOSIS — D64.9 ANEMIA, UNSPECIFIED TYPE: ICD-10-CM

## 2021-11-05 LAB
FERRITIN SERPL-MCNC: 44 NG/ML (ref 12–150)
IRON SATN MFR SERPL: 25 % (ref 15–46)
IRON SERPL-MCNC: 47 UG/DL (ref 35–180)
TIBC SERPL-MCNC: 190 UG/DL (ref 240–430)

## 2021-11-05 PROCEDURE — 99214 OFFICE O/P EST MOD 30 MIN: CPT | Performed by: INTERNAL MEDICINE

## 2021-11-05 RX ORDER — LEFLUNOMIDE 10 MG/1
10 TABLET ORAL DAILY
Qty: 90 TABLET | Refills: 2 | Status: SHIPPED | OUTPATIENT
Start: 2021-11-05 | End: 2022-05-13

## 2021-11-05 RX ORDER — APREMILAST 30 MG/1
30 TABLET, FILM COATED ORAL DAILY
Qty: 60 TABLET | Refills: 4 | Status: SHIPPED | OUTPATIENT
Start: 2021-11-05 | End: 2022-05-13

## 2021-11-05 ASSESSMENT — MIFFLIN-ST. JEOR: SCORE: 1141.26

## 2021-11-05 NOTE — PATIENT INSTRUCTIONS
A single additional dose of the Pfizer or Moderna COVID-19 vaccine is recommended at least 28 days after the completion of the 2-dose mRNA vaccine series for patients receiving any immunosuppressive or immunomodulatory therapy. Attempts should be made to match the additional mRNA dose type to the type given in the mRNA primary series; however, if that is not feasible, a booster dose with the alternative mRNA vaccine is permitted.    Based on our current understanding (and this may change over time as we learn more), medications should be adjusted as noted below, if disease activity allows:  - NSAIDs and Acetaminophen: hold for 24 hours prior to vaccination if able to do so  - Leflunomide should be held for 1 week after the COVID19 booster vaccine

## 2021-11-05 NOTE — NURSING NOTE
RAPID3 (0-30) Cumulative Score  3.5          RAPID3 Weighted Score (divide #4 by 3 and that is the weighted score)  1.1

## 2021-11-05 NOTE — PROGRESS NOTES
Rheumatology Clinic Visit      Abby Foy MRN# [id number]   YOB: 1980 Age: 41 year old      Date of visit: 11/05/21   PCP: Sandi Duffy  Dermatologist: Amy Patel  Ophthalmologist: Dr. Mathis     Chief Complaint   Patient presents with:  RECHECK: psoriatic    Assessment and Plan   1. 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 However, given her continued back pain that improved with activity but no radiographic evidence for inflammatory arthritis or osteoarthritis, there was concern that she had inflammatory back pain that would be more consistent with a spondylarthritis. Humira was started and resulted in improvement of her back pain, suggesting axial disease.  Arthritis improved with methotrexate and sulfasalazine, but she was having headaches and therefore the most likely culprit methotrexate was reduced until her headaches worsened significantly and therefore sulfasalazine and methotrexate were both discontinued. She had resolution of her headaches after discontinuing both sulfasalazine and methotrexate. I believe that the sulfasalazine was the cause of her headaches. Therefore, cautious retrial of methotrexate in the future could be done.  She was doing well on a combination of leflunomide 10 mg daily and Humira 40 mg SQ every 14 days but Humira had to be stopped because of issues with the  program; so Simponi was Rx'd but never started.  Intermittent inflammatory symptom so Otezla was prescribed but but delayed starting until March 2020, it was found be effective but the twice daily dose was resulting in worsening headaches that resolved with a once daily dose. Was on doing well on a combination of leflunomide 10 mg daily and Otezla  developed daily; leflunomide was discontinued in an effort to get to the lowest effective dose but she had return of symptoms primarily affecting the PIPs so it was restarted. Currently on leflunomide 10 mg daily and Otezla 30 mg daily and doing well with regard to inflammatory arthritis.  Mild degenerative arthritis at the PIPs and DIPs that may be treated with topical Voltaren gel as needed.   Chronic illness, stable.    - Continue leflunomide 10 mg daily  - Continue Otezla 30mg daily  - Start diclofenac (VOLTAREN) 1 % topical gel; Apply up to 2 grams of 1% gel to hands up to 4 times daily as needed for joint pain (maximum: 8 g per upper extremity per day)  Dispense: 200 g; Refill: 2  - Labs in 3 months: CBC, Creatinine, Hepatic Panel, ESR, CRP    High risk medication requiring intensive toxicity monitoring at least quarterly: labs ordered include CBC, Creatinine, Hepatic panel to monitor for cytopenia and hepatotoxicity; checking creatinine as it affects clearance of medication.      # Pregnancy Planning: s/p salpingectomy;  had a vasectomy    2. 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 Interestingly when leflunomide was stopped between 3/2018 and  6/15/2018, she experienced increased L>R eye irritation that when bothering her only affected one eye at a time.   Chronic illness, stable.       3.  Anemia: Possibly gynecologic related?  History of iron deficiency previously.  Check iron.  If low then take ferrous sulfate 324 mg every other day.  If blood loss is not only gynecologic then may need further work-up for GI blood loss from her PCP.  - Lab add-on: Ferritin, iron    4.  Hand osteoarthritis: See #1    5.  Vaccinations: Vaccinations reviewed with MsYusef Janasalvador.   - Influenza: Reportedly up to date for the 3666-0163 season  - [id number]: up to date  - [id number]: up to date  - Status-post 2 doses of the Pfizer COVID-19 vaccine,  received on 1/15/2021 and 2/4/2021    A single additional dose of the Pfizer or Moderna COVID-19 vaccine is recommended at least 28 days after the completion of the 2-dose mRNA vaccine series for patients receiving any immunosuppressive or immunomodulatory therapy. Attempts should be made to match the additional mRNA dose type to the type given in the mRNA primary series; however, if that is not feasible, a booster dose with the alternative mRNA vaccine is permitted.    Based on our current understanding (and this may change over time as we learn more), medications should be adjusted as noted below, if disease activity allows:  - NSAIDs and Acetaminophen: hold for 24 hours prior to vaccination if able to do so  - Leflunomide should be held for 1 week after the COVID19 booster vaccine    Total minutes spent in evaluation with patient, documentation, , and review of pertinent studies and chart notes: 18    Ms. Foy verbalized agreement with and understanding of the rational for the diagnosis and treatment plan.  All questions were answered to best of my ability and the patient's satisfaction. Ms. Foy was advised to contact the clinic with any questions that may arise after the clinic visit.      Thank you for involving me in the care of the patient    Return to clinic: 3-4 months    HPI   Abby Foy is a 41 year old female with medical history significant for urticaria, H. pylori infection, and iritis? who returns for f/u of seronegative spondyloarthropathy.    Today, 11/5/2021: Mild ache at the right third and fourth PIPs and fourth DIP and occasionally the left second MCP.  Pain is worse with activity and improves with rest.  Pain comes and goes, dull ache.  Other joints are doing well.  Tolerating medications well.  Gynecologic blood loss suspected as the etiology of her anemia, per patient.  She is in contact with her gynecologist.    Denies fevers, chills, nausea, vomiting,  constipation, diarrhea. No abdominal pain.  No blood in her stool.  No chest pain/pressure, palpitations, or shortness of breath. No neck pain. Occasional cold sores..     Tobacco: None  EtOH: 1-2 drinks on the weekends  Drugs: None  Occupation: Dietitian at the [employer]    ROS   12 point review of systems completed and negative except as noted in the HPI    Active Problem List     Patient Active Problem List   Diagnosis     CARDIOVASCULAR SCREENING; LDL GOAL LESS THAN 160     Urticaria     Diagnostic skin and sensitization tests     Nonallergic rhinitis     Angioedema of lips     H. pylori infection     GPC (giant papillary conjunctivitis)     Seronegative spondyloarthropathy     Midline low back pain without sciatica     Abnormal uterine bleeding (AUB)- on OCPs     Psoriatic arthritis (H)     Past Medical History     Past Medical History:   Diagnosis Date     Angioedema of lips episode in 3/13--idiopathic     Complication of anesthesia     ponv     Contraception 1/28/2009     Diagnostic skin and sensitization tests 3/27/13 skin tests all NEGATIVE for environmental and food allergens including shellfish and nuts.      Nonallergic rhinitis     3/27/13 skin tests all NEGATIVE for environmental and food allergens including shellfish and nuts. 3/27/13 followup IgE tests NEG to Peanut, [facility], shrimp, macadamia nut, pistachio and walnut.     Rheumatoid arthritis of multiple sites with negative rheumatoid factor (H) 12/31/2015     Past Surgical History     Past Surgical History:   Procedure Laterality Date     DILATE CERVIX, HYSTEROSCOPY, ABLATE ENDOMETRIUM HYDROTHERMAL, COMBINED N/A 7/24/2020    Procedure: DILATION, CERVIX, WITH HYSTEROSCOPY AND HYDROTHERMAL ENDOMETRIAL ABLATION;  Surgeon: Apryl West MD;  Location: [facility]     DILATE CERVIX, HYSTEROSCOPY, ABLATE ENDOMETRIUM HYDROTHERMAL, COMBINED N/A 9/10/2021    Procedure: DILATION, CERVIX, WITH HYSTEROSCOPY AND HYDROTHERMAL ENDOMETRIAL ABLATION;   Surgeon: Apryl West MD;  Location: [facility]     ENDOMETRIAL SAMPLING (BIOPSY) N/A 7/24/2020    Procedure: BIOPSY, ENDOMETRIUM;  Surgeon: Apryl West MD;  Location: [facility]     LAPAROSCOPIC SALPINGECTOMY Bilateral 6/23/2017    Procedure: LAPAROSCOPIC SALPINGECTOMY;  Operative Laparoscopy, Bilateral Salpingectomy ;  Surgeon: Apryl West MD;  Location: [facility]     OPERATIVE HYSTEROSCOPY WITH MORCELLATOR N/A 12/12/2018    Procedure: DIAGNOSTIC HYSTEROSCOPY AND D AND C;  Surgeon: Apryl West MD;  Location: [facility]        Allergy     Allergies   Allergen Reactions     Shrimp Swelling     Lips and tongue     Walnuts [Nuts] Swelling     Lips and tongue       Current Medication List     Current Outpatient Medications   Medication Sig     apremilast (OTEZLA) 30 MG tablet Take 1 tablet (30 mg) by mouth daily     ibuprofen (ADVIL/MOTRIN) 200 MG capsule Take 3 capsules (600 mg) by mouth every 6 hours as needed for fever     leflunomide (ARAVA) 10 MG tablet Take 1 tablet (10 mg) by mouth daily     multivitamin peds with iron (FLINTSTONES COMPLETE) 60 MG chewable tablet Take 1 chew tab by mouth daily.     No current facility-administered medications for this visit.     Social History   See HPI    Family History     Family History   Problem Relation Age of Onset     Hypertension Maternal Grandmother      Autoimmune Disease Maternal Grandmother         Autoimmune hepatitis     Cancer Maternal Grandfather      Hypertension Paternal Grandmother      Cancer - colorectal Paternal Grandfather      Cardiovascular Paternal Grandfather      Other Cancer Paternal Grandfather      Hypertension Mother      Autoimmune Disease Mother         Autoimmune hepatitis     Hyperlipidemia Mother      Asthma Mother      Hypertension Father      Diabetes Father         Type 2     Multiple Sclerosis Maternal Aunt      Cerebrovascular Disease No family hx of      Thyroid Disease No family hx of      Glaucoma No family hx of      Macular  "Degeneration No family hx of      Breast Cancer No family hx of      Colon Cancer No family hx of      Physical Exam     Temp Readings from Last 3 Encounters:   09/10/21 97.7  F (36.5  C) (Axillary)   08/30/21 97.3  F (36.3  C) (Tympanic)   07/24/20 98.1  F (36.7  C) (Oral)     BP Readings from Last 5 Encounters:   11/05/21 120/77   09/10/21 122/78   08/30/21 125/83   08/27/21 123/82   09/02/20 (!) 155/97     Pulse Readings from Last 1 Encounters:   11/05/21 97     Resp Readings from Last 1 Encounters:   09/10/21 14     Estimated body mass index is 18.4 kg/m  as calculated from the following:    Height as of this encounter: 1.626 m (5' 4\").    Weight as of this encounter: 48.6 kg (107 lb 3.2 oz).      GEN: NAD. Healthy appearing adult.   HEENT:  Anicteric, noninjected sclera. No obvious external lesions of the ear and nose. Hearing intact.  CV: S1, S2. RRR. No m/r/g  PULM: No increased work of breathing. CTA bilaterally   MSK: MCPs, PIPs, DIPs without swelling or tenderness to palpation.  Very small Heberden's and suspected Lisa's nodes developing, more on the right hand.   Elbows and shoulders without swelling or tenderness to palpation.  Shoulders with normal range of motion.  Knees, ankles, and MTPs without swelling or tenderness to palpation.    SKIN: No rash or jaundice seen  PSYCH: Alert. Appropriate.         Labs / Imaging (select studies)     RF/CCP  Recent Labs   Lab Test 12/21/15  1447   CCPABY <20  Interpretation:  Negative     RHF <20     CBC  Recent Labs   Lab Test 11/01/21  1445 09/10/21  1225 07/02/21  0922 01/12/21  1437 01/12/21  1437 07/24/20  0626 07/20/20  1117   WBC 4.1  --  5.4  --  5.5  --  6.5   RBC 3.92  --  4.52  --  4.56  --  4.20   HGB 10.5* 11.3* 11.9   < > 12.1   < > 11.2*   HCT 32.5*  --  37.7  --  37.8  --  35.4   MCV 83  --  83  --  83  --  84   RDW 12.3  --  12.5  --  12.7  --  12.6     --  195  --  221  --  218   MCH 26.8  --  26.3*  --  26.5  --  26.7   MCHC 32.3  --  " 31.6  --  32.0  --  31.6   NEUTROPHIL 51  --  60.3  --  60.5  --  72.4   LYMPH 40  --  29.0  --  29.5  --  21.1   MONOCYTE 6  --  8.5  --  7.5  --  5.2   EOSINOPHIL 2  --  1.8  --  2.0  --  1.1   BASOPHIL 1  --  0.4  --  0.5  --  0.2   ANEU  --   --  3.3  --  3.3  --  4.7   ALYM  --   --  1.6  --  1.6  --  1.4   ANA MARÍA  --   --  0.5  --  0.4  --  0.3   AEOS  --   --  0.1  --  0.1  --  0.1   ABAS  --   --  0.0  --  0.0  --  0.0   ANEUTAUTO 2.1  --   --   --   --   --   --    ALYMPAUTO 1.6  --   --   --   --   --   --    AMONOAUTO 0.3  --   --   --   --   --   --    AEOSAUTO 0.1  --   --   --   --   --   --    ABSBASO 0.0  --   --   --   --   --   --     < > = values in this interval not displayed.     CMP  Recent Labs   Lab Test 11/01/21  1445 09/10/21  1202 07/02/21  0922 01/12/21  1437 07/20/20  1117 07/20/20  1117 02/21/19  1427 12/12/18  1047 01/27/16  1211 06/30/15  1636   NA  --   --   --   --   --   --   --   --   --  140   POTASSIUM  --   --   --   --   --   --   --   --   --  4.1   CHLORIDE  --   --   --   --   --   --   --   --   --  105   CO2  --   --   --   --   --   --   --   --   --  30   ANIONGAP  --   --   --   --   --   --   --   --   --  5   GLC  --  71  --   --   --   --   --  88  --  73   BUN  --   --   --   --   --   --   --   --   --  9   CR 0.63  --  0.72 0.76   < > 0.82   < >  --    < > 0.80   GFRESTIMATED >90  --  >90 >90   < > 90   < >  --    < > 83   GFRESTBLACK  --   --  >90 >90  --  >90   < >  --    < > >90   GFR Calc     DAISY  --   --   --   --   --   --   --   --   --  9.4   BILITOTAL 0.4  --  0.5 0.4   < > 0.3   < >  --    < > 0.5   ALBUMIN 3.8  --  4.2 4.4   < > 3.6   < >  --    < > 4.1   PROTTOTAL 6.8  --  7.6 7.7   < > 7.4   < >  --    < > 8.1   ALKPHOS 61  --  59 60   < > 52   < >  --    < > 65   AST 16  --  12 15   < > 20   < >  --    < > 19   ALT 20  --  20 20   < > 22   < >  --    < > 26    < > = values in this interval not displayed.     Calcium/VitaminD  Recent  Labs   Lab Test 01/13/17  1355 12/21/15  1447 06/30/15  1636 01/09/14  0906   DAISY  --   --  9.4  --    VITDT 45 74  --  35     ESR/CRP  Recent Labs   Lab Test 11/01/21  1445 07/02/21  0922 01/12/21  1437   SED 8 8 7   CRP <2.9 <2.9 <2.9     Hepatitis B  Recent Labs   Lab Test 03/20/17  1506 03/28/16  1442   AUSAB  --  264.69*   HBCAB Nonreactive  --    HEPBANG  --  Nonreactive     Hepatitis C  Recent Labs   Lab Test 12/21/15  1447   HCVAB Nonreactive   Assay performance characteristics have not been established for newborns,   infants, and children       Lyme confirmation testing by Western Blot  Recent Labs   Lab Test 08/05/15  1621   LYWG Negative  Reference range: Negative  (Note)  Band(s) present: NONE  (Insufficient number of bands for positive result)  INTERPRETIVE INFORMATION: Borrelia Burgdorferi Ab, IgG  Western Blot  For this assay, a positive result is reported when any 5 or  more of the following 10 bands are present: 18, 23, 28, 30,  39, 41, 45, 58, 66, or 93 kDa.  All other banding patterns  are reported as negative.  Performed by [facility],  [address] [place] [id number] [phone]  [email or web address], Pete Saha MD, Lab. Director       Tuberculosis Screening  Recent Labs   Lab Test 03/20/17  1508 03/28/16  1443   TBRSLT Negative Negative   TBAGN 0.00 0.04     HIV Screening  Recent Labs   Lab Test 12/21/15  1447   HIAGAB Nonreactive   HIV-1 p24 Ag & HIV-1/HIV-2 Ab Not Detected       Immunization History     Immunization History   Administered Date(s) Administered     Influenza (IIV3) PF 10/12/2011, 10/03/2013     Influenza Vaccine IM > 6 months Valent IIV4 (Alfuria,Fluzone) 10/10/2020     Influenza Vaccine, 6+MO IM (QUADRIVALENT W/PRESERVATIVES) 10/01/2015     Pneumo Conj 13-V (2010&after) 09/26/2016     Pneumococcal 23 valent 12/22/2016     TD (ADULT, 7+) 08/15/2001     TDAP Vaccine (Adacel) 06/20/2011     TDAP Vaccine (Boostrix) 03/06/2014          Chart documentation done in part with  [employer]listen Voice recognition Software. Although reviewed after completion, some word and grammatical error may remain.      Hunter Monroy MD

## 2022-01-09 ENCOUNTER — HEALTH MAINTENANCE LETTER (OUTPATIENT)
Age: 42
End: 2022-01-09

## 2022-02-04 ENCOUNTER — LAB (OUTPATIENT)
Dept: LAB | Facility: CLINIC | Age: 42
End: 2022-02-04
Payer: COMMERCIAL

## 2022-02-04 DIAGNOSIS — Z79.899 HIGH RISK MEDICATION USE: ICD-10-CM

## 2022-02-04 DIAGNOSIS — L40.50 PSORIATIC ARTHRITIS (H): ICD-10-CM

## 2022-02-04 LAB
ALBUMIN SERPL-MCNC: 4.1 G/DL (ref 3.4–5)
ALP SERPL-CCNC: 57 U/L (ref 40–150)
ALT SERPL W P-5'-P-CCNC: 20 U/L (ref 0–50)
AST SERPL W P-5'-P-CCNC: 12 U/L (ref 0–45)
BASOPHILS # BLD AUTO: 0 10E3/UL (ref 0–0.2)
BASOPHILS NFR BLD AUTO: 0 %
BILIRUB DIRECT SERPL-MCNC: <0.1 MG/DL (ref 0–0.2)
BILIRUB SERPL-MCNC: 0.3 MG/DL (ref 0.2–1.3)
CREAT SERPL-MCNC: 0.79 MG/DL (ref 0.52–1.04)
CRP SERPL-MCNC: <2.9 MG/L (ref 0–8)
EOSINOPHIL # BLD AUTO: 0.1 10E3/UL (ref 0–0.7)
EOSINOPHIL NFR BLD AUTO: 1 %
ERYTHROCYTE [DISTWIDTH] IN BLOOD BY AUTOMATED COUNT: 12.8 % (ref 10–15)
ERYTHROCYTE [SEDIMENTATION RATE] IN BLOOD BY WESTERGREN METHOD: 8 MM/HR (ref 0–20)
GFR SERPL CREATININE-BSD FRML MDRD: >90 ML/MIN/1.73M2
HCT VFR BLD AUTO: 37.6 % (ref 35–47)
HGB BLD-MCNC: 11.9 G/DL (ref 11.7–15.7)
LYMPHOCYTES # BLD AUTO: 1.3 10E3/UL (ref 0.8–5.3)
LYMPHOCYTES NFR BLD AUTO: 20 %
MCH RBC QN AUTO: 26.4 PG (ref 26.5–33)
MCHC RBC AUTO-ENTMCNC: 31.6 G/DL (ref 31.5–36.5)
MCV RBC AUTO: 84 FL (ref 78–100)
MONOCYTES # BLD AUTO: 0.4 10E3/UL (ref 0–1.3)
MONOCYTES NFR BLD AUTO: 7 %
NEUTROPHILS # BLD AUTO: 4.8 10E3/UL (ref 1.6–8.3)
NEUTROPHILS NFR BLD AUTO: 72 %
PLATELET # BLD AUTO: 216 10E3/UL (ref 150–450)
PROT SERPL-MCNC: 7.4 G/DL (ref 6.8–8.8)
RBC # BLD AUTO: 4.5 10E6/UL (ref 3.8–5.2)
WBC # BLD AUTO: 6.7 10E3/UL (ref 4–11)

## 2022-02-04 PROCEDURE — 82565 ASSAY OF CREATININE: CPT

## 2022-02-04 PROCEDURE — 85025 COMPLETE CBC W/AUTO DIFF WBC: CPT

## 2022-02-04 PROCEDURE — 85652 RBC SED RATE AUTOMATED: CPT

## 2022-02-04 PROCEDURE — 86140 C-REACTIVE PROTEIN: CPT

## 2022-02-04 PROCEDURE — 36415 COLL VENOUS BLD VENIPUNCTURE: CPT

## 2022-02-04 PROCEDURE — 80076 HEPATIC FUNCTION PANEL: CPT

## 2022-02-08 ENCOUNTER — VIRTUAL VISIT (OUTPATIENT)
Dept: RHEUMATOLOGY | Facility: CLINIC | Age: 42
End: 2022-02-08
Payer: COMMERCIAL

## 2022-02-08 DIAGNOSIS — L40.50 PSORIATIC ARTHRITIS (H): Primary | ICD-10-CM

## 2022-02-08 DIAGNOSIS — Z79.899 HIGH RISK MEDICATION USE: ICD-10-CM

## 2022-02-08 PROCEDURE — 99214 OFFICE O/P EST MOD 30 MIN: CPT | Mod: 95 | Performed by: INTERNAL MEDICINE

## 2022-02-08 NOTE — PROGRESS NOTES
Abby Foy  is a 41 year old year old female who is being evaluated via a billable video visit.      How would you like to obtain your AVS? MyChart  If the video visit is dropped, the invitation should be resent by: Text to cell phone: [phone]  Will anyone else be joining your video visit? No     Rheumatology Video Visit      Abby Foy MRN# [id number]   YOB: 1980 Age: 41 year old      Date of visit: 2/08/22   PCP: Sandi Duffy  Dermatologist: Amy Patel  Ophthalmologist: Dr. Mathis     Chief Complaint   Patient presents with:  Psoriatic arthritis    Assessment and Plan   1. 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 However, given her continued back pain that improved with activity but no radiographic evidence for inflammatory arthritis or osteoarthritis, there was concern that she had inflammatory back pain that would be more consistent with a spondylarthritis. Humira was started and resulted in improvement of her back pain, suggesting axial disease.  Arthritis improved with methotrexate and sulfasalazine, but she was having headaches and therefore the most likely culprit methotrexate was reduced until her headaches worsened significantly and therefore sulfasalazine and methotrexate were both discontinued. She had resolution of her headaches after discontinuing both sulfasalazine and methotrexate. I believe that the sulfasalazine was the cause of her headaches. Therefore, cautious retrial of methotrexate in the future could be done.  She was doing well on a combination of leflunomide 10 mg daily and Humira 40 mg SQ every 14 days but Humira had to be stopped because of issues with the  program; so Simponi was Rx'd but never  started.  Intermittent inflammatory symptom so Otezla was prescribed but but delayed starting until March 2020, it was found be effective but the twice daily dose was resulting in worsening headaches that resolved with a once daily dose. Was on doing well on a combination of leflunomide 10 mg daily and Otezla developed daily; leflunomide was discontinued in an effort to get to the lowest effective dose but she had return of symptoms primarily affecting the PIPs so it was restarted. Currently on leflunomide 10 mg daily and Otezla 30 mg daily and doing well with regard to inflammatory arthritis except for intermittent pain at the left second MCP and occasionally the right third MCP.  We discussed changing Otezla to be alternating between 30 mg daily and 30 mg twice daily, versus changing Otezla to a different DMARD.  Also discussed increasing leflunomide.  Topical Voltaren has been very effective.  Monitor for now.  If needed in the future may consider intra-articular joint injection of the MCP if still symptomatic, versus changing DMARDs.  Chronic illness, progressive.    - Continue leflunomide 10 mg daily  - Continue Otezla 30mg daily  - Continue diclofenac (VOLTAREN) 1 % topical gel; Apply up to 2 grams of 1% gel to hands up to 4 times daily as needed for joint pain (maximum: 8 g per upper extremity per day)    - Labs in 3 months: CBC, Creatinine, Hepatic Panel, ESR, CRP, QuantiFERON-TB gold plus    High risk medication requiring intensive toxicity monitoring at least quarterly: labs ordered include CBC, Creatinine, Hepatic panel to monitor for cytopenia and hepatotoxicity; checking creatinine as it affects clearance of medication.      # Pregnancy Planning: s/p salpingectomy;  had a vasectomy    2. 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 Interestingly when leflunomide was stopped between 3/2018 and  6/15/2018, she experienced increased  L>R eye irritation that when bothering her only affected one eye at a time.   Chronic illness, stable.       3.  Hand osteoarthritis: See #1    4.  Vaccinations: Vaccinations reviewed with Ms. Foy.   - Influenza: Reportedly up to date for the 2644-4651 season  - [id number]: up to date  - [id number]: up to date  - Status-post 2 doses of the Pfizer COVID-19 vaccine, received on 1/15/2021 and 2/4/2021 and 11/19/2021.  A 4th dose of the mRNA COVID-19 vaccination is recommended to be received 5 months after the third mRNA COVID-19 vaccination was received.  Hold leflunomide for 1 week after vaccination.    Total minutes spent in evaluation with patient, documentation, , and review of pertinent studies and chart notes: 19    Ms. Foy verbalized agreement with and understanding of the rational for the diagnosis and treatment plan.  All questions were answered to best of my ability and the patient's satisfaction. Ms. Foy was advised to contact the clinic with any questions that may arise after the clinic visit.      Thank you for involving me in the care of the patient    Return to clinic: 3-4 months    HPI   Abby Foy is a 41 year old female with medical history significant for urticaria, H. pylori infection, and iritis? who returns for f/u of seronegative spondyloarthropathy.    Today, 2/8/2022: Mild ache at the right third and left second MCP that responds well to topical Voltaren gel.  Tolerating medications well.  Has only had one major headache in the last 6 months and lasted for about 4 days.  Arthritis is not limiting any of her daily activities.      Denies fevers, chills, nausea, vomiting, constipation, diarrhea. No abdominal pain.  No black or bloody stools.  No chest pain/pressure, palpitations, or shortness of breath. No neck pain. Occasional cold sores.No eye pain or redness    Tobacco: None  EtOH: 1-2 drinks on the weekends  Drugs: None  Occupation: Dietitian at the  [employer]    ROS   12 point review of systems completed and negative except as noted in the HPI    Active Problem List     Patient Active Problem List   Diagnosis     CARDIOVASCULAR SCREENING; LDL GOAL LESS THAN 160     Urticaria     Diagnostic skin and sensitization tests     Nonallergic rhinitis     Angioedema of lips     H. pylori infection     GPC (giant papillary conjunctivitis)     Seronegative spondyloarthropathy     Midline low back pain without sciatica     Abnormal uterine bleeding (AUB)- on OCPs     Psoriatic arthritis (H)     Past Medical History     Past Medical History:   Diagnosis Date     Angioedema of lips episode in 3/13--idiopathic     Complication of anesthesia     ponv     Contraception 1/28/2009     Diagnostic skin and sensitization tests 3/27/13 skin tests all NEGATIVE for environmental and food allergens including shellfish and nuts.      Nonallergic rhinitis     3/27/13 skin tests all NEGATIVE for environmental and food allergens including shellfish and nuts. 3/27/13 followup IgE tests NEG to Peanut, [facility], shrimp, macadamia nut, pistachio and walnut.     Rheumatoid arthritis of multiple sites with negative rheumatoid factor (H) 12/31/2015     Past Surgical History     Past Surgical History:   Procedure Laterality Date     DILATE CERVIX, HYSTEROSCOPY, ABLATE ENDOMETRIUM HYDROTHERMAL, COMBINED N/A 7/24/2020    Procedure: DILATION, CERVIX, WITH HYSTEROSCOPY AND HYDROTHERMAL ENDOMETRIAL ABLATION;  Surgeon: Apryl West MD;  Location: [facility]     DILATE CERVIX, HYSTEROSCOPY, ABLATE ENDOMETRIUM HYDROTHERMAL, COMBINED N/A 9/10/2021    Procedure: DILATION, CERVIX, WITH HYSTEROSCOPY AND HYDROTHERMAL ENDOMETRIAL ABLATION;  Surgeon: Apryl West MD;  [facility]: [facility]     ENDOMETRIAL SAMPLING (BIOPSY) N/A 7/24/2020    Procedure: BIOPSY, ENDOMETRIUM;  Surgeon: Apryl West MD;  [facility]     LAPAROSCOPIC SALPINGECTOMY Bilateral 6/23/2017    Procedure: LAPAROSCOPIC  SALPINGECTOMY;  Operative Laparoscopy, Bilateral Salpingectomy ;  Surgeon: Apryl West MD;  Location: [facility]     OPERATIVE HYSTEROSCOPY WITH MORCELLATOR N/A 12/12/2018    Procedure: DIAGNOSTIC HYSTEROSCOPY AND D AND C;  Surgeon: Apryl West MD;  Location: [facility]        Allergy     Allergies   Allergen Reactions     Shrimp Swelling     Lips and tongue     Walnuts [Nuts] Swelling     Lips and tongue       Current Medication List     Current Outpatient Medications   Medication Sig     apremilast (OTEZLA) 30 MG tablet Take 1 tablet (30 mg) by mouth daily     diclofenac (VOLTAREN) 1 % topical gel Apply up to 2 grams of 1% gel to hands up to 4 times daily as needed for joint pain (maximum: 8 g per upper extremity per day)     ibuprofen (ADVIL/MOTRIN) 200 MG capsule Take 3 capsules (600 mg) by mouth every 6 hours as needed for fever     leflunomide (ARAVA) 10 MG tablet Take 1 tablet (10 mg) by mouth daily     multivitamin peds with iron (FLINTSTONES COMPLETE) 60 MG chewable tablet Take 1 chew tab by mouth daily.     No current facility-administered medications for this visit.     Social History   See HPI    Family History     Family History   Problem Relation Age of Onset     Hypertension Maternal Grandmother      Autoimmune Disease Maternal Grandmother         Autoimmune hepatitis     Cancer Maternal Grandfather      Hypertension Paternal Grandmother      Cancer - colorectal Paternal Grandfather      Cardiovascular Paternal Grandfather      Other Cancer Paternal Grandfather      Hypertension Mother      Autoimmune Disease Mother         Autoimmune hepatitis     Hyperlipidemia Mother      Asthma Mother      Hypertension Father      Diabetes Father         Type 2     Multiple Sclerosis Maternal Aunt      Cerebrovascular Disease No family hx of      Thyroid Disease No family hx of      Glaucoma No family hx of      Macular Degeneration No family hx of      Breast Cancer No family hx of      Colon Cancer No family  "hx of      Physical Exam     Temp Readings from Last 3 Encounters:   09/10/21 97.7  F (36.5  C) (Axillary)   08/30/21 97.3  F (36.3  C) (Tympanic)   07/24/20 98.1  F (36.7  C) (Oral)     BP Readings from Last 5 Encounters:   11/05/21 120/77   09/10/21 122/78   08/30/21 125/83   08/27/21 123/82   09/02/20 (!) 155/97     Pulse Readings from Last 1 Encounters:   11/05/21 97     Resp Readings from Last 1 Encounters:   09/10/21 14     Estimated body mass index is 18.4 kg/m  as calculated from the following:    Height as of 11/5/21: 1.626 m (5' 4\").    Weight as of 11/5/21: 48.6 kg (107 lb 3.2 oz).      GEN: NAD. Healthy appearing adult.   HEENT: MMM.  Anicteric, noninjected sclera. No obvious external lesions of the ear and nose. Hearing intact.  PULM: No increased work of breathing  MSK:  Hands and wrists without swelling.   SKIN: No rash or jaundice seen  PSYCH: Alert. Appropriate.         Labs / Imaging (select studies)     RF/CCP  Recent Labs   Lab Test 12/21/15  1447   CCPABY <20  Interpretation:  Negative     RHF <20     CBC  Recent Labs   Lab Test 02/04/22  0922 11/01/21  1445 09/10/21  1225 07/02/21  0922 01/12/21  1437 07/24/20  0626 07/20/20  1117   WBC 6.7 4.1  --  5.4 5.5  --  6.5   RBC 4.50 3.92  --  4.52 4.56  --  4.20   HGB 11.9 10.5* 11.3* 11.9 12.1   < > 11.2*   HCT 37.6 32.5*  --  37.7 37.8  --  35.4   MCV 84 83  --  83 83  --  84   RDW 12.8 12.3  --  12.5 12.7  --  12.6    187  --  195 221  --  218   MCH 26.4* 26.8  --  26.3* 26.5  --  26.7   MCHC 31.6 32.3  --  31.6 32.0  --  31.6   NEUTROPHIL 72 51  --  60.3 60.5  --  72.4   LYMPH 20 40  --  29.0 29.5  --  21.1   MONOCYTE 7 6  --  8.5 7.5  --  5.2   EOSINOPHIL 1 2  --  1.8 2.0  --  1.1   BASOPHIL 0 1  --  0.4 0.5  --  0.2   ANEU  --   --   --  3.3 3.3  --  4.7   ALYM  --   --   --  1.6 1.6  --  1.4   ANA MARÍA  --   --   --  0.5 0.4  --  0.3   AEOS  --   --   --  0.1 0.1  --  0.1   ABAS  --   --   --  0.0 0.0  --  0.0   ANEUTAUTO 4.8 2.1  --   --   " --   --   --    ALYMPAUTO 1.3 1.6  --   --   --   --   --    AMONOAUTO 0.4 0.3  --   --   --   --   --    AEOSAUTO 0.1 0.1  --   --   --   --   --    ABSBASO 0.0 0.0  --   --   --   --   --     < > = values in this interval not displayed.     CMP  Recent Labs   Lab Test 02/04/22  0922 11/01/21  1445 09/10/21  1202 07/02/21  0922 01/12/21  1437 07/20/20  1117 02/21/19  1427 12/12/18  1047 01/27/16  1211 06/30/15  1636   NA  --   --   --   --   --   --   --   --   --  140   POTASSIUM  --   --   --   --   --   --   --   --   --  4.1   CHLORIDE  --   --   --   --   --   --   --   --   --  105   CO2  --   --   --   --   --   --   --   --   --  30   ANIONGAP  --   --   --   --   --   --   --   --   --  5   GLC  --   --  71  --   --   --   --  88  --  73   BUN  --   --   --   --   --   --   --   --   --  9   CR 0.79 0.63  --  0.72 0.76 0.82   < >  --    < > 0.80   GFRESTIMATED >90 >90  --  >90 >90 90   < >  --    < > 83   GFRESTBLACK  --   --   --  >90 >90 >90   < >  --    < > >90   GFR Calc     DAISY  --   --   --   --   --   --   --   --   --  9.4   BILITOTAL 0.3 0.4  --  0.5 0.4 0.3   < >  --    < > 0.5   ALBUMIN 4.1 3.8  --  4.2 4.4 3.6   < >  --    < > 4.1   PROTTOTAL 7.4 6.8  --  7.6 7.7 7.4   < >  --    < > 8.1   ALKPHOS 57 61  --  59 60 52   < >  --    < > 65   AST 12 16  --  12 15 20   < >  --    < > 19   ALT 20 20  --  20 20 22   < >  --    < > 26    < > = values in this interval not displayed.     Calcium/VitaminD  Recent Labs   Lab Test 01/13/17  1355 12/21/15  1447 06/30/15  1636 01/09/14  0906   DAISY  --   --  9.4  --    VITDT 45 74  --  35     ESR/CRP  Recent Labs   Lab Test 02/04/22  0922 11/01/21  1445 07/02/21  0922   SED 8 8 8   CRP <2.9 <2.9 <2.9       Hepatitis B  Recent Labs   Lab Test 03/20/17  1506 03/28/16  1442   AUSAB  --  264.69*   HBCAB Nonreactive  --    HEPBANG  --  Nonreactive     Hepatitis C  Recent Labs   Lab Test 12/21/15  1447   HCVAB Nonreactive   Assay performance  characteristics have not been established for newborns,   infants, and children       Lyme confirmation testing by Western Blot  Recent Labs   Lab Test 08/05/15  1621   LYWG Negative  Reference range: Negative  (Note)  Band(s) present: NONE  (Insufficient number of bands for positive result)  INTERPRETIVE INFORMATION: Borrelia Burgdorferi Ab, IgG  Western Blot  For this assay, a positive result is reported when any 5 or  more of the following 10 bands are present: 18, 23, 28, 30,  39, 41, 45, 58, 66, or 93 kDa.  All other banding patterns  are reported as negative.  Performed by [facility],  [address] [place] [id number] [phone]  [email or web address], Pete Saha MD, Lab. Director       Tuberculosis Screening  Recent Labs   Lab Test 03/20/17  1508 03/28/16  1443   TBRSLT Negative Negative   TBAGN 0.00 0.04     HIV Screening  Recent Labs   Lab Test 12/21/15  1447   HIAGAB Nonreactive   HIV-1 p24 Ag & HIV-1/HIV-2 Ab Not Detected         Immunization History     Immunization History   Administered Date(s) Administered     COVID-19,PF,Pfizer (12+ Yrs) 11/19/2021     Influenza (IIV3) PF 10/12/2011, 10/03/2013     Influenza Vaccine IM > 6 months Valent IIV4 (Alfuria,Fluzone) 10/10/2020     Influenza Vaccine, 6+MO IM (QUADRIVALENT W/PRESERVATIVES) 10/01/2015     Pneumo Conj 13-V (2010&after) 09/26/2016     Pneumococcal 23 valent 12/22/2016     TD (ADULT, 7+) 08/15/2001     TDAP Vaccine (Adacel) 06/20/2011     TDAP Vaccine (Boostrix) 03/06/2014          Chart documentation done in part with Dragon Voice recognition Software. Although reviewed after completion, some word and grammatical error may remain.      Video-Visit Details    Type of service:  Video Visit    Video Start Time: 8:15 AM    Video End Time: 8:29 AM     Originating Location (pt. Location):  [place], [place]    Distant Location (provider location):  [place]    Platform used for Video Visit: Carley Monroy MD

## 2022-02-08 NOTE — PATIENT INSTRUCTIONS
RHEUMATOLOGY    Dr. Hunter Monroy    [address] [place] [address]  Phone number: [phone]  Fax number: [phone]      Thank you for choosing [facility]!    Maddie Hennessy CMA Rheumatology

## 2022-03-02 ENCOUNTER — TELEPHONE (OUTPATIENT)
Dept: RHEUMATOLOGY | Facility: CLINIC | Age: 42
End: 2022-03-02
Payer: COMMERCIAL

## 2022-03-02 NOTE — TELEPHONE ENCOUNTER
PA Initiation    Medication: Otezla--PA Initiated  Insurance Company: [employer] - Phone [phone] Fax [phone]  Pharmacy Filling the Rx:    Filling Pharmacy Phone:    Filling Pharmacy Fax:    Start Date: 3/2/2022    KEY: [id number]

## 2022-03-02 NOTE — TELEPHONE ENCOUNTER
Prior Authorization Approval    Authorization Effective Date: 3/2/2022  Authorization Expiration Date: 3/2/2023  Medication: Otezla--PA Approved  Approved Dose/Quantity: UD  Reference #: KEY: [id number]   Insurance Company: [employer] [phone] Fax [phone]  Expected CoPay:       CoPay Card Available:      Foundation Assistance Needed:    Which Pharmacy is filling the prescription (Not needed for infusion/clinic administered):    Pharmacy Notified:    Patient Notified:

## 2022-05-11 ENCOUNTER — LAB (OUTPATIENT)
Dept: LAB | Facility: CLINIC | Age: 42
End: 2022-05-11
Payer: COMMERCIAL

## 2022-05-11 DIAGNOSIS — L40.50 PSORIATIC ARTHRITIS (H): ICD-10-CM

## 2022-05-11 DIAGNOSIS — Z79.899 HIGH RISK MEDICATION USE: ICD-10-CM

## 2022-05-11 LAB
ALBUMIN SERPL-MCNC: 4 G/DL (ref 3.4–5)
ALP SERPL-CCNC: 53 U/L (ref 40–150)
ALT SERPL W P-5'-P-CCNC: 19 U/L (ref 0–50)
AST SERPL W P-5'-P-CCNC: 16 U/L (ref 0–45)
BASOPHILS # BLD AUTO: 0 10E3/UL (ref 0–0.2)
BASOPHILS NFR BLD AUTO: 0 %
BILIRUB DIRECT SERPL-MCNC: 0.1 MG/DL (ref 0–0.2)
BILIRUB SERPL-MCNC: 0.5 MG/DL (ref 0.2–1.3)
CREAT SERPL-MCNC: 0.73 MG/DL (ref 0.52–1.04)
CRP SERPL-MCNC: <2.9 MG/L (ref 0–8)
EOSINOPHIL # BLD AUTO: 0.1 10E3/UL (ref 0–0.7)
EOSINOPHIL NFR BLD AUTO: 2 %
ERYTHROCYTE [DISTWIDTH] IN BLOOD BY AUTOMATED COUNT: 12.2 % (ref 10–15)
ERYTHROCYTE [SEDIMENTATION RATE] IN BLOOD BY WESTERGREN METHOD: 6 MM/HR (ref 0–20)
GFR SERPL CREATININE-BSD FRML MDRD: >90 ML/MIN/1.73M2
HCT VFR BLD AUTO: 37.2 % (ref 35–47)
HGB BLD-MCNC: 11.8 G/DL (ref 11.7–15.7)
LYMPHOCYTES # BLD AUTO: 1.5 10E3/UL (ref 0.8–5.3)
LYMPHOCYTES NFR BLD AUTO: 35 %
MCH RBC QN AUTO: 26.9 PG (ref 26.5–33)
MCHC RBC AUTO-ENTMCNC: 31.7 G/DL (ref 31.5–36.5)
MCV RBC AUTO: 85 FL (ref 78–100)
MONOCYTES # BLD AUTO: 0.4 10E3/UL (ref 0–1.3)
MONOCYTES NFR BLD AUTO: 9 %
NEUTROPHILS # BLD AUTO: 2.4 10E3/UL (ref 1.6–8.3)
NEUTROPHILS NFR BLD AUTO: 54 %
PLATELET # BLD AUTO: 222 10E3/UL (ref 150–450)
PROT SERPL-MCNC: 7.2 G/DL (ref 6.8–8.8)
RBC # BLD AUTO: 4.39 10E6/UL (ref 3.8–5.2)
WBC # BLD AUTO: 4.4 10E3/UL (ref 4–11)

## 2022-05-11 PROCEDURE — 80076 HEPATIC FUNCTION PANEL: CPT

## 2022-05-11 PROCEDURE — 85652 RBC SED RATE AUTOMATED: CPT

## 2022-05-11 PROCEDURE — 36415 COLL VENOUS BLD VENIPUNCTURE: CPT

## 2022-05-11 PROCEDURE — 82565 ASSAY OF CREATININE: CPT

## 2022-05-11 PROCEDURE — 86140 C-REACTIVE PROTEIN: CPT

## 2022-05-11 PROCEDURE — 86481 TB AG RESPONSE T-CELL SUSP: CPT

## 2022-05-11 PROCEDURE — 85025 COMPLETE CBC W/AUTO DIFF WBC: CPT

## 2022-05-13 ENCOUNTER — OFFICE VISIT (OUTPATIENT)
Dept: RHEUMATOLOGY | Facility: CLINIC | Age: 42
End: 2022-05-13
Payer: COMMERCIAL

## 2022-05-13 VITALS
OXYGEN SATURATION: 98 % | DIASTOLIC BLOOD PRESSURE: 77 MMHG | BODY MASS INDEX: 18.16 KG/M2 | SYSTOLIC BLOOD PRESSURE: 136 MMHG | HEART RATE: 72 BPM | WEIGHT: 105.8 LBS

## 2022-05-13 DIAGNOSIS — Z79.899 HIGH RISK MEDICATION USE: ICD-10-CM

## 2022-05-13 DIAGNOSIS — L40.50 PSORIATIC ARTHRITIS (H): Primary | ICD-10-CM

## 2022-05-13 LAB
GAMMA INTERFERON BACKGROUND BLD IA-ACNC: 0.14 IU/ML
M TB IFN-G BLD-IMP: NEGATIVE
M TB IFN-G CD4+ BCKGRND COR BLD-ACNC: 8.36 IU/ML
MITOGEN IGNF BCKGRD COR BLD-ACNC: 0 IU/ML
MITOGEN IGNF BCKGRD COR BLD-ACNC: 0.01 IU/ML
QUANTIFERON MITOGEN: 8.5 IU/ML
QUANTIFERON NIL TUBE: 0.14 IU/ML
QUANTIFERON TB1 TUBE: 0.15 IU/ML
QUANTIFERON TB2 TUBE: 0.14

## 2022-05-13 PROCEDURE — 99214 OFFICE O/P EST MOD 30 MIN: CPT | Performed by: INTERNAL MEDICINE

## 2022-05-13 RX ORDER — APREMILAST 30 MG/1
30 TABLET, FILM COATED ORAL DAILY
Qty: 60 TABLET | Refills: 6 | Status: SHIPPED | OUTPATIENT
Start: 2022-05-13 | End: 2022-11-18

## 2022-05-13 RX ORDER — LEFLUNOMIDE 10 MG/1
10 TABLET ORAL DAILY
Qty: 90 TABLET | Refills: 2 | Status: SHIPPED | OUTPATIENT
Start: 2022-05-13 | End: 2022-11-18

## 2022-05-13 NOTE — PATIENT INSTRUCTIONS
RHEUMATOLOGY    Dr. Hunter Monroy    [facility]  [address] [place] [address] [place] [address]  Phone number: [phone]  Fax number: [phone]      Thank you for choosing [facility]!    Maddie Hennessy CMA Rheumatology    ---------------------------------    COVID-19:     A 4th dose (1st booster) of the mRNA COVID-19 vaccination is recommended to be received 3 months after the third mRNA COVID-19 vaccination was received.  A 5th mRNA vaccine (2nd booster) may be received at least 4 months after the 4th dose.     Based on our current understanding (and this may change over time as we learn more), medications should be adjusted as noted below, if disease activity allows:  - NSAIDs and Acetaminophen: hold for 24 hours prior to vaccination if able to do so  - Leflunomide should be held for 1-2 weeks after each COVID-19 vaccine (as disease activity allows)

## 2022-05-13 NOTE — NURSING NOTE
RAPID3 (0-30) Cumulative Score  4.0          RAPID3 Weighted Score (divide #4 by 3 and that is the weighted score)  1.3

## 2022-05-13 NOTE — PROGRESS NOTES
Rheumatology Clinic Visit      Abby Foy MRN# [id number]   YOB: 1980 Age: 42 year old      Date of visit: 5/13/22   PCP: Sandi Duffy  Dermatologist: Amy Patel  Ophthalmologist: Dr. Mathis     Chief Complaint   Patient presents with:  Psoriatic arthritis    Assessment and Plan   1. 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 However, given her continued back pain that improved with activity but no radiographic evidence for inflammatory arthritis or osteoarthritis, there was concern that she had inflammatory back pain that would be more consistent with a spondylarthritis. Humira was started and resulted in improvement of her back pain, suggesting axial disease.  Arthritis improved with methotrexate and sulfasalazine, but she was having headaches and therefore the most likely culprit methotrexate was reduced until her headaches worsened significantly and therefore sulfasalazine and methotrexate were both discontinued. She had resolution of her headaches after discontinuing both sulfasalazine and methotrexate. I believe that the sulfasalazine was the cause of her headaches. Therefore, cautious retrial of methotrexate in the future could be done.  She was doing well on a combination of leflunomide 10 mg daily and Humira 40 mg SQ every 14 days but Humira had to be stopped because of issues with the  program; so Simponi was Rx'd but never started.  Intermittent inflammatory symptom so Otezla was prescribed but but delayed starting until March 2020, it was found be effective but the twice daily dose was resulting in worsening headaches that resolved with a once daily dose. Was on doing well on a combination of leflunomide 10 mg daily and Otezla  daily; previously leflunomide was discontinued in an effort to get to the lowest effective dose but she had return of symptoms primarily affecting the PIPs so it was restarted. Currently on leflunomide 10 mg daily and Otezla 30 mg daily and doing well with regard to inflammatory arthritis.  Infrequently with left second MCP and right third-fourth MCP ache is of short duration and resolve spontaneously; no swelling.  Since last seen she also had blue discrimination on the palm of her right hand with some stiffness associated with it intermittently since then but normal exam today.  If needed in the future for the MCP symptoms, may consider intra-articular joint injection of the MCP if still symptomatic, versus changing DMARDs.  Chronic illness, stable.    - Continue leflunomide 10 mg daily  - Continue Otezla 30mg daily  - Continue diclofenac (VOLTAREN) 1 % topical gel; Apply up to 2 grams of 1% gel to hands up to 4 times daily as needed for joint pain (maximum: 8 g per upper extremity per day)    - Labs in 3 months: CBC, Creatinine, Hepatic Panel  - Labs in 6 months: CBC, Creatinine, Hepatic Panel, ESR, CRP \    High risk medication requiring intensive toxicity monitoring at least quarterly: labs ordered include CBC, Creatinine, Hepatic panel to monitor for cytopenia and hepatotoxicity; checking creatinine as it affects clearance of medication.      # Pregnancy Planning: s/p salpingectomy;  had a vasectomy    2. 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 Interestingly when leflunomide was stopped between 3/2018 and  6/15/2018, she experienced increased L>R eye irritation that when bothering her only affected one eye at a time.   Not an issue at this time.     3.  Vaccinations: Vaccinations reviewed with Ms. Foy.   - Influenza: Reportedly up to date for the 0645-5648 season  - [id number]: up to date  - [id number]: up to date  -  COVID-19  vaccine: received on Pfizer vaccine 1/15/2021 and 2/4/2021 and 11/19/2021.  A 4th dose (1st booster) of the mRNA COVID-19 vaccination is recommended to be received 3 months after the third mRNA COVID-19 vaccination was received.  A 5th mRNA vaccine (2nd booster) may be received at least 4 months after the 4th dose.   Based on our current understanding (and this may change over time as we learn more), medications should be adjusted as noted below, if disease activity allows:  - NSAIDs and Acetaminophen: hold for 24 hours prior to vaccination if able to do so  - Leflunomide should be held for 1-2 weeks after each COVID-19 vaccine (as disease activity allows)     Total minutes spent in evaluation with patient, documentation, , and review of pertinent studies and chart notes: 18    Ms. Foy verbalized agreement with and understanding of the rational for the diagnosis and treatment plan.  All questions were answered to best of my ability and the patient's satisfaction. Ms. Foy was advised to contact the clinic with any questions that may arise after the clinic visit.      Thank you for involving me in the care of the patient    Return to clinic: 6 months    HPI   Abby Foy is a 42 year old female with medical history significant for urticaria, H. pylori infection, and iritis? who returns for f/u of seronegative spondyloarthropathy.    Today, 5/13/2022: Mild intermittent ache at the left second MCP and right third and fourth MCPs that responds well to Voltaren gel, occurs infrequently, and is of mild severity.  No swelling of the joints.  Generally without morning stiffness but it may be present for up to an hour.  At one point she had blue discoloration on the palm of her right hand, possibly burst blood vessel and the discoloration has resolved but she says that she has some stiffness and ache in her joints ever since that time that occurs and resolve spontaneously.  Tolerating leflunomide  and Otezla well.    Denies fevers, chills, nausea, vomiting, constipation, diarrhea. No abdominal pain.  No black or bloody stools.  No chest pain/pressure, palpitations, or shortness of breath. No neck pain. Occasional cold sores. No eye pain or redness    Tobacco: None  EtOH: 1-2 drinks on the weekends  Drugs: None  Occupation: Dietitian at the [employer]    ROS   12 point review of systems completed and negative except as noted in the HPI    Active Problem List     Patient Active Problem List   Diagnosis     CARDIOVASCULAR SCREENING; LDL GOAL LESS THAN 160     Urticaria     Diagnostic skin and sensitization tests     Nonallergic rhinitis     Angioedema of lips     H. pylori infection     GPC (giant papillary conjunctivitis)     Seronegative spondyloarthropathy     Midline low back pain without sciatica     Abnormal uterine bleeding (AUB)- on OCPs     Psoriatic arthritis (H)     Past Medical History     Past Medical History:   Diagnosis Date     Angioedema of lips episode in 3/13--idiopathic     Complication of anesthesia     ponv     Contraception 1/28/2009     Diagnostic skin and sensitization tests 3/27/13 skin tests all NEGATIVE for environmental and food allergens including shellfish and nuts.      Nonallergic rhinitis     3/27/13 skin tests all NEGATIVE for environmental and food allergens including shellfish and nuts. 3/27/13 followup IgE tests NEG to Peanut, SN[facility], shrimp, macadamia nut, pistachio and walnut.     Rheumatoid arthritis of multiple sites with negative rheumatoid factor (H) 12/31/2015     Past Surgical History     Past Surgical History:   Procedure Laterality Date     DILATE CERVIX, HYSTEROSCOPY, ABLATE ENDOMETRIUM HYDROTHERMAL, COMBINED N/A 7/24/2020    Procedure: DILATION, CERVIX, WITH HYSTEROSCOPY AND HYDROTHERMAL ENDOMETRIAL ABLATION;  Surgeon: Apryl West MD;  Location: [facility]     DILATE CERVIX, HYSTEROSCOPY, ABLATE ENDOMETRIUM HYDROTHERMAL, COMBINED N/A 9/10/2021     Procedure: DILATION, CERVIX, WITH HYSTEROSCOPY AND HYDROTHERMAL ENDOMETRIAL ABLATION;  Surgeon: Apryl West MD;  [facility]     ENDOMETRIAL SAMPLING (BIOPSY) N/A 7/24/2020    Procedure: BIOPSY, ENDOMETRIUM;  Surgeon: Apryl West MD;  [facility]     LAPAROSCOPIC SALPINGECTOMY Bilateral 6/23/2017    Procedure: LAPAROSCOPIC SALPINGECTOMY;  Operative Laparoscopy, Bilateral Salpingectomy ;  Surgeon: Apryl West MD;  [facility]     OPERATIVE HYSTEROSCOPY WITH MORCELLATOR N/A 12/12/2018    Procedure: DIAGNOSTIC HYSTEROSCOPY AND D AND C;  Surgeon: Apryl West MD;  [facility]        Allergy     Allergies   Allergen Reactions     Shrimp Swelling     Lips and tongue     Walnuts [Nuts] Swelling     Lips and tongue       Current Medication List     Current Outpatient Medications   Medication Sig     apremilast (OTEZLA) 30 MG tablet Take 1 tablet (30 mg) by mouth daily     diclofenac (VOLTAREN) 1 % topical gel Apply up to 2 grams of 1% gel to hands up to 4 times daily as needed for joint pain (maximum: 8 g per upper extremity per day)     ibuprofen (ADVIL/MOTRIN) 200 MG capsule Take 3 capsules (600 mg) by mouth every 6 hours as needed for fever     leflunomide (ARAVA) 10 MG tablet Take 1 tablet (10 mg) by mouth daily     multivitamin peds with iron (FLINTSTONES COMPLETE) 60 MG chewable tablet Take 1 chew tab by mouth daily.     No current facility-administered medications for this visit.     Social History   See HPI    Family History     Family History   Problem Relation Age of Onset     Hypertension Maternal Grandmother      Autoimmune Disease Maternal Grandmother         Autoimmune hepatitis     Cancer Maternal Grandfather      Hypertension Paternal Grandmother      Cancer - colorectal Paternal Grandfather      Cardiovascular Paternal Grandfather      Other Cancer Paternal Grandfather      Hypertension Mother      Autoimmune Disease Mother         Autoimmune hepatitis      "Hyperlipidemia Mother      Asthma Mother      Hypertension Father      Diabetes Father         Type 2     Multiple Sclerosis Maternal Aunt      Cerebrovascular Disease No family hx of      Thyroid Disease No family hx of      Glaucoma No family hx of      Macular Degeneration No family hx of      Breast Cancer No family hx of      Colon Cancer No family hx of      Physical Exam     Temp Readings from Last 3 Encounters:   09/10/21 97.7  F (36.5  C) (Axillary)   08/30/21 97.3  F (36.3  C) (Tympanic)   07/24/20 98.1  F (36.7  C) (Oral)     BP Readings from Last 5 Encounters:   05/13/22 136/77   11/05/21 120/77   09/10/21 122/78   08/30/21 125/83   08/27/21 123/82     Pulse Readings from Last 1 Encounters:   05/13/22 72     Resp Readings from Last 1 Encounters:   09/10/21 14     Estimated body mass index is 18.16 kg/m  as calculated from the following:    Height as of 11/5/21: 1.626 m (5' 4\").    Weight as of this encounter: 48 kg (105 lb 12.8 oz).    GEN: NAD. Healthy appearing adult.   HEENT:  Anicteric, noninjected sclera. No obvious external lesions of the ear and nose. Hearing intact.  CV: S1, S2. RRR. No m/r/g  PULM: No increased work of breathing. CTA bilaterally   MSK: MCPs, PIPs, DIPs without swelling or tenderness to palpation.  Wrists without swelling or tenderness to palpation.  Elbows and shoulders without swelling or tenderness to palpation. Knees and ankles without swelling or tenderness to palpation.  Negative MTP squeeze.    SKIN: No rash or jaundice seen  PSYCH: Alert. Appropriate.      Labs / Imaging (select studies)     RF/CCP  Recent Labs   Lab Test 12/21/15  1447   CCPABY <20  Interpretation:  Negative     RHF <20     CBC  Recent Labs   Lab Test 05/11/22  1524 02/04/22  0922 11/01/21  1445 09/10/21  1225 07/02/21  0922 01/12/21  1437 07/24/20  0626 07/20/20  1117   WBC 4.4 6.7 4.1  --  5.4 5.5  --  6.5   RBC 4.39 4.50 3.92  --  4.52 4.56  --  4.20   HGB 11.8 11.9 10.5*   < > 11.9 12.1   < > 11.2* "   HCT 37.2 37.6 32.5*  --  37.7 37.8  --  35.4   MCV 85 84 83  --  83 83  --  84   RDW 12.2 12.8 12.3  --  12.5 12.7  --  12.6    216 187  --  195 221  --  218   MCH 26.9 26.4* 26.8  --  26.3* 26.5  --  26.7   MCHC 31.7 31.6 32.3  --  31.6 32.0  --  31.6   NEUTROPHIL 54 72 51  --  60.3 60.5  --  72.4   LYMPH 35 20 40  --  29.0 29.5  --  21.1   MONOCYTE 9 7 6  --  8.5 7.5  --  5.2   EOSINOPHIL 2 1 2  --  1.8 2.0  --  1.1   BASOPHIL 0 0 1  --  0.4 0.5  --  0.2   ANEU  --   --   --   --  3.3 3.3  --  4.7   ALYM  --   --   --   --  1.6 1.6  --  1.4   ANA MARÍA  --   --   --   --  0.5 0.4  --  0.3   AEOS  --   --   --   --  0.1 0.1  --  0.1   ABAS  --   --   --   --  0.0 0.0  --  0.0   ANEUTAUTO 2.4 4.8 2.1  --   --   --   --   --    ALYMPAUTO 1.5 1.3 1.6  --   --   --   --   --    AMONOAUTO 0.4 0.4 0.3  --   --   --   --   --    AEOSAUTO 0.1 0.1 0.1  --   --   --   --   --    ABSBASO 0.0 0.0 0.0  --   --   --   --   --     < > = values in this interval not displayed.     CMP  Recent Labs   Lab Test 05/11/22  1524 02/04/22  0922 11/01/21  1445 09/10/21  1202 07/02/21  0922 01/12/21  1437 07/20/20  1117 02/21/19  1427 12/12/18  1047 01/27/16  1211 06/30/15  1636   NA  --   --   --   --   --   --   --   --   --   --  140   POTASSIUM  --   --   --   --   --   --   --   --   --   --  4.1   CHLORIDE  --   --   --   --   --   --   --   --   --   --  105   CO2  --   --   --   --   --   --   --   --   --   --  30   ANIONGAP  --   --   --   --   --   --   --   --   --   --  5   GLC  --   --   --  71  --   --   --   --  88  --  73   BUN  --   --   --   --   --   --   --   --   --   --  9   CR 0.73 0.79 0.63  --  0.72 0.76 0.82   < >  --    < > 0.80   GFRESTIMATED >90 >90 >90  --  >90 >90 90   < >  --    < > 83   GFRESTBLACK  --   --   --   --  >90 >90 >90   < >  --    < > >90   GFR Calc     DAIYS  --   --   --   --   --   --   --   --   --   --  9.4   BILITOTAL 0.5 0.3 0.4  --  0.5 0.4 0.3   < >  --    < > 0.5    ALBUMIN 4.0 4.1 3.8  --  4.2 4.4 3.6   < >  --    < > 4.1   PROTTOTAL 7.2 7.4 6.8  --  7.6 7.7 7.4   < >  --    < > 8.1   ALKPHOS 53 57 61  --  59 60 52   < >  --    < > 65   AST 16 12 16  --  12 15 20   < >  --    < > 19   ALT 19 20 20  --  20 20 22   < >  --    < > 26    < > = values in this interval not displayed.     Calcium/VitaminD  Recent Labs   Lab Test 01/13/17  1355 12/21/15  1447 06/30/15  1636   DAISY  --   --  9.4   VITDT 45 74  --      ESR/CRP  Recent Labs   Lab Test 05/11/22  1524 02/04/22  0922 11/01/21  1445   SED 6 8 8   CRP <2.9 <2.9 <2.9     Hepatitis B  Recent Labs   Lab Test 03/20/17  1506 03/28/16  1442   AUSAB  --  264.69*   HBCAB Nonreactive  --    HEPBANG  --  Nonreactive     Hepatitis C  Recent Labs   Lab Test 12/21/15  1447   HCVAB Nonreactive   Assay performance characteristics have not been established for newborns,   infants, and children       Lyme confirmation testing by Western Blot  Recent Labs   Lab Test 08/05/15  1621   LYWG Negative  Reference range: Negative  (Note)  Band(s) present: NONE  (Insufficient number of bands for positive result)  INTERPRETIVE INFORMATION: Borrelia Burgdorferi Ab, IgG  Western Blot  For this assay, a positive result is reported when any 5 or  more of the following 10 bands are present: 18, 23, 28, 30,  39, 41, 45, 58, 66, or 93 kDa.  All other banding patterns  are reported as negative.  Performed by [facility],  [address] [place] [id number] [phone]  [email or web address], Pete Saha MD, Lab. Director       Tuberculosis Screening  Recent Labs   Lab Test 03/20/17  1508 03/28/16  1443   TBRSLT Negative Negative   TBAGN 0.00 0.04     HIV Screening  Recent Labs   Lab Test 12/21/15  1447   HIAGAB Nonreactive   HIV-1 p24 Ag & HIV-1/HIV-2 Ab Not Detected       Immunization History     Immunization History   Administered Date(s) Administered     COVID-19,PF,Pfizer (12+ Yrs) 11/19/2021     Influenza (IIV3) PF 10/12/2011, 10/03/2013     Influenza  Vaccine IM > 6 months Valent IIV4 (Alfuria,Fluzone) 10/10/2020     Influenza Vaccine, 6+MO IM (QUADRIVALENT W/PRESERVATIVES) 10/01/2015     Pneumo Conj 13-V (2010&after) 09/26/2016     Pneumococcal 23 valent 12/22/2016     TD (ADULT, 7+) 08/15/2001     TDAP Vaccine (Adacel) 06/20/2011     TDAP Vaccine (Boostrix) 03/06/2014          Chart documentation done in part with Dragon Voice recognition Software. Although reviewed after completion, some word and grammatical error may remain.    Hunter Monroy MD

## 2022-08-12 ENCOUNTER — LAB (OUTPATIENT)
Dept: LAB | Facility: CLINIC | Age: 42
End: 2022-08-12
Payer: COMMERCIAL

## 2022-08-12 DIAGNOSIS — Z79.899 HIGH RISK MEDICATION USE: ICD-10-CM

## 2022-08-12 DIAGNOSIS — L40.50 PSORIATIC ARTHRITIS (H): ICD-10-CM

## 2022-08-12 LAB
ALBUMIN SERPL-MCNC: 3.9 G/DL (ref 3.4–5)
ALP SERPL-CCNC: 54 U/L (ref 40–150)
ALT SERPL W P-5'-P-CCNC: 16 U/L (ref 0–50)
AST SERPL W P-5'-P-CCNC: 15 U/L (ref 0–45)
BASOPHILS # BLD AUTO: 0 10E3/UL (ref 0–0.2)
BASOPHILS NFR BLD AUTO: 0 %
BILIRUB DIRECT SERPL-MCNC: <0.1 MG/DL (ref 0–0.2)
BILIRUB SERPL-MCNC: 0.3 MG/DL (ref 0.2–1.3)
CREAT SERPL-MCNC: 0.75 MG/DL (ref 0.52–1.04)
EOSINOPHIL # BLD AUTO: 0.1 10E3/UL (ref 0–0.7)
EOSINOPHIL NFR BLD AUTO: 3 %
ERYTHROCYTE [DISTWIDTH] IN BLOOD BY AUTOMATED COUNT: 12.3 % (ref 10–15)
GFR SERPL CREATININE-BSD FRML MDRD: >90 ML/MIN/1.73M2
HCT VFR BLD AUTO: 37.1 % (ref 35–47)
HGB BLD-MCNC: 11.5 G/DL (ref 11.7–15.7)
LYMPHOCYTES # BLD AUTO: 2 10E3/UL (ref 0.8–5.3)
LYMPHOCYTES NFR BLD AUTO: 43 %
MCH RBC QN AUTO: 26.3 PG (ref 26.5–33)
MCHC RBC AUTO-ENTMCNC: 31 G/DL (ref 31.5–36.5)
MCV RBC AUTO: 85 FL (ref 78–100)
MONOCYTES # BLD AUTO: 0.4 10E3/UL (ref 0–1.3)
MONOCYTES NFR BLD AUTO: 9 %
NEUTROPHILS # BLD AUTO: 2 10E3/UL (ref 1.6–8.3)
NEUTROPHILS NFR BLD AUTO: 45 %
PLATELET # BLD AUTO: 243 10E3/UL (ref 150–450)
PROT SERPL-MCNC: 7.1 G/DL (ref 6.8–8.8)
RBC # BLD AUTO: 4.38 10E6/UL (ref 3.8–5.2)
WBC # BLD AUTO: 4.6 10E3/UL (ref 4–11)

## 2022-08-12 PROCEDURE — 80076 HEPATIC FUNCTION PANEL: CPT

## 2022-08-12 PROCEDURE — 82565 ASSAY OF CREATININE: CPT

## 2022-08-12 PROCEDURE — 36415 COLL VENOUS BLD VENIPUNCTURE: CPT

## 2022-08-12 PROCEDURE — 85025 COMPLETE CBC W/AUTO DIFF WBC: CPT

## 2022-08-19 ENCOUNTER — OFFICE VISIT (OUTPATIENT)
Dept: OPTOMETRY | Facility: CLINIC | Age: 42
End: 2022-08-19
Payer: COMMERCIAL

## 2022-08-19 DIAGNOSIS — Z01.00 EXAMINATION OF EYES AND VISION: Primary | ICD-10-CM

## 2022-08-19 DIAGNOSIS — H52.13 MYOPIA OF BOTH EYES: ICD-10-CM

## 2022-08-19 DIAGNOSIS — H52.223 REGULAR ASTIGMATISM OF BOTH EYES: ICD-10-CM

## 2022-08-19 PROCEDURE — 92015 DETERMINE REFRACTIVE STATE: CPT | Performed by: OPTOMETRIST

## 2022-08-19 PROCEDURE — 92004 COMPRE OPH EXAM NEW PT 1/>: CPT | Performed by: OPTOMETRIST

## 2022-08-19 PROCEDURE — 92310 CONTACT LENS FITTING OU: CPT | Mod: GA | Performed by: OPTOMETRIST

## 2022-08-19 ASSESSMENT — REFRACTION_WEARINGRX
OD_AXIS: 150
OS_SPHERE: -2.75
OD_SPHERE: -2.75
OD_SPHERE: -2.75
OS_AXIS: 030
OS_SPHERE: -2.75
OD_AXIS: 175
OS_CYLINDER: +0.50
OS_CYLINDER: SPHERE
SPECS_TYPE: SVL
OD_CYLINDER: +0.50
OD_CYLINDER: +0.50

## 2022-08-19 ASSESSMENT — KERATOMETRY
METHOD_AUTO_MANUAL: AUTOMATED
OS_K1POWER_DIOPTERS: 45.75
OS_AXISANGLE2_DEGREES: 141
OD_AXISANGLE_DEGREES: 108
OD_AXISANGLE2_DEGREES: 18
OS_AXISANGLE_DEGREES: 51
OS_K2POWER_DIOPTERS: 46.50
OD_K1POWER_DIOPTERS: 45.50
OD_K2POWER_DIOPTERS: 46.00

## 2022-08-19 ASSESSMENT — TONOMETRY
IOP_METHOD: APPLANATION
OS_IOP_MMHG: 8
OD_IOP_MMHG: 10

## 2022-08-19 ASSESSMENT — VISUAL ACUITY
OD_CC: 20/20
OD_CC+: -1
OD_CC: 20/20
OS_CC: 20/20
CORRECTION_TYPE: CONTACTS
OS_CC+: +1
METHOD: SNELLEN - LINEAR
OS_CC: 20/30

## 2022-08-19 ASSESSMENT — EXTERNAL EXAM - LEFT EYE: OS_EXAM: NORMAL

## 2022-08-19 ASSESSMENT — CONF VISUAL FIELD
OS_NORMAL: 1
OD_NORMAL: 1

## 2022-08-19 ASSESSMENT — REFRACTION_CURRENTRX
OS_SPHERE: -2.50
OD_SPHERE: -2.50
OS_BRAND: J&J 1-DAY ACUVUE MOIST BC 8.5, D 14.2
OD_BRAND: J&J 1-DAY ACUVUE MOIST BC 8.5, D 14.2

## 2022-08-19 ASSESSMENT — CUP TO DISC RATIO
OS_RATIO: 0.45
OD_RATIO: 0.35

## 2022-08-19 ASSESSMENT — EXTERNAL EXAM - RIGHT EYE: OD_EXAM: NORMAL

## 2022-08-19 ASSESSMENT — REFRACTION_MANIFEST
OS_SPHERE: -3.25
OD_SPHERE: -3.00
OS_CYLINDER: +0.50
OD_CYLINDER: +0.50
OS_AXIS: 030
OD_AXIS: 150

## 2022-08-19 ASSESSMENT — REFRACTION
OD_SPHERE: -3.00
OS_AXIS: 030
OS_CYLINDER: +0.50
OD_AXIS: 150
OD_CYLINDER: +0.50
OS_SPHERE: -3.25

## 2022-08-19 ASSESSMENT — SLIT LAMP EXAM - LIDS
COMMENTS: NORMAL
COMMENTS: NORMAL

## 2022-08-19 NOTE — LETTER
8/19/2022         RE: Abby Foy  [address] [place] [address]        Dear Colleague,    Thank you for referring your patient, Abby Foy, to the [facility]. Please see a copy of my visit note below.    Chief Complaint   Patient presents with     COMPREHENSIVE EYE EXAM     Contact Lens Fitting        Previous contact lens wearer? Yes: AV 1 day moist  Comfort of contact lenses : good   Satisfied with current lenses: Yes        Last Eye Exam: 2/15/19  Dilated Previously: Yes    What are you currently using to see?  glasses and contacts    Distance Vision Acuity: Satisfied with vision    Near Vision Acuity: Satisfied with vision while reading  with glasses and contacts    Eye Comfort: good  Do you use eye drops? 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       OBJECTIVE: See Ophthalmology exam    ASSESSMENT:    ICD-10-CM    1. Examination of eyes and vision  Z01.00 EYE EXAM (SIMPLE-NONBILLABLE)   2. Myopia of both eyes  H52.13 REFRACTION     CONTACT LENS FITTING,BILAT w/ signed waiver   3. Regular astigmatism of both eyes  H52.223 REFRACTION      PLAN:     Patient Instructions   Eyeglass prescription given.    Dispensed trial contacts.   Recheck vision left eye in 2 weeks or sooner if needed.    Return in 1 year for a complete eye exam or sooner if needed.    Demian Andrade OD                           Again, thank you for allowing me to participate in the care of your patient.        Sincerely,        Demian Andrade, OD

## 2022-08-19 NOTE — PATIENT INSTRUCTIONS
Eyeglass prescription given.    Dispensed trial contacts.   Recheck vision left eye in 2 weeks or sooner if needed.    Return in 1 year for a complete eye exam or sooner if needed.    Demian Andrade, JUNIOR    The affects of the dilating drops last for 4- 6 hours.  You will be more sensitive to light and vision will be blurry up close.  Do not drive if you do not feel comfortable.  Mydriatic sunglasses were given if needed.      Optometry Providers       Clinic Locations                                 Telephone Number   Dr. Josefa Avelar   [facility] [phone]     [facility] Hours:                [facility] Hours:       [facility] Hours:   [address]   [address]     [address] [place] [address]   [place] [address]    [place] [address]  Phone: [phone]                    Phone: [phone]     Phone: [phone]                      Monday 8:00-6:00                          Monday 8:00-6:00                          Monday 8:00-6:00              Tuesday 8:00-6:00                          Tuesday 8:00-6:00                          Tuesday 8:00-6:00              Wednesday 8:00-6:00                  Wednesday 8:00-6:00                   Wednesday 8:00-6:00      Thursday 8:00-6:00                        Thursday 8:00-6:00                         Thursday 8:00-6:00            Friday 8:00-5:00                              Friday 8:00-5:00                              Friday 8:00-5:00    [facility] Hours:   [address] [place], [place] [id number] [phone]    Monday 9:00-6:00  Tuesday 9:00-6:00  Wednesday 9:00-6:00  Thursday 9:00-6:00  Friday 9:00-5:00  Please log on to [email or web address] to order your contact lenses.  The link is found on the [facility] page.  As always, Thank you for trusting us with your health care  needs!

## 2022-08-19 NOTE — PROGRESS NOTES
Chief Complaint   Patient presents with     COMPREHENSIVE EYE EXAM     Contact Lens Fitting        Previous contact lens wearer? Yes: AV 1 day moist  Comfort of contact lenses : good   Satisfied with current lenses: Yes        Last Eye Exam: 2/15/19  Dilated Previously: Yes    What are you currently using to see?  glasses and contacts    Distance Vision Acuity: Satisfied with vision    Near Vision Acuity: Satisfied with vision while reading  with glasses and contacts    Eye Comfort: good  Do you use eye drops? 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       OBJECTIVE: See Ophthalmology exam    ASSESSMENT:    ICD-10-CM    1. Examination of eyes and vision  Z01.00 EYE EXAM (SIMPLE-NONBILLABLE)   2. Myopia of both eyes  H52.13 REFRACTION     CONTACT LENS FITTING,BILAT w/ signed waiver   3. Regular astigmatism of both eyes  H52.223 REFRACTION      PLAN:     Patient Instructions   Eyeglass prescription given.    Dispensed trial contacts.   Recheck vision left eye in 2 weeks or sooner if needed.    Return in 1 year for a complete eye exam or sooner if needed.    Demian Andrade, OD

## 2022-09-02 ENCOUNTER — ALLIED HEALTH/NURSE VISIT (OUTPATIENT)
Dept: OPTOMETRY | Facility: CLINIC | Age: 42
End: 2022-09-02
Payer: COMMERCIAL

## 2022-09-02 DIAGNOSIS — H52.13 MYOPIA OF BOTH EYES: Primary | ICD-10-CM

## 2022-09-02 DIAGNOSIS — H53.8 BLURRED VISION, LEFT EYE: ICD-10-CM

## 2022-09-02 PROCEDURE — 92499 UNLISTED OPH SVC/PROCEDURE: CPT | Performed by: OPTOMETRIST

## 2022-09-02 PROCEDURE — 99207 PR NO CHARGE LOS: CPT | Performed by: OPTOMETRIST

## 2022-09-02 ASSESSMENT — VISUAL ACUITY
OS_CC: 20/25
VA_OR_OD_CURRENT_RX: 20/20
METHOD: SNELLEN - LINEAR
VA_OR_OS_CURRENT_RX: 20/25+2
CORRECTION_TYPE: CONTACTS
OD_CC: 20/20

## 2022-09-02 ASSESSMENT — REFRACTION_CURRENTRX
OD_SPHERE: -2.75
OD_BRAND: J&J 1-DAY ACUVUE MOIST BC 8.5, D 14.2
OS_BRAND: J&J 1-DAY ACUVUE MOIST BC 8.5, D 14.2
OS_SPHERE: -2.75

## 2022-09-02 NOTE — PATIENT INSTRUCTIONS
Ok to order contact lenses.    Return in 1 month for vision check left eye.    Demian Andrade, OD

## 2022-09-02 NOTE — PROGRESS NOTES
Chief Complaint   Patient presents with     Contact Lens Check     Checking va in os eye if vision has cleared up.     Vision is good when looking with both eyes.    Poked left eye this am when putting contact lens in.      Medical, surgical and family histories reviewed and updated 9/2/2022.       OBJECTIVE: See Ophthalmology exam    ASSESSMENT:    ICD-10-CM    1. Myopia of both eyes  H52.13    2. Blurred vision, left eye  H53.8     Improved visual acuity today 20/25 + 2, but irritated due to poking eye      PLAN:     Patient Instructions   Ok to order contact lenses.    Return in 1 month for vision check left eye.    Demian Andrade, OD

## 2022-09-16 ENCOUNTER — ANCILLARY PROCEDURE (OUTPATIENT)
Dept: MAMMOGRAPHY | Facility: CLINIC | Age: 42
End: 2022-09-16
Attending: OBSTETRICS & GYNECOLOGY
Payer: COMMERCIAL

## 2022-09-16 DIAGNOSIS — Z12.31 VISIT FOR SCREENING MAMMOGRAM: ICD-10-CM

## 2022-09-16 PROCEDURE — 77067 SCR MAMMO BI INCL CAD: CPT | Mod: GC

## 2022-10-12 ENCOUNTER — ALLIED HEALTH/NURSE VISIT (OUTPATIENT)
Dept: OPTOMETRY | Facility: CLINIC | Age: 42
End: 2022-10-12
Payer: COMMERCIAL

## 2022-10-12 DIAGNOSIS — H52.13 MYOPIA OF BOTH EYES: Primary | ICD-10-CM

## 2022-10-12 DIAGNOSIS — H53.8 BLURRED VISION, LEFT EYE: ICD-10-CM

## 2022-10-12 PROCEDURE — 99207 PR NO CHARGE LOS: CPT | Performed by: OPTOMETRIST

## 2022-10-12 ASSESSMENT — VISUAL ACUITY
OD_CC: 20/20
CORRECTION_TYPE: CONTACTS
METHOD: SNELLEN - LINEAR
OS_CC: 20/20

## 2022-10-12 ASSESSMENT — REFRACTION_CURRENTRX
OD_BRAND: J&J 1-DAY ACUVUE MOIST BC 8.5, D 14.2
OS_BRAND: J&J 1-DAY ACUVUE MOIST BC 8.5, D 14.2
OS_SPHERE: -2.75
OD_SPHERE: -2.75

## 2022-10-12 NOTE — PATIENT INSTRUCTIONS
Contact lens prescription given and form signed.    Return in 1 year for a complete eye exam or sooner if needed.    Demian Andrade, OD

## 2022-10-12 NOTE — PROGRESS NOTES
Contact lens check    Good comfort::yes  Clear vision: yes    Recheck vision left eye, blurred vision at exam 8/19/2022-  And cl check- but eye was irritated at that visit.    OBJECTIVE:     See contact lens exam    ASSESSMENT:         ICD-10-CM    1. Myopia of both eyes  H52.13       2. Blurred vision, left eye  H53.8     Resolved      Good fit with contacts and good vision left eye and right eye.      PLAN:      Patient Instructions   Contact lens prescription given and form signed.    Return in 1 year for a complete eye exam or sooner if needed.    Demian Andrade, OD

## 2022-11-10 ENCOUNTER — OFFICE VISIT (OUTPATIENT)
Dept: OBGYN | Facility: CLINIC | Age: 42
End: 2022-11-10
Attending: OBSTETRICS & GYNECOLOGY
Payer: COMMERCIAL

## 2022-11-10 VITALS
BODY MASS INDEX: 18.1 KG/M2 | WEIGHT: 106 LBS | SYSTOLIC BLOOD PRESSURE: 113 MMHG | DIASTOLIC BLOOD PRESSURE: 77 MMHG | HEIGHT: 64 IN | HEART RATE: 87 BPM

## 2022-11-10 DIAGNOSIS — N93.9 ABNORMAL UTERINE BLEEDING (AUB): Primary | ICD-10-CM

## 2022-11-10 PROCEDURE — 99213 OFFICE O/P EST LOW 20 MIN: CPT | Mod: 25 | Performed by: OBSTETRICS & GYNECOLOGY

## 2022-11-10 PROCEDURE — G0463 HOSPITAL OUTPT CLINIC VISIT: HCPCS | Performed by: OBSTETRICS & GYNECOLOGY

## 2022-11-10 PROCEDURE — 88305 TISSUE EXAM BY PATHOLOGIST: CPT | Mod: TC | Performed by: OBSTETRICS & GYNECOLOGY

## 2022-11-10 PROCEDURE — 58100 BIOPSY OF UTERUS LINING: CPT | Performed by: OBSTETRICS & GYNECOLOGY

## 2022-11-10 PROCEDURE — 64435 NJX AA&/STRD PARACRV NRV: CPT | Performed by: OBSTETRICS & GYNECOLOGY

## 2022-11-10 PROCEDURE — 88305 TISSUE EXAM BY PATHOLOGIST: CPT | Mod: 26 | Performed by: PATHOLOGY

## 2022-11-10 ASSESSMENT — PAIN SCALES - GENERAL: PAINLEVEL: NO PAIN (0)

## 2022-11-10 ASSESSMENT — ANXIETY QUESTIONNAIRES
3. WORRYING TOO MUCH ABOUT DIFFERENT THINGS: NOT AT ALL
6. BECOMING EASILY ANNOYED OR IRRITABLE: NOT AT ALL
5. BEING SO RESTLESS THAT IT IS HARD TO SIT STILL: NOT AT ALL
GAD7 TOTAL SCORE: 0
7. FEELING AFRAID AS IF SOMETHING AWFUL MIGHT HAPPEN: NOT AT ALL
1. FEELING NERVOUS, ANXIOUS, OR ON EDGE: NOT AT ALL
2. NOT BEING ABLE TO STOP OR CONTROL WORRYING: NOT AT ALL
GAD7 TOTAL SCORE: 0

## 2022-11-10 ASSESSMENT — PATIENT HEALTH QUESTIONNAIRE - PHQ9
5. POOR APPETITE OR OVEREATING: NOT AT ALL
SUM OF ALL RESPONSES TO PHQ QUESTIONS 1-9: 2

## 2022-11-10 NOTE — PROGRESS NOTES
CC/HPI:   Abby Foy is a 42 year old female s/p repeat endometrial ablation on 9/2021 who presents today for her annual exam and to discuss abnormal bleeding. She has a bilateral salpingectomy and would like to continue with this method of birth control.    She has had irregular spotting since her repeat ablation in September, 2021, but in the last month has had more prolonged bleeding with brown and red blood lasting for 3 weeks.  She denies pain.  Bleeding seems worse with activity.  She states the amount of bleeding is tolerable, but the unpredictability is frustrating.  She is agreeable to cervical dilation and endometrial biopsy today.    HISTORIES:  Patient Active Problem List   Diagnosis     CARDIOVASCULAR SCREENING; LDL GOAL LESS THAN 160     Urticaria     Diagnostic skin and sensitization tests     Nonallergic rhinitis     Angioedema of lips     H. pylori infection     GPC (giant papillary conjunctivitis)     Seronegative spondyloarthropathy     Midline low back pain without sciatica     Abnormal uterine bleeding (AUB)- on OCPs     Psoriatic arthritis (H)     Past Medical History:   Diagnosis Date     Angioedema of lips episode in 3/13--idiopathic     Complication of anesthesia     ponv     Contraception 1/28/2009     Diagnostic skin and sensitization tests 3/27/13 skin tests all NEGATIVE for environmental and food allergens including shellfish and nuts.      Nonallergic rhinitis     3/27/13 skin tests all NEGATIVE for environmental and food allergens including shellfish and nuts. 3/27/13 followup IgE tests NEG to [facility], shrimp, macadamia nut, pistachio and walnut.     Rheumatoid arthritis of multiple sites with negative rheumatoid factor (H) 12/31/2015     Past Surgical History:   Procedure Laterality Date     DILATE CERVIX, HYSTEROSCOPY, ABLATE ENDOMETRIUM HYDROTHERMAL, COMBINED N/A 7/24/2020    Procedure: DILATION, CERVIX, WITH HYSTEROSCOPY AND HYDROTHERMAL ENDOMETRIAL ABLATION;  Surgeon:  Apryl West MD;  [facility]     DILATE CERVIX, HYSTEROSCOPY, ABLATE ENDOMETRIUM HYDROTHERMAL, COMBINED N/A 9/10/2021    Procedure: DILATION, CERVIX, WITH HYSTEROSCOPY AND HYDROTHERMAL ENDOMETRIAL ABLATION;  Surgeon: Apryl West MD;  [facility]     ENDOMETRIAL SAMPLING (BIOPSY) N/A 7/24/2020    Procedure: BIOPSY, ENDOMETRIUM;  Surgeon: Apryl West MD;  [facility]     LAPAROSCOPIC SALPINGECTOMY Bilateral 6/23/2017    Procedure: LAPAROSCOPIC SALPINGECTOMY;  Operative Laparoscopy, Bilateral Salpingectomy ;  Surgeon: Apryl West MD;  [facility]     OPERATIVE HYSTEROSCOPY WITH MORCELLATOR N/A 12/12/2018    Procedure: DIAGNOSTIC HYSTEROSCOPY AND D AND C;  Surgeon: Apryl West MD;  [facility]     Current Outpatient Medications   Medication Sig Dispense Refill     apremilast (OTEZLA) 30 MG tablet Take 1 tablet (30 mg) by mouth daily 60 tablet 6     diclofenac (VOLTAREN) 1 % topical gel Apply up to 2 grams of 1% gel to hands up to 4 times daily as needed for joint pain (maximum: 8 g per upper extremity per day) 200 g 2     ibuprofen (ADVIL/MOTRIN) 200 MG capsule Take 3 capsules (600 mg) by mouth every 6 hours as needed for fever       leflunomide (ARAVA) 10 MG tablet Take 1 tablet (10 mg) by mouth daily 90 tablet 2     multivitamin peds with iron (FLINTSTONES COMPLETE) 60 MG chewable tablet Take 1 chew tab by mouth daily.       Allergies   Allergen Reactions     Shrimp Swelling     Lips and tongue     Walnuts [Nuts] Swelling     Lips and tongue       Social History     Socioeconomic History     Marital status:      Spouse name: Not on file     Number of children: 1     Years of education: Not on file     Highest education level: Not on file   Occupational History     Employer: [employer]    Tobacco Use     Smoking status: Never     Smokeless tobacco: Never   Substance and Sexual Activity     Alcohol use: Yes     Comment: 1-3 drinks/ week     Drug use: No      Sexual activity: Yes     Partners: Male     Birth control/protection: Pill, Male Surgical, Female Surgical     Comment: bilateral salpingectomy   Other Topics Concern     Parent/sibling w/ CABG, MI or angioplasty before 65F 55M? No   Social History Narrative    How much exercise per week? 4 times    How much calcium per day? 2 servings and multivits       How much caffeine per day? 1 cup    How much vitamin D per day? In foods and sunlight    Do you/your family wear seatbelts?  Yes    Do you/your family use safety helmets? Yes    Do you/your family use sunscreen? Yes    Do you/your family keep firearms in the home? No    Do you/your family have a smoke detector(s)? Yes        Do you feel safe in your home? Yes    Has anyone ever touched you in an unwanted manner? No     Explain          Social Determinants of Health     Financial Resource Strain: Not on file   Food Insecurity: Not on file   Transportation Needs: Not on file   Physical Activity: Not on file   Stress: Not on file   Social Connections: Not on file   Intimate Partner Violence: Not on file   Housing Stability: Not on file     Family History   Problem Relation Age of Onset     Hypertension Maternal Grandmother      Autoimmune Disease Maternal Grandmother         Autoimmune hepatitis     Cancer Maternal Grandfather      Hypertension Paternal Grandmother      Cancer - colorectal Paternal Grandfather      Cardiovascular Paternal Grandfather      Other Cancer Paternal Grandfather      Hypertension Mother      Autoimmune Disease Mother         Autoimmune hepatitis     Hyperlipidemia Mother      Asthma Mother      Hypertension Father      Diabetes Father         Type 2     Multiple Sclerosis Maternal Aunt      Cerebrovascular Disease No family hx of      Thyroid Disease No family hx of      Glaucoma No family hx of      Macular Degeneration No family hx of      Breast Cancer No family hx of      Colon Cancer No family hx of           Gyn Hx:   Patient's last  "menstrual period was 10/29/2022.  Menses:   See hpi    ROS: 10 point ROS neg other than the symptoms noted above in the HPI.      EXAM:  /77   Pulse 87   Ht 1.626 m (5' 4\")   Wt 48.1 kg (106 lb)   LMP 10/29/2022   BMI 18.19 kg/m    Body mass index is 18.19 kg/m .    General - pleasant female in no acute distress.  Skin - no suspicious lesions or rashes  Lungs - non-labored respirations  Heart - regular rate, well perfused  Breasts- no examined, mammogram 9/16/2022  Abdomen - soft, nontender, nondistended, no masses or organomegaly noted.  Musculoskeletal - no gross deformities.  Neurological - normal strength, sensation, and mental status.  Pelvic - EG: normal  female, vulva reveals no erythema or lesions.   BUS: within normal limits.  Vagina: well rugated, no lesions polyps or suspicious  discharge.     Cervix: no lesions, polyps discharge or CMT. multiparous  Uterus: firm, anteverted, normal size and nontender. Sounds to 6 cm  Adnexa: no masses or tenderness.  Anus- normal, no lesions.  Rectovaginal - deferred.    ASSESSMENT/PLAN  AUB s/p endometrial ablation x2     Discussed current bleeding likely result of endometrial ablation.  Discussed evaluation including cervical dilation and EMB done today.  Recommend pelvic ultrasound in 3-4 weeks after EMB  Discussed management options, declines hormones, did not tolerate in the past.  Discussed option of hysterectomy, she would be a good candidate for vaginal hysterectomy. Reviewed general information about hysterectomy, plan to leave ovaries.  Typical risks, most serious risks and recovery time/limitations.    Patient will see how things go with dilation of cervix.  Plan to discuss plan after ultrasound and after she's had time to consider and discuss with her .    Apryl West MD     Endometrial Biopsy                                                                       Time Out - \"Pause for the Cause\"  Just before the procedure begins, through " verbal and active participation of team members, verify:    Initials   Patient Name    mip   Patient Date of Birth mip   Procedure to be performed  mip   Site, laterality, level or multiples, noting patient position   mip   Relevant images or diagnostics available/displayed   mip   Implants, special equipment or special requirements        mip     Consent:  Written consent signed and scanned into medical record.    Indication: AUB after endometrial ablation  Faculty:  [facility]  Pregnancy test:  Not done, post bilateral salpingectomy    Using a medium Graves speculum, the cervix was visualized. The cervix was prepped with Betadine.  A paracervical block was administered with 20cc 1% lidocaine.  A single tooth tenaculum was applied to the anterior lip of the cervix. Using hegar dilators the cervix was dilated to 4mm.  No significant drainage of hematometra.  The endometrial pipelle was advanced through the cervix without difficulty and a sample collected. One additional pass was made.  The tenaculum was removed from the cervix and the tenaculum site made hemostatic with pressure.    EBL: 3cc  Complications:  None apparent  Pathology: EMB sample was sent to pathology.  Tolerance of Procedure:  Patient did tolerate the procedure well.     Patient was instructed to call if she experiences any heavy bleeding, severe cramping, or abnormal vaginal discharge.  May take ibuprofen 400-800 mg PO TID PRN or naproxen 500 mg PO BID for cramping.    Will notify patient of results.        The patient will be notified of any results via [employer].    [facility]  Medical Student    I, Apryl West, was present with the medical student who participated in the service and in the documentation of the note. I have verified the history and personally performed the physical exam and medical decision making. I agree with the assessment and plan of care as documented in the note.  I preformed the cervical dilation and EMB.     Apryl Vargas  MD [facility]

## 2022-11-10 NOTE — LETTER
11/10/2022       RE: Abby Foy  [id number] [facility] [place] [address]     Dear Colleague,    Thank you for referring your patient, Abby Foy, to the [facility] [place] at [facility]. Please see a copy of my visit note below.    Chief Complaint   Patient presents with     Physical     Annual exam   Cecilia Steward LPN    CC/HPI:   Abby Foy is a 42 year old female s/p repeat endometrial ablation on 9/2021 who presents today for her annual exam and to discuss abnormal bleeding. She has a bilateral salpingectomy and would like to continue with this method of birth control.    She has had irregular spotting since her repeat ablation in September, 2021, but in the last month has had more prolonged bleeding with brown and red blood lasting for 3 weeks.  She denies pain.  Bleeding seems worse with activity.  She states the amount of bleeding is tolerable, but the unpredictability is frustrating.  She is agreeable to cervical dilation and endometrial biopsy today.    HISTORIES:  Patient Active Problem List   Diagnosis     CARDIOVASCULAR SCREENING; LDL GOAL LESS THAN 160     Urticaria     Diagnostic skin and sensitization tests     Nonallergic rhinitis     Angioedema of lips     H. pylori infection     GPC (giant papillary conjunctivitis)     Seronegative spondyloarthropathy     Midline low back pain without sciatica     Abnormal uterine bleeding (AUB)- on OCPs     Psoriatic arthritis (H)     Past Medical History:   Diagnosis Date     Angioedema of lips episode in 3/13--idiopathic     Complication of anesthesia     ponv     Contraception 1/28/2009     Diagnostic skin and sensitization tests 3/27/13 skin tests all NEGATIVE for environmental and food allergens including shellfish and nuts.      Nonallergic rhinitis     3/27/13 skin tests all NEGATIVE for environmental and food allergens including shellfish and nuts. 3/27/13  followup IgE tests NEG to [facility], shrimp, macadamia nut, pistachio and walnut.     Rheumatoid arthritis of multiple sites with negative rheumatoid factor (H) 12/31/2015     Past Surgical History:   Procedure Laterality Date     DILATE CERVIX, HYSTEROSCOPY, ABLATE ENDOMETRIUM HYDROTHERMAL, COMBINED N/A 7/24/2020    Procedure: DILATION, CERVIX, WITH HYSTEROSCOPY AND HYDROTHERMAL ENDOMETRIAL ABLATION;  Surgeon: Apryl West MD;  [facility]     DILATE CERVIX, HYSTEROSCOPY, ABLATE ENDOMETRIUM HYDROTHERMAL, COMBINED N/A 9/10/2021    Procedure: DILATION, CERVIX, WITH HYSTEROSCOPY AND HYDROTHERMAL ENDOMETRIAL ABLATION;  Surgeon: Apryl West MD;  [facility]     ENDOMETRIAL SAMPLING (BIOPSY) N/A 7/24/2020    Procedure: BIOPSY, ENDOMETRIUM;  Surgeon: Apryl West MD;  [facility]     LAPAROSCOPIC SALPINGECTOMY Bilateral 6/23/2017    Procedure: LAPAROSCOPIC SALPINGECTOMY;  Operative Laparoscopy, Bilateral Salpingectomy ;  Surgeon: Apryl West MD;  [facility]     OPERATIVE HYSTEROSCOPY WITH MORCELLATOR N/A 12/12/2018    Procedure: DIAGNOSTIC HYSTEROSCOPY AND D AND C;  Surgeon: Apryl West MD;  [facility]     Current Outpatient Medications   Medication Sig Dispense Refill     apremilast (OTEZLA) 30 MG tablet Take 1 tablet (30 mg) by mouth daily 60 tablet 6     diclofenac (VOLTAREN) 1 % topical gel Apply up to 2 grams of 1% gel to hands up to 4 times daily as needed for joint pain (maximum: 8 g per upper extremity per day) 200 g 2     ibuprofen (ADVIL/MOTRIN) 200 MG capsule Take 3 capsules (600 mg) by mouth every 6 hours as needed for fever       leflunomide (ARAVA) 10 MG tablet Take 1 tablet (10 mg) by mouth daily 90 tablet 2     multivitamin peds with iron (FLINTSTONES COMPLETE) 60 MG chewable tablet Take 1 chew tab by mouth daily.       Allergies   Allergen Reactions     Shrimp Swelling     Lips and tongue     Walnuts [Nuts] Swelling     Lips and tongue       Social  History     Socioeconomic History     Marital status:      Spouse name: Not on file     Number of children: 1     Years of education: Not on file     Highest education level: Not on file   Occupational History     Employer: [employer]    Tobacco Use     Smoking status: Never     Smokeless tobacco: Never   Substance and Sexual Activity     Alcohol use: Yes     Comment: 1-3 drinks/ week     Drug use: No     Sexual activity: Yes     Partners: Male     Birth control/protection: Pill, Male Surgical, Female Surgical     Comment: bilateral salpingectomy   Other Topics Concern     Parent/sibling w/ CABG, MI or angioplasty before 65F 55M? No   Social History Narrative    How much exercise per week? 4 times    How much calcium per day? 2 servings and multivits       How much caffeine per day? 1 cup    How much vitamin D per day? In foods and sunlight    Do you/your family wear seatbelts?  Yes    Do you/your family use safety helmets? Yes    Do you/your family use sunscreen? Yes    Do you/your family keep firearms in the home? No    Do you/your family have a smoke detector(s)? Yes        Do you feel safe in your home? Yes    Has anyone ever touched you in an unwanted manner? No     Explain          Social Determinants of Health     Financial Resource Strain: Not on file   Food Insecurity: Not on file   Transportation Needs: Not on file   Physical Activity: Not on file   Stress: Not on file   Social Connections: Not on file   Intimate Partner Violence: Not on file   Housing Stability: Not on file     Family History   Problem Relation Age of Onset     Hypertension Maternal Grandmother      Autoimmune Disease Maternal Grandmother         Autoimmune hepatitis     Cancer Maternal Grandfather      Hypertension Paternal Grandmother      Cancer - colorectal Paternal Grandfather      Cardiovascular Paternal Grandfather      Other Cancer Paternal Grandfather      Hypertension Mother      Autoimmune Disease Mother          "Autoimmune hepatitis     Hyperlipidemia Mother      Asthma Mother      Hypertension Father      Diabetes Father         Type 2     Multiple Sclerosis Maternal Aunt      Cerebrovascular Disease No family hx of      Thyroid Disease No family hx of      Glaucoma No family hx of      Macular Degeneration No family hx of      Breast Cancer No family hx of      Colon Cancer No family hx of           Gyn Hx:   Patient's last menstrual period was 10/29/2022.  Menses:   See hpi    ROS: 10 point ROS neg other than the symptoms noted above in the HPI.      EXAM:  /77   Pulse 87   Ht 1.626 m (5' 4\")   Wt 48.1 kg (106 lb)   LMP 10/29/2022   BMI 18.19 kg/m    Body mass index is 18.19 kg/m .    General - pleasant female in no acute distress.  Skin - no suspicious lesions or rashes  Lungs - non-labored respirations  Heart - regular rate, well perfused  Breasts- no examined, mammogram 9/16/2022  Abdomen - soft, nontender, nondistended, no masses or organomegaly noted.  Musculoskeletal - no gross deformities.  Neurological - normal strength, sensation, and mental status.  Pelvic - EG: normal  female, vulva reveals no erythema or lesions.   BUS: within normal limits.  Vagina: well rugated, no lesions polyps or suspicious  discharge.     Cervix: no lesions, polyps discharge or CMT. multiparous  Uterus: firm, anteverted, normal size and nontender. Sounds to 6 cm  Adnexa: no masses or tenderness.  Anus- normal, no lesions.  Rectovaginal - deferred.    ASSESSMENT/PLAN  AUB s/p endometrial ablation x2     Discussed current bleeding likely result of endometrial ablation.  Discussed evaluation including cervical dilation and EMB done today.  Recommend pelvic ultrasound in 3-4 weeks after EMB  Discussed management options, declines hormones, did not tolerate in the past.  Discussed option of hysterectomy, she would be a good candidate for vaginal hysterectomy. Reviewed general information about hysterectomy, plan to leave ovaries.  " "Typical risks, most serious risks and recovery time/limitations.    Patient will see how things go with dilation of cervix.  Plan to discuss plan after ultrasound and after she's had time to consider and discuss with her .    Apryl West MD     Endometrial Biopsy                                                                       Time Out - \"Pause for the Cause\"  Just before the procedure begins, through verbal and active participation of team members, verify:    Initials   Patient Name    mip   Patient Date of Birth mip   Procedure to be performed  mip   Site, laterality, level or multiples, noting patient position   mip   Relevant images or diagnostics available/displayed   mip   Implants, special equipment or special requirements        mip     Consent:  Written consent signed and scanned into medical record.    Indication: AUB after endometrial ablation  Faculty:  [facility]  Pregnancy test:  Not done, post bilateral salpingectomy    Using a medium Graves speculum, the cervix was visualized. The cervix was prepped with Betadine.  A paracervical block was administered with 20cc 1% lidocaine.  A single tooth tenaculum was applied to the anterior lip of the cervix. Using hegar dilators the cervix was dilated to 4mm.  No significant drainage of hematometra.  The endometrial pipelle was advanced through the cervix without difficulty and a sample collected. One additional pass was made.  The tenaculum was removed from the cervix and the tenaculum site made hemostatic with pressure.    EBL: 3cc  Complications:  None apparent  Pathology: EMB sample was sent to pathology.  Tolerance of Procedure:  Patient did tolerate the procedure well.     Patient was instructed to call if she experiences any heavy bleeding, severe cramping, or abnormal vaginal discharge.  May take ibuprofen 400-800 mg PO TID PRN or naproxen 500 mg PO BID for cramping.    Will notify patient of results.        The patient will be notified of " any results via [employer].    [facility]  Medical Student    I, Apryl West, was present with the medical student who participated in the service and in the documentation of the note. I have verified the history and personally performed the physical exam and medical decision making. I agree with the assessment and plan of care as documented in the note.  I preformed the cervical dilation and EMB.     Apryl West MD

## 2022-11-14 ENCOUNTER — LAB (OUTPATIENT)
Dept: LAB | Facility: CLINIC | Age: 42
End: 2022-11-14
Payer: COMMERCIAL

## 2022-11-14 DIAGNOSIS — L40.50 PSORIATIC ARTHRITIS (H): ICD-10-CM

## 2022-11-14 DIAGNOSIS — Z79.899 HIGH RISK MEDICATION USE: ICD-10-CM

## 2022-11-14 LAB
ALBUMIN SERPL-MCNC: 4.1 G/DL (ref 3.4–5)
ALP SERPL-CCNC: 51 U/L (ref 40–150)
ALT SERPL W P-5'-P-CCNC: 18 U/L (ref 0–50)
AST SERPL W P-5'-P-CCNC: 15 U/L (ref 0–45)
BASOPHILS # BLD AUTO: 0 10E3/UL (ref 0–0.2)
BASOPHILS NFR BLD AUTO: 0 %
BILIRUB DIRECT SERPL-MCNC: 0.1 MG/DL (ref 0–0.2)
BILIRUB SERPL-MCNC: 0.4 MG/DL (ref 0.2–1.3)
CREAT SERPL-MCNC: 0.68 MG/DL (ref 0.52–1.04)
CRP SERPL-MCNC: <2.9 MG/L (ref 0–8)
EOSINOPHIL # BLD AUTO: 0.1 10E3/UL (ref 0–0.7)
EOSINOPHIL NFR BLD AUTO: 1 %
ERYTHROCYTE [DISTWIDTH] IN BLOOD BY AUTOMATED COUNT: 12.2 % (ref 10–15)
ERYTHROCYTE [SEDIMENTATION RATE] IN BLOOD BY WESTERGREN METHOD: 8 MM/HR (ref 0–20)
GFR SERPL CREATININE-BSD FRML MDRD: >90 ML/MIN/1.73M2
HCT VFR BLD AUTO: 35.2 % (ref 35–47)
HGB BLD-MCNC: 11.1 G/DL (ref 11.7–15.7)
LYMPHOCYTES # BLD AUTO: 1.4 10E3/UL (ref 0.8–5.3)
LYMPHOCYTES NFR BLD AUTO: 23 %
MCH RBC QN AUTO: 26.6 PG (ref 26.5–33)
MCHC RBC AUTO-ENTMCNC: 31.5 G/DL (ref 31.5–36.5)
MCV RBC AUTO: 84 FL (ref 78–100)
MONOCYTES # BLD AUTO: 0.4 10E3/UL (ref 0–1.3)
MONOCYTES NFR BLD AUTO: 7 %
NEUTROPHILS # BLD AUTO: 4 10E3/UL (ref 1.6–8.3)
NEUTROPHILS NFR BLD AUTO: 68 %
PLATELET # BLD AUTO: 232 10E3/UL (ref 150–450)
PROT SERPL-MCNC: 7.2 G/DL (ref 6.8–8.8)
RBC # BLD AUTO: 4.17 10E6/UL (ref 3.8–5.2)
WBC # BLD AUTO: 5.9 10E3/UL (ref 4–11)

## 2022-11-14 PROCEDURE — 86140 C-REACTIVE PROTEIN: CPT

## 2022-11-14 PROCEDURE — 85025 COMPLETE CBC W/AUTO DIFF WBC: CPT

## 2022-11-14 PROCEDURE — 82565 ASSAY OF CREATININE: CPT

## 2022-11-14 PROCEDURE — 85652 RBC SED RATE AUTOMATED: CPT

## 2022-11-14 PROCEDURE — 80076 HEPATIC FUNCTION PANEL: CPT

## 2022-11-14 PROCEDURE — 36415 COLL VENOUS BLD VENIPUNCTURE: CPT

## 2022-11-18 ENCOUNTER — OFFICE VISIT (OUTPATIENT)
Dept: RHEUMATOLOGY | Facility: CLINIC | Age: 42
End: 2022-11-18
Payer: COMMERCIAL

## 2022-11-18 VITALS
SYSTOLIC BLOOD PRESSURE: 116 MMHG | HEART RATE: 80 BPM | WEIGHT: 107 LBS | OXYGEN SATURATION: 99 % | RESPIRATION RATE: 16 BRPM | BODY MASS INDEX: 18.37 KG/M2 | DIASTOLIC BLOOD PRESSURE: 73 MMHG

## 2022-11-18 DIAGNOSIS — Z79.899 HIGH RISK MEDICATION USE: ICD-10-CM

## 2022-11-18 DIAGNOSIS — L40.50 PSORIATIC ARTHRITIS (H): Primary | ICD-10-CM

## 2022-11-18 PROCEDURE — 99214 OFFICE O/P EST MOD 30 MIN: CPT | Performed by: INTERNAL MEDICINE

## 2022-11-18 RX ORDER — APREMILAST 30 MG/1
30 TABLET, FILM COATED ORAL DAILY
Qty: 60 TABLET | Refills: 6 | Status: SHIPPED | OUTPATIENT
Start: 2022-11-18 | End: 2023-05-15

## 2022-11-18 RX ORDER — LEFLUNOMIDE 10 MG/1
10 TABLET ORAL DAILY
Qty: 90 TABLET | Refills: 2 | Status: SHIPPED | OUTPATIENT
Start: 2022-11-18 | End: 2023-05-15

## 2022-11-18 ASSESSMENT — PAIN SCALES - GENERAL: PAINLEVEL: NO PAIN (0)

## 2022-11-18 NOTE — PROGRESS NOTES
Rheumatology Clinic Visit      Abby Foy MRN# [id number]   YOB: 1980 Age: 42 year old      Date of visit: 11/18/22   PCP: Sandi Duffy  Dermatologist: Amy Patel  Ophthalmologist: Dr. Mathis     Chief Complaint   Patient presents with:  Follow Up    Assessment and Plan   1. 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 However, given her continued back pain that improved with activity but no radiographic evidence for inflammatory arthritis or osteoarthritis, there was concern that she had inflammatory back pain that would be more consistent with a spondylarthritis. Humira was started and resulted in improvement of her back pain, suggesting axial disease.  Arthritis improved with methotrexate and sulfasalazine, but she was having headaches and therefore the most likely culprit methotrexate was reduced until her headaches worsened significantly and therefore sulfasalazine and methotrexate were both discontinued. She had resolution of her headaches after discontinuing both sulfasalazine and methotrexate. I believe that the sulfasalazine was the cause of her headaches. Therefore, cautious retrial of methotrexate in the future could be done.  She was doing well on a combination of leflunomide 10 mg daily and Humira 40 mg SQ every 14 days but Humira had to be stopped because of issues with the  program; so Simponi was Rx'd but never started.  Intermittent inflammatory symptom so Otezla was prescribed but but delayed starting until March 2020, it was found be effective but the twice daily dose was resulting in worsening headaches that resolved with a once daily dose. Was on doing well on a combination of leflunomide 10 mg daily and Otezla daily;  previously leflunomide was discontinued in an effort to get to the lowest effective dose but she had return of symptoms primarily affecting the PIPs so it was restarted. Currently on leflunomide 10 mg daily and Otezla 30 mg daily and doing well with regard to inflammatory arthritis.  Infrequently with left second MCP and right third-fourth MCP ache is of short duration and resolve spontaneously; no swelling.  She feels like she is doing well and does not want to change therapy at this time.  Chronic illness, stable.    - Continue leflunomide 10 mg daily  - Continue Otezla 30mg daily  - Continue diclofenac (VOLTAREN) 1 % topical gel; Apply up to 2 grams of 1% gel to hands up to 4 times daily as needed for joint pain (maximum: 8 g per upper extremity per day)    - Labs in 3 months: CBC, Creatinine, Hepatic Panel  - Labs in 6 months: CBC, Creatinine, Hepatic Panel, ESR, CRP     High risk medication requiring intensive toxicity monitoring at least quarterly: labs ordered include CBC, Creatinine, Hepatic panel to monitor for cytopenia and hepatotoxicity; checking creatinine as it affects clearance of medication.      # Pregnancy Planning: s/p salpingectomy;  had a vasectomy    2. 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 Interestingly when leflunomide was stopped between 3/2018 and  6/15/2018, she experienced increased L>R eye irritation that when bothering her only affected one eye at a time.   Not an issue at this time.     3.  Vaccinations: Vaccinations reviewed with Ms. Foy.   - Influenza: Advised yearly vaccination  -  COVID-19 vaccine: Advised updating, and to hold leflunomide for 2 weeks afterward     Total minutes spent in evaluation with patient, documentation, , and review of pertinent studies and chart notes: 16    Ms. Foy verbalized agreement with and understanding of the rational for the diagnosis and treatment  plan.  All questions were answered to best of my ability and the patient's satisfaction. Ms. Foy was advised to contact the clinic with any questions that may arise after the clinic visit.      Thank you for involving me in the care of the patient    Return to clinic: 6 months    HPI   Abby Foy is a 42 year old female with medical history significant for urticaria, H. pylori infection, and iritis? who returns for f/u of seronegative spondyloarthropathy.    Today, 11/18/2022: intermittent ache at the left second MCP and right third and fourth MCPs that responds well to Voltaren gel, and is typically of short duration and mild severity.  Arthritis is not limiting daily activities.  States like leflunomide and Otezla have been effective for her arthritis.    Denies fevers, chills, nausea, vomiting, constipation, diarrhea. No abdominal pain.  No black or bloody stools.  No chest pain/pressure, palpitations, or shortness of breath. No neck pain. Occasional cold sores. No eye pain or redness    Tobacco: None  EtOH: 1-2 drinks on the weekends  Drugs: None  Occupation: Dietitian at the [employer]    ROS   12 point review of systems completed and negative except as noted in the HPI    Active Problem List     Patient Active Problem List   Diagnosis     CARDIOVASCULAR SCREENING; LDL GOAL LESS THAN 160     Urticaria     Diagnostic skin and sensitization tests     Nonallergic rhinitis     Angioedema of lips     H. pylori infection     GPC (giant papillary conjunctivitis)     Seronegative spondyloarthropathy     Midline low back pain without sciatica     Abnormal uterine bleeding (AUB)- on OCPs     Psoriatic arthritis (H)     Past Medical History     Past Medical History:   Diagnosis Date     Angioedema of lips episode in 3/13--idiopathic     Complication of anesthesia     ponv     Contraception 1/28/2009     Diagnostic skin and sensitization tests 3/27/13 skin tests all NEGATIVE for environmental and  food allergens including shellfish and nuts.      Nonallergic rhinitis     3/27/13 skin tests all NEGATIVE for environmental and food allergens including shellfish and nuts. 3/27/13 followup IgE tests NEG to Peanut, [facility], shrimp, macadamia nut, pistachio and walnut.     Rheumatoid arthritis of multiple sites with negative rheumatoid factor (H) 12/31/2015     Past Surgical History     Past Surgical History:   Procedure Laterality Date     DILATE CERVIX, HYSTEROSCOPY, ABLATE ENDOMETRIUM HYDROTHERMAL, COMBINED N/A 7/24/2020    Procedure: DILATION, CERVIX, WITH HYSTEROSCOPY AND HYDROTHERMAL ENDOMETRIAL ABLATION;  Surgeon: Apryl West MD;  Location: [facility]     DILATE CERVIX, HYSTEROSCOPY, ABLATE ENDOMETRIUM HYDROTHERMAL, COMBINED N/A 9/10/2021    Procedure: DILATION, CERVIX, WITH HYSTEROSCOPY AND HYDROTHERMAL ENDOMETRIAL ABLATION;  Surgeon: Apryl West MD;  Location: [facility]     ENDOMETRIAL SAMPLING (BIOPSY) N/A 7/24/2020    Procedure: BIOPSY, ENDOMETRIUM;  Surgeon: Apryl West MD;  [facility]: [facility]     LAPAROSCOPIC SALPINGECTOMY Bilateral 6/23/2017    Procedure: LAPAROSCOPIC SALPINGECTOMY;  Operative Laparoscopy, Bilateral Salpingectomy ;  Surgeon: Apryl West MD;  Location: [facility]     OPERATIVE HYSTEROSCOPY WITH MORCELLATOR N/A 12/12/2018    Procedure: DIAGNOSTIC HYSTEROSCOPY AND D AND C;  Surgeon: Apryl West MD;  Location: [facility]        Allergy     Allergies   Allergen Reactions     Shrimp Swelling     Lips and tongue     Walnuts [Nuts] Swelling     Lips and tongue       Current Medication List     Current Outpatient Medications   Medication Sig     apremilast (OTEZLA) 30 MG tablet Take 1 tablet (30 mg) by mouth daily     diclofenac (VOLTAREN) 1 % topical gel Apply up to 2 grams of 1% gel to hands up to 4 times daily as needed for joint pain (maximum: 8 g per upper extremity per day)     ibuprofen (ADVIL/MOTRIN) 200 MG capsule Take 3 capsules (600 mg) by mouth every 6 hours as needed for fever  "    leflunomide (ARAVA) 10 MG tablet Take 1 tablet (10 mg) by mouth daily     multivitamin peds with iron (FLINTSTONES COMPLETE) 60 MG chewable tablet Take 1 chew tab by mouth daily.     No current facility-administered medications for this visit.     Social History   See HPI    Family History     Family History   Problem Relation Age of Onset     Hypertension Maternal Grandmother      Autoimmune Disease Maternal Grandmother         Autoimmune hepatitis     Cancer Maternal Grandfather      Hypertension Paternal Grandmother      Cancer - colorectal Paternal Grandfather      Cardiovascular Paternal Grandfather      Other Cancer Paternal Grandfather      Hypertension Mother      Autoimmune Disease Mother         Autoimmune hepatitis     Hyperlipidemia Mother      Asthma Mother      Hypertension Father      Diabetes Father         Type 2     Multiple Sclerosis Maternal Aunt      Cerebrovascular Disease No family hx of      Thyroid Disease No family hx of      Glaucoma No family hx of      Macular Degeneration No family hx of      Breast Cancer No family hx of      Colon Cancer No family hx of      Physical Exam     Temp Readings from Last 3 Encounters:   09/10/21 97.7  F (36.5  C) (Axillary)   08/30/21 97.3  F (36.3  C) (Tympanic)   07/24/20 98.1  F (36.7  C) (Oral)     BP Readings from Last 5 Encounters:   11/18/22 116/73   11/10/22 113/77   05/13/22 136/77   11/05/21 120/77   09/10/21 122/78     Pulse Readings from Last 1 Encounters:   11/18/22 80     Resp Readings from Last 1 Encounters:   11/18/22 16     Estimated body mass index is 18.37 kg/m  as calculated from the following:    Height as of 11/10/22: 1.626 m (5' 4\").    Weight as of this encounter: 48.5 kg (107 lb).    GEN: NAD.   HEENT: Anicteric, noninjected sclera. No obvious external lesions of the ear and nose. Hearing intact.  CV: S1, S2. RRR. No m/r/g  PULM: No increased work of breathing. CTA bilaterally   MSK: MCPs, PIPs, DIPs without swelling or " tenderness to palpation.  Wrists without swelling or tenderness to palpation.  Elbows and shoulders without swelling or tenderness to palpation. Knees and ankles without swelling or tenderness to palpation.  Negative MTP squeeze.    SKIN: No rash or jaundice seen  PSYCH: Alert. Appropriate.      Labs / Imaging (select studies)     RF/CCP  Recent Labs   Lab Test 12/21/15  1447   CCPABY <20  Interpretation:  Negative     RHF <20     CBC  Recent Labs   Lab Test 11/14/22  1520 08/12/22  0719 05/11/22  1524 09/10/21  1225 07/02/21  0922 01/12/21  1437 07/24/20  0626 07/20/20  1117   WBC 5.9 4.6 4.4   < > 5.4 5.5  --  6.5   RBC 4.17 4.38 4.39   < > 4.52 4.56  --  4.20   HGB 11.1* 11.5* 11.8   < > 11.9 12.1   < > 11.2*   HCT 35.2 37.1 37.2   < > 37.7 37.8  --  35.4   MCV 84 85 85   < > 83 83  --  84   RDW 12.2 12.3 12.2   < > 12.5 12.7  --  12.6    243 222   < > 195 221  --  218   MCH 26.6 26.3* 26.9   < > 26.3* 26.5  --  26.7   MCHC 31.5 31.0* 31.7   < > 31.6 32.0  --  31.6   NEUTROPHIL 68 45 54   < > 60.3 60.5  --  72.4   LYMPH 23 43 35   < > 29.0 29.5  --  21.1   MONOCYTE 7 9 9   < > 8.5 7.5  --  5.2   EOSINOPHIL 1 3 2   < > 1.8 2.0  --  1.1   BASOPHIL 0 0 0   < > 0.4 0.5  --  0.2   ANEU  --   --   --   --  3.3 3.3  --  4.7   ALYM  --   --   --   --  1.6 1.6  --  1.4   ANA MARÍA  --   --   --   --  0.5 0.4  --  0.3   AEOS  --   --   --   --  0.1 0.1  --  0.1   ABAS  --   --   --   --  0.0 0.0  --  0.0   ANEUTAUTO 4.0 2.0 2.4   < >  --   --   --   --    ALYMPAUTO 1.4 2.0 1.5   < >  --   --   --   --    AMONOAUTO 0.4 0.4 0.4   < >  --   --   --   --    AEOSAUTO 0.1 0.1 0.1   < >  --   --   --   --    ABSBASO 0.0 0.0 0.0   < >  --   --   --   --     < > = values in this interval not displayed.     CMP  Recent Labs   Lab Test 11/14/22  1520 08/12/22  0719 05/11/22  1524 11/01/21  1445 09/10/21  1202 07/02/21  0922 01/12/21  1437 07/20/20  1117 02/21/19  1427 12/12/18  1047 01/27/16  1211 06/30/15  1636   NA  --   --   --    --   --   --   --   --   --   --   --  140   POTASSIUM  --   --   --   --   --   --   --   --   --   --   --  4.1   CHLORIDE  --   --   --   --   --   --   --   --   --   --   --  105   CO2  --   --   --   --   --   --   --   --   --   --   --  30   ANIONGAP  --   --   --   --   --   --   --   --   --   --   --  5   GLC  --   --   --   --  71  --   --   --   --  88  --  73   BUN  --   --   --   --   --   --   --   --   --   --   --  9   CR 0.68 0.75 0.73   < >  --  0.72 0.76 0.82   < >  --    < > 0.80   GFRESTIMATED >90 >90 >90   < >  --  >90 >90 90   < >  --    < > 83   GFRESTBLACK  --   --   --   --   --  >90 >90 >90   < >  --    < > >90   GFR Calc     DAISY  --   --   --   --   --   --   --   --   --   --   --  9.4   BILITOTAL 0.4 0.3 0.5   < >  --  0.5 0.4 0.3   < >  --    < > 0.5   ALBUMIN 4.1 3.9 4.0   < >  --  4.2 4.4 3.6   < >  --    < > 4.1   PROTTOTAL 7.2 7.1 7.2   < >  --  7.6 7.7 7.4   < >  --    < > 8.1   ALKPHOS 51 54 53   < >  --  59 60 52   < >  --    < > 65   AST 15 15 16   < >  --  12 15 20   < >  --    < > 19   ALT 18 16 19   < >  --  20 20 22   < >  --    < > 26    < > = values in this interval not displayed.     Calcium/VitaminD  Recent Labs   Lab Test 01/13/17  1355 12/21/15  1447 06/30/15  1636   DAISY  --   --  9.4   VITDT 45 74  --      ESR/CRP  Recent Labs   Lab Test 11/14/22  1520 05/11/22  1524 02/04/22  0922   SED 8 6 8   CRP <2.9 <2.9 <2.9       Hepatitis B  Recent Labs   Lab Test 03/20/17  1506 03/28/16  1442   AUSAB  --  264.69*   HBCAB Nonreactive  --    HEPBANG  --  Nonreactive     Hepatitis C  Recent Labs   Lab Test 12/21/15  1447   HCVAB Nonreactive   Assay performance characteristics have not been established for newborns,   infants, and children       Lyme confirmation testing by Western Blot  Recent Labs   Lab Test 08/05/15  1621   LYWG Negative  Reference range: Negative  (Note)  Band(s) present: NONE  (Insufficient number of bands for positive  result)  INTERPRETIVE INFORMATION: Borrelia Burgdorferi Ab, IgG  Western Blot  For this assay, a positive result is reported when any 5 or  more of the following 10 bands are present: 18, 23, 28, 30,  39, 41, 45, 58, 66, or 93 kDa.  All other banding patterns  are reported as negative.  Performed by [facility],  [address] [place] [id number] [phone]  [email or web address], Pete Saha MD, Lab. Director       Tuberculosis Screening  Recent Labs   Lab Test 05/11/22  1524 03/20/17  1508 03/28/16  1443   TBRES Negative  --   --    TBRSLT  --  Negative Negative   TBAGN  --  0.00 0.04     HIV Screening  Recent Labs   Lab Test 12/21/15  1447   HIAGAB Nonreactive   HIV-1 p24 Ag & HIV-1/HIV-2 Ab Not Detected         Immunization History     Immunization History   Administered Date(s) Administered     COVID-19,PF,Pfizer (12+ Yrs) 11/19/2021     Influenza (IIV3) PF 10/12/2011, 10/03/2013     Influenza Vaccine IM > 6 months Valent IIV4 (Alfuria,Fluzone) 10/10/2020     Influenza Vaccine, 6+MO IM (QUADRIVALENT W/PRESERVATIVES) 10/01/2015     Pneumo Conj 13-V (2010&after) 09/26/2016     Pneumococcal 23 valent 12/22/2016     TD (ADULT, 7+) 08/15/2001     TDAP Vaccine (Adacel) 06/20/2011     TDAP Vaccine (Boostrix) 03/06/2014          Chart documentation done in part with Dragon Voice recognition Software. Although reviewed after completion, some word and grammatical error may remain.    Hunter Monroy MD

## 2022-11-28 ENCOUNTER — MYC MEDICAL ADVICE (OUTPATIENT)
Dept: OBGYN | Facility: CLINIC | Age: 42
End: 2022-11-28

## 2022-12-07 ENCOUNTER — ANCILLARY PROCEDURE (OUTPATIENT)
Dept: ULTRASOUND IMAGING | Facility: CLINIC | Age: 42
End: 2022-12-07
Attending: OBSTETRICS & GYNECOLOGY
Payer: COMMERCIAL

## 2022-12-07 DIAGNOSIS — N93.9 ABNORMAL UTERINE BLEEDING (AUB): ICD-10-CM

## 2022-12-07 PROCEDURE — 76830 TRANSVAGINAL US NON-OB: CPT | Mod: 26 | Performed by: OBSTETRICS & GYNECOLOGY

## 2022-12-07 PROCEDURE — 76830 TRANSVAGINAL US NON-OB: CPT

## 2023-02-16 ENCOUNTER — TELEPHONE (OUTPATIENT)
Dept: RHEUMATOLOGY | Facility: CLINIC | Age: 43
End: 2023-02-16
Payer: COMMERCIAL

## 2023-02-16 NOTE — TELEPHONE ENCOUNTER
PA Initiation    Medication: Otezla PA Renewal Initiated  Insurance Company: [employer] - Phone [phone] Fax [phone]  Pharmacy Filling the Rx:    Filling Pharmacy Phone:    Filling Pharmacy Fax:    Start Date: 2/16/2023        [facility]  [email or web address]  Phone: [phone]  Fax: [phone]

## 2023-02-20 NOTE — TELEPHONE ENCOUNTER
Prior Authorization Approval    Authorization Effective Date: 3/3/2023  Authorization Expiration Date: 3/3/2024  Medication: Otezla PA Renewal Approved  Approved Dose/Quantity: 60 tabs per 30 days  Reference #: [id number]   Insurance Company: [employer] - Phone [phone] Fax [phone]  Expected CoPay:       CoPay Card Available:      Foundation Assistance Needed:    Which Pharmacy is filling the prescription (Not needed for infusion/clinic administered):    Pharmacy Notified:    Patient Notified:  Yes          Janine Arroyo [facility] Liaison  [email or web address]  Phone: [phone]  Fax: [phone]

## 2023-02-24 ENCOUNTER — LAB (OUTPATIENT)
Dept: LAB | Facility: CLINIC | Age: 43
End: 2023-02-24
Payer: COMMERCIAL

## 2023-02-24 DIAGNOSIS — Z79.899 HIGH RISK MEDICATION USE: ICD-10-CM

## 2023-02-24 DIAGNOSIS — L40.50 PSORIATIC ARTHRITIS (H): ICD-10-CM

## 2023-02-24 LAB
ALBUMIN SERPL-MCNC: 4.1 G/DL (ref 3.4–5)
ALP SERPL-CCNC: 47 U/L (ref 40–150)
ALT SERPL W P-5'-P-CCNC: 20 U/L (ref 0–50)
AST SERPL W P-5'-P-CCNC: 15 U/L (ref 0–45)
BASOPHILS # BLD AUTO: 0 10E3/UL (ref 0–0.2)
BASOPHILS NFR BLD AUTO: 1 %
BILIRUB DIRECT SERPL-MCNC: 0.2 MG/DL (ref 0–0.2)
BILIRUB SERPL-MCNC: 0.4 MG/DL (ref 0.2–1.3)
CREAT SERPL-MCNC: 0.74 MG/DL (ref 0.52–1.04)
EOSINOPHIL # BLD AUTO: 0.1 10E3/UL (ref 0–0.7)
EOSINOPHIL NFR BLD AUTO: 3 %
ERYTHROCYTE [DISTWIDTH] IN BLOOD BY AUTOMATED COUNT: 12.6 % (ref 10–15)
GFR SERPL CREATININE-BSD FRML MDRD: >90 ML/MIN/1.73M2
HCT VFR BLD AUTO: 37.8 % (ref 35–47)
HGB BLD-MCNC: 12 G/DL (ref 11.7–15.7)
LYMPHOCYTES # BLD AUTO: 2.1 10E3/UL (ref 0.8–5.3)
LYMPHOCYTES NFR BLD AUTO: 40 %
MCH RBC QN AUTO: 26.7 PG (ref 26.5–33)
MCHC RBC AUTO-ENTMCNC: 31.7 G/DL (ref 31.5–36.5)
MCV RBC AUTO: 84 FL (ref 78–100)
MONOCYTES # BLD AUTO: 0.5 10E3/UL (ref 0–1.3)
MONOCYTES NFR BLD AUTO: 9 %
NEUTROPHILS # BLD AUTO: 2.4 10E3/UL (ref 1.6–8.3)
NEUTROPHILS NFR BLD AUTO: 47 %
PLATELET # BLD AUTO: 210 10E3/UL (ref 150–450)
PROT SERPL-MCNC: 7.3 G/DL (ref 6.8–8.8)
RBC # BLD AUTO: 4.5 10E6/UL (ref 3.8–5.2)
WBC # BLD AUTO: 5.1 10E3/UL (ref 4–11)

## 2023-02-24 PROCEDURE — 80076 HEPATIC FUNCTION PANEL: CPT

## 2023-02-24 PROCEDURE — 82565 ASSAY OF CREATININE: CPT

## 2023-02-24 PROCEDURE — 36415 COLL VENOUS BLD VENIPUNCTURE: CPT

## 2023-02-24 PROCEDURE — 85025 COMPLETE CBC W/AUTO DIFF WBC: CPT

## 2023-04-16 ENCOUNTER — HEALTH MAINTENANCE LETTER (OUTPATIENT)
Age: 43
End: 2023-04-16

## 2023-05-08 ENCOUNTER — E-VISIT (OUTPATIENT)
Dept: FAMILY MEDICINE | Facility: CLINIC | Age: 43
End: 2023-05-08
Payer: COMMERCIAL

## 2023-05-08 DIAGNOSIS — N30.00 ACUTE CYSTITIS WITHOUT HEMATURIA: Primary | ICD-10-CM

## 2023-05-08 PROCEDURE — 99421 OL DIG E/M SVC 5-10 MIN: CPT | Performed by: PHYSICIAN ASSISTANT

## 2023-05-08 RX ORDER — SULFAMETHOXAZOLE/TRIMETHOPRIM 800-160 MG
1 TABLET ORAL 2 TIMES DAILY
Qty: 6 TABLET | Refills: 0 | Status: SHIPPED | OUTPATIENT
Start: 2023-05-08 | End: 2023-05-11

## 2023-05-12 ENCOUNTER — LAB (OUTPATIENT)
Dept: LAB | Facility: CLINIC | Age: 43
End: 2023-05-12
Payer: COMMERCIAL

## 2023-05-12 DIAGNOSIS — L40.50 PSORIATIC ARTHRITIS (H): ICD-10-CM

## 2023-05-12 DIAGNOSIS — Z79.899 HIGH RISK MEDICATION USE: ICD-10-CM

## 2023-05-12 LAB
ALBUMIN SERPL-MCNC: 4.1 G/DL (ref 3.4–5)
ALP SERPL-CCNC: 46 U/L (ref 40–150)
ALT SERPL W P-5'-P-CCNC: 21 U/L (ref 0–50)
AST SERPL W P-5'-P-CCNC: 21 U/L (ref 0–45)
BASOPHILS # BLD AUTO: 0 10E3/UL (ref 0–0.2)
BASOPHILS NFR BLD AUTO: 1 %
BILIRUB DIRECT SERPL-MCNC: 0.1 MG/DL (ref 0–0.2)
BILIRUB SERPL-MCNC: 0.5 MG/DL (ref 0.2–1.3)
CREAT SERPL-MCNC: 0.9 MG/DL (ref 0.52–1.04)
CRP SERPL-MCNC: <2.9 MG/L (ref 0–8)
EOSINOPHIL # BLD AUTO: 0.1 10E3/UL (ref 0–0.7)
EOSINOPHIL NFR BLD AUTO: 3 %
ERYTHROCYTE [DISTWIDTH] IN BLOOD BY AUTOMATED COUNT: 12.3 % (ref 10–15)
ERYTHROCYTE [SEDIMENTATION RATE] IN BLOOD BY WESTERGREN METHOD: 21 MM/HR (ref 0–20)
GFR SERPL CREATININE-BSD FRML MDRD: 81 ML/MIN/1.73M2
HCT VFR BLD AUTO: 36.1 % (ref 35–47)
HGB BLD-MCNC: 11.4 G/DL (ref 11.7–15.7)
IMM GRANULOCYTES # BLD: 0 10E3/UL
IMM GRANULOCYTES NFR BLD: 0 %
LYMPHOCYTES # BLD AUTO: 1.3 10E3/UL (ref 0.8–5.3)
LYMPHOCYTES NFR BLD AUTO: 37 %
MCH RBC QN AUTO: 26.8 PG (ref 26.5–33)
MCHC RBC AUTO-ENTMCNC: 31.6 G/DL (ref 31.5–36.5)
MCV RBC AUTO: 85 FL (ref 78–100)
MONOCYTES # BLD AUTO: 0.3 10E3/UL (ref 0–1.3)
MONOCYTES NFR BLD AUTO: 8 %
NEUTROPHILS # BLD AUTO: 1.8 10E3/UL (ref 1.6–8.3)
NEUTROPHILS NFR BLD AUTO: 51 %
PLATELET # BLD AUTO: 201 10E3/UL (ref 150–450)
PROT SERPL-MCNC: 7.2 G/DL (ref 6.8–8.8)
RBC # BLD AUTO: 4.25 10E6/UL (ref 3.8–5.2)
WBC # BLD AUTO: 3.5 10E3/UL (ref 4–11)

## 2023-05-12 PROCEDURE — 36415 COLL VENOUS BLD VENIPUNCTURE: CPT

## 2023-05-12 PROCEDURE — 82565 ASSAY OF CREATININE: CPT

## 2023-05-12 PROCEDURE — 80076 HEPATIC FUNCTION PANEL: CPT

## 2023-05-12 PROCEDURE — 86140 C-REACTIVE PROTEIN: CPT

## 2023-05-12 PROCEDURE — 85025 COMPLETE CBC W/AUTO DIFF WBC: CPT

## 2023-05-12 PROCEDURE — 85652 RBC SED RATE AUTOMATED: CPT

## 2023-05-15 ENCOUNTER — OFFICE VISIT (OUTPATIENT)
Dept: RHEUMATOLOGY | Facility: CLINIC | Age: 43
End: 2023-05-15
Payer: COMMERCIAL

## 2023-05-15 VITALS
WEIGHT: 105 LBS | SYSTOLIC BLOOD PRESSURE: 118 MMHG | DIASTOLIC BLOOD PRESSURE: 79 MMHG | BODY MASS INDEX: 18.02 KG/M2 | HEART RATE: 85 BPM | OXYGEN SATURATION: 97 %

## 2023-05-15 DIAGNOSIS — L40.50 PSORIATIC ARTHRITIS (H): Primary | ICD-10-CM

## 2023-05-15 DIAGNOSIS — Z79.899 HIGH RISK MEDICATION USE: ICD-10-CM

## 2023-05-15 PROCEDURE — 99214 OFFICE O/P EST MOD 30 MIN: CPT | Performed by: INTERNAL MEDICINE

## 2023-05-15 RX ORDER — LEFLUNOMIDE 10 MG/1
10 TABLET ORAL DAILY
Qty: 90 TABLET | Refills: 2 | Status: SHIPPED | OUTPATIENT
Start: 2023-05-15 | End: 2023-11-13

## 2023-05-15 RX ORDER — APREMILAST 30 MG/1
30 TABLET, FILM COATED ORAL DAILY
Qty: 60 TABLET | Refills: 6 | Status: SHIPPED | OUTPATIENT
Start: 2023-05-15 | End: 2023-11-13

## 2023-05-15 NOTE — PATIENT INSTRUCTIONS
RHEUMATOLOGY    [facility]  [address]  [place] [address]    Phone number: [phone]  Fax number: [phone]      Thank you for choosing [facility]!    Maddie Hennessy CMA

## 2023-05-15 NOTE — PROGRESS NOTES
Rheumatology Clinic Visit      Abby Foy MRN# [id number]   YOB: 1980 Age: 43 year old      Date of visit: 5/15/23   PCP: Sandi Duffy  Dermatologist: Amy Patel  Ophthalmologist: Dr. Mathis     Chief Complaint   Patient presents with:  Psoriatic arthritis    Assessment and Plan   1. 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 However, given her continued back pain that improved with activity but no radiographic evidence for inflammatory arthritis or osteoarthritis, there was concern that she had inflammatory back pain that would be more consistent with a spondylarthritis. Humira was started and resulted in improvement of her back pain, suggesting axial disease.  Arthritis improved with methotrexate and sulfasalazine, but she was having headaches and therefore the most likely culprit methotrexate was reduced until her headaches worsened significantly and therefore sulfasalazine and methotrexate were both discontinued. She had resolution of her headaches after discontinuing both sulfasalazine and methotrexate. I believe that the sulfasalazine was the cause of her headaches. Therefore, cautious retrial of methotrexate in the future could be done.  She was doing well on a combination of leflunomide 10 mg daily and Humira 40 mg SQ every 14 days but Humira had to be stopped because of issues with the  program; so Simponi was Rx'd but never started.  Intermittent inflammatory symptom so Otezla was prescribed but but delayed starting until March 2020, it was found be effective but the twice daily dose was resulting in worsening headaches that resolved with a once daily dose. Was on doing well on a combination of leflunomide 10 mg daily and Otezla  daily; previously leflunomide was discontinued in an effort to get to the lowest effective dose but she had return of symptoms primarily affecting the PIPs so it was restarted. Currently on leflunomide 10 mg daily and Otezla 30 mg daily and doing well with regard to inflammatory arthritis.  Infrequently with left second MCP and right third-fourth MCP ache is of short duration and resolve spontaneously; no swelling.  No synovitis on exam today.  She feels like she is doing well and does not want to change therapy at this time.  Chronic illness, stable.    - Continue leflunomide 10 mg daily  - Continue Otezla 30mg daily  - Continue diclofenac (VOLTAREN) 1 % topical gel; Apply up to 2 grams of 1% gel to hands up to 4 times daily as needed for joint pain (maximum: 8 g per upper extremity per day)    - Labs in 3 months: CBC, Creatinine, Hepatic Panel  - Labs in 6 months: CBC, Creatinine, Hepatic Panel, ESR, CRP     High risk medication requiring intensive toxicity monitoring at least quarterly: labs ordered include CBC, Creatinine, Hepatic panel to monitor for cytopenia and hepatotoxicity; checking creatinine as it affects clearance of medication.      # Pregnancy Planning: s/p salpingectomy;  had a vasectomy    2. 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 Interestingly when leflunomide was stopped between 3/2018 and  6/15/2018, she experienced increased L>R eye irritation that when bothering her only affected one eye at a time.   Not an issue at this time.     3.  Vaccinations: Vaccinations reviewed with Ms. Foy.   - Influenza: Advised yearly vaccination  - COVID-19 vaccine: Advised updating, and to hold leflunomide for 2 weeks afterward     Total minutes spent in evaluation with patient, documentation, , and review of pertinent studies and chart notes: 12    Ms. Foy verbalized agreement with and understanding of the  rational for the diagnosis and treatment plan.  All questions were answered to best of my ability and the patient's satisfaction. Ms. Foy was advised to contact the clinic with any questions that may arise after the clinic visit.      Thank you for involving me in the care of the patient    Return to clinic: 6 months    HPI   Abby Foy is a 43 year old female with medical history significant for urticaria, H. pylori infection, and iritis? who returns for f/u of seronegative spondyloarthropathy.    Today, 5/15/2023:  intermittent ache at the left second MCP and right third and fourth MCPs that responds well to Voltaren gel, and is typically of short duration and mild severity.  Topical Voltaren gel as needed infrequently because her symptoms are infrequent.  Arthritis is not limiting daily activities.  States like leflunomide and Otezla have been effective for her arthritis.  She states that she is happy with how well she is doing and does not need to change therapy based on her current symptoms.  Morning stiffness for less than 10 minutes.    Denies fevers, chills, nausea, vomiting, constipation, diarrhea. No abdominal pain.  No black or bloody stools.  No chest pain/pressure, palpitations, or shortness of breath. No neck pain. Occasional cold sores. No eye pain or redness    Tobacco: None  EtOH: 1-2 drinks on the weekends  Drugs: None  Occupation: Dietitian at the [employer]    ROS   12 point review of systems completed and negative except as noted in the HPI    Active Problem List     Patient Active Problem List   Diagnosis     CARDIOVASCULAR SCREENING; LDL GOAL LESS THAN 160     Urticaria     Diagnostic skin and sensitization tests     Nonallergic rhinitis     Angioedema of lips     H. pylori infection     GPC (giant papillary conjunctivitis)     Seronegative spondyloarthropathy     Midline low back pain without sciatica     Abnormal uterine bleeding (AUB)- on OCPs     Psoriatic  arthritis (H)     Past Medical History     Past Medical History:   Diagnosis Date     Angioedema of lips episode in 3/13--idiopathic     Complication of anesthesia     ponv     Contraception 1/28/2009     Diagnostic skin and sensitization tests 3/27/13 skin tests all NEGATIVE for environmental and food allergens including shellfish and nuts.      Nonallergic rhinitis     3/27/13 skin tests all NEGATIVE for environmental and food allergens including shellfish and nuts. 3/27/13 followup IgE tests NEG to [facility], [facility], shrimp, macadamia nut, pistachio and walnut.     Rheumatoid arthritis of multiple sites with negative rheumatoid factor (H) 12/31/2015     Past Surgical History     Past Surgical History:   Procedure Laterality Date     DILATE CERVIX, HYSTEROSCOPY, ABLATE ENDOMETRIUM HYDROTHERMAL, COMBINED N/A 7/24/2020    Procedure: DILATION, CERVIX, WITH HYSTEROSCOPY AND HYDROTHERMAL ENDOMETRIAL ABLATION;  Surgeon: Apryl West MD;  Location: [facility]     DILATE CERVIX, HYSTEROSCOPY, ABLATE ENDOMETRIUM HYDROTHERMAL, COMBINED N/A 9/10/2021    Procedure: DILATION, CERVIX, WITH HYSTEROSCOPY AND HYDROTHERMAL ENDOMETRIAL ABLATION;  Surgeon: Apryl West MD;  Location: [facility]     ENDOMETRIAL SAMPLING (BIOPSY) N/A 7/24/2020    Procedure: BIOPSY, ENDOMETRIUM;  Surgeon: Apryl West MD;  Location: [facility]     LAPAROSCOPIC SALPINGECTOMY Bilateral 6/23/2017    Procedure: LAPAROSCOPIC SALPINGECTOMY;  Operative Laparoscopy, Bilateral Salpingectomy ;  Surgeon: Apryl West MD;  Location: [facility]     OPERATIVE HYSTEROSCOPY WITH MORCELLATOR N/A 12/12/2018    Procedure: DIAGNOSTIC HYSTEROSCOPY AND D AND C;  Surgeon: Apryl West MD;  Location: [facility]        Allergy     Allergies   Allergen Reactions     Shrimp Swelling     Lips and tongue     Walnuts [Nuts] Swelling     Lips and tongue       Current Medication List     Current Outpatient Medications   Medication Sig     apremilast (OTEZLA) 30 MG tablet Take 1 tablet (30  "mg) by mouth daily     diclofenac (VOLTAREN) 1 % topical gel Apply up to 2 grams of 1% gel to hands up to 4 times daily as needed for joint pain (maximum: 8 g per upper extremity per day)     ibuprofen (ADVIL/MOTRIN) 200 MG capsule Take 3 capsules (600 mg) by mouth every 6 hours as needed for fever     leflunomide (ARAVA) 10 MG tablet Take 1 tablet (10 mg) by mouth daily     multivitamin peds with iron (FLINTSTONES COMPLETE) 60 MG chewable tablet Take 1 chew tab by mouth daily.     No current facility-administered medications for this visit.     Social History   See HPI    Family History     Family History   Problem Relation Age of Onset     Hypertension Maternal Grandmother      Autoimmune Disease Maternal Grandmother         Autoimmune hepatitis     Cancer Maternal Grandfather      Hypertension Paternal Grandmother      Cancer - colorectal Paternal Grandfather      Cardiovascular Paternal Grandfather      Other Cancer Paternal Grandfather      Hypertension Mother      Autoimmune Disease Mother         Autoimmune hepatitis     Hyperlipidemia Mother      Asthma Mother      Hypertension Father      Diabetes Father         Type 2     Multiple Sclerosis Maternal Aunt      Cerebrovascular Disease No family hx of      Thyroid Disease No family hx of      Glaucoma No family hx of      Macular Degeneration No family hx of      Breast Cancer No family hx of      Colon Cancer No family hx of      Physical Exam     Temp Readings from Last 3 Encounters:   09/10/21 97.7  F (36.5  C) (Axillary)   08/30/21 97.3  F (36.3  C) (Tympanic)   07/24/20 98.1  F (36.7  C) (Oral)     BP Readings from Last 5 Encounters:   11/18/22 116/73   11/10/22 113/77   05/13/22 136/77   11/05/21 120/77   09/10/21 122/78     Pulse Readings from Last 1 Encounters:   11/18/22 80     Resp Readings from Last 1 Encounters:   11/18/22 16     Estimated body mass index is 18.37 kg/m  as calculated from the following:    Height as of 11/10/22: 1.626 m (5' 4\").   "  Weight as of 11/18/22: 48.5 kg (107 lb).    GEN: NAD.   HEENT: Anicteric, noninjected sclera. No obvious external lesions of the ear and nose. Hearing intact.  CV: S1, S2. RRR. No m/r/g  PULM: No increased work of breathing. CTA bilaterally   MSK: MCPs, PIPs, DIPs without swelling or tenderness to palpation.  Wrists without swelling or tenderness to palpation.  Elbows and shoulders without swelling or tenderness to palpation. Knees and ankles without swelling or tenderness to palpation.  Negative MTP squeeze.    SKIN: No rash or jaundice seen  PSYCH: Alert. Appropriate.      Labs / Imaging (select studies)     RF/CCP  Recent Labs   Lab Test 12/21/15  1447   CCPABY <20  Interpretation:  Negative     RHF <20     CBC  Recent Labs   Lab Test 05/12/23  0906 02/24/23  0716 11/14/22  1520 09/10/21  1225 07/02/21  0922 01/12/21  1437 07/24/20  0626 07/20/20  1117   WBC 3.5* 5.1 5.9   < > 5.4 5.5  --  6.5   RBC 4.25 4.50 4.17   < > 4.52 4.56  --  4.20   HGB 11.4* 12.0 11.1*   < > 11.9 12.1   < > 11.2*   HCT 36.1 37.8 35.2   < > 37.7 37.8  --  35.4   MCV 85 84 84   < > 83 83  --  84   RDW 12.3 12.6 12.2   < > 12.5 12.7  --  12.6    210 232   < > 195 221  --  218   MCH 26.8 26.7 26.6   < > 26.3* 26.5  --  26.7   MCHC 31.6 31.7 31.5   < > 31.6 32.0  --  31.6   NEUTROPHIL 51 47 68   < > 60.3 60.5  --  72.4   LYMPH 37 40 23   < > 29.0 29.5  --  21.1   MONOCYTE 8 9 7   < > 8.5 7.5  --  5.2   EOSINOPHIL 3 3 1   < > 1.8 2.0  --  1.1   BASOPHIL 1 1 0   < > 0.4 0.5  --  0.2   ANEU  --   --   --   --  3.3 3.3  --  4.7   ALYM  --   --   --   --  1.6 1.6  --  1.4   ANA MARÍA  --   --   --   --  0.5 0.4  --  0.3   AEOS  --   --   --   --  0.1 0.1  --  0.1   ABAS  --   --   --   --  0.0 0.0  --  0.0   ANEUTAUTO 1.8 2.4 4.0   < >  --   --   --   --    ALYMPAUTO 1.3 2.1 1.4   < >  --   --   --   --    AMONOAUTO 0.3 0.5 0.4   < >  --   --   --   --    AEOSAUTO 0.1 0.1 0.1   < >  --   --   --   --    ABSBASO 0.0 0.0 0.0   < >  --   --   --    --     < > = values in this interval not displayed.     CMP  Recent Labs   Lab Test 05/12/23  0906 02/24/23  0716 11/14/22  1520 11/01/21  1445 09/10/21  1202 07/02/21  0922 01/12/21  1437 07/20/20  1117 02/21/19  1427 12/12/18  1047 01/27/16  1211 06/30/15  1636   NA  --   --   --   --   --   --   --   --   --   --   --  140   POTASSIUM  --   --   --   --   --   --   --   --   --   --   --  4.1   CHLORIDE  --   --   --   --   --   --   --   --   --   --   --  105   CO2  --   --   --   --   --   --   --   --   --   --   --  30   ANIONGAP  --   --   --   --   --   --   --   --   --   --   --  5   GLC  --   --   --   --  71  --   --   --   --  88  --  73   BUN  --   --   --   --   --   --   --   --   --   --   --  9   CR 0.90 0.74 0.68   < >  --  0.72 0.76 0.82   < >  --    < > 0.80   GFRESTIMATED 81 >90 >90   < >  --  >90 >90 90   < >  --    < > 83   GFRESTBLACK  --   --   --   --   --  >90 >90 >90   < >  --    < > >90   GFR Calc     DAISY  --   --   --   --   --   --   --   --   --   --   --  9.4   BILITOTAL 0.5 0.4 0.4   < >  --  0.5 0.4 0.3   < >  --    < > 0.5   ALBUMIN 4.1 4.1 4.1   < >  --  4.2 4.4 3.6   < >  --    < > 4.1   PROTTOTAL 7.2 7.3 7.2   < >  --  7.6 7.7 7.4   < >  --    < > 8.1   ALKPHOS 46 47 51   < >  --  59 60 52   < >  --    < > 65   AST 21 15 15   < >  --  12 15 20   < >  --    < > 19   ALT 21 20 18   < >  --  20 20 22   < >  --    < > 26    < > = values in this interval not displayed.     Calcium/VitaminD  Recent Labs   Lab Test 01/13/17  1355 12/21/15  1447 06/30/15  1636   DAISY  --   --  9.4   VITDT 45 74  --      ESR/CRP  Recent Labs   Lab Test 05/12/23  0906 11/14/22  1520 05/11/22  1524   SED 21* 8 6   CRP <2.9 <2.9 <2.9     Hepatitis B  Recent Labs   Lab Test 03/20/17  1506 03/28/16  1442   AUSAB  --  264.69*   HBCAB Nonreactive  --    HEPBANG  --  Nonreactive     Hepatitis C  Recent Labs   Lab Test 12/21/15  1447   HCVAB Nonreactive   Assay performance characteristics have  not been established for newborns,   infants, and children       Lyme confirmation testing by Western Blot  Recent Labs   Lab Test 08/05/15  1621   LYWG Negative  Reference range: Negative  (Note)  Band(s) present: NONE  (Insufficient number of bands for positive result)  INTERPRETIVE INFORMATION: Borrelia Burgdorferi Ab, IgG  Western Blot  For this assay, a positive result is reported when any 5 or  more of the following 10 bands are present: 18, 23, 28, 30,  39, 41, 45, 58, 66, or 93 kDa.  All other banding patterns  are reported as negative.  Performed by [facility],  [address] [place] [address] [phone]  [email or web address], Pete Saha MD, Lab. Director       Tuberculosis Screening  Recent Labs   Lab Test 05/11/22  1524 03/20/17  1508 03/28/16  1443   TBRES Negative  --   --    TBRSLT  --  Negative Negative   TBAGN  --  0.00 0.04     HIV Screening  Recent Labs   Lab Test 12/21/15  1447   HIAGAB Nonreactive   HIV-1 p24 Ag & HIV-1/HIV-2 Ab Not Detected       Immunization History     Immunization History   Administered Date(s) Administered     COVID-19 MONOVALENT 12+ (Pfizer) 11/19/2021     Influenza (IIV3) PF 10/12/2011, 10/03/2013     Influenza Vaccine >6 months (Alfuria,Fluzone) 10/10/2020     Influenza Vaccine, 6+MO IM (QUADRIVALENT W/PRESERVATIVES) 10/01/2015     Pneumo Conj 13-V (2010&after) 09/26/2016     Pneumococcal 23 valent 12/22/2016     TD,PF 7+ (Tenivac) 08/15/2001     TDAP Vaccine (Adacel) 06/20/2011     TDAP Vaccine (Boostrix) 03/06/2014          Chart documentation done in part with Dragon Voice recognition Software. Although reviewed after completion, some word and grammatical error may remain.    Hunter Monroy MD

## 2023-08-18 ENCOUNTER — LAB (OUTPATIENT)
Dept: LAB | Facility: CLINIC | Age: 43
End: 2023-08-18
Payer: COMMERCIAL

## 2023-08-18 DIAGNOSIS — Z79.899 HIGH RISK MEDICATION USE: ICD-10-CM

## 2023-08-18 DIAGNOSIS — L40.50 PSORIATIC ARTHRITIS (H): ICD-10-CM

## 2023-08-18 LAB
ALBUMIN SERPL BCG-MCNC: 4.8 G/DL (ref 3.5–5.2)
ALP SERPL-CCNC: 47 U/L (ref 35–104)
ALT SERPL W P-5'-P-CCNC: 11 U/L (ref 0–50)
AST SERPL W P-5'-P-CCNC: 24 U/L (ref 0–45)
BASOPHILS # BLD AUTO: 0 10E3/UL (ref 0–0.2)
BASOPHILS NFR BLD AUTO: 1 %
BILIRUB DIRECT SERPL-MCNC: <0.2 MG/DL (ref 0–0.3)
BILIRUB SERPL-MCNC: 0.3 MG/DL
CREAT SERPL-MCNC: 0.73 MG/DL (ref 0.51–0.95)
EOSINOPHIL # BLD AUTO: 0.1 10E3/UL (ref 0–0.7)
EOSINOPHIL NFR BLD AUTO: 1 %
ERYTHROCYTE [DISTWIDTH] IN BLOOD BY AUTOMATED COUNT: 11.8 % (ref 10–15)
GFR SERPL CREATININE-BSD FRML MDRD: >90 ML/MIN/1.73M2
HCT VFR BLD AUTO: 38.3 % (ref 35–47)
HGB BLD-MCNC: 12.1 G/DL (ref 11.7–15.7)
IMM GRANULOCYTES # BLD: 0 10E3/UL
IMM GRANULOCYTES NFR BLD: 0 %
LYMPHOCYTES # BLD AUTO: 1.6 10E3/UL (ref 0.8–5.3)
LYMPHOCYTES NFR BLD AUTO: 26 %
MCH RBC QN AUTO: 26.9 PG (ref 26.5–33)
MCHC RBC AUTO-ENTMCNC: 31.6 G/DL (ref 31.5–36.5)
MCV RBC AUTO: 85 FL (ref 78–100)
MONOCYTES # BLD AUTO: 0.4 10E3/UL (ref 0–1.3)
MONOCYTES NFR BLD AUTO: 6 %
NEUTROPHILS # BLD AUTO: 4 10E3/UL (ref 1.6–8.3)
NEUTROPHILS NFR BLD AUTO: 66 %
PLATELET # BLD AUTO: 212 10E3/UL (ref 150–450)
PROT SERPL-MCNC: 7.3 G/DL (ref 6.4–8.3)
RBC # BLD AUTO: 4.5 10E6/UL (ref 3.8–5.2)
WBC # BLD AUTO: 6 10E3/UL (ref 4–11)

## 2023-08-18 PROCEDURE — 36415 COLL VENOUS BLD VENIPUNCTURE: CPT

## 2023-08-18 PROCEDURE — 80076 HEPATIC FUNCTION PANEL: CPT

## 2023-08-18 PROCEDURE — 85025 COMPLETE CBC W/AUTO DIFF WBC: CPT

## 2023-08-18 PROCEDURE — 82565 ASSAY OF CREATININE: CPT

## 2023-10-06 ENCOUNTER — ANCILLARY PROCEDURE (OUTPATIENT)
Dept: MAMMOGRAPHY | Facility: CLINIC | Age: 43
End: 2023-10-06
Attending: PHYSICIAN ASSISTANT
Payer: COMMERCIAL

## 2023-10-06 DIAGNOSIS — Z12.31 VISIT FOR SCREENING MAMMOGRAM: ICD-10-CM

## 2023-10-06 PROCEDURE — 77067 SCR MAMMO BI INCL CAD: CPT | Mod: GC | Performed by: RADIOLOGY

## 2023-11-10 ENCOUNTER — LAB (OUTPATIENT)
Dept: LAB | Facility: CLINIC | Age: 43
End: 2023-11-10
Payer: COMMERCIAL

## 2023-11-10 DIAGNOSIS — L40.50 PSORIATIC ARTHRITIS (H): ICD-10-CM

## 2023-11-10 DIAGNOSIS — Z79.899 HIGH RISK MEDICATION USE: ICD-10-CM

## 2023-11-10 LAB
ALBUMIN SERPL BCG-MCNC: 4.6 G/DL (ref 3.5–5.2)
ALP SERPL-CCNC: 45 U/L (ref 35–104)
ALT SERPL W P-5'-P-CCNC: 12 U/L (ref 0–50)
AST SERPL W P-5'-P-CCNC: 20 U/L (ref 0–45)
BASOPHILS # BLD AUTO: 0 10E3/UL (ref 0–0.2)
BASOPHILS NFR BLD AUTO: 1 %
BILIRUB DIRECT SERPL-MCNC: <0.2 MG/DL (ref 0–0.3)
BILIRUB SERPL-MCNC: 0.2 MG/DL
CREAT SERPL-MCNC: 0.73 MG/DL (ref 0.51–0.95)
CRP SERPL-MCNC: <3 MG/L
EGFRCR SERPLBLD CKD-EPI 2021: >90 ML/MIN/1.73M2
EOSINOPHIL # BLD AUTO: 0 10E3/UL (ref 0–0.7)
EOSINOPHIL NFR BLD AUTO: 1 %
ERYTHROCYTE [DISTWIDTH] IN BLOOD BY AUTOMATED COUNT: 11.7 % (ref 10–15)
ERYTHROCYTE [SEDIMENTATION RATE] IN BLOOD BY WESTERGREN METHOD: 7 MM/HR (ref 0–20)
HCT VFR BLD AUTO: 38.6 % (ref 35–47)
HGB BLD-MCNC: 11.8 G/DL (ref 11.7–15.7)
IMM GRANULOCYTES # BLD: 0 10E3/UL
IMM GRANULOCYTES NFR BLD: 0 %
LYMPHOCYTES # BLD AUTO: 1.1 10E3/UL (ref 0.8–5.3)
LYMPHOCYTES NFR BLD AUTO: 28 %
MCH RBC QN AUTO: 26.5 PG (ref 26.5–33)
MCHC RBC AUTO-ENTMCNC: 30.6 G/DL (ref 31.5–36.5)
MCV RBC AUTO: 87 FL (ref 78–100)
MONOCYTES # BLD AUTO: 0.3 10E3/UL (ref 0–1.3)
MONOCYTES NFR BLD AUTO: 7 %
NEUTROPHILS # BLD AUTO: 2.4 10E3/UL (ref 1.6–8.3)
NEUTROPHILS NFR BLD AUTO: 62 %
PLATELET # BLD AUTO: 211 10E3/UL (ref 150–450)
PROT SERPL-MCNC: 7.2 G/DL (ref 6.4–8.3)
RBC # BLD AUTO: 4.45 10E6/UL (ref 3.8–5.2)
WBC # BLD AUTO: 3.8 10E3/UL (ref 4–11)

## 2023-11-10 PROCEDURE — 36415 COLL VENOUS BLD VENIPUNCTURE: CPT

## 2023-11-10 PROCEDURE — 85025 COMPLETE CBC W/AUTO DIFF WBC: CPT

## 2023-11-10 PROCEDURE — 82565 ASSAY OF CREATININE: CPT

## 2023-11-10 PROCEDURE — 85652 RBC SED RATE AUTOMATED: CPT

## 2023-11-10 PROCEDURE — 80076 HEPATIC FUNCTION PANEL: CPT

## 2023-11-10 PROCEDURE — 86140 C-REACTIVE PROTEIN: CPT

## 2023-11-13 ENCOUNTER — OFFICE VISIT (OUTPATIENT)
Dept: RHEUMATOLOGY | Facility: CLINIC | Age: 43
End: 2023-11-13
Payer: COMMERCIAL

## 2023-11-13 VITALS
BODY MASS INDEX: 17.85 KG/M2 | OXYGEN SATURATION: 100 % | RESPIRATION RATE: 16 BRPM | WEIGHT: 104 LBS | HEART RATE: 90 BPM | SYSTOLIC BLOOD PRESSURE: 123 MMHG | DIASTOLIC BLOOD PRESSURE: 80 MMHG

## 2023-11-13 DIAGNOSIS — Z79.899 HIGH RISK MEDICATION USE: ICD-10-CM

## 2023-11-13 DIAGNOSIS — L40.50 PSORIATIC ARTHRITIS (H): Primary | ICD-10-CM

## 2023-11-13 DIAGNOSIS — M77.01 MEDIAL EPICONDYLITIS OF RIGHT ELBOW: ICD-10-CM

## 2023-11-13 PROCEDURE — 99214 OFFICE O/P EST MOD 30 MIN: CPT | Performed by: INTERNAL MEDICINE

## 2023-11-13 RX ORDER — APREMILAST 30 MG/1
30 TABLET, FILM COATED ORAL DAILY
Qty: 60 TABLET | Refills: 6 | Status: SHIPPED | OUTPATIENT
Start: 2023-11-13 | End: 2024-05-13

## 2023-11-13 RX ORDER — LEFLUNOMIDE 10 MG/1
10 TABLET ORAL DAILY
Qty: 90 TABLET | Refills: 2 | Status: SHIPPED | OUTPATIENT
Start: 2023-11-13 | End: 2024-05-13

## 2023-11-13 NOTE — NURSING NOTE
Blood pressure rechecked after visit    123/80  Maddie Hennessy CMA Rheumatology  11/13/2023                                 RAPID3 (0-30) Cumulative Score  1.0          RAPID3 Weighted Score (divide #4 by 3 and that is the weighted score)  0.3

## 2023-11-13 NOTE — PATIENT INSTRUCTIONS
RHEUMATOLOGY    [facility]  [address] [place] [address]  [place] [address]    Phone number: [phone]  Fax number: [phone]    If you need a medication refill, please contact us as you may need lab work and/or a follow up visit prior to your refill.      Thank you for choosing [facility]!    Maddie Hennessy CMA Rheumatology

## 2023-11-13 NOTE — PROGRESS NOTES
Rheumatology Clinic Visit      Abby Foy MRN# [id number]   YOB: 1980 Age: 43 year old      Date of visit: 11/13/23   PCP: Sandi Duffy  Dermatologist: Amy Patel  Ophthalmologist: Dr. Mathis     Chief Complaint   Patient presents with:  Psoriatic arthritis    Assessment and Plan   1. 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 However, given her continued back pain that improved with activity but no radiographic evidence for inflammatory arthritis or osteoarthritis, there was concern that she had inflammatory back pain that would be more consistent with a spondylarthritis. Humira was started and resulted in improvement of her back pain, suggesting axial disease.  Arthritis improved with methotrexate and sulfasalazine, but she was having headaches and therefore the most likely culprit methotrexate was reduced until her headaches worsened significantly and therefore sulfasalazine and methotrexate were both discontinued. She had resolution of her headaches after discontinuing both sulfasalazine and methotrexate. I believe that the sulfasalazine was the cause of her headaches. Therefore, cautious retrial of methotrexate in the future could be done.  She was doing well on a combination of leflunomide 10 mg daily and Humira 40 mg SQ every 14 days but Humira had to be stopped because of issues with the  program; so Simponi was Rx'd but never started.  Intermittent inflammatory symptom so Otezla was prescribed but but delayed starting until March 2020, it was found be effective but the twice daily dose was resulting in worsening headaches that resolved with a once daily dose. Was on doing well on a combination of leflunomide 10 mg daily and Otezla  daily; previously leflunomide was discontinued in an effort to get to the lowest effective dose but she had return of symptoms primarily affecting the PIPs so it was restarted. Currently on leflunomide 10 mg daily and Otezla 30 mg daily and doing well with regard to inflammatory arthritis.  Right medial epicondylitis symptoms for no more than 1 hour in the morning twice weekly that resolved spontaneously; normal exam today; discussed topical Voltaren gel as needed and to avoid aggravating activities and if no improvement then could trial higher dose of leflunomide and she is in agreement with this plan.  Note that we also discussed increasing leflunomide now but she would like to try the topical Voltaren gel first.  Chronic illness, stable.    - Continue leflunomide 10 mg daily  - Continue Otezla 30mg daily  - Continue diclofenac (VOLTAREN) 1 % topical gel; Apply up to 2 grams of 1% gel to hands and right elbow up to 4 times daily as needed for pain (maximum: 8 g per upper extremity per day)    - Labs in 3 months: CBC, Creatinine, Hepatic Panel  - Labs in 6 months: CBC, Creatinine, Hepatic Panel, ESR, CRP     High risk medication requiring intensive toxicity monitoring at least quarterly     # Pregnancy Planning: s/p salpingectomy;  had a vasectomy    2. 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 Interestingly when leflunomide was stopped between 3/2018 and  6/15/2018, she experienced increased L>R eye irritation that when bothering her only affected one eye at a time.   Not an issue at this time.     3.  Vaccinations: Vaccinations reviewed with Ms. Foy.   - Influenza: Advised yearly vaccination  - COVID-19 vaccine: Advised updating, and to hold leflunomide for 1-2 weeks afterward     4. Elevated blood pressure:  Abby to follow up with Primary Care provider regarding elevated blood pressure.     Total minutes spent in evaluation with  patient, documentation, , and review of pertinent studies and chart notes: 14    Ms. Foy verbalized agreement with and understanding of the rational for the diagnosis and treatment plan.  All questions were answered to best of my ability and the patient's satisfaction. Ms. Foy was advised to contact the clinic with any questions that may arise after the clinic visit.      Thank you for involving me in the care of the patient    Return to clinic: 6 months    HPI   Abby Foy is a 43 year old female with medical history significant for urticaria, H. pylori infection, and iritis? who returns for f/u of seronegative spondyloarthropathy.    5/15/2023:  intermittent ache at the left second MCP and right third and fourth MCPs that responds well to Voltaren gel, and is typically of short duration and mild severity.  Topical Voltaren gel as needed infrequently because her symptoms are infrequent.  Arthritis is not limiting daily activities.  States like leflunomide and Otezla have been effective for her arthritis.  She states that she is happy with how well she is doing and does not need to change therapy based on her current symptoms.  Morning stiffness for less than 10 minutes.    Today, 11/13/2023: For no more than 1 hour in the morning, twice weekly, she has pain at the medial right medial epicondyles without swelling, increased warmth, or overlying erythema that resolved spontaneously.  She also notices that she will get a red area for a couple days just distal to the olecranon process that resolved spontaneously without any hyperpigmentation or hypopigmentation afterward; not present today.  Other joints are doing well.  Morning stiffness for no more than 10 minutes.  No joint swelling.    Denies fevers, chills, nausea, vomiting, constipation, diarrhea. No abdominal pain.  No black or bloody stools.  No chest pain/pressure, palpitations, or shortness of breath. No neck pain. Occasional  cold sores. No eye pain or redness    Tobacco: None  EtOH: 1-2 drinks on the weekends  Drugs: None  Occupation: Dietitian at the [employer]    ROS   12 point review of systems completed and negative except as noted in the HPI    Active Problem List     Patient Active Problem List   Diagnosis    CARDIOVASCULAR SCREENING; LDL GOAL LESS THAN 160    Urticaria    Diagnostic skin and sensitization tests    Nonallergic rhinitis    Angioedema of lips    H. pylori infection    GPC (giant papillary conjunctivitis)    Seronegative spondyloarthropathy    Midline low back pain without sciatica    Abnormal uterine bleeding (AUB)- on OCPs    Psoriatic arthritis (H)     Past Medical History     Past Medical History:   Diagnosis Date    Angioedema of lips episode in 3/13--idiopathic    Complication of anesthesia     ponv    Contraception 1/28/2009    Diagnostic skin and sensitization tests 3/27/13 skin tests all NEGATIVE for environmental and food allergens including shellfish and nuts.     Nonallergic rhinitis     3/27/13 skin tests all NEGATIVE for environmental and food allergens including shellfish and nuts. 3/27/13 followup IgE tests NEG to Peanut, [facility], shrimp, macadamia nut, pistachio and walnut.    Rheumatoid arthritis of multiple sites with negative rheumatoid factor (H) 12/31/2015     Past Surgical History     Past Surgical History:   Procedure Laterality Date    DILATE CERVIX, HYSTEROSCOPY, ABLATE ENDOMETRIUM HYDROTHERMAL, COMBINED N/A 7/24/2020    Procedure: DILATION, CERVIX, WITH HYSTEROSCOPY AND HYDROTHERMAL ENDOMETRIAL ABLATION;  Surgeon: Apryl West MD;  Location: [facility]    DILATE CERVIX, HYSTEROSCOPY, ABLATE ENDOMETRIUM HYDROTHERMAL, COMBINED N/A 9/10/2021    Procedure: DILATION, CERVIX, WITH HYSTEROSCOPY AND HYDROTHERMAL ENDOMETRIAL ABLATION;  Surgeon: Apryl West MD;  Location: [facility]    ENDOMETRIAL SAMPLING (BIOPSY) N/A 7/24/2020    Procedure: BIOPSY, ENDOMETRIUM;  Surgeon: Apryl West  MD;  Location: [facility]    LAPAROSCOPIC SALPINGECTOMY Bilateral 6/23/2017    Procedure: LAPAROSCOPIC SALPINGECTOMY;  Operative Laparoscopy, Bilateral Salpingectomy ;  Surgeon: Apryl West MD;  Location: [facility]    OPERATIVE HYSTEROSCOPY WITH MORCELLATOR N/A 12/12/2018    Procedure: DIAGNOSTIC HYSTEROSCOPY AND D AND C;  Surgeon: Apryl West MD;  Location: [facility]        Allergy     Allergies   Allergen Reactions    Shrimp Swelling     Lips and tongue    Walnuts [Nuts] Swelling     Lips and tongue       Current Medication List     Current Outpatient Medications   Medication Sig    apremilast (OTEZLA) 30 MG tablet Take 1 tablet (30 mg) by mouth daily    diclofenac (VOLTAREN) 1 % topical gel Apply up to 2 grams of 1% gel to hands up to 4 times daily as needed for joint pain (maximum: 8 g per upper extremity per day)    ibuprofen (ADVIL/MOTRIN) 200 MG capsule Take 3 capsules (600 mg) by mouth every 6 hours as needed for fever    leflunomide (ARAVA) 10 MG tablet Take 1 tablet (10 mg) by mouth daily    multivitamin peds with iron (FLINTSTONES COMPLETE) 60 MG chewable tablet Take 1 chew tab by mouth daily.     No current facility-administered medications for this visit.     Social History   See HPI    Family History     Family History   Problem Relation Age of Onset    Hypertension Maternal Grandmother     Autoimmune Disease Maternal Grandmother         Autoimmune hepatitis    Cancer Maternal Grandfather     Hypertension Paternal Grandmother     Cancer - colorectal Paternal Grandfather     Cardiovascular Paternal Grandfather     Other Cancer Paternal Grandfather     Hypertension Mother     Autoimmune Disease Mother         Autoimmune hepatitis    Hyperlipidemia Mother     Asthma Mother     Hypertension Father     Diabetes Father         Type 2    Multiple Sclerosis Maternal Aunt     Cerebrovascular Disease No family hx of     Thyroid Disease No family hx of     Glaucoma No family hx of     Macular Degeneration No  "family hx of     Breast Cancer No family hx of     Colon Cancer No family hx of      Physical Exam     Temp Readings from Last 3 Encounters:   09/10/21 97.7  F (36.5  C) (Axillary)   08/30/21 97.3  F (36.3  C) (Tympanic)   07/24/20 98.1  F (36.7  C) (Oral)     BP Readings from Last 5 Encounters:   11/13/23 (!) 145/78   05/15/23 118/79   11/18/22 116/73   11/10/22 113/77   05/13/22 136/77     Pulse Readings from Last 1 Encounters:   11/13/23 90     Resp Readings from Last 1 Encounters:   11/13/23 16     Estimated body mass index is 17.85 kg/m  as calculated from the following:    Height as of 11/10/22: 1.626 m (5' 4\").    Weight as of this encounter: 47.2 kg (104 lb).    GEN: NAD.   HEENT: Anicteric, noninjected sclera. No obvious external lesions of the ear and nose. Hearing intact.  CV: S1, S2. RRR. No m/r/g  PULM: No increased work of breathing. CTA bilaterally   MSK: MCPs, PIPs, DIPs without swelling or tenderness to palpation.  Wrists without swelling or tenderness to palpation.  Right elbow mildly tender to palpation over the medial epicondyle but no swelling or increased warmth.  Left elbow without swelling or tenderness to palpation.  Shoulders without swelling or tenderness to palpation. Knees and ankles without swelling or tenderness to palpation.  Negative MTP squeeze.    SKIN: No rash or jaundice seen  PSYCH: Alert. Appropriate.      Labs / Imaging (select studies)     RF/CCP  Recent Labs   Lab Test 12/21/15  1447   CCPABY <20  Interpretation:  Negative     RHF <20     CBC  Recent Labs   Lab Test 11/10/23  0908 08/18/23  0827 05/12/23  0906 09/10/21  1225 07/02/21  0922 01/12/21  1437 07/24/20  0626 07/20/20  1117   WBC 3.8* 6.0 3.5*   < > 5.4 5.5  --  6.5   RBC 4.45 4.50 4.25   < > 4.52 4.56  --  4.20   HGB 11.8 12.1 11.4*   < > 11.9 12.1   < > 11.2*   HCT 38.6 38.3 36.1   < > 37.7 37.8  --  35.4   MCV 87 85 85   < > 83 83  --  84   RDW 11.7 11.8 12.3   < > 12.5 12.7  --  12.6    212 201   < > 195 " 221  --  218   MCH 26.5 26.9 26.8   < > 26.3* 26.5  --  26.7   MCHC 30.6* 31.6 31.6   < > 31.6 32.0  --  31.6   NEUTROPHIL 62 66 51   < > 60.3 60.5  --  72.4   LYMPH 28 26 37   < > 29.0 29.5  --  21.1   MONOCYTE 7 6 8   < > 8.5 7.5  --  5.2   EOSINOPHIL 1 1 3   < > 1.8 2.0  --  1.1   BASOPHIL 1 1 1   < > 0.4 0.5  --  0.2   ANEU  --   --   --   --  3.3 3.3  --  4.7   ALYM  --   --   --   --  1.6 1.6  --  1.4   ANA MARÍA  --   --   --   --  0.5 0.4  --  0.3   AEOS  --   --   --   --  0.1 0.1  --  0.1   ABAS  --   --   --   --  0.0 0.0  --  0.0   ANEUTAUTO 2.4 4.0 1.8   < >  --   --   --   --    ALYMPAUTO 1.1 1.6 1.3   < >  --   --   --   --    AMONOAUTO 0.3 0.4 0.3   < >  --   --   --   --    AEOSAUTO 0.0 0.1 0.1   < >  --   --   --   --    ABSBASO 0.0 0.0 0.0   < >  --   --   --   --     < > = values in this interval not displayed.     CMP  Recent Labs   Lab Test 11/10/23  0908 08/18/23  0827 05/12/23  0906 11/01/21  1445 09/10/21  1202 07/02/21  0922 01/12/21  1437 07/20/20  1117 02/21/19  1427 12/12/18  1047   GLC  --   --   --   --  71  --   --   --   --  88   CR 0.73 0.73 0.90   < >  --  0.72 0.76 0.82   < >  --    GFRESTIMATED >90 >90 81   < >  --  >90 >90 90   < >  --    GFRESTBLACK  --   --   --   --   --  >90 >90 >90   < >  --    BILITOTAL 0.2 0.3 0.5   < >  --  0.5 0.4 0.3   < >  --    ALBUMIN 4.6 4.8 4.1   < >  --  4.2 4.4 3.6   < >  --    PROTTOTAL 7.2 7.3 7.2   < >  --  7.6 7.7 7.4   < >  --    ALKPHOS 45 47 46   < >  --  59 60 52   < >  --    AST 20 24 21   < >  --  12 15 20   < >  --    ALT 12 11 21   < >  --  20 20 22   < >  --     < > = values in this interval not displayed.     Calcium/VitaminD  Recent Labs   Lab Test 01/13/17  1355 12/21/15  1447   VITDT 45 74     ESR/CRP  Recent Labs   Lab Test 11/10/23  0908 05/12/23  0906 11/14/22  1520 05/11/22  1524   SED 7 21* 8 6   CRP  --  <2.9 <2.9 <2.9   CRPI <3.00  --   --   --      Hepatitis B  Recent Labs   Lab Test 03/20/17  1506 03/28/16  1442   AUSAB  --   264.69*   HBCAB Nonreactive  --    HEPBANG  --  Nonreactive     Hepatitis C  Recent Labs   Lab Test 12/21/15  1447   HCVAB Nonreactive   Assay performance characteristics have not been established for newborns,   infants, and children       Tuberculosis Screening  Recent Labs   Lab Test 05/11/22  1524 03/20/17  1508 03/28/16  1443   TBRES Negative  --   --    TBRSLT  --  Negative Negative   TBAGN  --  0.00 0.04     HIV Screening  Recent Labs   Lab Test 12/21/15  1447   HIAGAB Nonreactive   HIV-1 p24 Ag & HIV-1/HIV-2 Ab Not Detected       Immunization History     Immunization History   Administered Date(s) Administered    COVID-19 MONOVALENT 12+ (Pfizer) 11/19/2021    Influenza (IIV3) PF 10/12/2011, 10/03/2013    Influenza Vaccine >6 months (Alfuria,Fluzone) 10/10/2020    Influenza Vaccine, 6+MO IM (QUADRIVALENT W/PRESERVATIVES) 10/01/2015    Pneumo Conj 13-V (2010&after) 09/26/2016    Pneumococcal 23 valent 12/22/2016    TD,PF 7+ (Tenivac) 08/15/2001    TDAP Vaccine (Adacel) 06/20/2011    TDAP Vaccine (Boostrix) 03/06/2014          Chart documentation done in part with Dragon Voice recognition Software. Although reviewed after completion, some word and grammatical error may remain.    Hunter Monroy MD

## 2023-12-01 ENCOUNTER — TELEPHONE (OUTPATIENT)
Dept: FAMILY MEDICINE | Facility: CLINIC | Age: 43
End: 2023-12-01
Payer: COMMERCIAL

## 2023-12-01 NOTE — TELEPHONE ENCOUNTER
Patient Quality Outreach    Patient is due for the following:   Cervical Cancer Screening - PAP Needed  Physical Preventive Adult Physical      Topic Date Due    Hepatitis B Vaccine (1 of 3 - 3-dose series) Never done    Pneumococcal Vaccine (3 - PPSV23 or PCV20) 12/22/2021    COVID-19 Vaccine (4 - 2023-24 season) 09/01/2023       Next Steps:   Schedule a Adult Preventative    Type of outreach:    Sent Publisha message.    Next Steps:  Reach out within 90 days via Phone and Letter.    Max number of attempts reached: No. Will try again in 90 days if patient still on fail list.    Questions for provider review:    None           Inessa Maria MA

## 2024-01-18 ASSESSMENT — ENCOUNTER SYMPTOMS
DIZZINESS: 0
JOINT SWELLING: 0
HEMATURIA: 0
HEADACHES: 0
FREQUENCY: 0
COUGH: 0
HEMATOCHEZIA: 0
NERVOUS/ANXIOUS: 1
ABDOMINAL PAIN: 0
DIARRHEA: 0
FEVER: 0
PALPITATIONS: 0
CHILLS: 0
SHORTNESS OF BREATH: 0
BREAST MASS: 0
PARESTHESIAS: 0
DYSURIA: 0
HEARTBURN: 0
ARTHRALGIAS: 0
WEAKNESS: 0
SORE THROAT: 0
EYE PAIN: 0
NAUSEA: 0
CONSTIPATION: 0
MYALGIAS: 0

## 2024-01-18 ASSESSMENT — ANXIETY QUESTIONNAIRES
IF YOU CHECKED OFF ANY PROBLEMS ON THIS QUESTIONNAIRE, HOW DIFFICULT HAVE THESE PROBLEMS MADE IT FOR YOU TO DO YOUR WORK, TAKE CARE OF THINGS AT HOME, OR GET ALONG WITH OTHER PEOPLE: NOT DIFFICULT AT ALL
3. WORRYING TOO MUCH ABOUT DIFFERENT THINGS: NOT AT ALL
5. BEING SO RESTLESS THAT IT IS HARD TO SIT STILL: NOT AT ALL
6. BECOMING EASILY ANNOYED OR IRRITABLE: NOT AT ALL
7. FEELING AFRAID AS IF SOMETHING AWFUL MIGHT HAPPEN: NOT AT ALL
2. NOT BEING ABLE TO STOP OR CONTROL WORRYING: NOT AT ALL
GAD7 TOTAL SCORE: 1
7. FEELING AFRAID AS IF SOMETHING AWFUL MIGHT HAPPEN: NOT AT ALL
GAD7 TOTAL SCORE: 1
8. IF YOU CHECKED OFF ANY PROBLEMS, HOW DIFFICULT HAVE THESE MADE IT FOR YOU TO DO YOUR WORK, TAKE CARE OF THINGS AT HOME, OR GET ALONG WITH OTHER PEOPLE?: NOT DIFFICULT AT ALL
4. TROUBLE RELAXING: NOT AT ALL
1. FEELING NERVOUS, ANXIOUS, OR ON EDGE: SEVERAL DAYS

## 2024-01-25 ENCOUNTER — OFFICE VISIT (OUTPATIENT)
Dept: OBGYN | Facility: CLINIC | Age: 44
End: 2024-01-25
Attending: OBSTETRICS & GYNECOLOGY
Payer: COMMERCIAL

## 2024-01-25 VITALS
SYSTOLIC BLOOD PRESSURE: 123 MMHG | BODY MASS INDEX: 17.58 KG/M2 | HEART RATE: 73 BPM | WEIGHT: 103 LBS | DIASTOLIC BLOOD PRESSURE: 80 MMHG | HEIGHT: 64 IN

## 2024-01-25 DIAGNOSIS — Z01.419 ENCOUNTER FOR GYNECOLOGICAL EXAMINATION WITHOUT ABNORMAL FINDING: Primary | ICD-10-CM

## 2024-01-25 DIAGNOSIS — Z12.4 SCREENING FOR MALIGNANT NEOPLASM OF CERVIX: ICD-10-CM

## 2024-01-25 DIAGNOSIS — N93.9 ABNORMAL UTERINE BLEEDING (AUB): ICD-10-CM

## 2024-01-25 PROCEDURE — G0145 SCR C/V CYTO,THINLAYER,RESCR: HCPCS | Performed by: OBSTETRICS & GYNECOLOGY

## 2024-01-25 PROCEDURE — G0101 CA SCREEN;PELVIC/BREAST EXAM: HCPCS | Performed by: OBSTETRICS & GYNECOLOGY

## 2024-01-25 PROCEDURE — 87624 HPV HI-RISK TYP POOLED RSLT: CPT | Performed by: OBSTETRICS & GYNECOLOGY

## 2024-01-25 PROCEDURE — 99212 OFFICE O/P EST SF 10 MIN: CPT | Mod: 25 | Performed by: OBSTETRICS & GYNECOLOGY

## 2024-01-25 PROCEDURE — 99213 OFFICE O/P EST LOW 20 MIN: CPT | Mod: 25 | Performed by: OBSTETRICS & GYNECOLOGY

## 2024-01-25 NOTE — PATIENT INSTRUCTIONS
Thank you for trusting us with your care!     If you need to contact us for questions about:  Symptoms, Scheduling & Medical Questions; Non-urgent (2-3 day response) Jordan message, Urgent (needing response today) [phone] (if after 3:30pm next day response)   Prescriptions: Please call your Pharmacy   Billing: [facility] [phone] or [facility]:[phone]

## 2024-01-25 NOTE — PROGRESS NOTES
Progress Note    SUBJECTIVE:  Abby Foy is an 43 year old  , who requests a breast and pelvic exam.    Patient is followed by Sandi Duffy PA-C for primary care.    Concerns today include: Continues to have spotting after 2 ablations. She has wanted to avoid hysterectomy, but is considering it now.  It is frustrating to not know when she will experience bleeding and need to wear protection all the time.    Menstrual History:      2019     2:00 PM 2019     9:40 AM 2020     5:29 AM 2021     8:30 AM 11/10/2022     8:00 AM   Menstrual History   LAST MENSTRUAL PERIOD 1/3/2019 2019 2020 2021 10/29/2022       Last    Lab Results   Component Value Date    PAP NIL 2018     History of abnormal Pap smear: NO - age 30-65 PAP every 5 years with negative HPV co-testing recommended    Last   Lab Results   Component Value Date    HPV16 Negative 2018     Last   Lab Results   Component Value Date    HPV18 Negative 2018     Last   Lab Results   Component Value Date    HRHPV Negative 2018       Mammogram current: yes    HISTORY:  Prescription Medications as of 2024         Rx Number Disp Refills Start End Last Dispensed Date Next Fill Date Owning Pharmacy    apremilast (OTEZLA) 30 MG tablet  60 tablet 6 2023 --   [facility] - [place], [place] - [address]    Sig: Take 1 tablet (30 mg) by mouth daily    Class: E-Prescribe    Route: Oral    diclofenac (VOLTAREN) 1 % topical gel  200 g 2 2021 --   [facility] [place], [place] - [address] [facility]    Sig: Apply up to 2 grams of 1% gel to hands up to 4 times daily as needed for joint pain (maximum: 8 g per upper extremity per day)    Class: E-Prescribe    ibuprofen (ADVIL/MOTRIN) 200 MG capsule  -- -- 9/10/2021 --   [facility] [place], [place] - [address]    Sig: Take 3 capsules (600 mg) by mouth every 6 hours as needed for fever     Class: OTC    Route: Oral    leflunomide (ARAVA) 10 MG tablet  90 tablet 2 2023 --   [facility] [place] - [address] [facility]    Sig: Take 1 tablet (10 mg) by mouth daily    Class: E-Prescribe    Route: Oral    multivitamin peds with iron (FLINTSTONES COMPLETE) 60 MG chewable tablet  -- --  --       Sig: Take 1 chew tab by mouth daily.    Class: Historical    Route: Oral          Allergies   Allergen Reactions    Shrimp Swelling     Lips and tongue    Walnuts [Nuts] Swelling     Lips and tongue       Immunization History   Administered Date(s) Administered    COVID-19 MONOVALENT 12+ (Pfizer) 01/15/2021, 2021, 2021    Influenza (IIV3) PF 10/12/2011, 2012, 10/03/2013    Influenza Vaccine >6 months,quad, PF 10/10/2020, 2022, 2023    Influenza Vaccine, 6+MO IM (QUADRIVALENT W/PRESERVATIVES) 10/01/2015    Influenza, seasonal, injectable, PF 10/12/2011    Pneumo Conj 13-V (2010&after) 2016    Pneumococcal 23 valent 2016    TD,PF 7+ (Tenivac) 08/15/2001    TDAP Vaccine (Adacel) 2011    TDAP Vaccine (Boostrix) 2014       OB History    Para Term  AB Living   2 2 1 0 0 2   SAB IAB Ectopic Multiple Live Births   0 0 0 0 2     Past Medical History:   Diagnosis Date    Angioedema of lips episode in 3/13--idiopathic    Complication of anesthesia     ponv    Contraception 2009    Diagnostic skin and sensitization tests 3/27/13 skin tests all NEGATIVE for environmental and food allergens including shellfish and nuts.     Nonallergic rhinitis     3/27/13 skin tests all NEGATIVE for environmental and food allergens including shellfish and nuts. 3/27/13 followup IgE tests NEG to [facility], SNF, shrimp, macadamia nut, pistachio and walnut.    Rheumatoid arthritis of multiple sites with negative rheumatoid factor (H) 2015     Past Surgical History:   Procedure Laterality Date    BIOPSY  21    Biopsy needed prior to ablation     DILATE CERVIX, HYSTEROSCOPY, ABLATE ENDOMETRIUM HYDROTHERMAL, COMBINED N/A 07/24/2020    Procedure: DILATION, CERVIX, WITH HYSTEROSCOPY AND HYDROTHERMAL ENDOMETRIAL ABLATION;  Surgeon: Apryl West MD;  [facility]    DILATE CERVIX, HYSTEROSCOPY, ABLATE ENDOMETRIUM HYDROTHERMAL, COMBINED N/A 09/10/2021    Procedure: DILATION, CERVIX, WITH HYSTEROSCOPY AND HYDROTHERMAL ENDOMETRIAL ABLATION;  Surgeon: Apryl West MD;  [facility]    ENDOMETRIAL SAMPLING (BIOPSY) N/A 07/24/2020    Procedure: BIOPSY, ENDOMETRIUM;  Surgeon: Apryl West MD;  [facility]    LAPAROSCOPIC SALPINGECTOMY Bilateral 06/23/2017    Procedure: LAPAROSCOPIC SALPINGECTOMY;  Operative Laparoscopy, Bilateral Salpingectomy ;  Surgeon: Apryl West MD;  [facility]    OPERATIVE HYSTEROSCOPY WITH MORCELLATOR N/A 12/12/2018    Procedure: DIAGNOSTIC HYSTEROSCOPY AND D AND C;  Surgeon: Apryl West MD;  [facility]     Family History   Problem Relation Age of Onset    Hypertension Maternal Grandmother     Autoimmune Disease Maternal Grandmother         Autoimmune hepatitis    Cancer Maternal Grandfather     Hypertension Paternal Grandmother     Cancer - colorectal Paternal Grandfather     Cardiovascular Paternal Grandfather     Other Cancer Paternal Grandfather     Hypertension Mother     Autoimmune Disease Mother         Autoimmune hepatitis    Hyperlipidemia Mother     Asthma Mother     Hypertension Father     Diabetes Father         Type 2    Prostate Cancer Father     Multiple Sclerosis Maternal Aunt     Other Cancer Other         Lymphoma    Cerebrovascular Disease No family hx of     Thyroid Disease No family hx of     Glaucoma No family hx of     Macular Degeneration No family hx of     Breast Cancer No family hx of     Colon Cancer No family hx of      Social History     Socioeconomic History    Marital status:      Spouse name: None    Number of children: 1    Years of education: None    Highest  "education level: None   Occupational History     Employer: [employer]    Tobacco Use    Smoking status: Never    Smokeless tobacco: Never   Substance and Sexual Activity    Alcohol use: Yes     Comment: 1-3 drinks/ week    Drug use: No    Sexual activity: Yes     Partners: Male     Birth control/protection: Male Surgical, Female Surgical     Comment: bilateral salpingectomy   Other Topics Concern    Parent/sibling w/ CABG, MI or angioplasty before 65F 55M? No   Social History Narrative    How much exercise per week? 4 times    How much calcium per day? 2 servings and multivits       How much caffeine per day? 1 cup    How much vitamin D per day? In foods and sunlight    Do you/your family wear seatbelts?  Yes    Do you/your family use safety helmets? Yes    Do you/your family use sunscreen? Yes    Do you/your family keep firearms in the home? No    Do you/your family have a smoke detector(s)? Yes        Do you feel safe in your home? Yes    Has anyone ever touched you in an unwanted manner? No     Explain            ROS    EXAM:  Blood pressure 123/80, pulse 73, height 1.626 m (5' 4\"), weight 46.7 kg (103 lb), not currently breastfeeding. Body mass index is 17.68 kg/m .  General appearance: Pleasant female in no acute distress.     BREAST EXAM:  Breast: Without visible skin changes. No dimpling or lesions seen.   Breasts supple, non-tender with palpation, no dominant mass, nodularity, or nipple discharge noted bilaterally. Axillary nodes negative.      PELVIC EXAM:  EG/BUS: Normal genital architecture without lesions, erythema or abnormal secretions Bartholin's, Urethra, [place]'s normal   Urethral meatus: normal    Urethra: no masses, tenderness, or scarring    Bladder: no masses or tenderness    Vagina: moist, pink, rugae with creamy, white, and odorless  secretions  Cervix: no lesions and pink, moist, closed, without lesion or CMT  Uterus: anteverted,  and small, smooth, firm, mobile w/o pain  Adnexa: Within " normal limits and No masses, nodularity, tenderness  Rectum:anus normal       ASSESSMENT:  Encounter Diagnoses   Name Primary?    Encounter for gynecological examination without abnormal finding Yes    Screening for malignant neoplasm of cervix     Abnormal uterine bleeding (AUB)       43 year old Female Pelvic and Breast Exam  Persistent AUB    PLAN:   Orders Placed This Encounter   Procedures    Pelvic and Breast Exam Procedure []    Pap Smear Exam []    Pap screen with HPV - recommended age 30 - 65 years     Discussed that definitive management of AUB would be hysterectomy.  She has tried endometrial ablation twice.  Our last attempt at endometrial biopsy did not get any endometrial tissue on pathology.  Do not think we could sample prior to hysterectomy, discussed that undiagnosed abnormalities could be present on final path, but low likelihood of anything worrisome given multiple prior sampling with benign path.  She will consider and let us  know.  Would plan RA-lap hyst.    Up to date on other health care maintenance.  Due for TDaP, 3/2024, was going to give today but not given prior to patient teaving clinic.   Return in one year/PRN for preventive care or problems/concerns.     Verbalized understanding and agreement with visit plan.  Apryl West MD

## 2024-01-25 NOTE — PROGRESS NOTES
[facility]  Follow-Up Visit    S: Ms. Abby Foy is a 43 year old  here for ***.    Abby Foy is a 42 year old female s/p repeat endometrial ablation on 2021 who presents today for her annual exam and to discuss abnormal bleeding. She has a bilateral salpingectomy and would like to continue with this method of birth control.     She has had irregular spotting since her repeat ablation in , but in the last month has had more prolonged bleeding with brown and red blood lasting for 3 weeks.  She denies pain.  Bleeding seems worse with activity.  She states the amount of bleeding is tolerable, but the unpredictability is frustrating.  She is agreeable to cervical dilation and endometrial biopsy today (11/10/22)    O:  There were no vitals taken for this visit.  Gen: Well-appearing, NAD  HEENT: Normocephalic, atraumatic  CV:  RRR, no m/r/g auscultated***  Pulm: CTAB, no w/r/r auscultated***  Abd: Soft, non-tender, non-distended***  Ext: No LE edema, extremities warm and well perfused***    Pelvic:  Normal appearing external female genitalia. Normal hair distribution. Vagina is without lesions. *** discharge. Cervix ***parous, no lesions, no cervical motion tenderness. Uterus is small, mobile, non-tender. No adnexal tenderness or masses    A/P:  Ms. Abby Foy is a 43 year old  here for ***.  - ***    @[facility]@  Answers submitted by the patient for this visit:  ANA-7 (Submitted on 2024)  ANA 7 TOTAL SCORE: 1  Annual Preventive Visit (Submitted on 2024)  Chief Complaint: Annual Exam:  Frequency of exercise:: 4-5 days/week  Getting at least 3 servings of Calcium per day:: Yes  Diet:: Regular (no restrictions)  Taking medications regularly:: Yes  Medication side effects:: None  Bi-annual eye exam:: Yes  Dental care twice a year:: Yes  Sleep apnea or symptoms of sleep apnea:: None  abdominal pain: No  Blood in stool: No  Blood in urine: No  chest pain:  No  chills: No  congestion: No  constipation: No  cough: No  diarrhea: No  dizziness: No  ear pain: No  eye pain: No  nervous/anxious: Yes  fever: No  frequency: No  genital sores: No  headaches: No  hearing loss: No  heartburn: No  arthralgias: No  joint swelling: No  peripheral edema: No  mood changes: No  myalgias: No  nausea: No  dysuria: No  palpitations: No  Skin sensation changes: No  sore throat: No  urgency: No  rash: No  shortness of breath: No  visual disturbance: No  weakness: No  pelvic pain: Yes  vaginal bleeding: Yes  vaginal discharge: Yes  tenderness: No  breast mass: No  breast discharge: Yes  Additional concerns today:: No  Exercise outside of work (Submitted on 1/18/2024)  Chief Complaint: Annual Exam:  Duration of exercise:: 45-60 minutes

## 2024-01-25 NOTE — LETTER
2024       RE: Abby Foy  [id number] [address] [place] [address]     Dear Colleague,    Thank you for referring your patient, Abby Foy, to the [facility] [place] [facility]. Please see a copy of my visit note below.    Progress Note    SUBJECTIVE:  Abby Foy is an 43 year old  , who requests a breast and pelvic exam.    Patient is followed by Sandi Duffy PA-C for primary care.    Concerns today include: Continues to have spotting after 2 ablations. She has wanted to avoid hysterectomy, but is considering it now.  It is frustrating to not know when she will experience bleeding and need to wear protection all the time.    Menstrual History:      2019     2:00 PM 2019     9:40 AM 2020     5:29 AM 2021     8:30 AM 11/10/2022     8:00 AM   Menstrual History   LAST MENSTRUAL PERIOD 1/3/2019 2019 2020 2021 10/29/2022       Last    Lab Results   Component Value Date    PAP NIL 2018     History of abnormal Pap smear: NO - age 30-65 PAP every 5 years with negative HPV co-testing recommended    Last   Lab Results   Component Value Date    HPV16 Negative 2018     Last   Lab Results   Component Value Date    HPV18 Negative 2018     Last   Lab Results   Component Value Date    HRHPV Negative 2018       Mammogram current: yes    HISTORY:  Prescription Medications as of 2024         Rx Number Disp Refills Start End Last Dispensed Date Next Fill Date Owning Pharmacy    apremilast (OTEZLA) 30 MG tablet  60 tablet 6 2023 --   [facility] - [place], [place] - [address]    Sig: Take 1 tablet (30 mg) by mouth daily    Class: E-Prescribe    Route: Oral    diclofenac (VOLTAREN) 1 % topical gel  200 g 2 2021 --   [facility] [place] - [address] [facility]    Sig: Apply up to 2 grams of 1% gel to hands up  to 4 times daily as needed for joint pain (maximum: 8 g per upper extremity per day)    Class: E-Prescribe    ibuprofen (ADVIL/MOTRIN) 200 MG capsule  -- -- 9/10/2021 --   [facility] [place], [place] - [address]    Sig: Take 3 capsules (600 mg) by mouth every 6 hours as needed for fever    Class: OTC    Route: Oral    leflunomide (ARAVA) 10 MG tablet  90 tablet 2 2023 --   [facility] [place] - [address] [facility]    Sig: Take 1 tablet (10 mg) by mouth daily    Class: E-Prescribe    Route: Oral    multivitamin peds with iron (FLINTSTONES COMPLETE) 60 MG chewable tablet  -- --  --       Sig: Take 1 chew tab by mouth daily.    Class: Historical    Route: Oral          Allergies   Allergen Reactions    Shrimp Swelling     Lips and tongue    Walnuts [Nuts] Swelling     Lips and tongue       Immunization History   Administered Date(s) Administered    COVID-19 MONOVALENT 12+ (Pfizer) 01/15/2021, 2021, 2021    Influenza (IIV3) PF 10/12/2011, 2012, 10/03/2013    Influenza Vaccine >6 months,quad, PF 10/10/2020, 2022, 2023    Influenza Vaccine, 6+MO IM (QUADRIVALENT W/PRESERVATIVES) 10/01/2015    Influenza, seasonal, injectable, PF 10/12/2011    Pneumo Conj 13-V (2010&after) 2016    Pneumococcal 23 valent 2016    TD,PF 7+ (Tenivac) 08/15/2001    TDAP Vaccine (Adacel) 2011    TDAP Vaccine (Boostrix) 2014       OB History    Para Term  AB Living   2 2 1 0 0 2   SAB IAB Ectopic Multiple Live Births   0 0 0 0 2     Past Medical History:   Diagnosis Date    Angioedema of lips episode in 3/13--idiopathic    Complication of anesthesia     ponv    Contraception 2009    Diagnostic skin and sensitization tests 3/27/13 skin tests all NEGATIVE for environmental and food allergens including shellfish and nuts.     Nonallergic rhinitis     3/27/13 skin tests all NEGATIVE for environmental and food allergens including  shellfish and nuts. 3/27/13 followup IgE tests NEG to [facility], SNF, shrimp, macadamia nut, pistachio and walnut.    Rheumatoid arthritis of multiple sites with negative rheumatoid factor (H) 12/31/2015     Past Surgical History:   Procedure Laterality Date    BIOPSY  8/27/21    Biopsy needed prior to ablation    DILATE CERVIX, HYSTEROSCOPY, ABLATE ENDOMETRIUM HYDROTHERMAL, COMBINED N/A 07/24/2020    Procedure: DILATION, CERVIX, WITH HYSTEROSCOPY AND HYDROTHERMAL ENDOMETRIAL ABLATION;  Surgeon: Apryl West MD;  [facility]    DILATE CERVIX, HYSTEROSCOPY, ABLATE ENDOMETRIUM HYDROTHERMAL, COMBINED N/A 09/10/2021    Procedure: DILATION, CERVIX, WITH HYSTEROSCOPY AND HYDROTHERMAL ENDOMETRIAL ABLATION;  Surgeon: Apryl West MD;  [facility]    ENDOMETRIAL SAMPLING (BIOPSY) N/A 07/24/2020    Procedure: BIOPSY, ENDOMETRIUM;  Surgeon: Apryl West MD;  [facility]    LAPAROSCOPIC SALPINGECTOMY Bilateral 06/23/2017    Procedure: LAPAROSCOPIC SALPINGECTOMY;  Operative Laparoscopy, Bilateral Salpingectomy ;  Surgeon: Apryl West MD;  [facility]    OPERATIVE HYSTEROSCOPY WITH MORCELLATOR N/A 12/12/2018    Procedure: DIAGNOSTIC HYSTEROSCOPY AND D AND C;  Surgeon: Apryl West MD;  [facility]     Family History   Problem Relation Age of Onset    Hypertension Maternal Grandmother     Autoimmune Disease Maternal Grandmother         Autoimmune hepatitis    Cancer Maternal Grandfather     Hypertension Paternal Grandmother     Cancer - colorectal Paternal Grandfather     Cardiovascular Paternal Grandfather     Other Cancer Paternal Grandfather     Hypertension Mother     Autoimmune Disease Mother         Autoimmune hepatitis    Hyperlipidemia Mother     Asthma Mother     Hypertension Father     Diabetes Father         Type 2    Prostate Cancer Father     Multiple Sclerosis Maternal Aunt     Other Cancer Other         Lymphoma    Cerebrovascular Disease No family hx of     Thyroid Disease  "No family hx of     Glaucoma No family hx of     Macular Degeneration No family hx of     Breast Cancer No family hx of     Colon Cancer No family hx of      Social History     Socioeconomic History    Marital status:      Spouse name: None    Number of children: 1    Years of education: None    Highest education level: None   Occupational History     Employer: [employer]    Tobacco Use    Smoking status: Never    Smokeless tobacco: Never   Substance and Sexual Activity    Alcohol use: Yes     Comment: 1-3 drinks/ week    Drug use: No    Sexual activity: Yes     Partners: Male     Birth control/protection: Male Surgical, Female Surgical     Comment: bilateral salpingectomy   Other Topics Concern    Parent/sibling w/ CABG, MI or angioplasty before 65F 55M? No   Social History Narrative    How much exercise per week? 4 times    How much calcium per day? 2 servings and multivits       How much caffeine per day? 1 cup    How much vitamin D per day? In foods and sunlight    Do you/your family wear seatbelts?  Yes    Do you/your family use safety helmets? Yes    Do you/your family use sunscreen? Yes    Do you/your family keep firearms in the home? No    Do you/your family have a smoke detector(s)? Yes        Do you feel safe in your home? Yes    Has anyone ever touched you in an unwanted manner? No     Explain            ROS    EXAM:  Blood pressure 123/80, pulse 73, height 1.626 m (5' 4\"), weight 46.7 kg (103 lb), not currently breastfeeding. Body mass index is 17.68 kg/m .  General appearance: Pleasant female in no acute distress.     BREAST EXAM:  Breast: Without visible skin changes. No dimpling or lesions seen.   Breasts supple, non-tender with palpation, no dominant mass, nodularity, or nipple discharge noted bilaterally. Axillary nodes negative.      PELVIC EXAM:  EG/BUS: Normal genital architecture without lesions, erythema or abnormal secretions Bartholin's, Urethra, [place]'s normal   Urethral meatus: " normal    Urethra: no masses, tenderness, or scarring    Bladder: no masses or tenderness    Vagina: moist, pink, rugae with creamy, white, and odorless  secretions  Cervix: no lesions and pink, moist, closed, without lesion or CMT  Uterus: anteverted,  and small, smooth, firm, mobile w/o pain  Adnexa: Within normal limits and No masses, nodularity, tenderness  Rectum:anus normal       ASSESSMENT:  Encounter Diagnoses   Name Primary?    Encounter for gynecological examination without abnormal finding Yes    Screening for malignant neoplasm of cervix     Abnormal uterine bleeding (AUB)       43 year old Female Pelvic and Breast Exam  Persistent AUB    PLAN:   Orders Placed This Encounter   Procedures    Pelvic and Breast Exam Procedure []    Pap Smear Exam []    Pap screen with HPV - recommended age 30 - 65 years     Discussed that definitive management of AUB would be hysterectomy.  She has tried endometrial ablation twice.  Our last attempt at endometrial biopsy did not get any endometrial tissue on pathology.  Do not think we could sample prior to hysterectomy, discussed that undiagnosed abnormalities could be present on final path, but low likelihood of anything worrisome given multiple prior sampling with benign path.  She will consider and let us  know.  Would plan RA-lap hyst.    Up to date on other health care maintenance.  Due for TDaP, 3/2024, was going to give today but not given prior to patient teaving clinic.   Return in one year/PRN for preventive care or problems/concerns.     Verbalized understanding and agreement with visit plan.  Apryl West MD

## 2024-01-30 LAB
BKR LAB AP GYN ADEQUACY: NORMAL
BKR LAB AP GYN INTERPRETATION: NORMAL
BKR LAB AP HPV REFLEX: NORMAL
BKR LAB AP PREVIOUS ABNORMAL: NORMAL
PATH REPORT.COMMENTS IMP SPEC: NORMAL
PATH REPORT.COMMENTS IMP SPEC: NORMAL
PATH REPORT.RELEVANT HX SPEC: NORMAL

## 2024-02-01 LAB
HUMAN PAPILLOMA VIRUS 16 DNA: NEGATIVE
HUMAN PAPILLOMA VIRUS 18 DNA: NEGATIVE
HUMAN PAPILLOMA VIRUS FINAL DIAGNOSIS: NORMAL
HUMAN PAPILLOMA VIRUS OTHER HR: NEGATIVE

## 2024-02-16 ENCOUNTER — LAB (OUTPATIENT)
Dept: LAB | Facility: CLINIC | Age: 44
End: 2024-02-16
Payer: COMMERCIAL

## 2024-02-16 DIAGNOSIS — Z79.899 HIGH RISK MEDICATION USE: ICD-10-CM

## 2024-02-16 DIAGNOSIS — L40.50 PSORIATIC ARTHRITIS (H): ICD-10-CM

## 2024-02-16 LAB
ALBUMIN SERPL BCG-MCNC: 4.8 G/DL (ref 3.5–5.2)
ALP SERPL-CCNC: 48 U/L (ref 40–150)
ALT SERPL W P-5'-P-CCNC: 13 U/L (ref 0–50)
AST SERPL W P-5'-P-CCNC: 22 U/L (ref 0–45)
BASOPHILS # BLD AUTO: 0.1 10E3/UL (ref 0–0.2)
BASOPHILS NFR BLD AUTO: 1 %
BILIRUB DIRECT SERPL-MCNC: <0.2 MG/DL (ref 0–0.3)
BILIRUB SERPL-MCNC: 0.3 MG/DL
CREAT SERPL-MCNC: 0.77 MG/DL (ref 0.51–0.95)
EGFRCR SERPLBLD CKD-EPI 2021: >90 ML/MIN/1.73M2
EOSINOPHIL # BLD AUTO: 0.1 10E3/UL (ref 0–0.7)
EOSINOPHIL NFR BLD AUTO: 2 %
ERYTHROCYTE [DISTWIDTH] IN BLOOD BY AUTOMATED COUNT: 12.2 % (ref 10–15)
HCT VFR BLD AUTO: 39.4 % (ref 35–47)
HGB BLD-MCNC: 12.1 G/DL (ref 11.7–15.7)
IMM GRANULOCYTES # BLD: 0 10E3/UL
IMM GRANULOCYTES NFR BLD: 0 %
LYMPHOCYTES # BLD AUTO: 1.4 10E3/UL (ref 0.8–5.3)
LYMPHOCYTES NFR BLD AUTO: 27 %
MCH RBC QN AUTO: 26.4 PG (ref 26.5–33)
MCHC RBC AUTO-ENTMCNC: 30.7 G/DL (ref 31.5–36.5)
MCV RBC AUTO: 86 FL (ref 78–100)
MONOCYTES # BLD AUTO: 0.4 10E3/UL (ref 0–1.3)
MONOCYTES NFR BLD AUTO: 7 %
NEUTROPHILS # BLD AUTO: 3.3 10E3/UL (ref 1.6–8.3)
NEUTROPHILS NFR BLD AUTO: 63 %
PLATELET # BLD AUTO: 196 10E3/UL (ref 150–450)
PROT SERPL-MCNC: 7.5 G/DL (ref 6.4–8.3)
RBC # BLD AUTO: 4.58 10E6/UL (ref 3.8–5.2)
WBC # BLD AUTO: 5.2 10E3/UL (ref 4–11)

## 2024-02-16 PROCEDURE — 85025 COMPLETE CBC W/AUTO DIFF WBC: CPT

## 2024-02-16 PROCEDURE — 82565 ASSAY OF CREATININE: CPT

## 2024-02-16 PROCEDURE — 36415 COLL VENOUS BLD VENIPUNCTURE: CPT

## 2024-02-16 PROCEDURE — 80076 HEPATIC FUNCTION PANEL: CPT

## 2024-02-19 ENCOUNTER — TELEPHONE (OUTPATIENT)
Dept: RHEUMATOLOGY | Facility: CLINIC | Age: 44
End: 2024-02-19
Payer: COMMERCIAL

## 2024-02-19 NOTE — TELEPHONE ENCOUNTER
PA Initiation    Medication: OTEZLA 30 MG PO TABS  Insurance Company: [employer] - Phone [phone] Fax [phone]  Pharmacy Filling the Rx: [facility] - [place] [address]  Filling Pharmacy Phone: [phone]  Filling Pharmacy Fax: [phone]  Start Date: 2/19/2024  HECTOR (Key: [id number])  [email or web address]        Thank You,     Hyacinth Cuevas CPhT  Specialty Pharmacy Clinic [facility]  [email or web address]  [email or web address]  Phone: [phone]  Fax: [phone]

## 2024-02-20 NOTE — TELEPHONE ENCOUNTER
Prior Authorization Approval    Medication: OTEZLA 30 MG PO TABS  Authorization Effective Date: 1/21/2024  Authorization Expiration Date: 2/20/2025  Approved Dose/Quantity: up to 60 tablets / 30 day supply  Reference #: [id number] (Key: [id number])   Insurance Company: [employer] - Phone [phone] Fax [phone]  Expected CoPay: $ 0  CoPay Card Available: Other (see comments)    Financial Assistance Needed: [email or web address]   Which Pharmacy is filling the prescription: [facility] - [place], [place]  [address] [place] [address] [facility]  Pharmacy Notified: renewal  Patient Notified: yes - elenit         Thank You,     Marcos Cuevas [facility]  Specialty Pharmacy Clinic [facility] Special[facility]  [email or web address]  [email or web address]  Phone: [phone]  Fax: [phone]

## 2024-05-10 ENCOUNTER — LAB (OUTPATIENT)
Dept: LAB | Facility: CLINIC | Age: 44
End: 2024-05-10
Payer: COMMERCIAL

## 2024-05-10 DIAGNOSIS — Z79.899 HIGH RISK MEDICATION USE: ICD-10-CM

## 2024-05-10 DIAGNOSIS — L40.50 PSORIATIC ARTHRITIS (H): ICD-10-CM

## 2024-05-10 LAB
ALBUMIN SERPL BCG-MCNC: 4.8 G/DL (ref 3.5–5.2)
ALP SERPL-CCNC: 53 U/L (ref 40–150)
ALT SERPL W P-5'-P-CCNC: 18 U/L (ref 0–50)
AST SERPL W P-5'-P-CCNC: 21 U/L (ref 0–45)
BASOPHILS # BLD AUTO: 0.1 10E3/UL (ref 0–0.2)
BASOPHILS NFR BLD AUTO: 1 %
BILIRUB DIRECT SERPL-MCNC: <0.2 MG/DL (ref 0–0.3)
BILIRUB SERPL-MCNC: 0.3 MG/DL
CREAT SERPL-MCNC: 0.75 MG/DL (ref 0.51–0.95)
CRP SERPL-MCNC: <3 MG/L
EGFRCR SERPLBLD CKD-EPI 2021: >90 ML/MIN/1.73M2
EOSINOPHIL # BLD AUTO: 0.1 10E3/UL (ref 0–0.7)
EOSINOPHIL NFR BLD AUTO: 1 %
ERYTHROCYTE [DISTWIDTH] IN BLOOD BY AUTOMATED COUNT: 12 % (ref 10–15)
ERYTHROCYTE [SEDIMENTATION RATE] IN BLOOD BY WESTERGREN METHOD: 7 MM/HR (ref 0–20)
HCT VFR BLD AUTO: 38.2 % (ref 35–47)
HGB BLD-MCNC: 12 G/DL (ref 11.7–15.7)
IMM GRANULOCYTES # BLD: 0 10E3/UL
IMM GRANULOCYTES NFR BLD: 0 %
LYMPHOCYTES # BLD AUTO: 1.3 10E3/UL (ref 0.8–5.3)
LYMPHOCYTES NFR BLD AUTO: 25 %
MCH RBC QN AUTO: 26.8 PG (ref 26.5–33)
MCHC RBC AUTO-ENTMCNC: 31.4 G/DL (ref 31.5–36.5)
MCV RBC AUTO: 85 FL (ref 78–100)
MONOCYTES # BLD AUTO: 0.4 10E3/UL (ref 0–1.3)
MONOCYTES NFR BLD AUTO: 8 %
NEUTROPHILS # BLD AUTO: 3.3 10E3/UL (ref 1.6–8.3)
NEUTROPHILS NFR BLD AUTO: 65 %
PLATELET # BLD AUTO: 207 10E3/UL (ref 150–450)
PROT SERPL-MCNC: 7.2 G/DL (ref 6.4–8.3)
RBC # BLD AUTO: 4.48 10E6/UL (ref 3.8–5.2)
WBC # BLD AUTO: 5.1 10E3/UL (ref 4–11)

## 2024-05-10 PROCEDURE — 86140 C-REACTIVE PROTEIN: CPT

## 2024-05-10 PROCEDURE — 85652 RBC SED RATE AUTOMATED: CPT

## 2024-05-10 PROCEDURE — 36415 COLL VENOUS BLD VENIPUNCTURE: CPT

## 2024-05-10 PROCEDURE — 80076 HEPATIC FUNCTION PANEL: CPT

## 2024-05-10 PROCEDURE — 85025 COMPLETE CBC W/AUTO DIFF WBC: CPT

## 2024-05-10 PROCEDURE — 82565 ASSAY OF CREATININE: CPT

## 2024-05-13 ENCOUNTER — OFFICE VISIT (OUTPATIENT)
Dept: RHEUMATOLOGY | Facility: CLINIC | Age: 44
End: 2024-05-13
Payer: COMMERCIAL

## 2024-05-13 VITALS — OXYGEN SATURATION: 98 % | SYSTOLIC BLOOD PRESSURE: 115 MMHG | DIASTOLIC BLOOD PRESSURE: 71 MMHG | HEART RATE: 94 BPM

## 2024-05-13 DIAGNOSIS — L40.50 PSORIATIC ARTHRITIS (H): Primary | ICD-10-CM

## 2024-05-13 DIAGNOSIS — Z23 NEED FOR VACCINATION: ICD-10-CM

## 2024-05-13 DIAGNOSIS — Z79.899 HIGH RISK MEDICATION USE: ICD-10-CM

## 2024-05-13 PROCEDURE — 90471 IMMUNIZATION ADMIN: CPT | Performed by: INTERNAL MEDICINE

## 2024-05-13 PROCEDURE — 90715 TDAP VACCINE 7 YRS/> IM: CPT | Performed by: INTERNAL MEDICINE

## 2024-05-13 PROCEDURE — 99214 OFFICE O/P EST MOD 30 MIN: CPT | Mod: 25 | Performed by: INTERNAL MEDICINE

## 2024-05-13 RX ORDER — LEFLUNOMIDE 10 MG/1
10 TABLET ORAL DAILY
Qty: 90 TABLET | Refills: 2 | Status: SHIPPED | OUTPATIENT
Start: 2024-05-13

## 2024-05-13 RX ORDER — APREMILAST 30 MG/1
30 TABLET, FILM COATED ORAL DAILY
Qty: 60 TABLET | Refills: 6 | Status: SHIPPED | OUTPATIENT
Start: 2024-05-13

## 2024-05-13 NOTE — PROGRESS NOTES
Rheumatology Clinic Visit      Abby Foy MRN# [id number]   YOB: 1980 Age: 44 year old      Date of visit: 5/13/24   PCP: Sandi Duffy  Dermatologist: Amy Patel  Ophthalmologist: Dr. Mathis     Chief Complaint   Patient presents with:  RECHECK    Assessment and Plan   1. 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 However, given her continued back pain that improved with activity but no radiographic evidence for inflammatory arthritis or osteoarthritis, there was concern that she had inflammatory back pain that would be more consistent with a spondylarthritis. Humira was started and resulted in improvement of her back pain, suggesting axial disease.  Arthritis improved with methotrexate and sulfasalazine, but she was having headaches and therefore the most likely culprit methotrexate was reduced until her headaches worsened significantly and therefore sulfasalazine and methotrexate were both discontinued. She had resolution of her headaches after discontinuing both sulfasalazine and methotrexate. I believe that the sulfasalazine was the cause of her headaches. Therefore, cautious retrial of methotrexate in the future could be done.  She was doing well on a combination of leflunomide 10 mg daily and Humira 40 mg SQ every 14 days but Humira had to be stopped because of issues with the  program; so Simponi was Rx'd but never started.  Intermittent inflammatory symptom so Otezla was prescribed but but delayed starting until March 2020, it was found be effective but the twice daily dose was resulting in worsening headaches that resolved with a once daily dose. Was on doing well on a combination of leflunomide 10 mg daily and Otezla daily;  previously leflunomide was discontinued in an effort to get to the lowest effective dose but she had return of symptoms primarily affecting the PIPs so it was restarted. Currently on leflunomide 10 mg daily and Otezla 30 mg daily and doing well with regard to inflammatory arthritis.  History of right medial epicondylitis symptoms; previously advised that she use topical Voltaren gel when this occurs.  No issues currently.  Chronic illness, stable.    - Continue leflunomide 10 mg daily  - Continue Otezla 30mg daily  - Continue diclofenac (VOLTAREN) 1 % topical gel; Apply up to 2 grams of 1% gel to hands and right elbow up to 4 times daily as needed for pain (maximum: 8 g per upper extremity per day)    - Labs in 3 months: CBC, Creatinine, Hepatic Panel  - Labs in 6 months: CBC, Creatinine, Hepatic Panel, ESR, CRP, hepatitis B core body and surface antigen, QuantiFERON-TB gold plus    High risk medication requiring intensive toxicity monitoring at least quarterly     # Pregnancy Planning: s/p salpingectomy;  had a vasectomy    2. 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 Interestingly when leflunomide was stopped between 3/2018 and  6/15/2018, she experienced increased L>R eye irritation that when bothering her only affected one eye at a time.   Not an issue at this time.    3.  Possible seasonal allergies: She will try using generic Allegra and possibly Flonase to see if this helps the eye itching that she experiences in the morning.     4.  Vaccinations: Vaccinations reviewed with Ms. Foy.   - Influenza: Advised yearly vaccination  - COVID-19 vaccine: Advised keeping updated  - Tdap: To receive today    Total minutes spent in evaluation with patient, documentation, , and review of pertinent studies and chart notes: 12  The longitudinal plan of care for the rheumatology problem(s) were addressed during this visit.  Due to  added complexity of care, we will continue to support the patient and the subsequent management of this condition with ongoing continuity of care.     Ms. Foy verbalized agreement with and understanding of the rational for the diagnosis and treatment plan.  All questions were answered to best of my ability and the patient's satisfaction. Ms. Foy was advised to contact the clinic with any questions that may arise after the clinic visit.      Thank you for involving me in the care of the patient    Return to clinic: 6 months    HPI   Abby Foy is a 44 year old female with medical history significant for urticaria, H. pylori infection, and iritis? who returns for f/u of seronegative spondyloarthropathy.    5/15/2023:  intermittent ache at the left second MCP and right third and fourth MCPs that responds well to Voltaren gel, and is typically of short duration and mild severity.  Topical Voltaren gel as needed infrequently because her symptoms are infrequent.  Arthritis is not limiting daily activities.  States like leflunomide and Otezla have been effective for her arthritis.  She states that she is happy with how well she is doing and does not need to change therapy based on her current symptoms.  Morning stiffness for less than 10 minutes.    11/13/2023: For no more than 1 hour in the morning, twice weekly, she has pain at the medial right medial epicondyles without swelling, increased warmth, or overlying erythema that resolved spontaneously.  She also notices that she will get a red area for a couple days just distal to the olecranon process that resolved spontaneously without any hyperpigmentation or hypopigmentation afterward; not present today.  Other joints are doing well.  Morning stiffness for no more than 10 minutes.  No joint swelling.    Today, 5/13/2024: Possibly seasonal allergies with eye itching and sometimes swelling in the morning.  No eye pain.  Joints are doing well.  No joint  pain or swelling.  Morning stiffness for no more than 10 minutes.  Arthritis does not limit daily activities.    Denies fevers, chills, nausea, vomiting, constipation, diarrhea. No abdominal pain.  No black or bloody stools.  No chest pain/pressure, palpitations, or shortness of breath. No neck pain. Occasional cold sores. No eye pain or redness    Tobacco: None  EtOH: 1-2 drinks on the weekends  Drugs: None  Occupation: Dietitian at the [employer]    ROS   12 point review of systems completed and negative except as noted in the HPI    Active Problem List     Patient Active Problem List   Diagnosis    CARDIOVASCULAR SCREENING; LDL GOAL LESS THAN 160    Urticaria    Diagnostic skin and sensitization tests    Nonallergic rhinitis    Angioedema of lips    H. pylori infection    GPC (giant papillary conjunctivitis)    Seronegative spondyloarthropathy    Midline low back pain without sciatica    Abnormal uterine bleeding (AUB)- on OCPs    Psoriatic arthritis (H)     Past Medical History     Past Medical History:   Diagnosis Date    Angioedema of lips episode in 3/13--idiopathic    Complication of anesthesia     ponv    Contraception 1/28/2009    Diagnostic skin and sensitization tests 3/27/13 skin tests all NEGATIVE for environmental and food allergens including shellfish and nuts.     Nonallergic rhinitis     3/27/13 skin tests all NEGATIVE for environmental and food allergens including shellfish and nuts. 3/27/13 followup IgE tests NEG to Peanut, SN[facility], shrimp, macadamia nut, pistachio and walnut.    Rheumatoid arthritis of multiple sites with negative rheumatoid factor (H) 12/31/2015     Past Surgical History     Past Surgical History:   Procedure Laterality Date    BIOPSY  8/27/21    Biopsy needed prior to ablation    DILATE CERVIX, HYSTEROSCOPY, ABLATE ENDOMETRIUM HYDROTHERMAL, COMBINED N/A 07/24/2020    Procedure: DILATION, CERVIX, WITH HYSTEROSCOPY AND HYDROTHERMAL ENDOMETRIAL ABLATION;  Surgeon:  Apryl West MD;  Location: [facility]    DILATE CERVIX, HYSTEROSCOPY, ABLATE ENDOMETRIUM HYDROTHERMAL, COMBINED N/A 09/10/2021    Procedure: DILATION, CERVIX, WITH HYSTEROSCOPY AND HYDROTHERMAL ENDOMETRIAL ABLATION;  Surgeon: Apryl West MD;  Location: [facility]    ENDOMETRIAL SAMPLING (BIOPSY) N/A 07/24/2020    Procedure: BIOPSY, ENDOMETRIUM;  Surgeon: Apryl West MD;  Location: [facility]    LAPAROSCOPIC SALPINGECTOMY Bilateral 06/23/2017    Procedure: LAPAROSCOPIC SALPINGECTOMY;  Operative Laparoscopy, Bilateral Salpingectomy ;  Surgeon: Apryl West MD;  Location: [facility]    OPERATIVE HYSTEROSCOPY WITH MORCELLATOR N/A 12/12/2018    Procedure: DIAGNOSTIC HYSTEROSCOPY AND D AND C;  Surgeon: Apryl Wets MD;  Location: [facility]        Allergy     Allergies   Allergen Reactions    Shrimp Swelling     Lips and tongue    Walnuts [Nuts] Swelling     Lips and tongue       Current Medication List     Current Outpatient Medications   Medication Sig Dispense Refill    apremilast (OTEZLA) 30 MG tablet Take 1 tablet (30 mg) by mouth daily 60 tablet 6    diclofenac (VOLTAREN) 1 % topical gel Apply up to 2 grams of 1% gel to hands up to 4 times daily as needed for joint pain (maximum: 8 g per upper extremity per day) 200 g 2    ibuprofen (ADVIL/MOTRIN) 200 MG capsule Take 3 capsules (600 mg) by mouth every 6 hours as needed for fever      leflunomide (ARAVA) 10 MG tablet Take 1 tablet (10 mg) by mouth daily 90 tablet 2    multivitamin peds with iron (FLINTSTONES COMPLETE) 60 MG chewable tablet Take 1 chew tab by mouth daily.       No current facility-administered medications for this visit.     Social History   See HPI    Family History     Family History   Problem Relation Age of Onset    Hypertension Maternal Grandmother     Autoimmune Disease Maternal Grandmother         Autoimmune hepatitis    Cancer Maternal Grandfather     Hypertension Paternal Grandmother     Cancer - colorectal Paternal Grandfather      "Cardiovascular Paternal Grandfather     Other Cancer Paternal Grandfather     Hypertension Mother     Autoimmune Disease Mother         Autoimmune hepatitis    Hyperlipidemia Mother     Asthma Mother     Hypertension Father     Diabetes Father         Type 2    Prostate Cancer Father     Multiple Sclerosis Maternal Aunt     Other Cancer Other         Lymphoma    Cerebrovascular Disease No family hx of     Thyroid Disease No family hx of     Glaucoma No family hx of     Macular Degeneration No family hx of     Breast Cancer No family hx of     Colon Cancer No family hx of      Physical Exam     Temp Readings from Last 3 Encounters:   09/10/21 97.7  F (36.5  C) (Axillary)   08/30/21 97.3  F (36.3  C) (Tympanic)   07/24/20 98.1  F (36.7  C) (Oral)     BP Readings from Last 5 Encounters:   05/13/24 115/71   01/25/24 123/80   11/13/23 123/80   05/15/23 118/79   11/18/22 116/73     Pulse Readings from Last 1 Encounters:   05/13/24 94     Resp Readings from Last 1 Encounters:   11/13/23 16     Estimated body mass index is 17.68 kg/m  as calculated from the following:    Height as of 1/25/24: 1.626 m (5' 4\").    Weight as of 1/25/24: 46.7 kg (103 lb).    GEN: NAD.   HEENT: Anicteric, noninjected sclera. No obvious external lesions of the ear and nose. Hearing intact.  CV: S1, S2. RRR. No m/r/g  PULM: No increased work of breathing.   MSK: MCPs, PIPs, DIPs without swelling or tenderness to palpation.  Wrists without swelling or tenderness to palpation.  Elbows without swelling or tenderness to palpation.  Shoulders without swelling or tenderness to palpation. Knees and ankles without swelling or tenderness to palpation.  Negative MTP squeeze.    SKIN: No rash or jaundice seen  PSYCH: Alert. Appropriate.      Labs / Imaging (select studies)       CBC  Recent Labs   Lab Test 05/10/24  0909 02/16/24  0924 11/10/23  0908 09/10/21  1225 07/02/21  0922 01/12/21  1437 07/24/20  0626 07/20/20  1117   WBC 5.1 5.2 3.8*   < > 5.4 5.5  " --  6.5   RBC 4.48 4.58 4.45   < > 4.52 4.56  --  4.20   HGB 12.0 12.1 11.8   < > 11.9 12.1   < > 11.2*   HCT 38.2 39.4 38.6   < > 37.7 37.8  --  35.4   MCV 85 86 87   < > 83 83  --  84   RDW 12.0 12.2 11.7   < > 12.5 12.7  --  12.6    196 211   < > 195 221  --  218   MCH 26.8 26.4* 26.5   < > 26.3* 26.5  --  26.7   MCHC 31.4* 30.7* 30.6*   < > 31.6 32.0  --  31.6   NEUTROPHIL 65 63 62   < > 60.3 60.5  --  72.4   LYMPH 25 27 28   < > 29.0 29.5  --  21.1   MONOCYTE 8 7 7   < > 8.5 7.5  --  5.2   EOSINOPHIL 1 2 1   < > 1.8 2.0  --  1.1   BASOPHIL 1 1 1   < > 0.4 0.5  --  0.2   ANEU  --   --   --   --  3.3 3.3  --  4.7   ALYM  --   --   --   --  1.6 1.6  --  1.4   ANA MARÍA  --   --   --   --  0.5 0.4  --  0.3   AEOS  --   --   --   --  0.1 0.1  --  0.1   ABAS  --   --   --   --  0.0 0.0  --  0.0   ANEUTAUTO 3.3 3.3 2.4   < >  --   --   --   --    ALYMPAUTO 1.3 1.4 1.1   < >  --   --   --   --    AMONOAUTO 0.4 0.4 0.3   < >  --   --   --   --    AEOSAUTO 0.1 0.1 0.0   < >  --   --   --   --    ABSBASO 0.1 0.1 0.0   < >  --   --   --   --     < > = values in this interval not displayed.     CMP  Recent Labs   Lab Test 05/10/24  0909 02/16/24  0924 11/10/23  0908 11/01/21  1445 09/10/21  1202 07/02/21  0922 01/12/21  1437 07/20/20  1117 02/21/19  1427 12/12/18  1047   GLC  --   --   --   --  71  --   --   --   --  88   CR 0.75 0.77 0.73   < >  --  0.72 0.76 0.82   < >  --    GFRESTIMATED >90 >90 >90   < >  --  >90 >90 90   < >  --    GFRESTBLACK  --   --   --   --   --  >90 >90 >90   < >  --    BILITOTAL 0.3 0.3 0.2   < >  --  0.5 0.4 0.3   < >  --    ALBUMIN 4.8 4.8 4.6   < >  --  4.2 4.4 3.6   < >  --    PROTTOTAL 7.2 7.5 7.2   < >  --  7.6 7.7 7.4   < >  --    ALKPHOS 53 48 45   < >  --  59 60 52   < >  --    AST 21 22 20   < >  --  12 15 20   < >  --    ALT 18 13 12   < >  --  20 20 22   < >  --     < > = values in this interval not displayed.     Calcium/VitaminD  Recent Labs   Lab Test 01/13/17  1355   VITDT 45      ESR/CRP  Recent Labs   Lab Test 05/10/24  0909 11/10/23  0908 05/12/23  0906 11/14/22  1520 05/11/22  1524   SED 7 7 21* 8 6   CRP  --   --  <2.9 <2.9 <2.9   CRPI <3.00 <3.00  --   --   --      Hepatitis B  Recent Labs   Lab Test 03/20/17  1506 03/28/16  1442   AUSAB  --  264.69*   HBCAB Nonreactive  --    HEPBANG  --  Nonreactive     Tuberculosis Screening  Recent Labs   Lab Test 05/11/22  1524 03/20/17  1508 03/28/16  1443   TBRES Negative  --   --    TBRSLT  --  Negative Negative   TBAGN  --  0.00 0.04     Immunization History     Immunization History   Administered Date(s) Administered    COVID-19 MONOVALENT 12+ (Pfizer) 01/15/2021, 02/04/2021, 11/19/2021    Influenza (IIV3) PF 10/12/2011, 09/14/2012, 10/03/2013    Influenza Vaccine >6 months,quad, PF 10/10/2020, 11/05/2022, 09/25/2023    Influenza Vaccine, 6+MO IM (QUADRIVALENT W/PRESERVATIVES) 10/01/2015    Influenza, seasonal, injectable, PF 10/12/2011    Pneumo Conj 13-V (2010&after) 09/26/2016    Pneumococcal 23 valent 12/22/2016    TD,PF 7+ (Tenivac) 08/15/2001    TDAP Vaccine (Adacel) 06/20/2011    TDAP Vaccine (Boostrix) 03/06/2014          Chart documentation done in part with Dragon Voice recognition Software. Although reviewed after completion, some word and grammatical error may remain.    Hunter Monroy MD

## 2024-06-22 ENCOUNTER — HEALTH MAINTENANCE LETTER (OUTPATIENT)
Age: 44
End: 2024-06-22

## 2024-08-16 ENCOUNTER — LAB (OUTPATIENT)
Dept: LAB | Facility: CLINIC | Age: 44
End: 2024-08-16
Payer: COMMERCIAL

## 2024-08-16 DIAGNOSIS — Z79.899 HIGH RISK MEDICATION USE: ICD-10-CM

## 2024-08-16 DIAGNOSIS — L40.50 PSORIATIC ARTHRITIS (H): ICD-10-CM

## 2024-08-16 LAB
ALBUMIN SERPL BCG-MCNC: 4.6 G/DL (ref 3.5–5.2)
ALP SERPL-CCNC: 46 U/L (ref 40–150)
ALT SERPL W P-5'-P-CCNC: 13 U/L (ref 0–50)
AST SERPL W P-5'-P-CCNC: 19 U/L (ref 0–45)
BASOPHILS # BLD AUTO: 0 10E3/UL (ref 0–0.2)
BASOPHILS NFR BLD AUTO: 1 %
BILIRUB DIRECT SERPL-MCNC: <0.2 MG/DL (ref 0–0.3)
BILIRUB SERPL-MCNC: 0.3 MG/DL
CREAT SERPL-MCNC: 0.74 MG/DL (ref 0.51–0.95)
EGFRCR SERPLBLD CKD-EPI 2021: >90 ML/MIN/1.73M2
EOSINOPHIL # BLD AUTO: 0.1 10E3/UL (ref 0–0.7)
EOSINOPHIL NFR BLD AUTO: 2 %
ERYTHROCYTE [DISTWIDTH] IN BLOOD BY AUTOMATED COUNT: 11.7 % (ref 10–15)
HCT VFR BLD AUTO: 37 % (ref 35–47)
HGB BLD-MCNC: 11.9 G/DL (ref 11.7–15.7)
IMM GRANULOCYTES # BLD: 0 10E3/UL
IMM GRANULOCYTES NFR BLD: 0 %
LYMPHOCYTES # BLD AUTO: 1.6 10E3/UL (ref 0.8–5.3)
LYMPHOCYTES NFR BLD AUTO: 36 %
MCH RBC QN AUTO: 27 PG (ref 26.5–33)
MCHC RBC AUTO-ENTMCNC: 32.2 G/DL (ref 31.5–36.5)
MCV RBC AUTO: 84 FL (ref 78–100)
MONOCYTES # BLD AUTO: 0.4 10E3/UL (ref 0–1.3)
MONOCYTES NFR BLD AUTO: 8 %
NEUTROPHILS # BLD AUTO: 2.5 10E3/UL (ref 1.6–8.3)
NEUTROPHILS NFR BLD AUTO: 54 %
PLATELET # BLD AUTO: 206 10E3/UL (ref 150–450)
PROT SERPL-MCNC: 7 G/DL (ref 6.4–8.3)
RBC # BLD AUTO: 4.41 10E6/UL (ref 3.8–5.2)
WBC # BLD AUTO: 4.6 10E3/UL (ref 4–11)

## 2024-08-16 PROCEDURE — 36415 COLL VENOUS BLD VENIPUNCTURE: CPT

## 2024-08-16 PROCEDURE — 82565 ASSAY OF CREATININE: CPT

## 2024-08-16 PROCEDURE — 80076 HEPATIC FUNCTION PANEL: CPT

## 2024-08-16 PROCEDURE — 85025 COMPLETE CBC W/AUTO DIFF WBC: CPT

## 2024-10-26 ENCOUNTER — ANCILLARY PROCEDURE (OUTPATIENT)
Dept: GENERAL RADIOLOGY | Facility: CLINIC | Age: 44
End: 2024-10-26
Attending: FAMILY MEDICINE
Payer: COMMERCIAL

## 2024-10-26 ENCOUNTER — OFFICE VISIT (OUTPATIENT)
Dept: ORTHOPEDICS | Facility: CLINIC | Age: 44
End: 2024-10-26
Payer: COMMERCIAL

## 2024-10-26 VITALS — HEIGHT: 64 IN | WEIGHT: 103 LBS | BODY MASS INDEX: 17.58 KG/M2

## 2024-10-26 DIAGNOSIS — S66.197A: ICD-10-CM

## 2024-10-26 DIAGNOSIS — S61.412A LACERATION OF HAND INVOLVING EXTENSOR TENDON, LEFT, INITIAL ENCOUNTER: ICD-10-CM

## 2024-10-26 DIAGNOSIS — M25.442 FINGER JOINT SWELLING, LEFT: ICD-10-CM

## 2024-10-26 DIAGNOSIS — S66.822A LACERATION OF HAND INVOLVING EXTENSOR TENDON, LEFT, INITIAL ENCOUNTER: ICD-10-CM

## 2024-10-26 DIAGNOSIS — S67.191A CRUSHING INJURY OF LEFT INDEX FINGER, INITIAL ENCOUNTER: Primary | ICD-10-CM

## 2024-10-26 DIAGNOSIS — S67.191A CRUSHING INJURY OF LEFT INDEX FINGER, INITIAL ENCOUNTER: ICD-10-CM

## 2024-10-26 PROCEDURE — 99204 OFFICE O/P NEW MOD 45 MIN: CPT | Performed by: FAMILY MEDICINE

## 2024-10-26 PROCEDURE — 73140 X-RAY EXAM OF FINGER(S): CPT | Mod: TC | Performed by: RADIOLOGY

## 2024-10-26 NOTE — PROGRESS NOTES
ASSESSMENT & PLAN    Abby was seen today for injury.    Diagnoses and all orders for this visit:    Crushing injury of left index finger, initial encounter  -     XR Finger LT G/E 2 vw; Future  -     MR Finger Left w/o Contrast; Future    Finger joint swelling, left  -     MR Finger Left w/o Contrast; Future    Other injury of flexor muscle, fascia and tendon of left little finger at wrist and hand level, initial encounter    Laceration of hand involving extensor tendon, left, initial encounter      This issue is acute and Unchanged.    I will contact her with MRI results  Injury 2 mo ago, crush injury, given alumafoam splint which she used consistently for 6 weeks after seeing urgent care  Pain, swelling have not improved, loss of DIP flexion and extension actively on exam, concerning for tendon rupture  Oral Tylenol and topical Voltaren gel reviewed as safe OTC options, reviewed safe dosing strategies  XR images independently visualized and reviewed with patient today in clinic  No focal bony pathology noted  MRI ordered today for better diagnostic clarity, concern for tendon function at the level of the DIP joint, XR reassuring for fracture, tendons not shown on XR and she has significant pain, swelling and loss of function currently  Consider OT if MRI reassuring, if tendon rupture is present will send her to ortho hand specialist  Oval-8 splint provided today for comfort and support  Home handouts provided and supportive care reviewed  All questions were answered today  Contact us with additional questions or concerns  Signs and sx of concern reviewed      Ryan Page DO, ERON  Sports Medicine Physician  [facility]    -----  Chief Complaint   Patient presents with    Left Index Finger - Injury       SUBJECTIVE  Abby Foy is a/an 44 year old female who is seen as a WALK IN patient for evaluation of left index finger.     The patient is seen by themselves.  The  "patient is Right handed    Onset: 1.5 month(s) ago. Patient describes injury as getting the finger slammed in a car door  Location of Pain: left index finger, DIP  Worsened by: Flexion, sensation/tenderness  Better with:   Treatments tried: Finger splint (used for 6 weeks, ice, elevation  Associated symptoms: swelling, bruising, burning/itchiness flexion of the DIP    Orthopedic/Surgical history: NO  [facility] visit with [facility] on 9/7/24  Social History/Occupation: Dietician       REVIEW OF SYSTEMS:  Review of Systems    OBJECTIVE:  Ht 1.626 m (5' 4\")   Wt 46.7 kg (103 lb)   BMI 17.68 kg/m     General: healthy, alert and in no distress  Skin: no suspicious lesions or rash.  CV: distal perfusion intact   Resp: normal respiratory effort without conversational dyspnea   Psych: normal mood and affect  Gait: NORMAL  Neuro: Normal light sensory exam of hand generally, increased sensitivity in the distal half of the second digit    Left hand exam  Swelling of the 2nd digit distal to the PIP joint, most focal over the DIP joint region, some remaining ecchymosis in the nailbed and small subungal hematoma, subtle bruising on the palmar surface of the distal digit  Palpable soft tissue that is quite tendon on the palmar aspect of the DIP joint, question retracted tendon  Active PIP flexion and extension noted, able to mildly flex and extend the DIP joint but no active function noted   No induration or warmth, circulation maintained distal digit    RADIOLOGY:  Final results and radiologist's interpretation, available in the [facility] health record.  Images were reviewed with the patient in the office today.  My personal interpretation of the performed imaging: no obvious bony pathology, generalized soft tissue swelling noted, will follow final radiology read  XR images independently visualized and reviewed with patient today in clinic      "

## 2024-10-30 ENCOUNTER — ANCILLARY PROCEDURE (OUTPATIENT)
Dept: MRI IMAGING | Facility: CLINIC | Age: 44
End: 2024-10-30
Attending: FAMILY MEDICINE
Payer: COMMERCIAL

## 2024-10-30 ENCOUNTER — MYC MEDICAL ADVICE (OUTPATIENT)
Dept: ORTHOPEDICS | Facility: CLINIC | Age: 44
End: 2024-10-30

## 2024-10-30 DIAGNOSIS — S67.191A CRUSHING INJURY OF LEFT INDEX FINGER, INITIAL ENCOUNTER: ICD-10-CM

## 2024-10-30 DIAGNOSIS — M25.442 FINGER JOINT SWELLING, LEFT: ICD-10-CM

## 2024-10-30 PROCEDURE — 73221 MRI JOINT UPR EXTREM W/O DYE: CPT | Mod: LT | Performed by: RADIOLOGY

## 2024-10-31 NOTE — TELEPHONE ENCOUNTER
Called and reviewed MRI results  Will start to work on gentle ROM and desensitization  Reassuring MRI overall relative to tendon integrity  Can use splint as needed, but start to work on progressive use  OT offered, declined for now, available anytime  Hand surgery referral available if needed as well, defer at this time  All questions answered, she will keep me updated via Marilee Page DO, CAQ  Sports Medicine Physician  [facility]

## 2024-11-01 ENCOUNTER — LAB (OUTPATIENT)
Dept: LAB | Facility: CLINIC | Age: 44
End: 2024-11-01
Payer: COMMERCIAL

## 2024-11-01 ENCOUNTER — ANCILLARY PROCEDURE (OUTPATIENT)
Dept: MAMMOGRAPHY | Facility: CLINIC | Age: 44
End: 2024-11-01
Attending: PHYSICIAN ASSISTANT
Payer: COMMERCIAL

## 2024-11-01 DIAGNOSIS — Z12.31 VISIT FOR SCREENING MAMMOGRAM: ICD-10-CM

## 2024-11-01 DIAGNOSIS — Z79.899 HIGH RISK MEDICATION USE: ICD-10-CM

## 2024-11-01 DIAGNOSIS — L40.50 PSORIATIC ARTHRITIS (H): ICD-10-CM

## 2024-11-01 LAB
BASOPHILS # BLD AUTO: 0 10E3/UL (ref 0–0.2)
BASOPHILS NFR BLD AUTO: 1 %
EOSINOPHIL # BLD AUTO: 0.1 10E3/UL (ref 0–0.7)
EOSINOPHIL NFR BLD AUTO: 1 %
ERYTHROCYTE [DISTWIDTH] IN BLOOD BY AUTOMATED COUNT: 12 % (ref 10–15)
ERYTHROCYTE [SEDIMENTATION RATE] IN BLOOD BY WESTERGREN METHOD: 8 MM/HR (ref 0–20)
HCT VFR BLD AUTO: 36.5 % (ref 35–47)
HGB BLD-MCNC: 11.5 G/DL (ref 11.7–15.7)
IMM GRANULOCYTES # BLD: 0 10E3/UL
IMM GRANULOCYTES NFR BLD: 0 %
LYMPHOCYTES # BLD AUTO: 1.7 10E3/UL (ref 0.8–5.3)
LYMPHOCYTES NFR BLD AUTO: 32 %
MCH RBC QN AUTO: 26.7 PG (ref 26.5–33)
MCHC RBC AUTO-ENTMCNC: 31.5 G/DL (ref 31.5–36.5)
MCV RBC AUTO: 85 FL (ref 78–100)
MONOCYTES # BLD AUTO: 0.4 10E3/UL (ref 0–1.3)
MONOCYTES NFR BLD AUTO: 7 %
NEUTROPHILS # BLD AUTO: 3.2 10E3/UL (ref 1.6–8.3)
NEUTROPHILS NFR BLD AUTO: 59 %
PLATELET # BLD AUTO: 210 10E3/UL (ref 150–450)
RBC # BLD AUTO: 4.3 10E6/UL (ref 3.8–5.2)
WBC # BLD AUTO: 5.4 10E3/UL (ref 4–11)

## 2024-11-01 PROCEDURE — 82565 ASSAY OF CREATININE: CPT

## 2024-11-01 PROCEDURE — 86704 HEP B CORE ANTIBODY TOTAL: CPT

## 2024-11-01 PROCEDURE — 85652 RBC SED RATE AUTOMATED: CPT

## 2024-11-01 PROCEDURE — 80076 HEPATIC FUNCTION PANEL: CPT

## 2024-11-01 PROCEDURE — 86140 C-REACTIVE PROTEIN: CPT

## 2024-11-01 PROCEDURE — 36415 COLL VENOUS BLD VENIPUNCTURE: CPT

## 2024-11-01 PROCEDURE — 85025 COMPLETE CBC W/AUTO DIFF WBC: CPT

## 2024-11-01 PROCEDURE — 86803 HEPATITIS C AB TEST: CPT

## 2024-11-01 PROCEDURE — 86481 TB AG RESPONSE T-CELL SUSP: CPT

## 2024-11-01 PROCEDURE — 77063 BREAST TOMOSYNTHESIS BI: CPT | Mod: GC | Performed by: RADIOLOGY

## 2024-11-01 PROCEDURE — 77067 SCR MAMMO BI INCL CAD: CPT | Mod: GC | Performed by: RADIOLOGY

## 2024-11-01 PROCEDURE — 87340 HEPATITIS B SURFACE AG IA: CPT

## 2024-11-02 LAB
ALBUMIN SERPL BCG-MCNC: 4.6 G/DL (ref 3.5–5.2)
ALP SERPL-CCNC: 51 U/L (ref 40–150)
ALT SERPL W P-5'-P-CCNC: 16 U/L (ref 0–50)
AST SERPL W P-5'-P-CCNC: 23 U/L (ref 0–45)
BILIRUB DIRECT SERPL-MCNC: <0.2 MG/DL (ref 0–0.3)
BILIRUB SERPL-MCNC: 0.3 MG/DL
CREAT SERPL-MCNC: 0.75 MG/DL (ref 0.51–0.95)
CRP SERPL-MCNC: <3 MG/L
EGFRCR SERPLBLD CKD-EPI 2021: >90 ML/MIN/1.73M2
HBV CORE AB SERPL QL IA: NONREACTIVE
HBV SURFACE AG SERPL QL IA: NONREACTIVE
HCV AB SERPL QL IA: NONREACTIVE
PROT SERPL-MCNC: 7.1 G/DL (ref 6.4–8.3)

## 2024-11-03 LAB
GAMMA INTERFERON BACKGROUND BLD IA-ACNC: 0.11 IU/ML
M TB IFN-G BLD-IMP: NEGATIVE
M TB IFN-G CD4+ BCKGRND COR BLD-ACNC: 9.89 IU/ML
MITOGEN IGNF BCKGRD COR BLD-ACNC: 0 IU/ML
MITOGEN IGNF BCKGRD COR BLD-ACNC: 0.04 IU/ML
QUANTIFERON MITOGEN: 10 IU/ML
QUANTIFERON NIL TUBE: 0.11 IU/ML
QUANTIFERON TB1 TUBE: 0.15 IU/ML
QUANTIFERON TB2 TUBE: 0.11

## 2024-11-11 ENCOUNTER — OFFICE VISIT (OUTPATIENT)
Dept: RHEUMATOLOGY | Facility: CLINIC | Age: 44
End: 2024-11-11
Payer: COMMERCIAL

## 2024-11-11 VITALS
HEART RATE: 80 BPM | WEIGHT: 105.4 LBS | RESPIRATION RATE: 16 BRPM | DIASTOLIC BLOOD PRESSURE: 82 MMHG | SYSTOLIC BLOOD PRESSURE: 125 MMHG | BODY MASS INDEX: 18.09 KG/M2 | OXYGEN SATURATION: 97 %

## 2024-11-11 DIAGNOSIS — L40.50 PSORIATIC ARTHRITIS (H): Primary | ICD-10-CM

## 2024-11-11 DIAGNOSIS — Z79.899 HIGH RISK MEDICATION USE: ICD-10-CM

## 2024-11-11 PROCEDURE — G2211 COMPLEX E/M VISIT ADD ON: HCPCS | Performed by: INTERNAL MEDICINE

## 2024-11-11 PROCEDURE — 99214 OFFICE O/P EST MOD 30 MIN: CPT | Performed by: INTERNAL MEDICINE

## 2024-11-11 RX ORDER — APREMILAST 30 MG/1
30 TABLET, FILM COATED ORAL DAILY
Qty: 60 TABLET | Refills: 6 | Status: SHIPPED | OUTPATIENT
Start: 2024-11-11

## 2024-11-11 RX ORDER — LEFLUNOMIDE 10 MG/1
10 TABLET ORAL DAILY
Qty: 90 TABLET | Refills: 2 | Status: SHIPPED | OUTPATIENT
Start: 2024-11-11

## 2024-11-11 NOTE — PATIENT INSTRUCTIONS
RHEUMATOLOGY    [facility]  [address] [place] [address]  [place] [address]    Phone number: [phone]  Fax number: [phone]    If you need a medication refill, please contact us as you may need lab work and/or a follow up visit prior to your refill.      Thank you for choosing [facility]!    Maddie Hennessy CMA Rheumatology

## 2024-11-11 NOTE — PROGRESS NOTES
Rheumatology Clinic Visit      Abby Foy MRN# [id number]   YOB: 1980 Age: 44 year old      Date of visit: 11/11/24   PCP: Sandi Duffy  Dermatologist: Amy Patel  Ophthalmologist: Dr. Mathis     Chief Complaint   Patient presents with:  Psoriatic arthritis: Crush injury to left hand pointy finger    Assessment and Plan   1. 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 However, given her continued back pain that improved with activity but no radiographic evidence for inflammatory arthritis or osteoarthritis, there was concern that she had inflammatory back pain that would be more consistent with a spondylarthritis. Humira was started and resulted in improvement of her back pain, suggesting axial disease.  Arthritis improved with methotrexate and sulfasalazine, but she was having headaches and therefore the most likely culprit methotrexate was reduced until her headaches worsened significantly and therefore sulfasalazine and methotrexate were both discontinued. She had resolution of her headaches after discontinuing both sulfasalazine and methotrexate. I believe that the sulfasalazine was the cause of her headaches. Therefore, cautious retrial of methotrexate in the future could be done.  She was doing well on a combination of leflunomide 10 mg daily and Humira 40 mg SQ every 14 days but Humira had to be stopped because of issues with the  program; so Simponi was Rx'd but never started.  Intermittent inflammatory symptom so Otezla was prescribed but but delayed starting until March 2020, it was found be effective but the twice daily dose was resulting in worsening headaches that resolved with a once daily dose. Was on doing well on a  combination of leflunomide 10 mg daily and Otezla daily; previously leflunomide was discontinued in an effort to get to the lowest effective dose but she had return of symptoms primarily affecting the PIPs so it was restarted. Currently on leflunomide 10 mg daily and Otezla 30 mg daily and doing well with regard to inflammatory arthritis.  History of right medial epicondylitis symptoms not present currently.  Also with crush injury to the distal left second finger and symptomatic at the left second MCP/PIP/DIP likely related to this trauma. Continue current regimen.  Chronic illness, stable.    - Continue leflunomide 10 mg daily  - Continue Otezla 30mg daily  - Continue diclofenac (VOLTAREN) 1 % topical gel; Apply up to 2 grams of 1% gel to hands and right elbow up to 4 times daily as needed for pain (maximum: 8 g per upper extremity per day)    - Labs in 3 months: CBC, Creatinine, Hepatic Panel  - Labs in 6 months: CBC, Creatinine, Hepatic Panel, ESR, CRP    High risk medication requiring intensive toxicity monitoring at least quarterly     # Pregnancy Planning: s/p salpingectomy;  had a vasectomy    2. 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 Interestingly when leflunomide was stopped between 3/2018 and  6/15/2018, she experienced increased L>R eye irritation that when bothering her only affected one eye at a time.   Not an issue at this time.    3.  Possible seasonal allergies: She will try using generic Allegra and possibly Flonase to see if this helps the eye itching that she experiences in the morning.     4.  Vaccinations: Vaccinations reviewed with Ms. Foy.   - Influenza: Advised yearly vaccination  - [id number] and Tetbopeio12: up to date  - [id number]: will discuss vaccination at a future appointment.   - COVID-19: Advised keeping updated    Total minutes spent in evaluation with patient, documentation, , and review  of pertinent studies and chart notes: 10  The longitudinal plan of care for the rheumatology problem(s) were addressed during this visit.  Due to added complexity of care, we will continue to support the patient and the subsequent management of this condition with ongoing continuity of care.     Ms. Foy verbalized agreement with and understanding of the rational for the diagnosis and treatment plan.  All questions were answered to best of my ability and the patient's satisfaction. Ms. Foy was advised to contact the clinic with any questions that may arise after the clinic visit.      Thank you for involving me in the care of the patient    Return to clinic: 6 months    HPI   Abyb Foy is a 44 year old female with medical history significant for urticaria, H. pylori infection, and iritis? who returns for f/u of seronegative spondyloarthropathy.    5/15/2023:  intermittent ache at the left second MCP and right third and fourth MCPs that responds well to Voltaren gel, and is typically of short duration and mild severity.  Topical Voltaren gel as needed infrequently because her symptoms are infrequent.  Arthritis is not limiting daily activities.  States like leflunomide and Otezla have been effective for her arthritis.  She states that she is happy with how well she is doing and does not need to change therapy based on her current symptoms.  Morning stiffness for less than 10 minutes.    11/13/2023: For no more than 1 hour in the morning, twice weekly, she has pain at the medial right medial epicondyles without swelling, increased warmth, or overlying erythema that resolved spontaneously.  She also notices that she will get a red area for a couple days just distal to the olecranon process that resolved spontaneously without any hyperpigmentation or hypopigmentation afterward; not present today.  Other joints are doing well.  Morning stiffness for no more than 10 minutes.  No joint  swelling.    5/13/2024: Possibly seasonal allergies with eye itching and sometimes swelling in the morning.  No eye pain.  Joints are doing well.  No joint pain or swelling.  Morning stiffness for no more than 10 minutes.  Arthritis does not limit daily activities.    Today, 11/11/2024: Crush injury to the distal left second finger that is still healing and she suspects that likely due to this trauma she is having pain at the left second MCP/PIP/DIP.  She is working with hand therapy and slowly improving range of motion of the finger but still having difficulty flexing at the left second DIP.  He continues on leflunomide and Otezla; this combination is working well for the inflammatory arthritis.    Denies fevers, chills, nausea, vomiting, constipation, diarrhea. No abdominal pain.  No black or bloody stools.  No chest pain/pressure, palpitations, or shortness of breath. No neck pain. Occasional cold sores. No eye pain or redness    Tobacco: None  EtOH: 1-2 drinks on the weekends  Drugs: None  Occupation: Dietitian at the [employer]    ROS   12 point review of systems completed and negative except as noted in the HPI    Active Problem List     Patient Active Problem List   Diagnosis    CARDIOVASCULAR SCREENING; LDL GOAL LESS THAN 160    Urticaria    Diagnostic skin and sensitization tests    Nonallergic rhinitis    Angioedema of lips    H. pylori infection    GPC (giant papillary conjunctivitis)    Seronegative spondyloarthropathy    Midline low back pain without sciatica    Abnormal uterine bleeding (AUB)- on OCPs    Psoriatic arthritis (H)     Past Medical History     Past Medical History:   Diagnosis Date    Angioedema of lips episode in 3/13--idiopathic    Complication of anesthesia     ponv    Contraception 1/28/2009    Diagnostic skin and sensitization tests 3/27/13 skin tests all NEGATIVE for environmental and food allergens including shellfish and nuts.     Nonallergic rhinitis     3/27/13 skin  tests all NEGATIVE for environmental and food allergens including shellfish and nuts. 3/27/13 followup IgE tests NEG to Peanut, SN[facility], shrimp, macadamia nut, pistachio and walnut.    Rheumatoid arthritis of multiple sites with negative rheumatoid factor (H) 12/31/2015     Past Surgical History     Past Surgical History:   Procedure Laterality Date    BIOPSY  8/27/21    Biopsy needed prior to ablation    DILATE CERVIX, HYSTEROSCOPY, ABLATE ENDOMETRIUM HYDROTHERMAL, COMBINED N/A 07/24/2020    Procedure: DILATION, CERVIX, WITH HYSTEROSCOPY AND HYDROTHERMAL ENDOMETRIAL ABLATION;  Surgeon: Apryl West MD;  Location: [facility]    DILATE CERVIX, HYSTEROSCOPY, ABLATE ENDOMETRIUM HYDROTHERMAL, COMBINED N/A 09/10/2021    Procedure: DILATION, CERVIX, WITH HYSTEROSCOPY AND HYDROTHERMAL ENDOMETRIAL ABLATION;  Surgeon: Apryl West MD;  Location: [facility]    ENDOMETRIAL SAMPLING (BIOPSY) N/A 07/24/2020    Procedure: BIOPSY, ENDOMETRIUM;  Surgeon: Apryl West MD;  [facility]: [facility]    LAPAROSCOPIC SALPINGECTOMY Bilateral 06/23/2017    Procedure: LAPAROSCOPIC SALPINGECTOMY;  Operative Laparoscopy, Bilateral Salpingectomy ;  Surgeon: Apryl West MD;  Location: [facility]    OPERATIVE HYSTEROSCOPY WITH MORCELLATOR N/A 12/12/2018    Procedure: DIAGNOSTIC HYSTEROSCOPY AND D AND C;  Surgeon: Apryl West MD;  Location: [facility]        Allergy     Allergies   Allergen Reactions    Shrimp Swelling     Lips and tongue    Walnuts [Nuts] Swelling     Lips and tongue       Current Medication List     Current Outpatient Medications   Medication Sig Dispense Refill    apremilast (OTEZLA) 30 MG tablet Take 1 tablet (30 mg) by mouth daily 60 tablet 6    diclofenac (VOLTAREN) 1 % topical gel Apply up to 2 grams of 1% gel to hands up to 4 times daily as needed for joint pain (maximum: 8 g per upper extremity per day) 200 g 2    ibuprofen (ADVIL/MOTRIN) 200 MG capsule Take 3 capsules (600 mg) by mouth every 6 hours as needed for fever       "leflunomide (ARAVA) 10 MG tablet Take 1 tablet (10 mg) by mouth daily 90 tablet 2    multivitamin peds with iron (FLINTSTONES COMPLETE) 60 MG chewable tablet Take 1 chew tab by mouth daily.       No current facility-administered medications for this visit.     Social History   See HPI    Family History     Family History   Problem Relation Age of Onset    Hypertension Maternal Grandmother     Autoimmune Disease Maternal Grandmother         Autoimmune hepatitis    Cancer Maternal Grandfather     Hypertension Paternal Grandmother     Cancer - colorectal Paternal Grandfather     Cardiovascular Paternal Grandfather     Other Cancer Paternal Grandfather     Hypertension Mother     Autoimmune Disease Mother         Autoimmune hepatitis    Hyperlipidemia Mother     Asthma Mother     Hypertension Father     Diabetes Father         Type 2    Prostate Cancer Father     Multiple Sclerosis Maternal Aunt     Other Cancer Other         Lymphoma    Cerebrovascular Disease No family hx of     Thyroid Disease No family hx of     Glaucoma No family hx of     Macular Degeneration No family hx of     Breast Cancer No family hx of     Colon Cancer No family hx of      Physical Exam     Temp Readings from Last 3 Encounters:   09/10/21 97.7  F (36.5  C) (Axillary)   08/30/21 97.3  F (36.3  C) (Tympanic)   07/24/20 98.1  F (36.7  C) (Oral)     BP Readings from Last 5 Encounters:   11/11/24 125/82   05/13/24 115/71   01/25/24 123/80   11/13/23 123/80   05/15/23 118/79     Pulse Readings from Last 1 Encounters:   11/11/24 80     Resp Readings from Last 1 Encounters:   11/11/24 16     Estimated body mass index is 18.09 kg/m  as calculated from the following:    Height as of 10/26/24: 1.626 m (5' 4\").    Weight as of this encounter: 47.8 kg (105 lb 6.4 oz).    GEN: NAD.   HEENT: Anicteric, noninjected sclera. No obvious external lesions of the ear and nose. Hearing intact.  CV: S1, S2. RRR. No m/r/g  PULM: No increased work of breathing. "   MSK: MCPs, PIPs, DIPs without swelling or tenderness to palpation.  Wrists without swelling or tenderness to palpation.  Elbows without swelling or tenderness to palpation.  Shoulders without swelling or tenderness to palpation. Knees and ankles without swelling or tenderness to palpation.  Negative MTP squeeze.    SKIN: No rash or jaundice seen  PSYCH: Alert. Appropriate.      Labs / Imaging (select studies)     CBC  Recent Labs   Lab Test 11/01/24  1307 08/16/24  0910 05/10/24  0909 09/10/21  1225 07/02/21  0922 01/12/21  1437 07/24/20  0626 07/20/20  1117   WBC 5.4 4.6 5.1   < > 5.4 5.5  --  6.5   RBC 4.30 4.41 4.48   < > 4.52 4.56  --  4.20   HGB 11.5* 11.9 12.0   < > 11.9 12.1   < > 11.2*   HCT 36.5 37.0 38.2   < > 37.7 37.8  --  35.4   MCV 85 84 85   < > 83 83  --  84   RDW 12.0 11.7 12.0   < > 12.5 12.7  --  12.6    206 207   < > 195 221  --  218   MCH 26.7 27.0 26.8   < > 26.3* 26.5  --  26.7   MCHC 31.5 32.2 31.4*   < > 31.6 32.0  --  31.6   NEUTROPHIL 59 54 65   < > 60.3 60.5  --  72.4   LYMPH 32 36 25   < > 29.0 29.5  --  21.1   MONOCYTE 7 8 8   < > 8.5 7.5  --  5.2   EOSINOPHIL 1 2 1   < > 1.8 2.0  --  1.1   BASOPHIL 1 1 1   < > 0.4 0.5  --  0.2   ANEU  --   --   --   --  3.3 3.3  --  4.7   ALYM  --   --   --   --  1.6 1.6  --  1.4   ANA MARÍA  --   --   --   --  0.5 0.4  --  0.3   AEOS  --   --   --   --  0.1 0.1  --  0.1   ABAS  --   --   --   --  0.0 0.0  --  0.0   ANEUTAUTO 3.2 2.5 3.3   < >  --   --   --   --    ALYMPAUTO 1.7 1.6 1.3   < >  --   --   --   --    AMONOAUTO 0.4 0.4 0.4   < >  --   --   --   --    AEOSAUTO 0.1 0.1 0.1   < >  --   --   --   --    ABSBASO 0.0 0.0 0.1   < >  --   --   --   --     < > = values in this interval not displayed.     CMP  Recent Labs   Lab Test 11/01/24  1307 08/16/24  0910 05/10/24  0909 11/01/21  1445 09/10/21  1202 07/02/21  0922 01/12/21  1437 07/20/20  1117 02/21/19  1427 12/12/18  1047   GLC  --   --   --   --  71  --   --   --   --  88   CR 0.75 0.74  0.75   < >  --  0.72 0.76 0.82   < >  --    GFRESTIMATED >90 >90 >90   < >  --  >90 >90 90   < >  --    GFRESTBLACK  --   --   --   --   --  >90 >90 >90   < >  --    BILITOTAL 0.3 0.3 0.3   < >  --  0.5 0.4 0.3   < >  --    ALBUMIN 4.6 4.6 4.8   < >  --  4.2 4.4 3.6   < >  --    PROTTOTAL 7.1 7.0 7.2   < >  --  7.6 7.7 7.4   < >  --    ALKPHOS 51 46 53   < >  --  59 60 52   < >  --    AST 23 19 21   < >  --  12 15 20   < >  --    ALT 16 13 18   < >  --  20 20 22   < >  --     < > = values in this interval not displayed.     Calcium/VitaminD  Recent Labs   Lab Test 01/13/17  1355   VITDT 45     ESR/CRP  Recent Labs   Lab Test 11/01/24  1307 05/10/24  0909 11/10/23  0908 05/12/23  0906 11/14/22  1520 05/11/22  1524   SED 8 7 7 21* 8 6   CRP  --   --   --  <2.9 <2.9 <2.9   CRPI <3.00 <3.00 <3.00  --   --   --      Hepatitis B  Recent Labs   Lab Test 11/01/24  1307 03/20/17  1506   HBCAB Nonreactive Nonreactive   HEPBANG Nonreactive  --      Hepatitis C  Recent Labs   Lab Test 11/01/24  1307   HCVAB Nonreactive     Tuberculosis Screening  Recent Labs   Lab Test 11/01/24  1307 05/11/22  1524 03/20/17  1508   TBRES Negative Negative  --    TBRSLT  --   --  Negative   TBAGN  --   --  0.00     Immunization History     Immunization History   Administered Date(s) Administered    COVID-19 MONOVALENT 12+ (Pfizer) 01/15/2021, 02/04/2021, 11/19/2021    Influenza (IIV3) PF 10/12/2011, 09/14/2012, 10/03/2013    Influenza Vaccine >6 months,quad, PF 10/10/2020, 11/05/2022, 09/25/2023    Influenza Vaccine, 6+MO IM (QUADRIVALENT W/PRESERVATIVES) 10/01/2015    Influenza, seasonal, injectable, PF 10/12/2011    Pneumo Conj 13-V (2010&after) 09/26/2016    Pneumococcal 23 valent 12/22/2016    TD,PF 7+ (Tenivac) 08/15/2001    TDAP (Adacel,Boostrix) 05/13/2024    TDAP Vaccine (Adacel) 06/20/2011    TDAP Vaccine (Boostrix) 03/06/2014          Chart documentation done in part with Dragon Voice recognition Software. Although reviewed after  completion, some word and grammatical error may remain.    Hunter Monroy MD

## 2025-02-10 ENCOUNTER — OFFICE VISIT (OUTPATIENT)
Dept: OPTOMETRY | Facility: CLINIC | Age: 45
End: 2025-02-10
Payer: COMMERCIAL

## 2025-02-10 DIAGNOSIS — L40.50 PSORIATIC ARTHRITIS (H): ICD-10-CM

## 2025-02-10 DIAGNOSIS — H52.223 REGULAR ASTIGMATISM OF BOTH EYES: ICD-10-CM

## 2025-02-10 DIAGNOSIS — Z01.00 EXAMINATION OF EYES AND VISION: Primary | ICD-10-CM

## 2025-02-10 DIAGNOSIS — H52.13 MYOPIA OF BOTH EYES: ICD-10-CM

## 2025-02-10 PROCEDURE — 92015 DETERMINE REFRACTIVE STATE: CPT | Performed by: OPTOMETRIST

## 2025-02-10 PROCEDURE — 92310 CONTACT LENS FITTING OU: CPT | Mod: GA | Performed by: OPTOMETRIST

## 2025-02-10 PROCEDURE — 92014 COMPRE OPH EXAM EST PT 1/>: CPT | Performed by: OPTOMETRIST

## 2025-02-10 ASSESSMENT — KERATOMETRY
OD_K1POWER_DIOPTERS: 45.50
OD_AXISANGLE2_DEGREES: 009
OS_K1POWER_DIOPTERS: 46.25
OD_K2POWER_DIOPTERS: 46.50
OS_AXISANGLE2_DEGREES: 165
OD_AXISANGLE_DEGREES: 099
OS_AXISANGLE_DEGREES: 075
OS_K2POWER_DIOPTERS: 46.75

## 2025-02-10 ASSESSMENT — REFRACTION_CURRENTRX
OD_SPHERE: -3.00
OS_BRAND: J&J 1-DAY ACUVUE MOIST BC 8.5, D 14.2
OS_SPHERE: -3.00
OD_BRAND: J&J 1-DAY ACUVUE MOIST BC 8.5, D 14.2
OS_BRAND: ALCON DAILIES TOTAL 1 BC 8.5, D 14.1
OD_BRAND: ALCON DAILIES TOTAL 1 BC 8.5, D 14.1
OS_SPHERE: -2.75
OD_SPHERE: -2.75

## 2025-02-10 ASSESSMENT — REFRACTION_WEARINGRX
OS_CYLINDER: SPHERE
SPECS_TYPE: SVL
OD_CYLINDER: +0.50
OS_SPHERE: -3.25
OD_SPHERE: -3.00
OS_CYLINDER: +0.50
OS_SPHERE: -2.75
OD_CYLINDER: +0.50
OS_AXIS: 030
OD_AXIS: 150
OS_SPHERE: -2.75
OS_CYLINDER: +0.50
OD_CYLINDER: +0.50
SPECS_TYPE: DIDN'T FILL
OD_AXIS: 175
OD_SPHERE: -2.75
OS_AXIS: 030
OD_SPHERE: -2.75
OD_AXIS: 150
SPECS_TYPE: SVL

## 2025-02-10 ASSESSMENT — REFRACTION_MANIFEST
OD_SPHERE: -3.25
OS_SPHERE: -3.50
OS_CYLINDER: +0.50
OD_AXIS: 150
OS_AXIS: 030
OD_CYLINDER: +0.50

## 2025-02-10 ASSESSMENT — CONF VISUAL FIELD
OS_SUPERIOR_NASAL_RESTRICTION: 0
OS_NORMAL: 1
OD_NORMAL: 1
OD_INFERIOR_TEMPORAL_RESTRICTION: 0
OS_SUPERIOR_TEMPORAL_RESTRICTION: 0
OD_INFERIOR_NASAL_RESTRICTION: 0
OD_SUPERIOR_TEMPORAL_RESTRICTION: 0
OS_INFERIOR_NASAL_RESTRICTION: 0
OD_SUPERIOR_NASAL_RESTRICTION: 0
OS_INFERIOR_TEMPORAL_RESTRICTION: 0

## 2025-02-10 ASSESSMENT — VISUAL ACUITY
OS_CC: 20/40
OS_PH_CC+: +2
OS_CC: 20/20
OD_CC: 20/20
CORRECTION_TYPE: CONTACTS
OD_CC: 20/20
OS_PH_CC: 20/30
OD_CC+: -1
METHOD: SNELLEN - LINEAR

## 2025-02-10 ASSESSMENT — CUP TO DISC RATIO
OS_RATIO: 0.45
OD_RATIO: 0.35

## 2025-02-10 ASSESSMENT — EXTERNAL EXAM - LEFT EYE: OS_EXAM: NORMAL

## 2025-02-10 ASSESSMENT — TONOMETRY
IOP_METHOD: ICARE
OS_IOP_MMHG: 13.8
OD_IOP_MMHG: 16.7

## 2025-02-10 ASSESSMENT — SLIT LAMP EXAM - LIDS
COMMENTS: NORMAL
COMMENTS: NORMAL

## 2025-02-10 ASSESSMENT — EXTERNAL EXAM - RIGHT EYE: OD_EXAM: NORMAL

## 2025-02-10 NOTE — PROGRESS NOTES
Chief Complaint   Patient presents with    Annual Eye Exam     Would like contacts- $75 fit fee ok        Previous contact lens wearer? Yes: 1 day Acuvue Moist   Comfort of contact lenses :good  Satisfied with current lenses: Yes    Last Eye Exam: 2022  Dilated Previously: Yes    What are you currently using to see?  glasses and contacts    Distance Vision Acuity: Noticed gradual change in both eyes    Near Vision Acuity: Satisfied with vision while reading and using computer with glasses, contacts and unaided    Eye Comfort: good  Do you use eye drops? 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  Wore glasses for a few days and then got better.      Psoriatic arthritis-    Holly Abdullahi - Optometric Assistant     Medical, surgical and family histories reviewed and updated 2/10/2025.       OBJECTIVE: See Ophthalmology exam    ASSESSMENT:    ICD-10-CM    1. Examination of eyes and vision  Z01.00 EYE EXAM (SIMPLE-NONBILLABLE)      2. Myopia of both eyes  H52.13 REFRACTION     CONTACT LENS FITTING,BILAT w/ signed waiver      3. Regular astigmatism of both eyes  H52.223 REFRACTION      4. Psoriatic arthritis (H)  L40.50 EYE EXAM (SIMPLE-NONBILLABLE)         PLAN:     Patient Instructions   Eyeglass prescription given.    Contact lens prescription given and form signed.  You may notice increased strain at near with updated prescription.  OTC readers if needed +1.00 or let me know if you would prefer going back to -2.75 both eyes vs -3.00.    Artificial tears- 1 drop both eyes once before bedtime and once in the morning then 2 x day as needed.      Return in 1 year for a complete eye exam or sooner if needed.    Demian Andrade, OD

## 2025-02-10 NOTE — PATIENT INSTRUCTIONS
Eyeglass prescription given.    Contact lens prescription given and form signed.  You may notice increased strain at near with updated prescription.  OTC readers if needed +1.00 or let me know if you would prefer going back to -2.75 both eyes vs -3.00.    Artificial tears- 1 drop both eyes once before bedtime and once in the morning then 2 x day as needed.      Return in 1 year for a complete eye exam or sooner if needed.    Demian Andrade, OD    The affects of the dilating drops last for 4- 6 hours.  You will be more sensitive to light and vision will be blurry up close.  Do not drive if you do not feel comfortable.  Mydriatic sunglasses were given if needed.    There is a combination of three treatments which can greatly improve symptoms of dry eyes.     Artificial tears  Heat (eyes closed)  Eyelid and eyelash cleansing (eyes closed)     Use one drop of artificial tears both eyes 4 x daily.  Once in the morning, lunch, dinner and bedtime. Continue to use the drops regardless if your eyes are comfortable or not.  Artificial tears work best as a preventative and not as well after your eyes are starting to bother you.  It may take 4- 6 weeks of using the drops before you notice improvement.  If after that time you are still having problems schedule an appointment for an evaluation and discussion of different treatments such as Restasis or Xiidra.  Dry eyes are a chronic condition and you may have more symptoms at certain times of the year.    Excess tearing can be due to the right tears not working properly or a blockage in the tear drainage system.  You can try using artificial tears 1 drop both eyes 4 x day.  If the excess tearing is bothersome after 4-6 weeks of treatment then we can send you for further testing.  This would entail a referral to our oculoplastic specialist Dr. Jeff Balderas at the [facility]-[phone].    Recommended brands are:    Systane Complete  Systane Ultra  Systane Balance  Refresh  Advanced Optive  Refresh Relieva  Blink    Recommended brands for contact lens wearers are:    Systane contacts  Refresh contacts  Blink contacts    If you are using drops more than 4 x day or have sensitivities to preservatives I recommend non preserved artificial tears.  These come in 1 use vials.  They can be used every 1-2 hours.  Do not reuse the vials.    Recommended brands are:    Refresh Optive Adelso-3  Systane- preservative free  Refresh-  preservative free  Blink- preservative free    Gels or ointment can be used at night.    Recommended brands are:    Systane Gel  Refresh Gel  Blink Gel  Genteal Gel    Systane night time (ointment)  Refresh Celluvisc  Refresh PM (ointment)    Optase dry eye spray.  Spray to eyelids 3-4 x daily.  This can be purchased on Amazon.      Visine, Clear Eyes or Murine (drops that get the red out) can irritate the eyes and cause a rebound effect where the eyes become more red and you end up using more drops.  Avoid drops containing tetrahydrozoline, naphazoline, phenylephrine, oxymetazoline.      OTC Lumify is a newer product that gives immediate redness relief without the rebound effect.  Use as needed to take the redness out.    Artificial tears may be used with other drops (such as allergy, glaucoma, antibiotics) around the same time.  Be sure to wait 5 minutes in between drops.    Heat to the eyelids can also improve your symptoms of dry eyes.  Morris heat masks can be purchased at Amazon to be used nightly for 10-15 minutes.  Other options are gel masks that can be put in the microwave and purchased at most pharmacies.      Tea Tree Oil eyelid cleansers recommended are Ocusoft Oust foam cleanser to cleanse eyelids/lashes at night and in the am. Other options are Blephadex or Cliradex eyelid wipes.  KEEP EYES CLOSED when using these products.  These can be purchased on amazon.com   A good product for make up remover with tea tree oil is WeLoveEyes.  This can be found at  www.[employer].UserEvents or amazon.com.    Other good eyelid cleansers have hypochlorous which removes excess bacteria and is safe around the eyes. Products are Avenova, Ocusoft Hypochlor or Heyedrate. Spray solution onto cotton pad, close eyes and gently apply to eyelids and eyelashes using side to side motion.  You can also KEEP EYES CLOSED spray and rub into eyelashes.  You do not need to rinse it off. Use morning and evening. These products can be found on Amazon.  You can check with your local pharmacy and see if they can order if for you if they don't have it.    Other brands of eyelid cleansing wipes are:    Ocusoft wipes  Systane wipes    A great eye make up line is https://[email or web address]        Optometry Providers       Clinic Locations                                 Telephone Number   Dr. Josefa Hennessy [facility] [phone]     [place] [facility] Hours:                [place] [facility] Hours:       [facility] Hours:   [address]   [address]     [address] [place] [address]   [place] [address]    [place] [address]  Phone: [phone]                    Phone: [phone]     Phone: [phone]                      Monday 8:00-6:00                          Monday 8:00-6:00 Monday 8:00-6:00              Tuesday 8:00-6:00                          Tuesday 8:00-6:00                          Tuesday 8:00-6:00              Wednesday 8:00-6:00                  Wednesday 8:00-6:00                   Wednesday 8:00-6:00      Thursday 8:00-6:00                        Thursday 8:00-6:00                         Thursday 8:00-6:00            Friday 8:00-5:00                              Friday 8:00-5:00                              Friday  8:00-5:00    [facility] Hours:   [address] [place] [address]  [phone]    Monday 9:00-6:00  Tuesday 9:00-6:00  Wednesday 9:00-6:00  Thursday 9:00-6:00  Friday 9:00-5:00  As always, Thank you for trusting us with your health care needs!

## 2025-02-10 NOTE — LETTER
2/10/2025      Abby Foy  [id number]   [place] [address]      Dear Colleague,    Thank you for referring your patient, Abby Foy, to the [facility]. Please see a copy of my visit note below.    Chief Complaint   Patient presents with     Annual Eye Exam     Would like contacts- $75 fit fee ok        Previous contact lens wearer? Yes: 1 day Acuvue Moist   Comfort of contact lenses :good  Satisfied with current lenses: Yes    Last Eye Exam: 2022  Dilated Previously: Yes    What are you currently using to see?  glasses and contacts    Distance Vision Acuity: Noticed gradual change in both eyes    Near Vision Acuity: Satisfied with vision while reading and using computer with glasses, contacts and unaided    Eye Comfort: good  Do you use eye drops? 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  Wore glasses for a few days and then got better.      Psoriatic arthritis-    Holly Abdullahi - Optometric Assistant     Medical, surgical and family histories reviewed and updated 2/10/2025.       OBJECTIVE: See Ophthalmology exam    ASSESSMENT:    ICD-10-CM    1. Examination of eyes and vision  Z01.00 EYE EXAM (SIMPLE-NONBILLABLE)      2. Myopia of both eyes  H52.13 REFRACTION     CONTACT LENS FITTING,BILAT w/ signed waiver      3. Regular astigmatism of both eyes  H52.223 REFRACTION      4. Psoriatic arthritis (H)  L40.50 EYE EXAM (SIMPLE-NONBILLABLE)         PLAN:     Patient Instructions   Eyeglass prescription given.    Contact lens prescription given and form signed.  You may notice increased strain at near with updated prescription.  OTC readers if needed +1.00 or let me know if you would prefer going back to -2.75 both eyes vs -3.00.    Artificial tears- 1 drop both eyes once before bedtime and once in the morning then 2 x day as needed.      Return in 1 year  for a complete eye exam or sooner if needed.    Demian Andrade, OD                         Again, thank you for allowing me to participate in the care of your patient.        Sincerely,        Demian Andrade, OD    Electronically signed

## 2025-03-07 ENCOUNTER — ANCILLARY PROCEDURE (OUTPATIENT)
Dept: ULTRASOUND IMAGING | Facility: CLINIC | Age: 45
End: 2025-03-07
Attending: OBSTETRICS & GYNECOLOGY
Payer: COMMERCIAL

## 2025-03-07 DIAGNOSIS — N93.9 ABNORMAL UTERINE BLEEDING (AUB): ICD-10-CM

## 2025-03-07 PROCEDURE — 76830 TRANSVAGINAL US NON-OB: CPT

## 2025-03-07 PROCEDURE — 76830 TRANSVAGINAL US NON-OB: CPT | Mod: 26 | Performed by: STUDENT IN AN ORGANIZED HEALTH CARE EDUCATION/TRAINING PROGRAM

## 2025-03-25 ENCOUNTER — MYC MEDICAL ADVICE (OUTPATIENT)
Dept: OBGYN | Facility: CLINIC | Age: 45
End: 2025-03-25
Payer: COMMERCIAL

## 2025-03-28 ENCOUNTER — MYC MEDICAL ADVICE (OUTPATIENT)
Dept: RHEUMATOLOGY | Facility: CLINIC | Age: 45
End: 2025-03-28

## 2025-04-14 ENCOUNTER — ANESTHESIA EVENT (OUTPATIENT)
Dept: SURGERY | Facility: CLINIC | Age: 45
End: 2025-04-14
Payer: COMMERCIAL

## 2025-04-15 ENCOUNTER — HOSPITAL ENCOUNTER (OUTPATIENT)
Facility: CLINIC | Age: 45
Discharge: HOME | End: 2025-04-15
Attending: OBSTETRICS & GYNECOLOGY | Admitting: OBSTETRICS & GYNECOLOGY
Payer: COMMERCIAL

## 2025-04-15 ENCOUNTER — ANESTHESIA (OUTPATIENT)
Dept: SURGERY | Facility: CLINIC | Age: 45
End: 2025-04-15
Payer: COMMERCIAL

## 2025-04-15 VITALS
RESPIRATION RATE: 16 BRPM | OXYGEN SATURATION: 98 % | BODY MASS INDEX: 18.59 KG/M2 | DIASTOLIC BLOOD PRESSURE: 71 MMHG | HEIGHT: 64 IN | WEIGHT: 108.91 LBS | TEMPERATURE: 98 F | SYSTOLIC BLOOD PRESSURE: 105 MMHG | HEART RATE: 74 BPM

## 2025-04-15 DIAGNOSIS — N93.9 ABNORMAL UTERINE BLEEDING (AUB): ICD-10-CM

## 2025-04-15 DIAGNOSIS — Z90.710 STATUS POST LAPAROSCOPIC HYSTERECTOMY: Primary | ICD-10-CM

## 2025-04-15 LAB
ABO + RH BLD: NORMAL
BLD GP AB SCN SERPL QL: NEGATIVE
HCG UR QL: NEGATIVE
SPECIMEN EXP DATE BLD: NORMAL

## 2025-04-15 PROCEDURE — 64488 TAP BLOCK BI INJECTION: CPT | Mod: XU

## 2025-04-15 PROCEDURE — 710N000012 HC RECOVERY PHASE 2, PER MINUTE: Performed by: OBSTETRICS & GYNECOLOGY

## 2025-04-15 PROCEDURE — 258N000003 HC RX IP 258 OP 636: Performed by: STUDENT IN AN ORGANIZED HEALTH CARE EDUCATION/TRAINING PROGRAM

## 2025-04-15 PROCEDURE — 250N000011 HC RX IP 250 OP 636

## 2025-04-15 PROCEDURE — 86900 BLOOD TYPING SEROLOGIC ABO: CPT

## 2025-04-15 PROCEDURE — 250N000025 HC SEVOFLURANE, PER MIN: Performed by: OBSTETRICS & GYNECOLOGY

## 2025-04-15 PROCEDURE — 710N000010 HC RECOVERY PHASE 1, LEVEL 2, PER MIN: Performed by: OBSTETRICS & GYNECOLOGY

## 2025-04-15 PROCEDURE — 272N000001 HC OR GENERAL SUPPLY STERILE: Performed by: OBSTETRICS & GYNECOLOGY

## 2025-04-15 PROCEDURE — 88307 TISSUE EXAM BY PATHOLOGIST: CPT | Mod: TC | Performed by: OBSTETRICS & GYNECOLOGY

## 2025-04-15 PROCEDURE — 250N000011 HC RX IP 250 OP 636: Mod: JZ | Performed by: STUDENT IN AN ORGANIZED HEALTH CARE EDUCATION/TRAINING PROGRAM

## 2025-04-15 PROCEDURE — 370N000017 HC ANESTHESIA TECHNICAL FEE, PER MIN: Performed by: OBSTETRICS & GYNECOLOGY

## 2025-04-15 PROCEDURE — 250N000011 HC RX IP 250 OP 636: Mod: JZ | Performed by: OBSTETRICS & GYNECOLOGY

## 2025-04-15 PROCEDURE — 88307 TISSUE EXAM BY PATHOLOGIST: CPT | Mod: 26 | Performed by: STUDENT IN AN ORGANIZED HEALTH CARE EDUCATION/TRAINING PROGRAM

## 2025-04-15 PROCEDURE — 360N000080 HC SURGERY LEVEL 7, PER MIN: Performed by: OBSTETRICS & GYNECOLOGY

## 2025-04-15 PROCEDURE — S2900 ROBOTIC SURGICAL SYSTEM: HCPCS | Mod: GC | Performed by: OBSTETRICS & GYNECOLOGY

## 2025-04-15 PROCEDURE — 250N000009 HC RX 250: Performed by: STUDENT IN AN ORGANIZED HEALTH CARE EDUCATION/TRAINING PROGRAM

## 2025-04-15 PROCEDURE — 250N000011 HC RX IP 250 OP 636: Performed by: STUDENT IN AN ORGANIZED HEALTH CARE EDUCATION/TRAINING PROGRAM

## 2025-04-15 PROCEDURE — 250N000011 HC RX IP 250 OP 636: Performed by: OBSTETRICS & GYNECOLOGY

## 2025-04-15 PROCEDURE — 258N000003 HC RX IP 258 OP 636

## 2025-04-15 PROCEDURE — 258N000001 HC RX 258: Performed by: OBSTETRICS & GYNECOLOGY

## 2025-04-15 PROCEDURE — 999N000141 HC STATISTIC PRE-PROCEDURE NURSING ASSESSMENT: Performed by: OBSTETRICS & GYNECOLOGY

## 2025-04-15 PROCEDURE — 58571 TLH W/T/O 250 G OR LESS: CPT | Mod: GC | Performed by: OBSTETRICS & GYNECOLOGY

## 2025-04-15 PROCEDURE — 250N000013 HC RX MED GY IP 250 OP 250 PS 637: Performed by: OBSTETRICS & GYNECOLOGY

## 2025-04-15 PROCEDURE — 81025 URINE PREGNANCY TEST: CPT | Performed by: OBSTETRICS & GYNECOLOGY

## 2025-04-15 PROCEDURE — 250N000009 HC RX 250

## 2025-04-15 RX ORDER — CEFAZOLIN SODIUM/WATER 2 G/20 ML
2 SYRINGE (ML) INTRAVENOUS SEE ADMIN INSTRUCTIONS
Status: DISCONTINUED | OUTPATIENT
Start: 2025-04-15 | End: 2025-04-15 | Stop reason: HOSPADM

## 2025-04-15 RX ORDER — FENTANYL CITRATE 50 UG/ML
25 INJECTION, SOLUTION INTRAMUSCULAR; INTRAVENOUS EVERY 5 MIN PRN
Status: DISCONTINUED | OUTPATIENT
Start: 2025-04-15 | End: 2025-04-15 | Stop reason: HOSPADM

## 2025-04-15 RX ORDER — SCOPOLAMINE 1 MG/3D
1 PATCH, EXTENDED RELEASE TRANSDERMAL ONCE
Status: DISCONTINUED | OUTPATIENT
Start: 2025-04-15 | End: 2025-04-15 | Stop reason: HOSPADM

## 2025-04-15 RX ORDER — DEXAMETHASONE SODIUM PHOSPHATE 4 MG/ML
INJECTION, SOLUTION INTRA-ARTICULAR; INTRALESIONAL; INTRAMUSCULAR; INTRAVENOUS; SOFT TISSUE PRN
Status: DISCONTINUED | OUTPATIENT
Start: 2025-04-15 | End: 2025-04-15

## 2025-04-15 RX ORDER — FENTANYL CITRATE 50 UG/ML
25-50 INJECTION, SOLUTION INTRAMUSCULAR; INTRAVENOUS
Status: DISCONTINUED | OUTPATIENT
Start: 2025-04-15 | End: 2025-04-15 | Stop reason: HOSPADM

## 2025-04-15 RX ORDER — NALOXONE HYDROCHLORIDE 0.4 MG/ML
0.4 INJECTION, SOLUTION INTRAMUSCULAR; INTRAVENOUS; SUBCUTANEOUS
Status: DISCONTINUED | OUTPATIENT
Start: 2025-04-15 | End: 2025-04-15 | Stop reason: HOSPADM

## 2025-04-15 RX ORDER — PROPOFOL 10 MG/ML
INJECTION, EMULSION INTRAVENOUS PRN
Status: DISCONTINUED | OUTPATIENT
Start: 2025-04-15 | End: 2025-04-15

## 2025-04-15 RX ORDER — METRONIDAZOLE 500 MG/100ML
500 INJECTION, SOLUTION INTRAVENOUS
Status: COMPLETED | OUTPATIENT
Start: 2025-04-15 | End: 2025-04-15

## 2025-04-15 RX ORDER — HYDROMORPHONE HYDROCHLORIDE 1 MG/ML
0.2 INJECTION, SOLUTION INTRAMUSCULAR; INTRAVENOUS; SUBCUTANEOUS EVERY 5 MIN PRN
Status: DISCONTINUED | OUTPATIENT
Start: 2025-04-15 | End: 2025-04-15 | Stop reason: HOSPADM

## 2025-04-15 RX ORDER — NALOXONE HYDROCHLORIDE 0.4 MG/ML
0.2 INJECTION, SOLUTION INTRAMUSCULAR; INTRAVENOUS; SUBCUTANEOUS
Status: DISCONTINUED | OUTPATIENT
Start: 2025-04-15 | End: 2025-04-15 | Stop reason: HOSPADM

## 2025-04-15 RX ORDER — OXYCODONE HYDROCHLORIDE 5 MG/1
5 TABLET ORAL
Status: DISCONTINUED | OUTPATIENT
Start: 2025-04-15 | End: 2025-04-15 | Stop reason: HOSPADM

## 2025-04-15 RX ORDER — FENTANYL CITRATE 50 UG/ML
INJECTION, SOLUTION INTRAMUSCULAR; INTRAVENOUS PRN
Status: DISCONTINUED | OUTPATIENT
Start: 2025-04-15 | End: 2025-04-15

## 2025-04-15 RX ORDER — SODIUM CHLORIDE, SODIUM LACTATE, POTASSIUM CHLORIDE, CALCIUM CHLORIDE 600; 310; 30; 20 MG/100ML; MG/100ML; MG/100ML; MG/100ML
INJECTION, SOLUTION INTRAVENOUS CONTINUOUS PRN
Status: DISCONTINUED | OUTPATIENT
Start: 2025-04-15 | End: 2025-04-15

## 2025-04-15 RX ORDER — IBUPROFEN 800 MG/1
800 TABLET, FILM COATED ORAL EVERY 6 HOURS PRN
Qty: 30 TABLET | Refills: 0 | Status: SHIPPED | OUTPATIENT
Start: 2025-04-15

## 2025-04-15 RX ORDER — KETAMINE HYDROCHLORIDE 10 MG/ML
INJECTION INTRAMUSCULAR; INTRAVENOUS PRN
Status: DISCONTINUED | OUTPATIENT
Start: 2025-04-15 | End: 2025-04-15

## 2025-04-15 RX ORDER — ONDANSETRON 2 MG/ML
INJECTION INTRAMUSCULAR; INTRAVENOUS PRN
Status: DISCONTINUED | OUTPATIENT
Start: 2025-04-15 | End: 2025-04-15

## 2025-04-15 RX ORDER — AMOXICILLIN 250 MG
1-2 CAPSULE ORAL 2 TIMES DAILY PRN
Qty: 30 TABLET | Refills: 0 | Status: SHIPPED | OUTPATIENT
Start: 2025-04-15

## 2025-04-15 RX ORDER — FENTANYL CITRATE 50 UG/ML
50 INJECTION, SOLUTION INTRAMUSCULAR; INTRAVENOUS EVERY 5 MIN PRN
Status: DISCONTINUED | OUTPATIENT
Start: 2025-04-15 | End: 2025-04-15 | Stop reason: HOSPADM

## 2025-04-15 RX ORDER — NALOXONE HYDROCHLORIDE 0.4 MG/ML
0.1 INJECTION, SOLUTION INTRAMUSCULAR; INTRAVENOUS; SUBCUTANEOUS
Status: DISCONTINUED | OUTPATIENT
Start: 2025-04-15 | End: 2025-04-15 | Stop reason: HOSPADM

## 2025-04-15 RX ORDER — ACETAMINOPHEN 325 MG/1
975 TABLET ORAL EVERY 6 HOURS PRN
Qty: 50 TABLET | Refills: 0 | Status: SHIPPED | OUTPATIENT
Start: 2025-04-15

## 2025-04-15 RX ORDER — ACETAMINOPHEN 325 MG/1
975 TABLET ORAL ONCE
Status: DISCONTINUED | OUTPATIENT
Start: 2025-04-15 | End: 2025-04-15

## 2025-04-15 RX ORDER — PROPOFOL 10 MG/ML
INJECTION, EMULSION INTRAVENOUS CONTINUOUS PRN
Status: DISCONTINUED | OUTPATIENT
Start: 2025-04-15 | End: 2025-04-15

## 2025-04-15 RX ORDER — SODIUM CHLORIDE, SODIUM LACTATE, POTASSIUM CHLORIDE, CALCIUM CHLORIDE 600; 310; 30; 20 MG/100ML; MG/100ML; MG/100ML; MG/100ML
INJECTION, SOLUTION INTRAVENOUS CONTINUOUS
Status: DISCONTINUED | OUTPATIENT
Start: 2025-04-15 | End: 2025-04-15 | Stop reason: HOSPADM

## 2025-04-15 RX ORDER — KETOROLAC TROMETHAMINE 30 MG/ML
INJECTION, SOLUTION INTRAMUSCULAR; INTRAVENOUS PRN
Status: DISCONTINUED | OUTPATIENT
Start: 2025-04-15 | End: 2025-04-15

## 2025-04-15 RX ORDER — OXYCODONE HYDROCHLORIDE 5 MG/1
5-10 TABLET ORAL EVERY 4 HOURS PRN
Qty: 6 TABLET | Refills: 0 | Status: SHIPPED | OUTPATIENT
Start: 2025-04-15

## 2025-04-15 RX ORDER — CEFAZOLIN SODIUM/WATER 2 G/20 ML
2 SYRINGE (ML) INTRAVENOUS
Status: COMPLETED | OUTPATIENT
Start: 2025-04-15 | End: 2025-04-15

## 2025-04-15 RX ORDER — HYDRALAZINE HYDROCHLORIDE 20 MG/ML
2.5-5 INJECTION INTRAMUSCULAR; INTRAVENOUS EVERY 10 MIN PRN
Status: DISCONTINUED | OUTPATIENT
Start: 2025-04-15 | End: 2025-04-15 | Stop reason: HOSPADM

## 2025-04-15 RX ORDER — IBUPROFEN 200 MG
800 TABLET ORAL ONCE
Status: DISCONTINUED | OUTPATIENT
Start: 2025-04-15 | End: 2025-04-15 | Stop reason: HOSPADM

## 2025-04-15 RX ORDER — HALOPERIDOL 5 MG/ML
1 INJECTION INTRAMUSCULAR ONCE
Status: DISCONTINUED | OUTPATIENT
Start: 2025-04-15 | End: 2025-04-15 | Stop reason: HOSPADM

## 2025-04-15 RX ORDER — ONDANSETRON 2 MG/ML
4 INJECTION INTRAMUSCULAR; INTRAVENOUS EVERY 30 MIN PRN
Status: DISCONTINUED | OUTPATIENT
Start: 2025-04-15 | End: 2025-04-15 | Stop reason: HOSPADM

## 2025-04-15 RX ORDER — FLUMAZENIL 0.1 MG/ML
0.2 INJECTION, SOLUTION INTRAVENOUS
Status: DISCONTINUED | OUTPATIENT
Start: 2025-04-15 | End: 2025-04-15 | Stop reason: HOSPADM

## 2025-04-15 RX ORDER — HYDROMORPHONE HYDROCHLORIDE 1 MG/ML
0.4 INJECTION, SOLUTION INTRAMUSCULAR; INTRAVENOUS; SUBCUTANEOUS EVERY 5 MIN PRN
Status: DISCONTINUED | OUTPATIENT
Start: 2025-04-15 | End: 2025-04-15 | Stop reason: HOSPADM

## 2025-04-15 RX ORDER — ACETAMINOPHEN 325 MG/1
975 TABLET ORAL ONCE
Status: COMPLETED | OUTPATIENT
Start: 2025-04-15 | End: 2025-04-15

## 2025-04-15 RX ORDER — APREPITANT 40 MG/1
40 CAPSULE ORAL ONCE
Status: COMPLETED | OUTPATIENT
Start: 2025-04-15 | End: 2025-04-15

## 2025-04-15 RX ORDER — DEXMEDETOMIDINE HYDROCHLORIDE 4 UG/ML
INJECTION, SOLUTION INTRAVENOUS
Status: COMPLETED | OUTPATIENT
Start: 2025-04-15 | End: 2025-04-15

## 2025-04-15 RX ORDER — LABETALOL HYDROCHLORIDE 5 MG/ML
10 INJECTION, SOLUTION INTRAVENOUS
Status: DISCONTINUED | OUTPATIENT
Start: 2025-04-15 | End: 2025-04-15 | Stop reason: HOSPADM

## 2025-04-15 RX ORDER — DEXAMETHASONE SODIUM PHOSPHATE 10 MG/ML
INJECTION, SOLUTION INTRAMUSCULAR; INTRAVENOUS
Status: COMPLETED | OUTPATIENT
Start: 2025-04-15 | End: 2025-04-15

## 2025-04-15 RX ORDER — HALOPERIDOL 5 MG/ML
1 INJECTION INTRAMUSCULAR
Status: COMPLETED | OUTPATIENT
Start: 2025-04-15 | End: 2025-04-15

## 2025-04-15 RX ORDER — ACETAMINOPHEN 325 MG/1
975 TABLET ORAL ONCE
Status: DISCONTINUED | OUTPATIENT
Start: 2025-04-15 | End: 2025-04-15 | Stop reason: HOSPADM

## 2025-04-15 RX ORDER — LIDOCAINE HYDROCHLORIDE 20 MG/ML
INJECTION, SOLUTION INFILTRATION; PERINEURAL PRN
Status: DISCONTINUED | OUTPATIENT
Start: 2025-04-15 | End: 2025-04-15

## 2025-04-15 RX ORDER — LIDOCAINE 40 MG/G
CREAM TOPICAL
Status: DISCONTINUED | OUTPATIENT
Start: 2025-04-15 | End: 2025-04-15 | Stop reason: HOSPADM

## 2025-04-15 RX ORDER — BUPIVACAINE HYDROCHLORIDE 2.5 MG/ML
INJECTION, SOLUTION EPIDURAL; INFILTRATION; INTRACAUDAL; PERINEURAL
Status: COMPLETED | OUTPATIENT
Start: 2025-04-15 | End: 2025-04-15

## 2025-04-15 RX ORDER — ONDANSETRON 4 MG/1
4 TABLET, ORALLY DISINTEGRATING ORAL EVERY 8 HOURS PRN
Qty: 4 TABLET | Refills: 0 | Status: SHIPPED | OUTPATIENT
Start: 2025-04-15

## 2025-04-15 RX ORDER — ONDANSETRON 4 MG/1
4 TABLET, ORALLY DISINTEGRATING ORAL EVERY 30 MIN PRN
Status: DISCONTINUED | OUTPATIENT
Start: 2025-04-15 | End: 2025-04-15 | Stop reason: HOSPADM

## 2025-04-15 RX ADMIN — Medication 20 MG: at 08:19

## 2025-04-15 RX ADMIN — DEXAMETHASONE SODIUM PHOSPHATE 2 MG: 10 INJECTION, SOLUTION INTRAMUSCULAR; INTRAVENOUS at 08:00

## 2025-04-15 RX ADMIN — PROPOFOL 100 MCG/KG/MIN: 10 INJECTION, EMULSION INTRAVENOUS at 09:16

## 2025-04-15 RX ADMIN — ONDANSETRON 4 MG: 2 INJECTION INTRAMUSCULAR; INTRAVENOUS at 10:19

## 2025-04-15 RX ADMIN — PHENYLEPHRINE HYDROCHLORIDE 50 MCG: 10 INJECTION INTRAVENOUS at 08:49

## 2025-04-15 RX ADMIN — MIDAZOLAM 2 MG: 1 INJECTION INTRAMUSCULAR; INTRAVENOUS at 07:50

## 2025-04-15 RX ADMIN — Medication 2 G: at 08:10

## 2025-04-15 RX ADMIN — DEXMEDETOMIDINE HYDROCHLORIDE 8 MCG: 100 INJECTION, SOLUTION INTRAVENOUS at 08:30

## 2025-04-15 RX ADMIN — Medication 20 MG: at 09:23

## 2025-04-15 RX ADMIN — PROPOFOL 50 MCG/KG/MIN: 10 INJECTION, EMULSION INTRAVENOUS at 08:07

## 2025-04-15 RX ADMIN — SCOLOPAMINE TRANSDERMAL SYSTEM 1 PATCH: 1 PATCH, EXTENDED RELEASE TRANSDERMAL at 07:28

## 2025-04-15 RX ADMIN — LIDOCAINE HYDROCHLORIDE 80 MG: 20 INJECTION, SOLUTION INFILTRATION; PERINEURAL at 07:57

## 2025-04-15 RX ADMIN — BUPIVACAINE HYDROCHLORIDE 60 ML: 2.5 INJECTION, SOLUTION EPIDURAL; INFILTRATION; INTRACAUDAL; PERINEURAL at 08:00

## 2025-04-15 RX ADMIN — SUGAMMADEX 100 MG: 100 INJECTION, SOLUTION INTRAVENOUS at 10:33

## 2025-04-15 RX ADMIN — DEXMEDETOMIDINE HYDROCHLORIDE 40 MCG: 4 INJECTION, SOLUTION INTRAVENOUS at 08:00

## 2025-04-15 RX ADMIN — APREPITANT 40 MG: 40 CAPSULE ORAL at 07:21

## 2025-04-15 RX ADMIN — HALOPERIDOL LACTATE 1 MG: 5 INJECTION, SOLUTION INTRAMUSCULAR at 12:45

## 2025-04-15 RX ADMIN — PHENYLEPHRINE HYDROCHLORIDE 50 MCG: 10 INJECTION INTRAVENOUS at 08:56

## 2025-04-15 RX ADMIN — METRONIDAZOLE 500 MG: 500 INJECTION, SOLUTION INTRAVENOUS at 07:22

## 2025-04-15 RX ADMIN — SODIUM CHLORIDE, SODIUM LACTATE, POTASSIUM CHLORIDE, AND CALCIUM CHLORIDE: .6; .31; .03; .02 INJECTION, SOLUTION INTRAVENOUS at 08:05

## 2025-04-15 RX ADMIN — FENTANYL CITRATE 100 MCG: 50 INJECTION INTRAMUSCULAR; INTRAVENOUS at 07:56

## 2025-04-15 RX ADMIN — ONDANSETRON 4 MG: 2 INJECTION INTRAMUSCULAR; INTRAVENOUS at 11:35

## 2025-04-15 RX ADMIN — ACETAMINOPHEN 975 MG: 325 TABLET, FILM COATED ORAL at 07:00

## 2025-04-15 RX ADMIN — Medication 20 MG: at 09:11

## 2025-04-15 RX ADMIN — SODIUM CHLORIDE, SODIUM LACTATE, POTASSIUM CHLORIDE, AND CALCIUM CHLORIDE: .6; .31; .03; .02 INJECTION, SOLUTION INTRAVENOUS at 13:01

## 2025-04-15 RX ADMIN — KETOROLAC TROMETHAMINE 15 MG: 30 INJECTION, SOLUTION INTRAMUSCULAR at 10:19

## 2025-04-15 RX ADMIN — SODIUM CHLORIDE, SODIUM LACTATE, POTASSIUM CHLORIDE, AND CALCIUM CHLORIDE: .6; .31; .03; .02 INJECTION, SOLUTION INTRAVENOUS at 07:46

## 2025-04-15 RX ADMIN — Medication 40 MG: at 07:58

## 2025-04-15 RX ADMIN — PHENYLEPHRINE HYDROCHLORIDE 0.2 MCG/KG/MIN: 10 INJECTION INTRAVENOUS at 08:53

## 2025-04-15 RX ADMIN — Medication 20 MG: at 08:27

## 2025-04-15 RX ADMIN — PROCHLORPERAZINE EDISYLATE 5 MG: 5 INJECTION INTRAMUSCULAR; INTRAVENOUS at 12:03

## 2025-04-15 RX ADMIN — Medication 10 MG: at 09:36

## 2025-04-15 RX ADMIN — PHENYLEPHRINE HYDROCHLORIDE 50 MCG: 10 INJECTION INTRAVENOUS at 08:17

## 2025-04-15 RX ADMIN — PHENYLEPHRINE HYDROCHLORIDE 100 MCG: 10 INJECTION INTRAVENOUS at 07:57

## 2025-04-15 RX ADMIN — SODIUM CHLORIDE, SODIUM LACTATE, POTASSIUM CHLORIDE, AND CALCIUM CHLORIDE 500 ML: .6; .31; .03; .02 INJECTION, SOLUTION INTRAVENOUS at 12:00

## 2025-04-15 RX ADMIN — PHENYLEPHRINE HYDROCHLORIDE 50 MCG: 10 INJECTION INTRAVENOUS at 10:29

## 2025-04-15 RX ADMIN — PROPOFOL 100 MG: 10 INJECTION, EMULSION INTRAVENOUS at 07:57

## 2025-04-15 RX ADMIN — DEXAMETHASONE SODIUM PHOSPHATE 4 MG: 4 INJECTION, SOLUTION INTRAMUSCULAR; INTRAVENOUS at 08:10

## 2025-04-15 ASSESSMENT — ACTIVITIES OF DAILY LIVING (ADL)
ADLS_ACUITY_SCORE: 35
ADLS_ACUITY_SCORE: 36
ADLS_ACUITY_SCORE: 35
ADLS_ACUITY_SCORE: 36
ADLS_ACUITY_SCORE: 36
ADLS_ACUITY_SCORE: 35
ADLS_ACUITY_SCORE: 36
ADLS_ACUITY_SCORE: 37
ADLS_ACUITY_SCORE: 36
ADLS_ACUITY_SCORE: 37

## 2025-04-15 NOTE — OP NOTE
[facility]  Full Operative Note    Date of Service:  4/15/2025    Surgeon: Apryl West MD  Assistants: Deepika Ferrer MD PGY-4    Moon Scales MD PGY-1    Catie BLOOM3    Preop Dx: AUB  Postop Dx: Same as above, s/p procedure  Procedure: Exam under anesthesia, robot assisted total laparoscopic hysterectomy, cystoscopy    Anesthesia: General  IVF: 1300 mL crystalloid  EBL: 25 mL  UOP: 600 mL clear urine at the end of the case  Drains: None  Specimens:   ID Type Source Tests Collected by Time Destination   1 : Uterus, Cervix, Bilateral fallopian tube fragments Tissue Uterus, Cervix SURGICAL PATHOLOGY EXAM Apryl West MD 4/15/2025  9:39 AM      Complications: None apparent    Findings: Exam under anesthesia revealed normal external female genitalia. Uterus small, mobile, anteverted. Normal appearing multiparous cervix. No adnexal masses noted on bimanual exam. No trauma noted upon entry of the abdomen with the laparoscope. Surgically absent bilateral fallopian tubes. Normal appearing ovaries, uterus. Normal appearing liver edge, gallbladder, stomach, bowels. Appendix not visualized. Hemostatic surgical sites bilaterally. No intraabdominal adhesions noted. Cystoscopy without evidence of stitches or trauma in the bladder and brisk bilateral ureteral jets.    Indications:   Abby Foy is 44 year old female with past medical history of AUB s/p endometrial ablation x2 without resolution of symptoms. Given this, it was recommended she undergo Davinci assisted total laparoscopy hysterectomy, possible cystoscopy. Risks, benefits and alternatives to above stated procedure were discussed. Patient desired to proceed and written informed consent was obtained prior to proceeding.   Procedure Details:   Consent was reviewed with the patient in the preoperative setting and confirmed. She received prophylactic antibiotics. The patient was transferred to the operating room where SCDs were  placed. General anesthesia was obtained without noted difficulties and she was positioned in dorsal lithotomy position in what was felt to be a neurologically safe position with both arms tucked at sides. Exam under anesthesia was performed with findings as described above. The patient was then prepped and draped in the usual sterile fashion.   Timeout was called at which point the patient's name, procedure and operative site were confirmed by the operative team. A frances catheter placed. A speculum was placed in the vagina. The anterior lip of the cervix was grasped and a suture was placed. A uterine manipulator was placed. The speculum was removed.   Attention was then turned to the upper abdomen after gloves were changed. The umbilicus was everted with an Allis clamp and then grasped on each side with penetrating towel clamps. An 11 blade was used to make an 8 mm vertical incision within the umbilicus and the Veress needle introduced through incision. The abdomen was insufflated with CO2 gas with an opening pressure of 2 mmHg.  Pneumoperitoneum was achieved to a maxiumum pressure of 15mm Hg. The Veress needle and penetrating towel clamps were removed and a Davinci port placed. The Davinci camera was then inserted and the abdomen evaluated with no evidence of intraabdominal trauma from entry noted. All additional ports were placed under direct visualization without any injuries noted. Two additional 8 mm Davinci ports were placed in the left upper, and right upper abdomen with a minimum of 10 cm between them. An 8 mm assistant port was placed between the midline port and the left lower abdomen port. At this point, the patient was placed into steep Trendelenburg. The smoke evacuator/insufflator was attached. The pelvis was inspected as well as the upper abdomen with findings as noted above. The da Santino robot was docked and instruments were inserted including fenestrated bipolar and monopolar scissors.  Attention  was then turned to the right adnexa where a portion of remaining fallopian tube remained and was cauterized, excised, and handed off for pathology. Then the right round ligament  was grasped, cauterized, and transected using monopolar scissors. The posterior leaf of the broad ligament was then taken down to the cervix. The same process was repeated on the left side. The remaining portions of the anterior and posterior leaves of the broad ligament were then opened and carefully dissected down to the level of the uterine arteries bilaterally. This dissection was continued medially to develop the bladder flap. The bladder was gently dissected off the uterus and cervix until it was clear of the planned area of colpotomy. Bilateral uterine arteries were skeletonized, isolated and cauterized with the fenestrated bipolar and transected. Blanching of the uterus was noted. Serial bites along bilateral cardinal ligaments were completed using the monopolar scissors until the distal cervix was reached on each side. The monopolar scissors were then used to further dissect the cardial ligament so that the ureter and vascular pedicle fell away from the cervix bilaterally. The monopolar scissors were then used to incise the tissue to make the colpotomy curcumfrentially. This incision was extended around the cervix until the uterus was free of all its attachments. The uterus and cervix were then removed through the vagina. A vaginal balloon was inserted to mantain adequate insufflation. The bladder was further dissected off the anterior vaginal cuff. The vaginal cuff was closed with a running suture of 0-V Lock with good reapproximation noted. The vaginal cuff was hemostatic and the pelvis irrigated. Hemostasis of all surgical excision sites were noted.  Next, attention was turned to cystoscopy portion of case. The frances catheter was removed.  A 30-degree angled cystoscope was placed with findings as above. Cystoscope was used to  drain bladder prior to removal. The frances catheter was not replaced. Vaginal balloon was removed from the vagina. Vaginal inspection revealed no lacerations or other injuries. All laparoscopic instruments and ports were then removed and the pneumoperitoneum was expelled. The patient was leveled and given positive pressure breaths. Skin was reapproximated with 4-0 monocryl sutures and derma-borrero.   Sponge, instrument and needle counts were correct x 2. The patient tolerated the procedure well and was taken to PACU in stable condition. Dr. West  was present for carty portions of the procedure.    Deepika Ferrer MD  Ob/Gyn Resident, PGY-4  04/15/2025 10:49 AM    I was present and scrubbed throughout the procedure,  I agree with the note above  Apryl West MD

## 2025-04-15 NOTE — DISCHARGE INSTRUCTIONS
[phone]     You have received 975 mg of Acetaminophen (Tylenol) at 0700 am. Please do not take an additional dose of Tylenol until after 1:00 pm     Do not exceed 4,000 mg of acetaminophen during a 24 hour period and keep in mind that acetaminophen can also be found in many over-the-counter cold medications as well as narcotics that may be given for pain.     You received a dose of IV Toradol at 10:19 am. Please do not take any additional Ibuprofen/NSAID products (Aleve/Advil/Motrin/Naproxen/Celebrex) until after 4:19 pm.

## 2025-04-15 NOTE — ANESTHESIA PROCEDURE NOTES
TAP Procedure Note    Pre-Procedure   Staff -        Anesthesiologist:  Bibi Davis MD       Performed By: anesthesiologist       Location: OR       Procedure Start/Stop Times: 4/15/2025 8:00 AM and 4/15/2025 8:05 AM       Pre-Anesthestic Checklist: patient identified, IV checked, site marked, risks and benefits discussed, informed consent, monitors and equipment checked, pre-op evaluation, at physician/surgeon's request and post-op pain management  Timeout:       Correct Patient: Yes        Correct Procedure: Yes        Correct Site: Yes        Correct Position: Yes        Correct Laterality: Yes        Site Marked: Yes  Procedure Documentation  Procedure: TAP         Diagnosis: POST OPERATIVE PAIN       Laterality: bilateral       Patient Position: supine       Patient Prep/Sterile Barriers: sterile gloves, mask       Skin prep: Chloraprep       Needle Type: short bevel       Needle Gauge: 21.        Needle Length (millimeters): 110        Ultrasound guided       1. Ultrasound was used to identify targeted nerve, plexus, vascular marker, or fascial plane and place a needle adjacent to it in real-time.       2. Ultrasound was used to visualize the spread of anesthetic in close proximity to the above referenced structure.       3. A permanent image is entered into the patient's record.    Assessment/Narrative         The placement was negative for: blood aspirated, painful injection and site bleeding       Paresthesias: No.       Bolus given via needle..        Secured via.        Insertion/Infusion Method: Single Shot       Complications: none       Injection made incrementally with aspirations every 5 mL.    Medication(s) Administered   Bupivacaine 0.25% PF (Infiltration) - Infiltration   60 mL - 4/15/2025 8:00:00 AM  Dexmedetomidine 4 mcg/mL (Perineural) - Perineural   40 mcg - 4/15/2025 8:00:00 AM  Dexamethasone 10 mg/mL PF (Perineural) - Perineural   2 mg - 4/15/2025 8:00:00 AM  Medication Administration  "Time: 4/15/2025 8:00 AM      FOR [facility] ONLY:   Pain Team Contact information: please page the Pain Team Via [employer]. Search \"Pain\". During daytime hours, please page the attending first. At night please page the resident first.      "

## 2025-04-15 NOTE — OR NURSING
"GYN team updated on pt's status. Pt was able to void which she characterized as \"6 trickles.\" Pt rescanned post void for 175ml. Resident was ok with pt discharging if she agreed to return to a facility if unable to void in 6hrs. Pt agreed. Pt discharged to home with .  "

## 2025-04-15 NOTE — ANESTHESIA PROCEDURE NOTES
Airway         Procedure Start/Stop Times: 4/15/2025 8:01 AM  Staff -        Performed By: SRNAIndications and Patient Condition       Indications for airway management: hesham-procedural         Mask difficulty assessment: 1 - vent by mask    Final Airway Details       Final airway type: endotracheal airway       Successful airway: ETT - single  Endotracheal Airway Details        ETT size (mm): 7.0       Cuffed: yes       Successful intubation technique: direct laryngoscopy       DL Blade Type: Cooper 2       Grade View of Cords: 1       Adjucts: stylet       Position: Right       Measured from: lips       Secured at (cm): 22       Bite block used: None    Post intubation assessment        Placement verified by: capnometry, equal breath sounds and chest rise        Number of attempts at approach: 1       Number of other approaches attempted: 0       Secured with: tape       Ease of procedure: easy       Dentition: Unchanged and Intact    Medication(s) Administered   Medication Administration Time: 4/15/2025 8:01 AM

## 2025-04-15 NOTE — ANESTHESIA PREPROCEDURE EVALUATION
Anesthesia Pre-Procedure Evaluation    Patient: Abby Foy   MRN: [id number] : 1980        Procedure : Procedure(s):  HYSTERECTOMY, TOTAL, ROBOT-ASSISTED          Past Medical History:   Diagnosis Date    Angioedema of lips episode in 3/13--idiopathic    Complication of anesthesia     ponv    Contraception 2009    Diagnostic skin and sensitization tests 3/27/13 skin tests all NEGATIVE for environmental and food allergens including shellfish and nuts.     Nonallergic rhinitis     3/27/13 skin tests all NEGATIVE for environmental and food allergens including shellfish and nuts. 3/27/13 followup IgE tests NEG to Peanut, SN[facility], shrimp, macadamia nut, pistachio and walnut.    Rheumatoid arthritis of multiple sites with negative rheumatoid factor (H) 2015      Past Surgical History:   Procedure Laterality Date    BIOPSY  21    Biopsy needed prior to ablation    DILATE CERVIX, HYSTEROSCOPY, ABLATE ENDOMETRIUM HYDROTHERMAL, COMBINED N/A 2020    Procedure: DILATION, CERVIX, WITH HYSTEROSCOPY AND HYDROTHERMAL ENDOMETRIAL ABLATION;  Surgeon: Apryl West MD;  Location: [facility] OR    DILATE CERVIX, HYSTEROSCOPY, ABLATE ENDOMETRIUM HYDROTHERMAL, COMBINED N/A 09/10/2021    Procedure: DILATION, CERVIX, WITH HYSTEROSCOPY AND HYDROTHERMAL ENDOMETRIAL ABLATION;  Surgeon: Apryl West MD;  Location: [facility] OR    ENDOMETRIAL SAMPLING (BIOPSY) N/A 2020    Procedure: BIOPSY, ENDOMETRIUM;  Surgeon: Apryl West MD;  Location: [facility] OR    LAPAROSCOPIC SALPINGECTOMY Bilateral 2017    Procedure: LAPAROSCOPIC SALPINGECTOMY;  Operative Laparoscopy, Bilateral Salpingectomy ;  Surgeon: Apryl West MD;  Location: [facility] OR    OPERATIVE HYSTEROSCOPY WITH MORCELLATOR N/A 2018    Procedure: DIAGNOSTIC HYSTEROSCOPY AND D AND C;  Surgeon: Apryl West MD;  Location: [facility] OR      Allergies   Allergen Reactions    Shrimp Swelling     Lips and tongue    Walnuts [Nuts] Swelling     Lips and  tongue        Social History     Tobacco Use    Smoking status: Never    Smokeless tobacco: Never   Substance Use Topics    Alcohol use: Yes     Comment: 1-3 drinks/ week      Wt Readings from Last 1 Encounters:   04/15/25 49.4 kg (108 lb 14.5 oz)        Anesthesia Evaluation   Pt has had prior anesthetic. Type: MAC, General and Regional.    History of anesthetic complications  - PONV.      ROS/MED HX  ENT/Pulmonary:  - neg pulmonary ROS     Neurologic:  - neg neurologic ROS     Cardiovascular:  - neg cardiovascular ROS     METS/Exercise Tolerance: >4 METS    Hematologic: Comments: Immunosuppressed   (-) anemia   Musculoskeletal: Comment: Psoriatic arthritis  Seronegative spondyloarthropathy  Low back pain  (+)  arthritis,             GI/Hepatic:  - neg GI/hepatic ROS     Renal/Genitourinary: Comment:   # Abnormal uterine bleeding, s/p D&C, Endometrial Biopsies and ablation  -- 4/15/2025: here for Total Robot Assisted Hysterectomy   # S/p Laparoscopic salpingectomy      Endo:  - neg endo ROS     Psychiatric/Substance Use:  - neg psychiatric ROS     Infectious Disease:  - neg infectious disease ROS     Malignancy:  - neg malignancy ROS     Other: Comment: Hx/o Angioedema of lips & Urticaria           Physical Exam    Airway  airway exam normal      Mallampati: I   TM distance: > 3 FB   Neck ROM: full   Mouth opening: > 3 cm    Respiratory Devices and Support         Dental       (+) Completely normal teeth      Cardiovascular   cardiovascular exam normal          Pulmonary   pulmonary exam normal                OUTSIDE LABS:  CBC:   Lab Results   Component Value Date    WBC 6.2 02/07/2025    WBC 5.4 11/01/2024    HGB 12.2 02/07/2025    HGB 11.5 (L) 11/01/2024    HCT 39.1 02/07/2025    HCT 36.5 11/01/2024     02/07/2025     11/01/2024     BMP:   Lab Results   Component Value Date     06/30/2015     02/02/2009    POTASSIUM 4.1 06/30/2015    POTASSIUM 4.1 02/02/2009    CHLORIDE 105 06/30/2015  "   CHLORIDE 103 02/02/2009    CO2 30 06/30/2015    CO2 25 02/02/2009    BUN 9 06/30/2015    BUN 8 02/02/2009    CR 0.75 02/07/2025    CR 0.75 11/01/2024    GLC 71 09/10/2021    GLC 88 12/12/2018     COAGS: No results found for: \"PTT\", \"INR\", \"FIBR\"  POC:   Lab Results   Component Value Date    BGM 97 12/12/2018    HCG Negative 12/12/2018    HCGS Negative 09/10/2021     HEPATIC:   Lab Results   Component Value Date    ALBUMIN 4.7 02/07/2025    PROTTOTAL 7.2 02/07/2025    ALT 15 02/07/2025    AST 23 02/07/2025    ALKPHOS 48 02/07/2025    BILITOTAL 0.3 02/07/2025     OTHER:   Lab Results   Component Value Date    DAISY 9.4 06/30/2015    LIPASE 56 02/02/2009    AMYLASE 49 02/02/2009    TSH 1.38 09/27/2019    CRP <2.9 05/12/2023    SED 8 11/01/2024       Anesthesia Plan    ASA Status:  2    NPO Status:  NPO Appropriate    Anesthesia Type: General.     - Airway: ETT   Induction: Intravenous.   Maintenance: Balanced.        Consents    Anesthesia Plan(s) and associated risks, benefits, and realistic alternatives discussed. Questions answered and patient/representative(s) expressed understanding.     - Discussed:     - Discussed with:  Patient      - Extended Intubation/Ventilatory Support Discussed: No.      - Patient is DNR/DNI Status: No     Use of blood products discussed: Yes.     - Discussed with: Patient.     - Consented: consented to blood products     Postoperative Care    Pain management: Oral pain medications, IV analgesics, Multi-modal analgesia.   PONV prophylaxis: Ondansetron (or other 5HT-3), Dexamethasone or Solumedrol, Aprepitant     Comments:             Estefania Albirght MD    Clinically Significant Risk Factors Present on Admission                                          "

## 2025-04-15 NOTE — PROGRESS NOTES
Dr. Albright came by to assess patient, 1 mg haldol given for nausea (9095). Pain tolerable, bp still soft 80s/50s. To give remaining 500 ml LR as a bolus per Dr. Albright.

## 2025-04-15 NOTE — ANESTHESIA CARE TRANSFER NOTE
Patient: Abby Foy    Procedure: Procedure(s):  HYSTERECTOMY, TOTAL, ROBOT-ASSISTED CYSTOSCOPY       Diagnosis: Abnormal uterine bleeding (AUB) [N93.9]  Diagnosis Additional Information: No value filed.    Anesthesia Type:   General     Note:    Oropharynx: oropharynx clear of all foreign objects, spontaneously breathing and oral airway in place  Level of Consciousness: drowsy  Oxygen Supplementation: face mask  Level of Supplemental Oxygen (L/min / FiO2): 6  Independent Airway: airway patency satisfactory and stable  Dentition: dentition unchanged  Vital Signs Stable: post-procedure vital signs reviewed and stable  Report to RN Given: handoff report given  Patient transferred to: PACU    Handoff Report: Identifed the Patient, Identified the Reponsible Provider, Reviewed the pertinent medical history, Discussed the surgical course, Reviewed Intra-OP anesthesia mangement and issues during anesthesia, Set expectations for post-procedure period and Allowed opportunity for questions and acknowledgement of understanding    Vitals:  CRNA VITALS  4/15/2025 1019 - 4/15/2025 1051        4/15/2025             NIBP: 106/63    Pulse: 56    NIBP Mean: 76    Temp: 36  C (96.8  F)    SpO2: 100 %    Resp Rate (observed): 14             Electronically Signed By: CLARISSA Love CRNA  April 15, 2025  10:51 AM

## 2025-04-15 NOTE — PROVIDER NOTIFICATION
OB team and Dr Sarah contacted about intractable ongoing nausea. Abby previously up to bedside commode with stand by assist; unable to void (bladder scan shows 180 mL) at this time. Ongoing nausea making activity and participation in recovery intolerable. Non pharmacological measures attempted, with fan use, alcohol wipes, aromatherapy, sea bands in place. Scopolamine patch remains in place, with zofran, compazine and haldol previously given. LR boluses given previously as ordered. VSS. Provider teams relay they will come to bedside to assess patient.

## 2025-04-16 ENCOUNTER — PATIENT OUTREACH (OUTPATIENT)
Dept: CARE COORDINATION | Facility: CLINIC | Age: 45
End: 2025-04-16
Payer: COMMERCIAL

## 2025-04-17 ENCOUNTER — TELEPHONE (OUTPATIENT)
Dept: OBGYN | Facility: CLINIC | Age: 45
End: 2025-04-17
Payer: COMMERCIAL

## 2025-04-17 NOTE — TELEPHONE ENCOUNTER
Received Short-term Disability Paperwork    Placed form in Deann's mailbox for Dr. West to review.

## 2025-04-24 ENCOUNTER — TELEPHONE (OUTPATIENT)
Dept: OBGYN | Facility: CLINIC | Age: 45
End: 2025-04-24
Payer: COMMERCIAL

## 2025-04-24 NOTE — TELEPHONE ENCOUNTER
Left voicemail with patient that I was following up after surgery and hoped she was feeling better.  Pathology was benign and she has a post-op scheduled in May.    Apryl West MD

## 2025-05-12 ENCOUNTER — ANCILLARY PROCEDURE (OUTPATIENT)
Dept: GENERAL RADIOLOGY | Facility: CLINIC | Age: 45
End: 2025-05-12
Attending: INTERNAL MEDICINE
Payer: COMMERCIAL

## 2025-05-12 ENCOUNTER — OFFICE VISIT (OUTPATIENT)
Dept: RHEUMATOLOGY | Facility: CLINIC | Age: 45
End: 2025-05-12
Payer: COMMERCIAL

## 2025-05-12 ENCOUNTER — NURSE TRIAGE (OUTPATIENT)
Dept: OBGYN | Facility: CLINIC | Age: 45
End: 2025-05-12

## 2025-05-12 VITALS
HEART RATE: 84 BPM | DIASTOLIC BLOOD PRESSURE: 80 MMHG | WEIGHT: 103.6 LBS | SYSTOLIC BLOOD PRESSURE: 132 MMHG | OXYGEN SATURATION: 99 % | BODY MASS INDEX: 17.69 KG/M2 | HEIGHT: 64 IN

## 2025-05-12 DIAGNOSIS — Z23 NEED FOR VACCINATION: ICD-10-CM

## 2025-05-12 DIAGNOSIS — L40.50 PSORIATIC ARTHRITIS (H): Primary | ICD-10-CM

## 2025-05-12 DIAGNOSIS — Z79.899 HIGH RISK MEDICATION USE: ICD-10-CM

## 2025-05-12 DIAGNOSIS — L40.50 PSORIATIC ARTHRITIS (H): ICD-10-CM

## 2025-05-12 PROCEDURE — 1126F AMNT PAIN NOTED NONE PRSNT: CPT | Performed by: INTERNAL MEDICINE

## 2025-05-12 PROCEDURE — 73130 X-RAY EXAM OF HAND: CPT | Mod: TC | Performed by: RADIOLOGY

## 2025-05-12 PROCEDURE — 3079F DIAST BP 80-89 MM HG: CPT | Performed by: INTERNAL MEDICINE

## 2025-05-12 PROCEDURE — 99207 XR HAND BILATERAL G/E 3 VIEWS: CPT | Performed by: RADIOLOGY

## 2025-05-12 PROCEDURE — 3077F SYST BP >= 140 MM HG: CPT | Performed by: INTERNAL MEDICINE

## 2025-05-12 PROCEDURE — 90677 PCV20 VACCINE IM: CPT | Performed by: INTERNAL MEDICINE

## 2025-05-12 PROCEDURE — 99214 OFFICE O/P EST MOD 30 MIN: CPT | Mod: 25 | Performed by: INTERNAL MEDICINE

## 2025-05-12 PROCEDURE — G2211 COMPLEX E/M VISIT ADD ON: HCPCS | Performed by: INTERNAL MEDICINE

## 2025-05-12 PROCEDURE — 90471 IMMUNIZATION ADMIN: CPT | Performed by: INTERNAL MEDICINE

## 2025-05-12 RX ORDER — APREMILAST 30 MG/1
30 TABLET, FILM COATED ORAL DAILY
Qty: 60 TABLET | Refills: 6 | Status: SHIPPED | OUTPATIENT
Start: 2025-05-12

## 2025-05-12 RX ORDER — LEFLUNOMIDE 10 MG/1
10 TABLET ORAL DAILY
Qty: 90 TABLET | Refills: 2 | Status: SHIPPED | OUTPATIENT
Start: 2025-05-12

## 2025-05-12 ASSESSMENT — PAIN SCALES - GENERAL: PAINLEVEL_OUTOF10: NO PAIN (0)

## 2025-05-12 NOTE — TELEPHONE ENCOUNTER
Patient states that the nausea subsided about 1 1/2 - 2 hours after taking the zofran.  Bleeding is still light, but she is wearing a panty liner.    Denies constipation, last BM was this AM, and was soft.    No vomiting.    Denies any fever or body aches.  Not experiencing any new discomfort outside of her recovery.    She does not do any heavy lifting or strenuous activity at work.  Advised should be OK to return to work unless she feels unwell.

## 2025-05-12 NOTE — TELEPHONE ENCOUNTER
Reason for Disposition    Patient wants to be seen    Bleeding or spotting occurs after hysterectomy    Additional Information    Negative: SEVERE vaginal bleeding (e.g., continuous red blood from vagina, or large blood clots) and very weak (can't stand)    Negative: Passed out (e.g., fainted, lost consciousness, blacked out and was not responding)    Negative: Difficult to awaken or acting confused (e.g., disoriented, slurred speech)    Negative: Shock suspected (e.g., cold/pale/clammy skin, too weak to stand, low BP, rapid pulse)    Negative: Sounds like a life-threatening emergency to the triager    Negative: Pregnant 20 or more weeks (5 months or more)    Negative: Pregnant < 20 weeks (less than 5 months)    Negative: Postpartum (from 0 to 6 weeks after delivery)    Negative: Vaginal discharge is main symptom and bleeding is slight    Negative: SEVERE abdominal pain (e.g., excruciating)    Negative: SEVERE dizziness (e.g., unable to stand, requires support to walk, feels like passing out now)    Negative: SEVERE vaginal bleeding (e.g., soaking 2 pads or tampons per hour and present 2 or more hours; 1 menstrual cup every 2 hours)    Negative: Patient sounds very sick or weak to the triager    Negative: MODERATE vaginal bleeding (i.e., soaking pad or tampon per hour and present > 6 hours; 1 menstrual cup every 6 hours)    Negative: Constant abdominal pain lasting > 2 hours    Negative: Pale skin (pallor) of new-onset or getting worse    Negative: Passed tissue (e.g., gray-white)    Negative: Taking Coumadin (warfarin) or other strong blood thinner, or known bleeding disorder (e.g., thrombocytopenia)    Negative: Skin bruises or nosebleed and not caused by an injury    Negative: Bleeding or spotting after procedure (e.g., biopsy) or pelvic examination (e.g., pap smear) that lasts > 7 days    Negative: Bleeding or spotting between regular periods occurs more than three cycles (3 months), and using birth control  medicine (e.g., pills, patch, Depo-Provera, Implanon, vaginal ring, Mirena IUD)    Negative: Uses menstrual cups and more than 80 ml blood per menstrual period    Negative: Missed period has occurred 2 or more times in the last year and the cause is not known    Negative: Menstrual cycle < 21 days OR > 35 days, and occurs more than two cycles (2 months) this past year    Negative: Periods with > 6 soaked pads or tampons per day    Negative: Periods last > 7 days    Negative: Bleeding or spotting between regular periods occurs more than three cycles (3 months) this past year    Negative: Bleeding or spotting occurs after sex  (Exception: First intercourse.)    Negative: Age > 39 years with irregular or excessive bleeding    Negative: Normal menstrual flow    Negative: MILD bleeding or SPOTTING and pregnancy suspected (e.g., missed last period)    Negative: MILD bleeding or SPOTTING after procedure (e.g., biopsy) or pelvic examination (e.g., pap smear) persisting < 7 days    Negative: MILD bleeding or SPOTTING and immediately follows first intercourse    Negative: Taking birth control pills and hasn't missed taking any pills    Negative: Taking birth control pills and has missed one or more pills; or took a progestin-only pill late    Negative: Using Depo-Provera subdermal implant    Negative: Has Implanon subdermal implant    Negative: Using birth control patch (i.e., transdermal contraceptive system)    Negative: Using vaginal contraceptive ring (i.e., NuvaRing)    Negative: Using hormonal IUD (e.g., Jaydess, Kyleena, Liletta, Mirena, Teresa)    Protocols used: Vaginal Bleeding - [id number]-R-RL

## 2025-05-12 NOTE — TELEPHONE ENCOUNTER
Caller reporting the following red-flag symptom(s): Pt had surgery 4/15/25 a hysterectomy now is having bleeding, nausea, and overall not feeling well. Does states she isn't having any pain.     Per the system red-flag symptom policy, patient was instructed to:  speak with a Registered Nurse    Action:  Patient warm transferred to a Registered Nurse

## 2025-05-12 NOTE — NURSING NOTE
RAPID3 (0-30) Cumulative Score  .5          RAPID3 Weighted Score (divide #4 by 3 and that is the weighted score)  .17    Oly Walters Certified Medical Assistant

## 2025-05-12 NOTE — NURSING NOTE
Blood pressure rechecked after visit    132/80  Maddie Hennessy CMA Rheumatology  5/12/2025

## 2025-05-12 NOTE — PROGRESS NOTES
Rheumatology Clinic Visit      Abby Foy MRN# [id number]   YOB: 1980 Age: 45 year old      Date of visit: 5/12/25   PCP: Sandi Duffy  Dermatologist: Amy Patel  Ophthalmologist: Dr. Mathis     Chief Complaint   Patient presents with:  RECHECK: Psoriatic arthritis. Patient notes she is doing good. No concerns or issues.     Assessment and Plan   1. 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 However, given her continued back pain that improved with activity but no radiographic evidence for inflammatory arthritis or osteoarthritis, there was concern that she had inflammatory back pain that would be more consistent with a spondylarthritis. Humira was started and resulted in improvement of her back pain, suggesting axial disease.  Arthritis improved with methotrexate and sulfasalazine, but she was having headaches and therefore the most likely culprit methotrexate was reduced until her headaches worsened significantly and therefore sulfasalazine and methotrexate were both discontinued. She had resolution of her headaches after discontinuing both sulfasalazine and methotrexate. I believe that the sulfasalazine was the cause of her headaches. Therefore, cautious retrial of methotrexate in the future could be done.  She was doing well on a combination of leflunomide 10 mg daily and Humira 40 mg SQ every 14 days but Humira had to be stopped because of issues with the  program; so Simponi was Rx'd but never started.  Intermittent inflammatory symptom so Otezla was prescribed but but delayed starting until March 2020, it was found be effective but the twice daily dose was resulting in worsening headaches that resolved with a once daily dose. Was on  doing well on a combination of leflunomide 10 mg daily and Otezla daily; previously leflunomide was discontinued in an effort to get to the lowest effective dose but she had return of symptoms primarily affecting the PIPs so it was restarted. Currently on leflunomide 10 mg daily and Otezla 30 mg daily and doing well with regard to inflammatory arthritis.  History of right medial epicondylitis symptoms not present currently.  Also with crush injury to the distal left second finger where she still occasionally gets some pain, and also a Heberden's node on the right fifth finger.  Update x-rays.  Chronic illness, stable.    - Continue leflunomide 10 mg daily  - Continue Otezla 30mg daily  - Continue diclofenac (VOLTAREN) 1 % topical gel; Apply up to 2 grams of 1% gel to hands and right elbow up to 4 times daily as needed for pain (maximum: 8 g per upper extremity per day)    - X-rays today: Bilateral hands  - Labs in 3 months: CBC, Creatinine, Hepatic Panel  - Labs in 6 months: CBC, Creatinine, Hepatic Panel, ESR, CRP    High risk medication requiring intensive toxicity monitoring at least quarterly     # Pregnancy Planning: s/p hysterectomy    2. 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 Interestingly when leflunomide was stopped between 3/2018 and  6/15/2018, she experienced increased L>R eye irritation that when bothering her only affected one eye at a time.   Not an issue at this time.    3.  Osteoarthritis of the hands: Evidenced by Heberden's nodes.  Reviewed the difference between inflammatory and degenerative arthritis.    4.  Vaccinations: Vaccinations reviewed with Ms. Foy.   - Influenza: Advised yearly vaccination  - [id number]: To receive today  - Shingrix: Could consider receiving based on immunosuppression used and the fact that she had shingles at age of 21 years old.  Otherwise would wait until 51 years old.  This was discussed  today.  She plans to check with her insurance regarding coverage  - COVID-19: Advised keeping updated    Total minutes spent in evaluation with patient, documentation, , and review of pertinent studies and chart notes: 16  The longitudinal plan of care for the rheumatology problem(s) were addressed during this visit.  Due to added complexity of care, we will continue to support the patient and the subsequent management of this condition with ongoing continuity of care.     Ms. Foy verbalized agreement with and understanding of the rational for the diagnosis and treatment plan.  All questions were answered to best of my ability and the patient's satisfaction. Ms. Foy was advised to contact the clinic with any questions that may arise after the clinic visit.      Thank you for involving me in the care of the patient    Return to clinic: 6 months    HPI   Abby Foy is a 45 year old female with medical history significant for urticaria, H. pylori infection, and iritis? who returns for f/u of seronegative spondyloarthropathy.    5/15/2023:  intermittent ache at the left second MCP and right third and fourth MCPs that responds well to Voltaren gel, and is typically of short duration and mild severity.  Topical Voltaren gel as needed infrequently because her symptoms are infrequent.  Arthritis is not limiting daily activities.  States like leflunomide and Otezla have been effective for her arthritis.  She states that she is happy with how well she is doing and does not need to change therapy based on her current symptoms.  Morning stiffness for less than 10 minutes.    11/13/2023: For no more than 1 hour in the morning, twice weekly, she has pain at the medial right medial epicondyles without swelling, increased warmth, or overlying erythema that resolved spontaneously.  She also notices that she will get a red area for a couple days just distal to the olecranon process that resolved  spontaneously without any hyperpigmentation or hypopigmentation afterward; not present today.  Other joints are doing well.  Morning stiffness for no more than 10 minutes.  No joint swelling.    5/13/2024: Possibly seasonal allergies with eye itching and sometimes swelling in the morning.  No eye pain.  Joints are doing well.  No joint pain or swelling.  Morning stiffness for no more than 10 minutes.  Arthritis does not limit daily activities.    11/11/2024: Crush injury to the distal left second finger that is still healing and she suspects that likely due to this trauma she is having pain at the left second MCP/PIP/DIP.  She is working with hand therapy and slowly improving range of motion of the finger but still having difficulty flexing at the left second DIP.  He continues on leflunomide and Otezla; this combination is working well for the inflammatory arthritis.    Today, 5/12/2025: Right fifth DIP pain and welling ever since and exhilarating game of Taco-Cat-Goat-Cheese-Pizza (card game where people are quickly putting cards on a deck and may run the hands into each other).  Right fifth DIP with Heberden's nodes and tender when bumped but pain at that joint is short-lived.  Also occasional pain at the right second DIP where she had the history of crush injury; no swelling or increased warmth at that location.  No other joint pain or swelling.  No missed doses of leflunomide or Otezla.  Not using ibuprofen or Tylenol currently but she did use it after her hysterectomy and during that time she had no joint pain.    Denies fevers, chills, nausea, vomiting, constipation, diarrhea. No abdominal pain.  No black or bloody stools.  No chest pain/pressure, palpitations, or shortness of breath. No neck pain. Occasional cold sores. No eye pain or redness    Tobacco: None  EtOH: 1-2 drinks on the weekends  Drugs: None  Occupation: Dietitian at the [employer]    ROS   12 point review of systems completed and  negative except as noted in the HPI    Active Problem List     Patient Active Problem List   Diagnosis    Urticaria    Nonallergic rhinitis    Angioedema of lips    GPC (giant papillary conjunctivitis)    Seronegative spondyloarthropathy    Midline low back pain without sciatica    Abnormal uterine bleeding (AUB)- on OCPs    Psoriatic arthritis (H)     Past Medical History     Past Medical History:   Diagnosis Date    Angioedema of lips episode in 3/13--idiopathic    Complication of anesthesia     ponv    Contraception 1/28/2009    Diagnostic skin and sensitization tests 3/27/13 skin tests all NEGATIVE for environmental and food allergens including shellfish and nuts.     Nonallergic rhinitis     3/27/13 skin tests all NEGATIVE for environmental and food allergens including shellfish and nuts. 3/27/13 followup IgE tests NEG to Peanut, [facility], shrimp, macadamia nut, pistachio and walnut.    Rheumatoid arthritis of multiple sites with negative rheumatoid factor (H) 12/31/2015     Past Surgical History     Past Surgical History:   Procedure Laterality Date    BIOPSY  8/27/21    Biopsy needed prior to ablation    DAVINCI HYSTERECTOMY TOTAL N/A 4/15/2025    Procedure: HYSTERECTOMY, TOTAL, ROBOT-ASSISTED CYSTOSCOPY;  Surgeon: Apryl West MD;  Location: [facility]    DILATE CERVIX, HYSTEROSCOPY, ABLATE ENDOMETRIUM HYDROTHERMAL, COMBINED N/A 07/24/2020    Procedure: DILATION, CERVIX, WITH HYSTEROSCOPY AND HYDROTHERMAL ENDOMETRIAL ABLATION;  Surgeon: Apryl West MD;  Location: [facility]    DILATE CERVIX, HYSTEROSCOPY, ABLATE ENDOMETRIUM HYDROTHERMAL, COMBINED N/A 09/10/2021    Procedure: DILATION, CERVIX, WITH HYSTEROSCOPY AND HYDROTHERMAL ENDOMETRIAL ABLATION;  Surgeon: Apryl West MD;  Location: [facility]    ENDOMETRIAL SAMPLING (BIOPSY) N/A 07/24/2020    Procedure: BIOPSY, ENDOMETRIUM;  Surgeon: Apryl West MD;  Location: [facility]    LAPAROSCOPIC SALPINGECTOMY Bilateral 06/23/2017    Procedure: LAPAROSCOPIC  SALPINGECTOMY;  Operative Laparoscopy, Bilateral Salpingectomy ;  Surgeon: Apryl West MD;  Location: [facility]    OPERATIVE HYSTEROSCOPY WITH MORCELLATOR N/A 12/12/2018    Procedure: DIAGNOSTIC HYSTEROSCOPY AND D AND C;  Surgeon: Apryl West MD;  Location: [facility]        Allergy     Allergies   Allergen Reactions    Shrimp Swelling     Lips and tongue    Walnuts [Nuts] Swelling     Lips and tongue       Current Medication List     Current Outpatient Medications   Medication Sig Dispense Refill    apremilast (OTEZLA) 30 MG tablet Take 1 tablet (30 mg) by mouth daily. 60 tablet 6    diclofenac (VOLTAREN) 1 % topical gel Apply up to 2 grams of 1% gel to hands up to 4 times daily as needed for joint pain (maximum: 8 g per upper extremity per day) 200 g 2    leflunomide (ARAVA) 10 MG tablet Take 1 tablet (10 mg) by mouth daily. 90 tablet 2    multivitamin peds with iron (FLINTSTONES COMPLETE) 60 MG chewable tablet Take 1 chew tab by mouth daily.      ondansetron (ZOFRAN ODT) 4 MG ODT tab Take 1 tablet (4 mg) by mouth every 8 hours as needed for nausea. 4 tablet 0     No current facility-administered medications for this visit.     Social History   See HPI    Family History     Family History   Problem Relation Age of Onset    Hypertension Maternal Grandmother     Autoimmune Disease Maternal Grandmother         Autoimmune hepatitis    Cancer Maternal Grandfather     Hypertension Paternal Grandmother     Cancer - colorectal Paternal Grandfather     Cardiovascular Paternal Grandfather     Other Cancer Paternal Grandfather     Cancer Paternal Grandfather     Hypertension Mother     Autoimmune Disease Mother         Autoimmune hepatitis    Hyperlipidemia Mother     Asthma Mother     Hypertension Father     Diabetes Father         Type 2    Prostate Cancer Father     Multiple Sclerosis Maternal Aunt     Other Cancer Other         Lymphoma    Cancer Other         Lymphoma    Cerebrovascular Disease No family hx of      "Thyroid Disease No family hx of     Glaucoma No family hx of     Macular Degeneration No family hx of     Breast Cancer No family hx of     Colon Cancer No family hx of      Physical Exam     Temp Readings from Last 3 Encounters:   04/15/25 98  F (36.7  C) (Oral)   03/28/25 97.8  F (36.6  C) (Temporal)   09/10/21 97.7  F (36.5  C) (Axillary)     BP Readings from Last 5 Encounters:   05/12/25 (!) 143/84   04/15/25 105/71   03/28/25 108/60   02/27/25 129/86   11/11/24 125/82     Pulse Readings from Last 1 Encounters:   05/12/25 84     Resp Readings from Last 1 Encounters:   04/15/25 16     Estimated body mass index is 17.78 kg/m  as calculated from the following:    Height as of this encounter: 1.626 m (5' 4\").    Weight as of this encounter: 47 kg (103 lb 9.6 oz).    GEN: NAD.   HEENT: Anicteric, noninjected sclera. No obvious external lesions of the ear and nose. Hearing intact.  CV: S1, S2. RRR. No m/r/g  PULM: No increased work of breathing.   MSK: MCPs and PIPs without swelling or tenderness to palpation.  Heberden's node and tenderness without increased warmth or overlying erythema of the right fifth DIP.  Other DIPs without swelling or tenderness to palpation.  Wrists without swelling or tenderness to palpation.  Elbows without swelling or tenderness to palpation.  Shoulders without swelling or tenderness to palpation. Knees and ankles without swelling or tenderness to palpation.  Negative MTP squeeze.    SKIN: No rash or jaundice seen  PSYCH: Alert. Appropriate.      Labs / Imaging (select studies)       CBC  Recent Labs   Lab Test 05/02/25  0859 02/07/25  0902 11/01/24  1307   WBC 4.7 6.2 5.4   RBC 4.46 4.66 4.30   HGB 12.0 12.2 11.5*   HCT 37.4 39.1 36.5   MCV 84 84 85   RDW 11.9 12.2 12.0    209 210   MCH 26.9 26.2* 26.7   MCHC 32.1 31.2* 31.5   NEUTROPHIL 57 74 59   LYMPH 31 18 32   MONOCYTE 8 7 7   EOSINOPHIL 3 1 1   BASOPHIL 1 0 1   ANEU 2.7 4.5 3.2   ALYM 1.5 1.1 1.7   ANA MARÍA 0.4 0.4 0.4   AEOS 0.1 " 0.1 0.1   ABAS 0.1 0.0 0.0     CMP  Recent Labs   Lab Test 05/02/25  0859 02/07/25  0902 11/01/24  1307 11/01/21  1445 09/10/21  1202 07/02/21  0922 01/12/21  1437 07/20/20  1117 02/21/19  1427 12/12/18  1047   GLC  --   --   --   --  71  --   --   --   --  88   CR 0.78 0.75 0.75   < >  --  0.72 0.76 0.82   < >  --    GFRESTIMATED >90 >90 >90   < >  --  >90 >90 90   < >  --    GFRESTBLACK  --   --   --   --   --  >90 >90 >90   < >  --    BILITOTAL 0.3 0.3 0.3   < >  --  0.5 0.4 0.3   < >  --    ALBUMIN 4.5 4.7 4.6   < >  --  4.2 4.4 3.6   < >  --    PROTTOTAL 7.0 7.2 7.1   < >  --  7.6 7.7 7.4   < >  --    ALKPHOS 57 48 51   < >  --  59 60 52   < >  --    AST 19 23 23   < >  --  12 15 20   < >  --    ALT 11 15 16   < >  --  20 20 22   < >  --     < > = values in this interval not displayed.     ESR/CRP  Recent Labs   Lab Test 05/02/25  0859 11/01/24  1307 05/10/24  0909 11/10/23  0908 05/12/23  0906 11/14/22  1520 05/11/22  1524   SED 8 8 7   < > 21* 8 6   CRP  --   --   --   --  <2.9 <2.9 <2.9   CRPI <3.00 <3.00 <3.00   < >  --   --   --      Hepatitis B  Recent Labs   Lab Test 11/01/24  1307 03/20/17  1506   HBCAB Nonreactive Nonreactive   HEPBANG Nonreactive  --      Hepatitis C  Recent Labs   Lab Test 11/01/24  1307   HCVAB Nonreactive     Tuberculosis Screening  Recent Labs   Lab Test 11/01/24  1307 05/11/22  1524 03/20/17  1508   TBRES Negative Negative  --    TBRSLT  --   --  Negative   TBAGN  --   --  0.00     Immunization History     Immunization History   Administered Date(s) Administered    COVID-19 12+ (MODERNA) 10/16/2024    COVID-19 MONOVALENT 12+ (Pfizer) 01/15/2021, 02/04/2021, 11/19/2021    Influenza (IIV3) PF 10/12/2011, 09/14/2012, 10/03/2013    Influenza (prior to 2024) 10/12/2011    Influenza Vaccine >6 months,quad, PF 10/10/2020, 11/05/2022, 09/25/2023    Influenza Vaccine, 6+MO IM (QUADRIVALENT W/PRESERVATIVES) 10/01/2015    Influenza, Split Virus, Trivalent, Pf (Fluzone\Fluarix) 09/09/2024     Pneumo Conj 13-V (2010&after) 09/26/2016    Pneumococcal 23 valent 12/22/2016    TD,PF 7+ (Tenivac) 08/15/2001    TDAP (Adacel,Boostrix) 05/13/2024    TDAP Vaccine (Adacel) 06/20/2011    TDAP Vaccine (Boostrix) 03/06/2014          Chart documentation done in part with Dragon Voice recognition Software. Although reviewed after completion, some word and grammatical error may remain.    Hunter Monroy MD

## 2025-05-12 NOTE — TELEPHONE ENCOUNTER
Patient reports light, pink-tinged vaginal bleeding and nausea.     She is S/P hysterectomy on 4/15 with Dr. West.     Patient reports bleeding started 5/11 night and continues into today. Reports the bleeding presented when she wiped after going to the bathroom. Bleeding is now a brown color and requiring her to wear a pantyliner.     Denies abnormal discharge, odor, pain and fever. Endorses nausea that required use of Zofran yesterday and today. States the Zofran is not helping her nausea today. Denies respiratory, common cold signs/symptoms.     Per protocol, patient should be seen within 2 weeks in clinic. Scheduled her 5/15 at 0900 with Dr. West. Patient is due to return to work tomorrow and vocalizes nervousness about getting time off right away after returning. Will consult with clinic float provider, Dr. Rios, to inquire if anything else needs to be done.

## 2025-05-14 ENCOUNTER — RESULTS FOLLOW-UP (OUTPATIENT)
Dept: RHEUMATOLOGY | Facility: CLINIC | Age: 45
End: 2025-05-14

## 2025-05-15 ENCOUNTER — OFFICE VISIT (OUTPATIENT)
Dept: OBGYN | Facility: CLINIC | Age: 45
End: 2025-05-15
Attending: OBSTETRICS & GYNECOLOGY
Payer: COMMERCIAL

## 2025-05-15 VITALS
SYSTOLIC BLOOD PRESSURE: 131 MMHG | HEART RATE: 85 BPM | WEIGHT: 104 LBS | BODY MASS INDEX: 17.85 KG/M2 | DIASTOLIC BLOOD PRESSURE: 81 MMHG

## 2025-05-15 DIAGNOSIS — Z90.710 STATUS POST LAPAROSCOPIC HYSTERECTOMY: Primary | ICD-10-CM

## 2025-05-15 PROCEDURE — 99213 OFFICE O/P EST LOW 20 MIN: CPT | Performed by: OBSTETRICS & GYNECOLOGY

## 2025-05-15 NOTE — LETTER
5/15/2025    Abby Foy   1980        To Whom it May Concern;    Please excuse Abby Foy from work for complications of recovery from surgery.  Due to this her post-op leave was extended through May 13, 2025.  She was clear to restart work on May 14, 2025.    Sincerely,        Apryl West MD

## 2025-05-15 NOTE — PROGRESS NOTES
Chief Complaint   Patient presents with    Surgical Followup     Total Hysterectomy 4/15/2025    Cecilia Steward LPN

## 2025-05-15 NOTE — PROGRESS NOTES
Patient is now 1 month s/p RA-TLH/BS with recent new vaginal bleeding and nausea.  Had been feeling well, getting ready to return to work when on Sunday (4 days ago) felt nausea which responded to zofran quickly.  Then on Monday, again had nausea which was slower to respond to medication and noted new return of vaginal spotting.  She had bled for a few days after surgery, but then that had stopped.  This bleeding was new after about 3 weeks and was enough to wear a panty liner and note dark blood on the pad during the day.  She denies fever, chills, abdominal pain or constipation/changes in bowel movement.  Otherwise pain has been manageable with tylenol and ibuprofen.  She does feels some pelvic cramping and pressure in the pelvis.  No issues with voiding.    Past Medical History:   Diagnosis Date    Angioedema of lips episode in 3/13--idiopathic    Complication of anesthesia     ponv    Contraception 1/28/2009    Diagnostic skin and sensitization tests 3/27/13 skin tests all NEGATIVE for environmental and food allergens including shellfish and nuts.     Nonallergic rhinitis     3/27/13 skin tests all NEGATIVE for environmental and food allergens including shellfish and nuts. 3/27/13 followup IgE tests NEG to [facility], SNF, shrimp, macadamia nut, pistachio and walnut.    Rheumatoid arthritis of multiple sites with negative rheumatoid factor (H) 12/31/2015     Past Surgical History:   Procedure Laterality Date    BIOPSY  8/27/21    Biopsy needed prior to ablation    DAVINCI HYSTERECTOMY TOTAL N/A 4/15/2025    Procedure: HYSTERECTOMY, TOTAL, ROBOT-ASSISTED CYSTOSCOPY;  Surgeon: Apryl West MD;  [facility]    DILATE CERVIX, HYSTEROSCOPY, ABLATE ENDOMETRIUM HYDROTHERMAL, COMBINED N/A 07/24/2020    Procedure: DILATION, CERVIX, WITH HYSTEROSCOPY AND HYDROTHERMAL ENDOMETRIAL ABLATION;  Surgeon: Apryl West MD;  [facility]    DILATE CERVIX, HYSTEROSCOPY, ABLATE ENDOMETRIUM HYDROTHERMAL, COMBINED N/A  09/10/2021    Procedure: DILATION, CERVIX, WITH HYSTEROSCOPY AND HYDROTHERMAL ENDOMETRIAL ABLATION;  Surgeon: Apryl West MD;  [facility]    ENDOMETRIAL SAMPLING (BIOPSY) N/A 07/24/2020    Procedure: BIOPSY, ENDOMETRIUM;  Surgeon: Apryl West MD;  [facility]    LAPAROSCOPIC SALPINGECTOMY Bilateral 06/23/2017    Procedure: LAPAROSCOPIC SALPINGECTOMY;  Operative Laparoscopy, Bilateral Salpingectomy ;  Surgeon: Apryl West MD;  [facility]    OPERATIVE HYSTEROSCOPY WITH MORCELLATOR N/A 12/12/2018    Procedure: DIAGNOSTIC HYSTEROSCOPY AND D AND C;  Surgeon: Apryl West MD;  [facility]     Physical exam:  /81   Pulse 85   Wt 47.2 kg (104 lb)   LMP 03/24/2025 (Approximate)   BMI 17.85 kg/m    Gen'l: appears well  Abd: soft, NT, ND, left upper quadrant incision site the skin is , reinforced with steri strip.  Vulva: normal, no lesions  Urethra: normal  Vagina: pink, normal rugae, phsyiologic discharge present, no blood visualized or noted on swab.  No granulation tissue present.  Cuff appears intact with suture visible. Palpates intact and is non-tender.  Cervix: surgically absent   Uterus: surgically absent   Adnexa: no palpable masses, NT  Rectum: Anus normal, no rectovaginal exam done      A/P 44 yo 4 weeks s/p RA-TLH/BS with vaginal bleeding, no evidence of cuff disruption or active bleeding, no granulation tissue, likely related to passage of clot between stitches at vaginal cuff related to healing after hysterectomy.    - reassured patient that she appears to be healing well and the bleeding she described can be normal.  - continue pelvic rest to 8 weeks post-op  - unclear relation of nausea to bleeding, discussed history is not c/w bowel complication after surgery and to just watch.  - RTC for final post-op visit as scheduled.    erythromycin

## 2025-05-15 NOTE — PATIENT INSTRUCTIONS
Thank you for trusting us with your care!   Please be aware, if you are on Mychart, you may see your results prior to your providers review. If labs are abnormal, we will call or message you on [employer] with a follow up plan.    If you need to contact us for questions about:  Symptoms, Scheduling & Medical Questions; Non-urgent (2-3 day response) [employer] message, Urgent (needing response today) [phone] (if after 3:30pm next day response)   Prescriptions: Please call your Pharmacy   Billing: Ritu [phone] or [facility]:[phone]

## 2025-05-15 NOTE — LETTER
5/15/2025       RE: Abby [place]  [address] [facility] [place] [address]     Dear Colleague,    Thank you for referring your patient, Abby Foy, to the [facility] [place] at [facility]. Please see a copy of my visit note below.    Chief Complaint   Patient presents with     Surgical Followup     Total Hysterectomy 4/15/2025    Cecilia Steward LPN     Patient is now 1 month s/p RA-TLH/BS with recent new vaginal bleeding and nausea.  Had been feeling well, getting ready to return to work when on Sunday (4 days ago) felt nausea which responded to zofran quickly.  Then on Monday, again had nausea which was slower to respond to medication and noted new return of vaginal spotting.  She had bled for a few days after surgery, but then that had stopped.  This bleeding was new after about 3 weeks and was enough to wear a panty liner and note dark blood on the pad during the day.  She denies fever, chills, abdominal pain or constipation/changes in bowel movement.  Otherwise pain has been manageable with tylenol and ibuprofen.  She does feels some pelvic cramping and pressure in the pelvis.  No issues with voiding.    Past Medical History:   Diagnosis Date     Angioedema of lips episode in 3/13--idiopathic     Complication of anesthesia     ponv     Contraception 1/28/2009     Diagnostic skin and sensitization tests 3/27/13 skin tests all NEGATIVE for environmental and food allergens including shellfish and nuts.      Nonallergic rhinitis     3/27/13 skin tests all NEGATIVE for environmental and food allergens including shellfish and nuts. 3/27/13 followup IgE tests NEG to [facility], [facility], shrimp, macadamia nut, pistachio and walnut.     Rheumatoid arthritis of multiple sites with negative rheumatoid factor (H) 12/31/2015     Past Surgical History:   Procedure Laterality Date     BIOPSY  8/27/21    Biopsy needed prior to ablation     NELLIE  HYSTERECTOMY TOTAL N/A 4/15/2025    Procedure: HYSTERECTOMY, TOTAL, ROBOT-ASSISTED CYSTOSCOPY;  Surgeon: Apryl West MD;  [facility]     DILATE CERVIX, HYSTEROSCOPY, ABLATE ENDOMETRIUM HYDROTHERMAL, COMBINED N/A 07/24/2020    Procedure: DILATION, CERVIX, WITH HYSTEROSCOPY AND HYDROTHERMAL ENDOMETRIAL ABLATION;  Surgeon: Apryl West MD;  [facility]     DILATE CERVIX, HYSTEROSCOPY, ABLATE ENDOMETRIUM HYDROTHERMAL, COMBINED N/A 09/10/2021    Procedure: DILATION, CERVIX, WITH HYSTEROSCOPY AND HYDROTHERMAL ENDOMETRIAL ABLATION;  Surgeon: Apryl West MD;  [facility]     ENDOMETRIAL SAMPLING (BIOPSY) N/A 07/24/2020    Procedure: BIOPSY, ENDOMETRIUM;  Surgeon: Apryl West MD;  [facility]     LAPAROSCOPIC SALPINGECTOMY Bilateral 06/23/2017    Procedure: LAPAROSCOPIC SALPINGECTOMY;  Operative Laparoscopy, Bilateral Salpingectomy ;  Surgeon: Apryl West MD;  [facility]     OPERATIVE HYSTEROSCOPY WITH MORCELLATOR N/A 12/12/2018    Procedure: DIAGNOSTIC HYSTEROSCOPY AND D AND C;  Surgeon: Apryl West MD;  [facility]     Physical exam:  /81   Pulse 85   Wt 47.2 kg (104 lb)   LMP 03/24/2025 (Approximate)   BMI 17.85 kg/m    Gen'l: appears well  Abd: soft, NT, ND, left upper quadrant incision site the skin is , reinforced with steri strip.  Vulva: normal, no lesions  Urethra: normal  Vagina: pink, normal rugae, phsyiologic discharge present, no blood visualized or noted on swab.  No granulation tissue present.  Cuff appears intact with suture visible. Palpates intact and is non-tender.  Cervix: surgically absent   Uterus: surgically absent   Adnexa: no palpable masses, NT  Rectum: Anus normal, no rectovaginal exam done      A/P 44 yo 4 weeks s/p RA-TLH/BS with vaginal bleeding, no evidence of cuff disruption or active bleeding, no granulation tissue, likely related to passage of clot between stitches at vaginal cuff related to healing after  hysterectomy.    - reassured patient that she appears to be healing well and the bleeding she described can be normal.  - continue pelvic rest to 8 weeks post-op  - unclear relation of nausea to bleeding, discussed history is not c/w bowel complication after surgery and to just watch.  - RTC for final post-op visit as scheduled.    erythromycin     Again, thank you for allowing me to participate in the care of your patient.      Sincerely,    Apryl West MD

## 2025-05-29 ENCOUNTER — OFFICE VISIT (OUTPATIENT)
Dept: OBGYN | Facility: CLINIC | Age: 45
End: 2025-05-29
Attending: OBSTETRICS & GYNECOLOGY
Payer: COMMERCIAL

## 2025-05-29 VITALS
HEART RATE: 76 BPM | SYSTOLIC BLOOD PRESSURE: 127 MMHG | HEIGHT: 64 IN | DIASTOLIC BLOOD PRESSURE: 78 MMHG | BODY MASS INDEX: 17.85 KG/M2

## 2025-05-29 DIAGNOSIS — Z90.710 STATUS POST LAPAROSCOPIC HYSTERECTOMY: Primary | ICD-10-CM

## 2025-05-29 DIAGNOSIS — L92.9 GRANULATION TISSUE: ICD-10-CM

## 2025-05-29 PROCEDURE — 99213 OFFICE O/P EST LOW 20 MIN: CPT | Performed by: OBSTETRICS & GYNECOLOGY

## 2025-05-29 NOTE — PROGRESS NOTES
Patient returns for 6 week post op check.  Overall still improving.  Has had no vaginal bleeding since last visit 2 weeks ago.  She still has some pelvic cramping and notes that she continues to have some oozing from the LUQ incision site.  Oozed onto her clothes overnight.  No increase in pain or fever.    Past Medical History:   Diagnosis Date    Angioedema of lips episode in 3/13--idiopathic    Complication of anesthesia     ponv    Contraception 1/28/2009    Diagnostic skin and sensitization tests 3/27/13 skin tests all NEGATIVE for environmental and food allergens including shellfish and nuts.     Nonallergic rhinitis     3/27/13 skin tests all NEGATIVE for environmental and food allergens including shellfish and nuts. 3/27/13 followup IgE tests NEG to [facility], SNF, shrimp, macadamia nut, pistachio and walnut.    Rheumatoid arthritis of multiple sites with negative rheumatoid factor (H) 12/31/2015     Past Surgical History:   Procedure Laterality Date    BIOPSY  8/27/21    Biopsy needed prior to ablation    DAVINCI HYSTERECTOMY TOTAL N/A 4/15/2025    Procedure: HYSTERECTOMY, TOTAL, ROBOT-ASSISTED CYSTOSCOPY;  Surgeon: Apryl West MD;  [facility]    DILATE CERVIX, HYSTEROSCOPY, ABLATE ENDOMETRIUM HYDROTHERMAL, COMBINED N/A 07/24/2020    Procedure: DILATION, CERVIX, WITH HYSTEROSCOPY AND HYDROTHERMAL ENDOMETRIAL ABLATION;  Surgeon: Arpyl West MD;  [facility]    DILATE CERVIX, HYSTEROSCOPY, ABLATE ENDOMETRIUM HYDROTHERMAL, COMBINED N/A 09/10/2021    Procedure: DILATION, CERVIX, WITH HYSTEROSCOPY AND HYDROTHERMAL ENDOMETRIAL ABLATION;  Surgeon: Apryl West MD;  [facility]    ENDOMETRIAL SAMPLING (BIOPSY) N/A 07/24/2020    Procedure: BIOPSY, ENDOMETRIUM;  Surgeon: Apryl West MD;  [facility]    LAPAROSCOPIC SALPINGECTOMY Bilateral 06/23/2017    Procedure: LAPAROSCOPIC SALPINGECTOMY;  Operative Laparoscopy, Bilateral Salpingectomy ;  Surgeon: Apryl West MD;   "[facility]    OPERATIVE HYSTEROSCOPY WITH MORCELLATOR N/A 12/12/2018    Procedure: DIAGNOSTIC HYSTEROSCOPY AND D AND C;  Surgeon: Apryl West MD;  [facility]     Physical exam:   /78   Pulse 76   Ht 1.626 m (5' 4\")   LMP 03/24/2025 (Approximate)   BMI 17.85 kg/m    GEn'l: appears well  Abd: LUQ incision is slightly gaping, not deep (~1-2mm) with appearance of granulation tissue at the base.  No surrounding erythema or induration.  Remainder of incisions are well healed.  No abdominal tenderness or distension    Path: benign    A/P 46 yo P2 6 week s/p RA-TLH overall doing well, delayed healing at LUQ site    - reviewed ok to return to normal lifting, recommend wait 1 more week for intercourse  - LUQ treated with silver nitrate, will call if needs another treatment next week (OK to add on to any schedule)  - Return to clinic for routine health care.    Apryl West MD   "

## 2025-05-29 NOTE — PATIENT INSTRUCTIONS
Thank you for trusting us with your care!   Please be aware, if you are on Mychart, you may see your results prior to your providers review. If labs are abnormal, we will call or message you on [employer] with a follow up plan.    If you need to contact us for questions about:  Symptoms, Scheduling & Medical Questions; Non-urgent (2-3 day response) [employer] message, Urgent (needing response today) [phone] (if after 3:30pm next day response)   Prescriptions: Please call your Pharmacy   Billing: Ritu [phone] or [facility]:[phone]

## 2025-05-29 NOTE — LETTER
5/29/2025       RE: Abby Foy  [address] [place] [address]     Dear Colleague,    Thank you for referring your patient, Abby Foy, to the [facility] [place] at [facility]. Please see a copy of my visit note below.    Patient returns for 6 week post op check.  Overall still improving.  Has had no vaginal bleeding since last visit 2 weeks ago.  She still has some pelvic cramping and notes that she continues to have some oozing from the LUQ incision site.  Oozed onto her clothes overnight.  No increase in pain or fever.    Past Medical History:   Diagnosis Date     Angioedema of lips episode in 3/13--idiopathic     Complication of anesthesia     ponv     Contraception 1/28/2009     Diagnostic skin and sensitization tests 3/27/13 skin tests all NEGATIVE for environmental and food allergens including shellfish and nuts.      Nonallergic rhinitis     3/27/13 skin tests all NEGATIVE for environmental and food allergens including shellfish and nuts. 3/27/13 followup IgE tests NEG to [facility], SNF, shrimp, macadamia nut, pistachio and walnut.     Rheumatoid arthritis of multiple sites with negative rheumatoid factor (H) 12/31/2015     Past Surgical History:   Procedure Laterality Date     BIOPSY  8/27/21    Biopsy needed prior to ablation     DAVINCI HYSTERECTOMY TOTAL N/A 4/15/2025    Procedure: HYSTERECTOMY, TOTAL, ROBOT-ASSISTED CYSTOSCOPY;  Surgeon: Apryl West MD;  [facility]     DILATE CERVIX, HYSTEROSCOPY, ABLATE ENDOMETRIUM HYDROTHERMAL, COMBINED N/A 07/24/2020    Procedure: DILATION, CERVIX, WITH HYSTEROSCOPY AND HYDROTHERMAL ENDOMETRIAL ABLATION;  Surgeon: Apryl West MD;  [facility]     DILATE CERVIX, HYSTEROSCOPY, ABLATE ENDOMETRIUM HYDROTHERMAL, COMBINED N/A 09/10/2021    Procedure: DILATION, CERVIX, WITH HYSTEROSCOPY AND HYDROTHERMAL ENDOMETRIAL ABLATION;  Surgeon: Apryl West MD;   "[facility]     ENDOMETRIAL SAMPLING (BIOPSY) N/A 07/24/2020    Procedure: BIOPSY, ENDOMETRIUM;  Surgeon: Apryl West MD;  [facility]     LAPAROSCOPIC SALPINGECTOMY Bilateral 06/23/2017    Procedure: LAPAROSCOPIC SALPINGECTOMY;  Operative Laparoscopy, Bilateral Salpingectomy ;  Surgeon: Apryl West MD;  [facility]     OPERATIVE HYSTEROSCOPY WITH MORCELLATOR N/A 12/12/2018    Procedure: DIAGNOSTIC HYSTEROSCOPY AND D AND C;  Surgeon: Apryl West MD;  [facility]     Physical exam:   /78   Pulse 76   Ht 1.626 m (5' 4\")   LMP 03/24/2025 (Approximate)   BMI 17.85 kg/m    GEn'l: appears well  Abd: LUQ incision is slightly gaping, not deep (~1-2mm) with appearance of granulation tissue at the base.  No surrounding erythema or induration.  Remainder of incisions are well healed.  No abdominal tenderness or distension    Path: benign    A/P 46 yo P2 6 week s/p RA-TLH overall doing well, delayed healing at LUQ site    - reviewed ok to return to normal lifting, recommend wait 1 more week for intercourse  - LUQ treated with silver nitrate, will call if needs another treatment next week (OK to add on to any schedule)  - Return to clinic for routine health care.    Apryl West MD     Again, thank you for allowing me to participate in the care of your patient.      Sincerely,    Apryl West MD    "

## 2025-07-08 NOTE — LETTER
10/26/2024      Abby Foy  [address]   [place] [address]      Dear Colleague,    Thank you for referring your patient, Abby Foy, to the [facility] [place]. Please see a copy of my visit note below.    ASSESSMENT & PLAN    Abby was seen today for injury.    Diagnoses and all orders for this visit:    Crushing injury of left index finger, initial encounter  -     XR Finger LT G/E 2 vw; Future  -     MR Finger Left w/o Contrast; Future    Finger joint swelling, left  -     MR Finger Left w/o Contrast; Future    Other injury of flexor muscle, fascia and tendon of left little finger at wrist and hand level, initial encounter    Laceration of hand involving extensor tendon, left, initial encounter      This issue is acute and Unchanged.    I will contact her with MRI results  Injury 2 mo ago, crush injury, given alumafoam splint which she used consistently for 6 weeks after seeing urgent care  Pain, swelling have not improved, loss of DIP flexion and extension actively on exam, concerning for tendon rupture  Oral Tylenol and topical Voltaren gel reviewed as safe OTC options, reviewed safe dosing strategies  XR images independently visualized and reviewed with patient today in clinic  No focal bony pathology noted  MRI ordered today for better diagnostic clarity, concern for tendon function at the level of the DIP joint, XR reassuring for fracture, tendons not shown on XR and she has significant pain, swelling and loss of function currently  Consider OT if MRI reassuring, if tendon rupture is present will send her to ortho hand specialist  Oval-8 splint provided today for comfort and support  Home handouts provided and supportive care reviewed  All questions were answered today  Contact us with additional questions or concerns  Signs and sx of concern reviewed      Ryan Page DO, CAMAYUR  Sports Medicine Physician  [facility]  Hpi Title: Evaluation of Skin Lesions "Medicine    -----  Chief Complaint   Patient presents with     Left Index Finger - Injury       SUBJECTIVE  Abby Foy is a/an 44 year old female who is seen as a WALK IN patient for evaluation of left index finger.     The patient is seen by themselves.  The patient is Right handed    Onset: 1.5 month(s) ago. Patient describes injury as getting the finger slammed in a car door  Location of Pain: left index finger, DIP  Worsened by: Flexion, sensation/tenderness  Better with:   Treatments tried: Finger splint (used for 6 weeks, ice, elevation  Associated symptoms: swelling, bruising, burning/itchiness flexion of the DIP    Orthopedic/Surgical history: NO  [facility] visit with [facility] on 9/7/24  Social History/Occupation: Dietician       REVIEW OF SYSTEMS:  Review of Systems    OBJECTIVE:  Ht 1.626 m (5' 4\")   Wt 46.7 kg (103 lb)   BMI 17.68 kg/m     General: healthy, alert and in no distress  Skin: no suspicious lesions or rash.  CV: distal perfusion intact   Resp: normal respiratory effort without conversational dyspnea   Psych: normal mood and affect  Gait: NORMAL  Neuro: Normal light sensory exam of hand generally, increased sensitivity in the distal half of the second digit    Left hand exam  Swelling of the 2nd digit distal to the PIP joint, most focal over the DIP joint region, some remaining ecchymosis in the nailbed and small subungal hematoma, subtle bruising on the palmar surface of the distal digit  Palpable soft tissue that is quite tendon on the palmar aspect of the DIP joint, question retracted tendon  Active PIP flexion and extension noted, able to mildly flex and extend the DIP joint but no active function noted   No induration or warmth, circulation maintained distal digit    RADIOLOGY:  Final results and radiologist's interpretation, available in the [facility] health record.  Images were reviewed with the patient in the office today.  My personal interpretation of the performed imaging: no " obvious bony pathology, generalized soft tissue swelling noted, will follow final radiology read  XR images independently visualized and reviewed with patient today in clinic        Again, thank you for allowing me to participate in the care of your patient.        Sincerely,        Ryan Page, DO

## 2025-07-12 ENCOUNTER — HEALTH MAINTENANCE LETTER (OUTPATIENT)
Age: 45
End: 2025-07-12

## (undated) DEVICE — LINEN TOWEL PACK X5 5464

## (undated) DEVICE — SU DERMABOND ADVANCED .7ML DNX12

## (undated) DEVICE — PAD CHUX UNDERPAD 30X36" P3036C

## (undated) DEVICE — ESU FCP OLYMPUS PK CUTTING 5MMX33CM PK-CF0533

## (undated) DEVICE — NDL INSUFFLATION 13GA 120MM C2201

## (undated) DEVICE — ANTIFOG SOLUTION SEE SHARP 150M TROCAR SWABS 30978 (COI)

## (undated) DEVICE — STPL SKIN 35W ROTATING HEAD PRW35

## (undated) DEVICE — CLEANER INST PRE-KLENZ SOAK SHIELD TUBE 6 ML MEDIUM 2D66J4

## (undated) DEVICE — LINEN POUCH DBL 5427

## (undated) DEVICE — SOL NACL 0.9% IRRIG 3000ML BAG 2B7477

## (undated) DEVICE — GLOVE PROTEXIS W/NEU-THERA 7.0  2D73TE70

## (undated) DEVICE — JELLY LUBRICATING SURGILUBE 2OZ TUBE

## (undated) DEVICE — ENDO CANNULA 05MM VERSAONE UNIVERSAL UNVCA5STF

## (undated) DEVICE — SU MONOCRYL 4-0 PS-2 18" UND Y496G

## (undated) DEVICE — Device

## (undated) DEVICE — ENDO TROCAR 05MM VERSAONE BLADED W/STD FIX CANNULA B5STF

## (undated) DEVICE — WIPES FOLEY CARE SURESTEP PROVON DFC100

## (undated) DEVICE — GLOVE PROTEXIS BLUE W/NEU-THERA 7.5  2D73EB75

## (undated) DEVICE — PAD PERI INDIV WRAP 11" 2022A

## (undated) DEVICE — SPECIMEN BAG BEMIS HI FLOW SUCTION WHITE SOCK 533810

## (undated) DEVICE — TUBING SYS AQUILEX BLUE INFLOW AQL-110 YLW OUTFLOW AQL-111

## (undated) DEVICE — DEVICE SUTURE GRASPER TROCAR CLOSURE 14GA PMITCSG

## (undated) DEVICE — DAVINCI XI OBTURATOR BLADELESS 8MM 470359

## (undated) DEVICE — DAVINCI XI SEAL UNIVERSAL 5-12MM 470500

## (undated) DEVICE — DAVINCI XI MONOPOLAR SCISSORS HOT SHEARS 8MM 470179

## (undated) DEVICE — GLOVE BIOGEL PI MICRO SZ 7.0 48570

## (undated) DEVICE — SOLUTION WATER 1000ML R5000-01

## (undated) DEVICE — PAD PERI INDIV WRAP 11" NON241286

## (undated) DEVICE — DAVINCI XI DRAPE ARM 470015

## (undated) DEVICE — ESU ABLATION SET HTA GENESYS PROCERVA M006580210

## (undated) DEVICE — TUBING FILTER TRI-LUMEN AIRSEAL ASC-EVAC1

## (undated) DEVICE — DAVINCI XI DRAPE COLUMN 470341

## (undated) DEVICE — PACK DAVINCI RIVERSIDE LF SAN15UPM36

## (undated) DEVICE — CATH TRAY FOLEY SURESTEP 16FR WDRAIN BAG STLK LATEX A300316A

## (undated) DEVICE — SUCTION MANIFOLD NEPTUNE 2 SYS 4 PORT 0702-020-000

## (undated) DEVICE — UTERINE MANIPULATOR RUMI 6.7MMX10CM UMG670

## (undated) DEVICE — BLADE KNIFE SURG 11 371111

## (undated) DEVICE — NDL INSUFFLATION 14GA STEP S100000

## (undated) DEVICE — TUBING IRRIG CYSTO/BLADDER SET 82" LF V4608-20

## (undated) DEVICE — SOL NACL 0.9% IRRIG 1000ML BOTTLE 2F7124

## (undated) DEVICE — DAVINCI XI GRASPER PROGRASP 8MM EXT 471093

## (undated) DEVICE — SUCTION CANISTER BEMIS HI FLOW 006772-901

## (undated) DEVICE — TUBING INSUFFLATION W/FILTER 10FT GS1016

## (undated) DEVICE — PAD CHUX UNDERPAD 30X30"

## (undated) DEVICE — COVER CAMERA IN-LIGHT DISP LT-C02

## (undated) DEVICE — LINEN GOWN X4 5410

## (undated) DEVICE — STRAP KNEE/BODY 31143004

## (undated) DEVICE — ESU GROUND PAD UNIVERSAL W/O CORD

## (undated) DEVICE — KIT POSITIONER PINK PAD XL ADVANCED 40588

## (undated) DEVICE — DECANTER TRANSFER DEVICE 2008S

## (undated) DEVICE — SUCTION CURETTE 3MM ENDOMETRIAL MX140

## (undated) DEVICE — PREP DYNA-HEX 4% CHG SCRUB 4OZ BOTTLE MDS098710

## (undated) DEVICE — GLOVE BIOGEL PI MICRO INDICATOR UNDERGLOVE SZ 7.5 48975

## (undated) DEVICE — DAVINCI HOT SHEARS TIP COVER  400180

## (undated) DEVICE — SYR 50ML LL W/O NDL 309653

## (undated) DEVICE — DAVINCI XI ESU FORCE BIPOLAR 8MM 471405

## (undated) DEVICE — DRAPE UNDER BUTTOCK 8483

## (undated) DEVICE — SOL WATER IRRIG 1000ML BOTTLE 2F7114

## (undated) DEVICE — PREP CHLORAPREP 26ML TINTED HI-LITE ORANGE 930815

## (undated) DEVICE — ADHESIVE SWIFTSET 0.8ML OCTYL SS6

## (undated) DEVICE — SEAL SET MYOSURE ROD LENS SCOPE SINGLE USE 40-902

## (undated) DEVICE — ADAPTER DRAPE ALLY AU-AD

## (undated) DEVICE — DRAPE POUCH IRR 1016

## (undated) DEVICE — ENDO TROCAR 10MM STEP S101010

## (undated) DEVICE — SU WND CLOSURE VLOC 180 ABS 2-0 12" GS-21 VLOCL0315

## (undated) DEVICE — ENDO TROCAR CONMED AIRSEAL BLADELESS 08X120MM IAS8-120LP

## (undated) DEVICE — SUCTION IRR STRYKERFLOW II W/TIP 250-070-520

## (undated) DEVICE — DAVINCI XI DRIVER NDL MEGA SUTURECUT 8MM EXT 471309

## (undated) DEVICE — PANTIES MESH LG/XLG 2PK 706M2

## (undated) DEVICE — SOLUTION IV 0.9% NACL 1000ML E8000

## (undated) DEVICE — STABILIZER ARCH KOH-EFFICIENT 3.5CM KC-ARCH-35

## (undated) DEVICE — SYR 30ML SLIP TIP W/O NDL 302833

## (undated) DEVICE — SUCTION MANIFOLD DORNOCH ULTRA CART UL-CL500

## (undated) DEVICE — LINEN TOWEL PACK X30 5481

## (undated) RX ORDER — ACETAMINOPHEN 325 MG/1
TABLET ORAL
Status: DISPENSED
Start: 2025-04-15

## (undated) RX ORDER — ONDANSETRON 2 MG/ML
INJECTION INTRAMUSCULAR; INTRAVENOUS
Status: DISPENSED
Start: 2020-07-24

## (undated) RX ORDER — LIDOCAINE HYDROCHLORIDE 20 MG/ML
INJECTION, SOLUTION EPIDURAL; INFILTRATION; INTRACAUDAL; PERINEURAL
Status: DISPENSED
Start: 2020-07-24

## (undated) RX ORDER — APREPITANT 40 MG/1
CAPSULE ORAL
Status: DISPENSED
Start: 2025-04-15

## (undated) RX ORDER — LIDOCAINE 40 MG/G
CREAM TOPICAL
Status: DISPENSED
Start: 2018-12-12

## (undated) RX ORDER — KETOROLAC TROMETHAMINE 30 MG/ML
INJECTION, SOLUTION INTRAMUSCULAR; INTRAVENOUS
Status: DISPENSED
Start: 2017-06-23

## (undated) RX ORDER — ONDANSETRON 2 MG/ML
INJECTION INTRAMUSCULAR; INTRAVENOUS
Status: DISPENSED
Start: 2025-04-15

## (undated) RX ORDER — LIDOCAINE HYDROCHLORIDE 10 MG/ML
INJECTION, SOLUTION EPIDURAL; INFILTRATION; INTRACAUDAL; PERINEURAL
Status: DISPENSED
Start: 2021-09-10

## (undated) RX ORDER — GABAPENTIN 300 MG/1
CAPSULE ORAL
Status: DISPENSED
Start: 2020-07-24

## (undated) RX ORDER — LIDOCAINE HYDROCHLORIDE 20 MG/ML
INJECTION, SOLUTION EPIDURAL; INFILTRATION; INTRACAUDAL; PERINEURAL
Status: DISPENSED
Start: 2017-06-23

## (undated) RX ORDER — FENTANYL CITRATE 50 UG/ML
INJECTION, SOLUTION INTRAMUSCULAR; INTRAVENOUS
Status: DISPENSED
Start: 2017-06-23

## (undated) RX ORDER — FENTANYL CITRATE 50 UG/ML
INJECTION, SOLUTION INTRAMUSCULAR; INTRAVENOUS
Status: DISPENSED
Start: 2020-07-24

## (undated) RX ORDER — HYDROMORPHONE HYDROCHLORIDE 1 MG/ML
INJECTION, SOLUTION INTRAMUSCULAR; INTRAVENOUS; SUBCUTANEOUS
Status: DISPENSED
Start: 2025-04-15

## (undated) RX ORDER — DEXAMETHASONE SODIUM PHOSPHATE 4 MG/ML
INJECTION, SOLUTION INTRA-ARTICULAR; INTRALESIONAL; INTRAMUSCULAR; INTRAVENOUS; SOFT TISSUE
Status: DISPENSED
Start: 2025-04-15

## (undated) RX ORDER — FENTANYL CITRATE 50 UG/ML
INJECTION, SOLUTION INTRAMUSCULAR; INTRAVENOUS
Status: DISPENSED
Start: 2018-12-12

## (undated) RX ORDER — GLYCOPYRROLATE 0.2 MG/ML
INJECTION, SOLUTION INTRAMUSCULAR; INTRAVENOUS
Status: DISPENSED
Start: 2017-06-23

## (undated) RX ORDER — FENTANYL CITRATE 50 UG/ML
INJECTION, SOLUTION INTRAMUSCULAR; INTRAVENOUS
Status: DISPENSED
Start: 2021-09-10

## (undated) RX ORDER — METRONIDAZOLE 500 MG/100ML
INJECTION, SOLUTION INTRAVENOUS
Status: DISPENSED
Start: 2025-04-15

## (undated) RX ORDER — OXYCODONE HYDROCHLORIDE 5 MG/1
TABLET ORAL
Status: DISPENSED
Start: 2020-07-24

## (undated) RX ORDER — PROPOFOL 10 MG/ML
INJECTION, EMULSION INTRAVENOUS
Status: DISPENSED
Start: 2025-04-15

## (undated) RX ORDER — ONDANSETRON 2 MG/ML
INJECTION INTRAMUSCULAR; INTRAVENOUS
Status: DISPENSED
Start: 2018-12-12

## (undated) RX ORDER — OXYCODONE HYDROCHLORIDE 5 MG/1
TABLET ORAL
Status: DISPENSED
Start: 2021-09-10

## (undated) RX ORDER — LIDOCAINE HYDROCHLORIDE 10 MG/ML
INJECTION, SOLUTION EPIDURAL; INFILTRATION; INTRACAUDAL; PERINEURAL
Status: DISPENSED
Start: 2020-07-24

## (undated) RX ORDER — SODIUM CHLORIDE, SODIUM LACTATE, POTASSIUM CHLORIDE, CALCIUM CHLORIDE 600; 310; 30; 20 MG/100ML; MG/100ML; MG/100ML; MG/100ML
INJECTION, SOLUTION INTRAVENOUS
Status: DISPENSED
Start: 2025-04-15

## (undated) RX ORDER — SCOLOPAMINE TRANSDERMAL SYSTEM 1 MG/1
PATCH, EXTENDED RELEASE TRANSDERMAL
Status: DISPENSED
Start: 2017-06-23

## (undated) RX ORDER — KETOROLAC TROMETHAMINE 30 MG/ML
INJECTION, SOLUTION INTRAMUSCULAR; INTRAVENOUS
Status: DISPENSED
Start: 2018-12-12

## (undated) RX ORDER — PROPOFOL 10 MG/ML
INJECTION, EMULSION INTRAVENOUS
Status: DISPENSED
Start: 2020-07-24

## (undated) RX ORDER — ONDANSETRON 2 MG/ML
INJECTION INTRAMUSCULAR; INTRAVENOUS
Status: DISPENSED
Start: 2017-06-23

## (undated) RX ORDER — ONDANSETRON 2 MG/ML
INJECTION INTRAMUSCULAR; INTRAVENOUS
Status: DISPENSED
Start: 2021-09-10

## (undated) RX ORDER — DEXAMETHASONE SODIUM PHOSPHATE 4 MG/ML
INJECTION, SOLUTION INTRA-ARTICULAR; INTRALESIONAL; INTRAMUSCULAR; INTRAVENOUS; SOFT TISSUE
Status: DISPENSED
Start: 2017-06-23

## (undated) RX ORDER — DEXAMETHASONE SODIUM PHOSPHATE 4 MG/ML
INJECTION, SOLUTION INTRA-ARTICULAR; INTRALESIONAL; INTRAMUSCULAR; INTRAVENOUS; SOFT TISSUE
Status: DISPENSED
Start: 2018-12-12

## (undated) RX ORDER — GABAPENTIN 100 MG/1
CAPSULE ORAL
Status: DISPENSED
Start: 2017-06-23

## (undated) RX ORDER — ACETAMINOPHEN 325 MG/1
TABLET ORAL
Status: DISPENSED
Start: 2020-07-24

## (undated) RX ORDER — ACETAMINOPHEN 325 MG/1
TABLET ORAL
Status: DISPENSED
Start: 2017-06-23

## (undated) RX ORDER — ACETAMINOPHEN 325 MG/1
TABLET ORAL
Status: DISPENSED
Start: 2021-09-10

## (undated) RX ORDER — FENTANYL CITRATE 50 UG/ML
INJECTION, SOLUTION INTRAMUSCULAR; INTRAVENOUS
Status: DISPENSED
Start: 2025-04-15

## (undated) RX ORDER — GLYCOPYRROLATE 0.2 MG/ML
INJECTION INTRAMUSCULAR; INTRAVENOUS
Status: DISPENSED
Start: 2020-07-24

## (undated) RX ORDER — HALOPERIDOL 5 MG/ML
INJECTION INTRAMUSCULAR
Status: DISPENSED
Start: 2025-04-15

## (undated) RX ORDER — FENTANYL CITRATE-0.9 % NACL/PF 10 MCG/ML
PLASTIC BAG, INJECTION (ML) INTRAVENOUS
Status: DISPENSED
Start: 2020-07-24

## (undated) RX ORDER — FENTANYL CITRATE-0.9 % NACL/PF 10 MCG/ML
PLASTIC BAG, INJECTION (ML) INTRAVENOUS
Status: DISPENSED
Start: 2025-04-15

## (undated) RX ORDER — SCOPOLAMINE 1 MG/3D
PATCH, EXTENDED RELEASE TRANSDERMAL
Status: DISPENSED
Start: 2025-04-15

## (undated) RX ORDER — KETOROLAC TROMETHAMINE 30 MG/ML
INJECTION, SOLUTION INTRAMUSCULAR; INTRAVENOUS
Status: DISPENSED
Start: 2020-07-24

## (undated) RX ORDER — HYDROXYZINE HYDROCHLORIDE 25 MG/1
TABLET, FILM COATED ORAL
Status: DISPENSED
Start: 2017-06-23

## (undated) RX ORDER — DEXAMETHASONE SODIUM PHOSPHATE 4 MG/ML
INJECTION, SOLUTION INTRA-ARTICULAR; INTRALESIONAL; INTRAMUSCULAR; INTRAVENOUS; SOFT TISSUE
Status: DISPENSED
Start: 2020-07-24

## (undated) RX ORDER — KETOROLAC TROMETHAMINE 30 MG/ML
INJECTION, SOLUTION INTRAMUSCULAR; INTRAVENOUS
Status: DISPENSED
Start: 2021-09-10

## (undated) RX ORDER — EPHEDRINE SULFATE 50 MG/ML
INJECTION, SOLUTION INTRAMUSCULAR; INTRAVENOUS; SUBCUTANEOUS
Status: DISPENSED
Start: 2020-07-24

## (undated) RX ORDER — ACETAMINOPHEN 325 MG/1
TABLET ORAL
Status: DISPENSED
Start: 2018-12-12